# Patient Record
Sex: FEMALE | Race: WHITE | NOT HISPANIC OR LATINO | Employment: OTHER | URBAN - METROPOLITAN AREA
[De-identification: names, ages, dates, MRNs, and addresses within clinical notes are randomized per-mention and may not be internally consistent; named-entity substitution may affect disease eponyms.]

---

## 2017-01-31 ENCOUNTER — OFFICE VISIT (OUTPATIENT)
Dept: OBSTETRICS AND GYNECOLOGY | Facility: CLINIC | Age: 67
End: 2017-01-31
Payer: MEDICARE

## 2017-01-31 VITALS
BODY MASS INDEX: 30.11 KG/M2 | HEIGHT: 66 IN | SYSTOLIC BLOOD PRESSURE: 118 MMHG | WEIGHT: 187.38 LBS | DIASTOLIC BLOOD PRESSURE: 64 MMHG

## 2017-01-31 DIAGNOSIS — Z78.0 POSTMENOPAUSE: ICD-10-CM

## 2017-01-31 DIAGNOSIS — Z13.820 SCREENING FOR OSTEOPOROSIS: ICD-10-CM

## 2017-01-31 DIAGNOSIS — Z01.419 VISIT FOR GYNECOLOGIC EXAMINATION: Primary | ICD-10-CM

## 2017-01-31 PROCEDURE — 99213 OFFICE O/P EST LOW 20 MIN: CPT | Mod: PBBFAC | Performed by: NURSE PRACTITIONER

## 2017-01-31 PROCEDURE — G0101 CA SCREEN;PELVIC/BREAST EXAM: HCPCS | Mod: S$PBB,,, | Performed by: NURSE PRACTITIONER

## 2017-01-31 PROCEDURE — 99999 PR PBB SHADOW E&M-EST. PATIENT-LVL III: CPT | Mod: PBBFAC,,, | Performed by: NURSE PRACTITIONER

## 2017-01-31 NOTE — PROGRESS NOTES
HISTORY OF PRESENT ILLNESS:    Dana Cook is a 66 y.o. female , presents for a routine exam and has no gyn complaints.    -Main c/o are fatigue and eye problems.     Past Medical History   Diagnosis Date    Anxiety     Colon polyps     Depression     Dry eyes     Dry mouth     Dupuytren's contracture     Hypertension     left arm fracture      hairline    Osteopenia     Sarcoidosis        Past Surgical History   Procedure Laterality Date    Wrist surgery       left    Laparoscopy for possible endometriosis           MEDICATIONS AND ALLERGIES:      Current Outpatient Prescriptions:     ascorbic acid (VITAMIN C) 1000 MG tablet, , Disp: , Rfl:     buPROPion (WELLBUTRIN XL) 150 MG TB24 tablet, TAKE 1 TABLET BY MOUTH EVERY DAY, Disp: 90 tablet, Rfl: 0    chlorthalidone (HYGROTEN) 25 MG Tab, TAKE 1 TABLET BY MOUTH EVERY DAY, Disp: 90 tablet, Rfl: 3    cyanocobalamin, vitamin B-12, (VITAMIN B-12) 1,000 mcg Lozg, , Disp: , Rfl:     ferrous sulfate (IRON) 325 mg (65 mg iron) Tab, Take by mouth. 1 Tablet Oral Every day, Disp: , Rfl:     glucosamine-chondroitin 500-400 mg tablet, Take 1 tablet by mouth 3 (three) times daily., Disp: , Rfl:     multivitamin with minerals (MULTIPLE VITAMIN-MINERALS) tablet, Take by mouth. 1 Tablet Oral Every day, Disp: , Rfl:     omega-3 fatty acids-vitamin E (FISH OIL) 1,000 mg Cap, , Disp: , Rfl:     red yeast rice 600 mg Cap, Take by mouth., Disp: , Rfl:     SUBHASH'S WORT/S.GINSGR (SUBHASH'S WORT-SIBER.GINSENG) 450-90 mg Tab, , Disp: , Rfl:     vitamin E 1000 UNIT capsule, Take by mouth. 1 Capsule Oral Every day, Disp: , Rfl:     Review of patient's allergies indicates:   Allergen Reactions    Other Other (See Comments)     Allergy to tape.        Family History   Problem Relation Age of Onset    Cancer Father      ? type    Leukemia Paternal Grandmother     Breast cancer Paternal Aunt      maternal aunt    Cancer Paternal Aunt      cervical     Cervical cancer Paternal Aunt     Uterine cancer Sister     Basal cell carcinoma Sister      brother    Uterine cancer Maternal Grandmother     Breast cancer Maternal Aunt     Colon cancer Neg Hx        Social History     Social History    Marital status:      Spouse name: N/A    Number of children: 1    Years of education: N/A     Occupational History    retired      Part time security work     Social History Main Topics    Smoking status: Never Smoker    Smokeless tobacco: Never Used    Alcohol use 3.0 - 3.6 oz/week     5 - 6 Standard drinks or equivalent per week      Comment: wine every night    Drug use: No    Sexual activity: Not Currently     Partners: Male      Comment: Not x 10yrs     Other Topics Concern    Not on file     Social History Narrative       OB HISTORY: Number of vaginal deliveries:1     COMPREHENSIVE GYN HISTORY:  PAP History: Denies abnormal Paps. LAST PAP 1-27-15 NORMAL.   Infection History: Denies STDs. Denies PID.  Benign History: Denies uterine fibroids. Denies ovarian cysts. Reports endometriosis. Denies other conditions.  Cancer History: Denies cervical cancer. Denies uterine cancer or hyperplasia. Denies ovarian cancer. Denies vulvar cancer or pre-cancer. Denies vaginal cancer or pre-cancer. Denies breast cancer. Denies colon cancer.  Sexual Activity History: Denies currently being sexually active  Menstrual History: Denies menses. Pt is : 1996, not on HRT.     ROS:  GENERAL: No weight changes. No swelling. + FATIGUE. No fever.  CARDIOVASCULAR: No chest pain. No shortness of breath. No leg cramps.   NEUROLOGICAL: No headaches. No vision changes.  BREASTS: No pain. No lumps. No discharge.  ABDOMEN: No pain. No nausea. No vomiting. No diarrhea. No constipation.  REPRODUCTIVE: No abnormal bleeding.   VULVA: No pain. No lesions. No itching.  VAGINA: No relaxation. No itching. No odor. No discharge. No lesions.  URINARY: No incontinence. No nocturia. No frequency.  "No dysuria.    Visit Vitals    /64    Ht 5' 5.5" (1.664 m)    Wt 85 kg (187 lb 6.3 oz)    BMI 30.71 kg/m2       PE:  APPEARANCE: Well nourished, well developed, in no acute distress.  AFFECT: WNL, alert and oriented x 3.  SKIN: No hirsutism or acne.  NECK: Neck symmetric without masses or thyromegaly.  NODES: No inguinal, cervical, axillary or femoral lymph node enlargement.  CHEST: Good respiratory effort.   ABDOMEN: Soft. No tenderness or masses. No hepatosplenomegaly. No hernias.  BREASTS: Symmetrical, no skin changes or visible lesions. No palpable masses, nipple discharge bilaterally.  PELVIC: ATROPHIC EXTERNAL FEMALE GENITALIA without lesions. Normal hair distribution. Adequate perineal body, normal urethral meatus. VAGINA DRY / ATROPHIC without lesions or discharge. CERVIX STENOTIC without lesions, discharge or tenderness. No significant cystocele or rectocele. Bimanual exam shows uterus to be normal size, regular, mobile and nontender. Adnexa without masses or tenderness.  RECTAL: Rectovaginal exam confirms above with normal sphincter tone, no masses.  EXTREMITIES: No edema.    DIAGNOSIS:  1. Visit for gynecologic examination    2. Postmenopause        PLAN:    MEDICATIONS PRESCRIBED:  None    LABS AND TESTS ORDERED  BMD Test  Up to date on mammogram and colon screening ([facility])    COUNSELING:  The patient was counseled today on:  -osteoporosis prevention, calcium supplementation, regular weight bearing exercise;  -A.C.S. Pap and pelvic exam guidelines (pap every 3 years, no pap after age 65) and recommendations for yearly mammogram;  -to see her PCP for other health maintenance.    FOLLOW-UP with me annually.     "

## 2017-02-07 ENCOUNTER — LAB VISIT (OUTPATIENT)
Dept: LAB | Facility: HOSPITAL | Age: 67
End: 2017-02-07
Attending: INTERNAL MEDICINE
Payer: MEDICARE

## 2017-02-07 DIAGNOSIS — E78.00 ELEVATED CHOLESTEROL: ICD-10-CM

## 2017-02-07 DIAGNOSIS — I10 ESSENTIAL HYPERTENSION: ICD-10-CM

## 2017-02-07 DIAGNOSIS — Z11.59 NEED FOR HEPATITIS C SCREENING TEST: ICD-10-CM

## 2017-02-07 DIAGNOSIS — E78.5 HLD (HYPERLIPIDEMIA): ICD-10-CM

## 2017-02-07 DIAGNOSIS — F32.A DEPRESSION, UNSPECIFIED DEPRESSION TYPE: ICD-10-CM

## 2017-02-07 LAB
ALBUMIN SERPL BCP-MCNC: 3.8 G/DL
ALP SERPL-CCNC: 75 U/L
ALT SERPL W/O P-5'-P-CCNC: 27 U/L
ANION GAP SERPL CALC-SCNC: 11 MMOL/L
AST SERPL-CCNC: 27 U/L
BASOPHILS # BLD AUTO: 0.03 K/UL
BASOPHILS NFR BLD: 0.6 %
BILIRUB SERPL-MCNC: 0.5 MG/DL
BUN SERPL-MCNC: 12 MG/DL
CALCIUM SERPL-MCNC: 9.7 MG/DL
CHLORIDE SERPL-SCNC: 103 MMOL/L
CHOLEST/HDLC SERPL: 4.4 {RATIO}
CHOLEST/HDLC SERPL: 4.4 {RATIO}
CO2 SERPL-SCNC: 26 MMOL/L
CREAT SERPL-MCNC: 1 MG/DL
DIFFERENTIAL METHOD: NORMAL
EOSINOPHIL # BLD AUTO: 0.1 K/UL
EOSINOPHIL NFR BLD: 2.8 %
ERYTHROCYTE [DISTWIDTH] IN BLOOD BY AUTOMATED COUNT: 13.5 %
EST. GFR  (AFRICAN AMERICAN): >60 ML/MIN/1.73 M^2
EST. GFR  (NON AFRICAN AMERICAN): 58.8 ML/MIN/1.73 M^2
GLUCOSE SERPL-MCNC: 99 MG/DL
HCT VFR BLD AUTO: 40.7 %
HCV AB SERPL QL IA: NEGATIVE
HDL/CHOLESTEROL RATIO: 22.7 %
HDL/CHOLESTEROL RATIO: 22.7 %
HDLC SERPL-MCNC: 264 MG/DL
HDLC SERPL-MCNC: 264 MG/DL
HDLC SERPL-MCNC: 60 MG/DL
HDLC SERPL-MCNC: 60 MG/DL
HGB BLD-MCNC: 13.2 G/DL
LDLC SERPL CALC-MCNC: 168.6 MG/DL
LDLC SERPL CALC-MCNC: 168.6 MG/DL
LYMPHOCYTES # BLD AUTO: 2.1 K/UL
LYMPHOCYTES NFR BLD: 41.7 %
MCH RBC QN AUTO: 31 PG
MCHC RBC AUTO-ENTMCNC: 32.4 %
MCV RBC AUTO: 96 FL
MONOCYTES # BLD AUTO: 0.4 K/UL
MONOCYTES NFR BLD: 7.9 %
NEUTROPHILS # BLD AUTO: 2.3 K/UL
NEUTROPHILS NFR BLD: 46.8 %
NONHDLC SERPL-MCNC: 204 MG/DL
NONHDLC SERPL-MCNC: 204 MG/DL
PLATELET # BLD AUTO: 321 K/UL
PMV BLD AUTO: 9.7 FL
POTASSIUM SERPL-SCNC: 3.7 MMOL/L
PROT SERPL-MCNC: 7.6 G/DL
RBC # BLD AUTO: 4.26 M/UL
SODIUM SERPL-SCNC: 140 MMOL/L
TRIGL SERPL-MCNC: 177 MG/DL
TRIGL SERPL-MCNC: 177 MG/DL
TSH SERPL DL<=0.005 MIU/L-ACNC: 1.57 UIU/ML
WBC # BLD AUTO: 4.94 K/UL

## 2017-02-07 PROCEDURE — 80053 COMPREHEN METABOLIC PANEL: CPT

## 2017-02-07 PROCEDURE — 80061 LIPID PANEL: CPT

## 2017-02-07 PROCEDURE — 84443 ASSAY THYROID STIM HORMONE: CPT

## 2017-02-07 PROCEDURE — 36415 COLL VENOUS BLD VENIPUNCTURE: CPT | Mod: PO

## 2017-02-07 PROCEDURE — 86803 HEPATITIS C AB TEST: CPT

## 2017-02-07 PROCEDURE — 85025 COMPLETE CBC W/AUTO DIFF WBC: CPT

## 2017-02-09 ENCOUNTER — OFFICE VISIT (OUTPATIENT)
Dept: INTERNAL MEDICINE | Facility: CLINIC | Age: 67
End: 2017-02-09
Payer: MEDICARE

## 2017-02-09 VITALS
DIASTOLIC BLOOD PRESSURE: 88 MMHG | HEART RATE: 62 BPM | WEIGHT: 189.63 LBS | TEMPERATURE: 98 F | SYSTOLIC BLOOD PRESSURE: 136 MMHG | BODY MASS INDEX: 31.07 KG/M2 | RESPIRATION RATE: 18 BRPM

## 2017-02-09 DIAGNOSIS — D86.9 SARCOIDOSIS: ICD-10-CM

## 2017-02-09 DIAGNOSIS — M85.80 OSTEOPENIA: ICD-10-CM

## 2017-02-09 DIAGNOSIS — E78.2 MIXED HYPERLIPIDEMIA: ICD-10-CM

## 2017-02-09 DIAGNOSIS — Z00.00 ANNUAL PHYSICAL EXAM: Primary | ICD-10-CM

## 2017-02-09 DIAGNOSIS — I10 ESSENTIAL HYPERTENSION: ICD-10-CM

## 2017-02-09 DIAGNOSIS — F32.A DEPRESSION, UNSPECIFIED DEPRESSION TYPE: ICD-10-CM

## 2017-02-09 PROCEDURE — 99397 PER PM REEVAL EST PAT 65+ YR: CPT | Mod: S$PBB,,, | Performed by: INTERNAL MEDICINE

## 2017-02-09 PROCEDURE — 99999 PR PBB SHADOW E&M-EST. PATIENT-LVL III: CPT | Mod: PBBFAC,,, | Performed by: INTERNAL MEDICINE

## 2017-02-09 PROCEDURE — 90732 PPSV23 VACC 2 YRS+ SUBQ/IM: CPT | Mod: PBBFAC,PO | Performed by: INTERNAL MEDICINE

## 2017-02-09 PROCEDURE — 99213 OFFICE O/P EST LOW 20 MIN: CPT | Mod: PBBFAC,PO | Performed by: INTERNAL MEDICINE

## 2017-02-09 RX ORDER — BUPROPION HYDROCHLORIDE 300 MG/1
300 TABLET ORAL DAILY
Qty: 30 TABLET | Refills: 11 | Status: SHIPPED | OUTPATIENT
Start: 2017-02-09 | End: 2018-02-01 | Stop reason: SDUPTHER

## 2017-02-09 RX ORDER — BUPROPION HYDROCHLORIDE 300 MG/1
300 TABLET ORAL DAILY
Qty: 30 TABLET | Refills: 11 | Status: SHIPPED | OUTPATIENT
Start: 2017-02-09 | End: 2017-02-09 | Stop reason: SDUPTHER

## 2017-02-09 NOTE — PROGRESS NOTES
Subjective:       Patient ID: Dana Cook is a 66 y.o. female.    Chief Complaint: Annual Exam    Patient is a 66 y.o.female who presents today for annual.    Cholesterol: reviewed  Vaccines: Influenza (yearly); Tetanus (2016); Pneumovax (done); Zoster (done at age 60); Prevnar ( done)  Eye exam: up to date  Mammogram: nov 2016  Gyn exam: jan 2015  Colonoscopy: 2014; due in five years  Exercise: active  Diet: healthy      A bit more depressed recently. Feels like wellbutrin isn't working as well.   Review of Systems   Constitutional: Negative for appetite change, chills, diaphoresis, fatigue and fever.   HENT: Negative for congestion, dental problem, ear discharge, ear pain, hearing loss, postnasal drip, sinus pressure and sore throat.    Eyes: Negative for discharge, redness and itching.   Respiratory: Negative for cough, chest tightness, shortness of breath and wheezing.    Cardiovascular: Negative for chest pain, palpitations and leg swelling.   Gastrointestinal: Negative for abdominal pain, constipation, diarrhea, nausea and vomiting.   Endocrine: Negative for cold intolerance and heat intolerance.   Genitourinary: Negative for difficulty urinating, frequency, hematuria and urgency.   Musculoskeletal: Negative for arthralgias, back pain, gait problem, myalgias and neck pain.   Skin: Negative for color change and rash.   Neurological: Negative for dizziness, syncope and headaches.   Hematological: Negative for adenopathy.   Psychiatric/Behavioral: Positive for dysphoric mood. Negative for behavioral problems and sleep disturbance. The patient is not nervous/anxious.        Objective:      Physical Exam   Constitutional: She is oriented to person, place, and time. She appears well-developed and well-nourished. No distress.   HENT:   Head: Normocephalic and atraumatic.   Right Ear: External ear normal.   Left Ear: External ear normal.   Eyes: Conjunctivae and EOM are normal. Pupils are equal, round, and reactive  to light. Right eye exhibits no discharge. Left eye exhibits no discharge. No scleral icterus.   Neck: Normal range of motion. Neck supple. No JVD present. No thyromegaly present.   Cardiovascular: Normal rate, regular rhythm, normal heart sounds and intact distal pulses.  Exam reveals no gallop and no friction rub.    No murmur heard.  Pulmonary/Chest: Effort normal and breath sounds normal. No stridor. No respiratory distress. She has no wheezes. She has no rales. She exhibits no tenderness.   Abdominal: Soft. Bowel sounds are normal. She exhibits no distension. There is no tenderness. There is no rebound.   Musculoskeletal: Normal range of motion. She exhibits no edema or tenderness.   Lymphadenopathy:     She has no cervical adenopathy.   Neurological: She is alert and oriented to person, place, and time.   Skin: Skin is warm. No rash noted. She is not diaphoretic. No erythema.   Psychiatric: She has a normal mood and affect. Her behavior is normal.   Nursing note and vitals reviewed.      Assessment and Plan:       1. Annual physical exam  - labs reviewed  - mammo up to date  - gyn exam up to date  - dexa recently done  - colonoscopy up to date  - Pneumococcal Polysaccharide Vaccine (23 Valent) (SQ/IM)    2. Depression, unspecified depression type  - increase wellbutrin to 300 mg; notify clinic of mood in one month  - buPROPion (WELLBUTRIN XL) 300 MG 24 hr tablet; Take 1 tablet (300 mg total) by mouth once daily.  Dispense: 30 tablet; Refill: 11    3. Osteopenia  - recent dexa revealed osteopenia; recommendation is to start fosamax; pt will wait to speak to gyn as they ordered the screen    4. Sarcoidosis  - stable    5. HLD: improving; discussed low cholesterol diet and exercise; taking red yeast rice    6. HTN: stable on hygroten        Return in about 6 months (around 8/9/2017).

## 2017-02-09 NOTE — MR AVS SNAPSHOT
[facility]   [facility]  [place] [address]  Phone: [phone]  Fax: [phone]                  Dana Cook   2017 8:00 AM   Office Visit    Description:  Female : 1950   Provider:  Isabel Hernandez DO   Department:  [facility]           Reason for Visit     Annual Exam           Diagnoses this Visit        Comments    Annual physical exam    -  Primary     Depression, unspecified depression type         Osteopenia         Sarcoidosis         Mixed hyperlipidemia         Essential hypertension                To Do List           Goals (5 Years of Data)     None      Follow-Up and Disposition     Return in about 6 months (around 2017).       These Medications        Disp Refills Start End    buPROPion (WELLBUTRIN XL) 300 MG 24 hr tablet 30 tablet 11 2017     Take 1 tablet (300 mg total) by mouth once daily. - Oral    Pharmacy: [id number] [place]  [address] AT [facility] [facility] [place] Ph #: [phone]         [facility] On Call     [facility] On Call Nurse Care Line -  Assistance  Registered nurses in the [facility] On Call Center provide clinical advisement, health education, appointment booking, and other advisory services.  Call for this free service at [phone].             Medications           Message regarding Medications     Verify the changes and/or additions to your medication regime listed below are the same as discussed with your clinician today.  If any of these changes or additions are incorrect, please notify your healthcare provider.        START taking these NEW medications        Refills    buPROPion (WELLBUTRIN XL) 300 MG 24 hr tablet 11    Sig: Take 1 tablet (300 mg total) by mouth once daily.    Class: Normal    Route: Oral           Verify that the below list of medications is an accurate representation of the medications you are currently taking.  If none reported,  the list may be blank. If incorrect, please contact your healthcare provider. Carry this list with you in case of emergency.           Current Medications     ascorbic acid (VITAMIN C) 1000 MG tablet     chlorthalidone (HYGROTEN) 25 MG Tab TAKE 1 TABLET BY MOUTH EVERY DAY    cyanocobalamin, vitamin B-12, (VITAMIN B-12) 1,000 mcg Lozg     ferrous sulfate (IRON) 325 mg (65 mg iron) Tab Take by mouth. 1 Tablet Oral Every day    glucosamine-chondroitin 500-400 mg tablet Take 1 tablet by mouth 3 (three) times daily.    multivitamin with minerals (MULTIPLE VITAMIN-MINERALS) tablet Take by mouth. 1 Tablet Oral Every day    omega-3 fatty acids-vitamin E (FISH OIL) 1,000 mg Cap     red yeast rice 600 mg Cap Take by mouth.    SUBHASH'S WORT/S.GINSGR (SUBHASH'S WORT-SIBER.GINSENG) 450-90 mg Tab     vitamin E 1000 UNIT capsule Take by mouth. 1 Capsule Oral Every day    buPROPion (WELLBUTRIN XL) 300 MG 24 hr tablet Take 1 tablet (300 mg total) by mouth once daily.           Clinical Reference Information           Your Vitals Were     BP Pulse Temp Resp Weight BMI    136/88 (BP Location: Right arm, Patient Position: Sitting, BP Method: Manual) 62 98.3 °F (36.8 °C) (Oral) 18 86 kg (189 lb 9.5 oz) 31.07 kg/m2      Blood Pressure          Most Recent Value    BP  136/88      Allergies as of 2/9/2017     Other      Immunizations Administered on Date of Encounter - 2/9/2017     Name Date Dose VIS Date Route    Pneumococcal Polysaccharide - 23 Valent 2/9/2017 0.5 mL 4/24/2015 Intramuscular      Orders Placed During Today's Visit      Normal Orders This Visit    Pneumococcal Polysaccharide Vaccine (23 Valent) (SQ/IM)       [facility] Sign-Up     Activating your [facility] account is as easy as 1-2-3!     1) Visit [email or web address], select Sign Up Now, enter this activation code and your date of birth, then select Next.  [email or web address]  Expires: 3/26/2017  8:05 AM      2) Create a username and password to use when you visit [facility] in  the future and select a security question in case you lose your password and select Next.    3) Enter your e-mail address and click Sign Up!    Additional Information  If you have questions, please e-mail [email or web address] or call [phone] to talk to our [facility] staff. Remember, [facility] is NOT to be used for urgent needs. For medical emergencies, dial 911.         Language Assistance Services     ATTENTION: Language assistance services are available, free of charge. Please call [phone].      ATENCIÓN: Si habla español, tiene a arellano disposición servicios gratuitos de asistencia lingüística. Llame al [phone].     CHÚ Ý: N?u b?n nói Ti?ng Vi?t, có các d?ch v? h? tr? ngôn ng? mi?n phí dành cho b?n. G?i s? [phone].         [facility] complies with applicable Federal civil rights laws and does not discriminate on the basis of race, color, national origin, age, disability, or sex.

## 2017-02-16 ENCOUNTER — TELEPHONE (OUTPATIENT)
Dept: OBSTETRICS AND GYNECOLOGY | Facility: CLINIC | Age: 67
End: 2017-02-16

## 2017-02-16 NOTE — TELEPHONE ENCOUNTER
----- Message from Joe Masterson sent at 2/16/2017  8:46 AM CST -----  Contact: pt  x_ 1st Request   _ 2nd Request   _ 3rd Request     Who: JASON JACK [[id number]]    Why: pt is requesting a call back in reference to getting a prescription.  She will not be home today and is requesting a call back tomorrow.    What Number to Call Back: [phone]    When to Expect a call back: (Before the end of the day)   -- if call after 3:00 call back will be tomorrow.    PHONE CALL: LM that I will be out of the office tomorrow and will call on Monday. Maria G Burch NP    PHONE CALL: Discussed the results of her BMD test. Pt states her PCP did not prescribe medication. Advised to increase her weight bearing exercise, take the recommended Calcium and Vit D and will repeat the test in 2 years. If the results continue to decrease or she develops osteoporosis, would refer to endocrinology. Maria G Burch NP

## 2017-02-21 ENCOUNTER — TELEPHONE (OUTPATIENT)
Dept: INTERNAL MEDICINE | Facility: CLINIC | Age: 67
End: 2017-02-21

## 2017-02-21 DIAGNOSIS — M81.0 OSTEOPOROSIS: Primary | ICD-10-CM

## 2017-02-21 NOTE — TELEPHONE ENCOUNTER
Treatment recommendations are calcium 1200 mg daily, vitamin D 1000 units daily and we can start a medication called fosamax to take once a week to aid in bone turnover. Biggest side effect is reflux. She has to take it on a empty stomach with a full glass of water and sit up for thirty min after. If she is agreeable, i can send to her pharm

## 2017-02-21 NOTE — TELEPHONE ENCOUNTER
Spoke to pt who stated that she spoke with her GYN who stated that pt needs to talk to pcp about osteoporosis and treatment for it.     Pt also reported reaction to pneumovax, stated that she experienced arm swelling and fever and chills. Pt stated that she took ibuprofen and benadryl to help. Fever and chills went away after 2 days and arm swelling went down after 4 days.

## 2017-02-21 NOTE — TELEPHONE ENCOUNTER
----- Message from Thao Edmonds sent at 2/21/2017  8:16 AM CST -----  Contact: Home: [phone]   When ask the reference to the call. She replied, Call her back, she need to speak with you. I ask for ref again and this is all I got from her.

## 2017-02-23 NOTE — TELEPHONE ENCOUNTER
Spoke to pt and informed of Dr. Fagan's recommendations. Pt is not agreeable to fosamax. Please advise of another medication.

## 2017-02-24 ENCOUNTER — TELEPHONE (OUTPATIENT)
Dept: INTERNAL MEDICINE | Facility: CLINIC | Age: 67
End: 2017-02-24

## 2017-02-24 NOTE — TELEPHONE ENCOUNTER
If she doesn't want fosamax, the alternative is prolia or reclast which are injectable/ infusions. We can do an endocrine referral if she is interested in either of those. If she doesn't want that either, recommendation is at least calcium 1200 mg daily and vitamin D 2000 units daily

## 2017-02-24 NOTE — TELEPHONE ENCOUNTER
Spoke to pt. Pt advised of recommendations.   Pt ok with seeing endocrine for options of either prolia or reclast.

## 2017-02-24 NOTE — TELEPHONE ENCOUNTER
----- Message from Soledad Castro sent at 2/24/2017  1:06 PM CST -----  Contact: Pt at [phone]  Patient is returning a phone call.  Who left a message for the patient: Khadijah Martinez patient know what this is regarding:    Comments:

## 2017-03-10 ENCOUNTER — OFFICE VISIT (OUTPATIENT)
Dept: ENDOCRINOLOGY | Facility: CLINIC | Age: 67
End: 2017-03-10
Payer: MEDICARE

## 2017-03-10 VITALS
HEART RATE: 80 BPM | BODY MASS INDEX: 31.11 KG/M2 | WEIGHT: 186.75 LBS | HEIGHT: 65 IN | SYSTOLIC BLOOD PRESSURE: 138 MMHG | DIASTOLIC BLOOD PRESSURE: 80 MMHG

## 2017-03-10 DIAGNOSIS — M81.0 OSTEOPOROSIS: Primary | ICD-10-CM

## 2017-03-10 DIAGNOSIS — E78.2 MIXED HYPERLIPIDEMIA: ICD-10-CM

## 2017-03-10 DIAGNOSIS — E66.9 OBESITY (BMI 30.0-34.9): ICD-10-CM

## 2017-03-10 PROCEDURE — 99999 PR PBB SHADOW E&M-EST. PATIENT-LVL IV: CPT | Mod: PBBFAC,GC,, | Performed by: INTERNAL MEDICINE

## 2017-03-10 PROCEDURE — 99214 OFFICE O/P EST MOD 30 MIN: CPT | Mod: PBBFAC | Performed by: INTERNAL MEDICINE

## 2017-03-10 PROCEDURE — 99214 OFFICE O/P EST MOD 30 MIN: CPT | Mod: S$PBB,GC,, | Performed by: INTERNAL MEDICINE

## 2017-03-10 RX ORDER — ALENDRONATE SODIUM 70 MG/1
70 TABLET ORAL
Qty: 12 TABLET | Refills: 3 | Status: SHIPPED | OUTPATIENT
Start: 2017-03-10 | End: 2017-05-05 | Stop reason: SDUPTHER

## 2017-03-10 NOTE — PROGRESS NOTES
Subjective:       Patient ID: Dana Cook is a 66 y.o. female.    Chief Complaint: Osteoporosis    HPI Comments: Ms. Cook is presenting as a new patient for osteoporosis.    PMH includes depression, sarcoidosis.  Referred by PCP, Dr. Hernandez.  Recently had DXA in Feb 2017 which suggested osteopenia of the femoral neck.  FRAX score of osteoporitic fracture warranted treatment with 3% risk of hip fracture, 25% of osteoporotic fracture in 10 years.. Has never previously been on osteoporosis treatment.    Reports history of two fractures in wrist in 2003 and 2016.  First tripped over side-walk recent fracture in 2016 occurred at home.   Mom with history of hip fracture    Menopause at age 45. Was on HRT for maybe 1 year.    Drinks 2 alcoholic beverages per day.     Denies history of chronic steroids. No evidence of thyroid disease.    Does not take calcium and vit D.    Obesity BMI 31, Does no formal exercise now.    Dyslipidemia with elevated LDL and TG    Works previously as .    Review of Systems   Constitutional: Positive for fatigue. Negative for unexpected weight change.   Eyes: Negative for visual disturbance.   Respiratory: Negative for shortness of breath.    Cardiovascular: Negative for chest pain.   Gastrointestinal: Negative for abdominal pain.   Musculoskeletal: Negative for arthralgias.   Skin: Negative for wound.   Neurological: Negative for headaches.   Hematological: Does not bruise/bleed easily.   Psychiatric/Behavioral: Negative for sleep disturbance.       Objective:      Physical Exam   Constitutional: She appears well-developed and well-nourished.   HENT:   Head: Normocephalic.   Neck: Normal range of motion. Neck supple. No thyromegaly present.   Cardiovascular: Normal rate.    Pulmonary/Chest: Effort normal.   Abdominal: Soft.   Musculoskeletal: Normal range of motion. She exhibits no edema.   Neurological: She is alert.   Skin: Skin is warm and dry.    Psychiatric: She has a normal mood and affect.   Vitals reviewed.      Assessment:       1. Osteoporosis    2. Obesity (BMI 30.0-34.9)    3. Mixed hyperlipidemia        Plan:       1. Osteoporosis given history of wrist fragility fracture and elevated FRAX scores. Will begin fosamax 70mg weekly. Explained risk factors including esophagitis, jaw osteonecrosis, hypocalcemia. Plan to repeat DXA in 2 year.  2. Recommend exercise, weight loss.  3. Management per PCP. Lipid panel notable for elevated LDL and TG    Follow-up in 1 year  Discussed with Dr. Sierra Otoole MD

## 2017-03-10 NOTE — MR AVS SNAPSHOT
[facility]  [address]  [place] [address]  Phone: [phone]  Fax: [phone]                  Dana Cook   3/10/2017 8:00 AM   Office Visit    Description:  Female : 1950   Provider:  Brenton Otoole MD   Department:  [facility]           Reason for Visit     Osteoporosis           Diagnoses this Visit        Comments    Osteoporosis    -  Primary     Obesity (BMI 30.0-34.9)         Mixed hyperlipidemia                To Do List           Goals (5 Years of Data)     None      Follow-Up and Disposition     Return in about 1 year (around 3/10/2018).       These Medications        Disp Refills Start End    alendronate (FOSAMAX) 70 MG tablet 12 tablet 3 3/10/2017 3/10/2018    Take 1 tablet (70 mg total) by mouth every 7 days. - Oral    Pharmacy: [facility] [address] [place] [address] [place] [address] [address] AT [facility] [address] [place] Ph #: [phone]         [facility] On Call     [facility] On Call Nurse Care Line -  Assistance  Registered nurses in the [facility] provide clinical advisement, health education, appointment booking, and other advisory services.  Call for this free service at [phone].             Medications           Message regarding Medications     Verify the changes and/or additions to your medication regime listed below are the same as discussed with your clinician today.  If any of these changes or additions are incorrect, please notify your healthcare provider.        START taking these NEW medications        Refills    alendronate (FOSAMAX) 70 MG tablet 3    Sig: Take 1 tablet (70 mg total) by mouth every 7 days.    Class: Normal    Route: Oral           Verify that the below list of medications is an accurate representation of the medications you are currently taking.  If none reported, the list may be blank. If incorrect, please contact your healthcare provider. Carry this list with you in case of  "emergency.           Current Medications     ascorbic acid (VITAMIN C) 1000 MG tablet     buPROPion (WELLBUTRIN XL) 300 MG 24 hr tablet Take 1 tablet (300 mg total) by mouth once daily.    chlorthalidone (HYGROTEN) 25 MG Tab TAKE 1 TABLET BY MOUTH EVERY DAY    cyanocobalamin, vitamin B-12, (VITAMIN B-12) 1,000 mcg Lozg     ferrous sulfate (IRON) 325 mg (65 mg iron) Tab Take by mouth. 1 Tablet Oral Every day    glucosamine-chondroitin 500-400 mg tablet Take 1 tablet by mouth 3 (three) times daily.    multivitamin with minerals (MULTIPLE VITAMIN-MINERALS) tablet Take by mouth. 1 Tablet Oral Every day    omega-3 fatty acids-vitamin E (FISH OIL) 1,000 mg Cap     red yeast rice 600 mg Cap Take by mouth.    SUBHASH'S WORT/S.GINSGR (SUBHASH'S WORT-SIBER.GINSENG) 450-90 mg Tab     vitamin E 1000 UNIT capsule Take by mouth. 1 Capsule Oral Every day    alendronate (FOSAMAX) 70 MG tablet Take 1 tablet (70 mg total) by mouth every 7 days.           Clinical Reference Information           Your Vitals Were     BP Pulse Height Weight BMI    138/80 80 5' 5" (1.651 m) 84.7 kg (186 lb 11.7 oz) 31.07 kg/m2      Blood Pressure          Most Recent Value    BP  138/80      Allergies as of 3/10/2017     Other      Immunizations Administered on Date of Encounter - 3/10/2017     None      [facility] Sign-Up     Activating your [facility] account is as easy as 1-2-3!     1) Visit [email or web address], select Sign Up Now, enter this activation code and your date of birth, then select Next.  [email or web address]  Expires: 3/26/2017  8:05 AM      2) Create a username and password to use when you visit [facility] in the future and select a security question in case you lose your password and select Next.    3) Enter your e-mail address and click Sign Up!    Additional Information  If you have questions, please e-mail [email or web address] or call [phone] to talk to our [facility] staff. Remember, [facility] is NOT to be used for urgent needs. For " medical emergencies, dial 911.         Instructions    Take calcium 1200mg daily and Vit D 1000 IU daily       Language Assistance Services     ATTENTION: Language assistance services are available, free of charge. Please call [phone].      ATENCIÓN: Si tino dang, tiene a arellaon disposición servicios gratuitos de asistencia lingüística. Llame al [phone].     CHÚ Ý: N?u b?n nói Ti?ng Vi?t, có các d?ch v? h? tr? ngôn ng? mi?n phí dành cho b?n. G?i s? [phone].         [facility] complies with applicable Federal civil rights laws and does not discriminate on the basis of race, color, national origin, age, disability, or sex.

## 2017-03-10 NOTE — PROGRESS NOTES
I have reviewed and concur with the resident's history, physical, assessment, and plan.  I have personally interviewed the patient.     Will start weekly alendronate.

## 2017-03-25 RX ORDER — BUPROPION HYDROCHLORIDE 150 MG/1
TABLET ORAL
Qty: 90 TABLET | Refills: 0 | Status: SHIPPED | OUTPATIENT
Start: 2017-03-25 | End: 2017-06-22 | Stop reason: SDUPTHER

## 2017-05-04 ENCOUNTER — PATIENT MESSAGE (OUTPATIENT)
Dept: ENDOCRINOLOGY | Facility: CLINIC | Age: 67
End: 2017-05-04

## 2017-05-04 DIAGNOSIS — M81.0 OSTEOPOROSIS, UNSPECIFIED OSTEOPOROSIS TYPE, UNSPECIFIED PATHOLOGICAL FRACTURE PRESENCE: Primary | ICD-10-CM

## 2017-05-05 RX ORDER — ALENDRONATE SODIUM 70 MG/1
70 TABLET ORAL
Qty: 12 TABLET | Refills: 3 | Status: SHIPPED | OUTPATIENT
Start: 2017-05-05 | End: 2018-02-09 | Stop reason: SDUPTHER

## 2017-06-22 RX ORDER — BUPROPION HYDROCHLORIDE 150 MG/1
TABLET ORAL
Qty: 90 TABLET | Refills: 4 | Status: SHIPPED | OUTPATIENT
Start: 2017-06-22 | End: 2018-09-13 | Stop reason: SDUPTHER

## 2017-06-23 RX ORDER — CHLORTHALIDONE 25 MG/1
TABLET ORAL
Qty: 90 TABLET | Refills: 1 | Status: SHIPPED | OUTPATIENT
Start: 2017-06-23 | End: 2018-02-19 | Stop reason: SDUPTHER

## 2017-07-31 ENCOUNTER — TELEPHONE (OUTPATIENT)
Dept: INTERNAL MEDICINE | Facility: CLINIC | Age: 67
End: 2017-07-31

## 2017-07-31 NOTE — TELEPHONE ENCOUNTER
----- Message from Justina Castillo sent at 7/31/2017  8:56 AM CDT -----  Contact: patient- [phone]  Patient would like to know if she has ever been tested for Diabetes. Please call to advise.

## 2017-09-21 ENCOUNTER — TELEPHONE (OUTPATIENT)
Dept: OBSTETRICS AND GYNECOLOGY | Facility: CLINIC | Age: 67
End: 2017-09-21

## 2017-09-21 DIAGNOSIS — Z12.39 BREAST CANCER SCREENING: Primary | ICD-10-CM

## 2017-09-21 NOTE — TELEPHONE ENCOUNTER
----- Message from Cierra Blakely sent at 9/21/2017  7:55 AM CDT -----  Contact: Pt  Pt is calling to have mammo order placed in system and can be reached at [phone].    Thank you    Done GH

## 2017-10-30 ENCOUNTER — TELEPHONE (OUTPATIENT)
Dept: INTERNAL MEDICINE | Facility: CLINIC | Age: 67
End: 2017-10-30

## 2017-10-30 DIAGNOSIS — E78.5 HYPERLIPIDEMIA, UNSPECIFIED HYPERLIPIDEMIA TYPE: Primary | ICD-10-CM

## 2017-10-30 DIAGNOSIS — D86.9 SARCOID: ICD-10-CM

## 2017-10-30 NOTE — TELEPHONE ENCOUNTER
----- Message from Susie Young sent at 10/30/2017  2:42 PM CDT -----  Contact: fyi  Doctor appointment and lab have been scheduled.  Please link lab orders to the lab appointment.  Date of doctor appointment:  02/16/18  Physical or EP:  epp  Date of lab appointment:  02/09/18  Comments:

## 2017-11-20 ENCOUNTER — HOSPITAL ENCOUNTER (OUTPATIENT)
Dept: RADIOLOGY | Facility: HOSPITAL | Age: 67
Discharge: HOME | End: 2017-11-20
Attending: NURSE PRACTITIONER
Payer: MEDICARE

## 2017-11-20 VITALS — WEIGHT: 186 LBS | BODY MASS INDEX: 30.99 KG/M2 | HEIGHT: 65 IN

## 2017-11-20 DIAGNOSIS — Z12.39 BREAST CANCER SCREENING: ICD-10-CM

## 2017-11-20 PROCEDURE — 77067 SCR MAMMO BI INCL CAD: CPT | Mod: 26,GA,, | Performed by: RADIOLOGY

## 2017-11-20 PROCEDURE — 77063 BREAST TOMOSYNTHESIS BI: CPT | Mod: 26,GA,, | Performed by: RADIOLOGY

## 2017-11-20 PROCEDURE — 77067 SCR MAMMO BI INCL CAD: CPT | Mod: GA,TC

## 2018-02-01 DIAGNOSIS — F32.A DEPRESSION, UNSPECIFIED DEPRESSION TYPE: ICD-10-CM

## 2018-02-02 RX ORDER — BUPROPION HYDROCHLORIDE 300 MG/1
TABLET ORAL
Qty: 30 TABLET | Refills: 2 | Status: SHIPPED | OUTPATIENT
Start: 2018-02-02 | End: 2018-08-11 | Stop reason: SDUPTHER

## 2018-02-09 ENCOUNTER — LAB VISIT (OUTPATIENT)
Dept: LAB | Facility: HOSPITAL | Age: 68
End: 2018-02-09
Attending: INTERNAL MEDICINE
Payer: MEDICARE

## 2018-02-09 DIAGNOSIS — E78.5 HYPERLIPIDEMIA, UNSPECIFIED HYPERLIPIDEMIA TYPE: ICD-10-CM

## 2018-02-09 DIAGNOSIS — M81.0 OSTEOPOROSIS, UNSPECIFIED OSTEOPOROSIS TYPE, UNSPECIFIED PATHOLOGICAL FRACTURE PRESENCE: ICD-10-CM

## 2018-02-09 DIAGNOSIS — D86.9 SARCOID: ICD-10-CM

## 2018-02-09 LAB
ALBUMIN SERPL BCP-MCNC: 3.8 G/DL
ALP SERPL-CCNC: 63 U/L
ALT SERPL W/O P-5'-P-CCNC: 30 U/L
ANION GAP SERPL CALC-SCNC: 7 MMOL/L
AST SERPL-CCNC: 24 U/L
BASOPHILS # BLD AUTO: 0.05 K/UL
BASOPHILS NFR BLD: 1 %
BILIRUB SERPL-MCNC: 0.5 MG/DL
BUN SERPL-MCNC: 13 MG/DL
CALCIUM SERPL-MCNC: 9.7 MG/DL
CHLORIDE SERPL-SCNC: 100 MMOL/L
CHOLEST SERPL-MCNC: 297 MG/DL
CHOLEST/HDLC SERPL: 4 {RATIO}
CO2 SERPL-SCNC: 31 MMOL/L
CREAT SERPL-MCNC: 1 MG/DL
DIFFERENTIAL METHOD: ABNORMAL
EOSINOPHIL # BLD AUTO: 0.1 K/UL
EOSINOPHIL NFR BLD: 2.2 %
ERYTHROCYTE [DISTWIDTH] IN BLOOD BY AUTOMATED COUNT: 12.9 %
EST. GFR  (AFRICAN AMERICAN): >60 ML/MIN/1.73 M^2
EST. GFR  (NON AFRICAN AMERICAN): 58.4 ML/MIN/1.73 M^2
GLUCOSE SERPL-MCNC: 92 MG/DL
HCT VFR BLD AUTO: 39 %
HDLC SERPL-MCNC: 74 MG/DL
HDLC SERPL: 24.9 %
HGB BLD-MCNC: 13.2 G/DL
IMM GRANULOCYTES # BLD AUTO: 0.01 K/UL
IMM GRANULOCYTES NFR BLD AUTO: 0.2 %
LDLC SERPL CALC-MCNC: 186.2 MG/DL
LYMPHOCYTES # BLD AUTO: 2 K/UL
LYMPHOCYTES NFR BLD: 41.3 %
MCH RBC QN AUTO: 31.1 PG
MCHC RBC AUTO-ENTMCNC: 33.8 G/DL
MCV RBC AUTO: 92 FL
MONOCYTES # BLD AUTO: 0.5 K/UL
MONOCYTES NFR BLD: 10.2 %
NEUTROPHILS # BLD AUTO: 2.2 K/UL
NEUTROPHILS NFR BLD: 45.1 %
NONHDLC SERPL-MCNC: 223 MG/DL
NRBC BLD-RTO: 0 /100 WBC
PLATELET # BLD AUTO: 338 K/UL
PMV BLD AUTO: 9.4 FL
POTASSIUM SERPL-SCNC: 3.5 MMOL/L
PROT SERPL-MCNC: 7.9 G/DL
RBC # BLD AUTO: 4.24 M/UL
SODIUM SERPL-SCNC: 138 MMOL/L
TRIGL SERPL-MCNC: 184 MG/DL
TSH SERPL DL<=0.005 MIU/L-ACNC: 1.83 UIU/ML
WBC # BLD AUTO: 4.89 K/UL

## 2018-02-09 PROCEDURE — 80061 LIPID PANEL: CPT

## 2018-02-09 PROCEDURE — 84443 ASSAY THYROID STIM HORMONE: CPT

## 2018-02-09 PROCEDURE — 80053 COMPREHEN METABOLIC PANEL: CPT

## 2018-02-09 PROCEDURE — 85025 COMPLETE CBC W/AUTO DIFF WBC: CPT

## 2018-02-09 PROCEDURE — 36415 COLL VENOUS BLD VENIPUNCTURE: CPT | Mod: PO

## 2018-02-09 RX ORDER — ALENDRONATE SODIUM 70 MG/1
70 TABLET ORAL
Qty: 12 TABLET | Refills: 0 | Status: SHIPPED | OUTPATIENT
Start: 2018-02-09 | End: 2018-05-04 | Stop reason: SDUPTHER

## 2018-02-16 ENCOUNTER — HOSPITAL ENCOUNTER (OUTPATIENT)
Dept: RADIOLOGY | Facility: HOSPITAL | Age: 68
Discharge: HOME | End: 2018-02-16
Attending: INTERNAL MEDICINE
Payer: MEDICARE

## 2018-02-16 ENCOUNTER — PATIENT MESSAGE (OUTPATIENT)
Dept: INTERNAL MEDICINE | Facility: CLINIC | Age: 68
End: 2018-02-16

## 2018-02-16 ENCOUNTER — OFFICE VISIT (OUTPATIENT)
Dept: INTERNAL MEDICINE | Facility: CLINIC | Age: 68
End: 2018-02-16
Payer: MEDICARE

## 2018-02-16 VITALS
WEIGHT: 191.13 LBS | HEIGHT: 65 IN | SYSTOLIC BLOOD PRESSURE: 138 MMHG | RESPIRATION RATE: 16 BRPM | BODY MASS INDEX: 31.85 KG/M2 | TEMPERATURE: 98 F | HEART RATE: 67 BPM | DIASTOLIC BLOOD PRESSURE: 70 MMHG

## 2018-02-16 DIAGNOSIS — M25.511 ACUTE PAIN OF RIGHT SHOULDER: ICD-10-CM

## 2018-02-16 DIAGNOSIS — M54.40 LOW BACK PAIN WITH SCIATICA, SCIATICA LATERALITY UNSPECIFIED, UNSPECIFIED BACK PAIN LATERALITY, UNSPECIFIED CHRONICITY: ICD-10-CM

## 2018-02-16 DIAGNOSIS — Z00.00 ANNUAL PHYSICAL EXAM: Primary | ICD-10-CM

## 2018-02-16 DIAGNOSIS — M85.80 OSTEOPENIA, UNSPECIFIED LOCATION: ICD-10-CM

## 2018-02-16 DIAGNOSIS — E78.2 MIXED HYPERLIPIDEMIA: ICD-10-CM

## 2018-02-16 DIAGNOSIS — D86.9 SARCOIDOSIS: ICD-10-CM

## 2018-02-16 PROCEDURE — 73030 X-RAY EXAM OF SHOULDER: CPT | Mod: 26,RT,, | Performed by: RADIOLOGY

## 2018-02-16 PROCEDURE — 99214 OFFICE O/P EST MOD 30 MIN: CPT | Mod: PBBFAC,PO,25 | Performed by: INTERNAL MEDICINE

## 2018-02-16 PROCEDURE — 99999 PR PBB SHADOW E&M-EST. PATIENT-LVL IV: CPT | Mod: PBBFAC,,, | Performed by: INTERNAL MEDICINE

## 2018-02-16 PROCEDURE — 99397 PER PM REEVAL EST PAT 65+ YR: CPT | Mod: S$PBB,,, | Performed by: INTERNAL MEDICINE

## 2018-02-16 PROCEDURE — 72100 X-RAY EXAM L-S SPINE 2/3 VWS: CPT | Mod: 26,,, | Performed by: RADIOLOGY

## 2018-02-16 PROCEDURE — 72100 X-RAY EXAM L-S SPINE 2/3 VWS: CPT | Mod: TC,PO

## 2018-02-16 PROCEDURE — 73030 X-RAY EXAM OF SHOULDER: CPT | Mod: TC,PO,RT

## 2018-02-16 NOTE — PROGRESS NOTES
Subjective:       Patient ID: Dana Cook is a 67 y.o. female.    Chief Complaint: Annual Exam    Patient is a 67 y.o.female who presents today for annual.    Cholesterol: reviewed  Vaccines: Influenza (yearly); Tetanus (2016); Pneumovax (done); Zoster (done at age 60); Prevnar ( done)  Eye exam: up to date  Mammogram: nov 2017  Gyn exam: jan 2015  Colonoscopy: 2014; due in five years  Exercise: active  Diet: healthy; cholesterol is higher this year; pt states she had stopped red yeast rice      Right shoulder pain: intermittent; has had two steroid shots which helped but pt wants more of a work up.    Back pain: ongoing for a few months; worse with flexion; tried a heating pad with some relief. States it has bothered her since October. If she sits, pain is alleviated but once she starts walking again, it acts up. It does travel to the left buttock at times.   Review of Systems   Constitutional: Negative for appetite change, chills, diaphoresis, fatigue and fever.   HENT: Negative for congestion, dental problem, ear discharge, ear pain, hearing loss, postnasal drip, sinus pressure and sore throat.    Eyes: Negative for discharge, redness and itching.   Respiratory: Negative for cough, chest tightness, shortness of breath and wheezing.    Cardiovascular: Negative for chest pain, palpitations and leg swelling.   Gastrointestinal: Negative for abdominal pain, constipation, diarrhea, nausea and vomiting.   Endocrine: Negative for cold intolerance and heat intolerance.   Genitourinary: Negative for difficulty urinating, frequency, hematuria and urgency.   Musculoskeletal: Positive for arthralgias and back pain. Negative for gait problem, myalgias and neck pain.   Skin: Negative for color change and rash.   Neurological: Negative for dizziness, syncope and headaches.   Hematological: Negative for adenopathy.   Psychiatric/Behavioral: Negative for behavioral problems and sleep disturbance. The patient is not  nervous/anxious.        Objective:      Physical Exam   Constitutional: She is oriented to person, place, and time. She appears well-developed and well-nourished. No distress.   HENT:   Head: Normocephalic and atraumatic.   Right Ear: External ear normal.   Left Ear: External ear normal.   Nose: Nose normal.   Mouth/Throat: Oropharynx is clear and moist. No oropharyngeal exudate.   Eyes: Conjunctivae and EOM are normal. Pupils are equal, round, and reactive to light. Right eye exhibits no discharge. Left eye exhibits no discharge. No scleral icterus.   Neck: Normal range of motion. Neck supple. No JVD present. No thyromegaly present.   Cardiovascular: Normal rate, regular rhythm, normal heart sounds and intact distal pulses.  Exam reveals no gallop and no friction rub.    No murmur heard.  Pulmonary/Chest: Effort normal and breath sounds normal. No stridor. No respiratory distress. She has no wheezes. She has no rales. She exhibits no tenderness.   Abdominal: Soft. Bowel sounds are normal. She exhibits no distension. There is no tenderness. There is no rebound.   Musculoskeletal: She exhibits no edema.        Right shoulder: She exhibits decreased range of motion and tenderness.        Lumbar back: She exhibits decreased range of motion and tenderness.   Lymphadenopathy:     She has no cervical adenopathy.   Neurological: She is alert and oriented to person, place, and time. No cranial nerve deficit.   Skin: Skin is warm and dry. No rash noted. She is not diaphoretic. No erythema.   Psychiatric: She has a normal mood and affect. Her behavior is normal.   Nursing note and vitals reviewed.      Assessment and Plan:       1. Annual physical exam  - labs reviewed  - mammogram up to date    2. Mixed hyperlipidemia  - not controlled; pt stopped red yeast rice; declines statins; will restart red yeast rice    3. Sarcoidosis  - stable    4. Acute pain of right shoulder  - X-ray Shoulder 2 or More Views Right; Future  -  Ambulatory Referral to Orthopedics    5. Low back pain with sciatica, sciatica laterality unspecified, unspecified back pain laterality, unspecified chronicity  - X-Ray Lumbar Spine Ap And Lateral; Future  - Ambulatory Referral to Orthopedics    6. Osteopenia, unspecified location  - on fosamax; sees endo          No Follow-up on file.

## 2018-02-19 RX ORDER — CHLORTHALIDONE 25 MG/1
TABLET ORAL
Qty: 90 TABLET | Refills: 0 | Status: SHIPPED | OUTPATIENT
Start: 2018-02-19 | End: 2018-05-19 | Stop reason: SDUPTHER

## 2018-02-22 DIAGNOSIS — M54.50 LUMBAR SPINE PAIN: Primary | ICD-10-CM

## 2018-02-27 ENCOUNTER — OFFICE VISIT (OUTPATIENT)
Dept: OBSTETRICS AND GYNECOLOGY | Facility: CLINIC | Age: 68
End: 2018-02-27
Payer: MEDICARE

## 2018-02-27 VITALS
SYSTOLIC BLOOD PRESSURE: 118 MMHG | HEIGHT: 66 IN | BODY MASS INDEX: 30.44 KG/M2 | DIASTOLIC BLOOD PRESSURE: 68 MMHG | WEIGHT: 189.38 LBS

## 2018-02-27 DIAGNOSIS — Z78.0 POSTMENOPAUSE: ICD-10-CM

## 2018-02-27 DIAGNOSIS — Z01.419 WELL WOMAN EXAM WITH ROUTINE GYNECOLOGICAL EXAM: Primary | ICD-10-CM

## 2018-02-27 PROCEDURE — 88175 CYTOPATH C/V AUTO FLUID REDO: CPT

## 2018-02-27 PROCEDURE — G0101 CA SCREEN;PELVIC/BREAST EXAM: HCPCS | Mod: S$PBB,,, | Performed by: NURSE PRACTITIONER

## 2018-02-27 PROCEDURE — G0101 CA SCREEN;PELVIC/BREAST EXAM: HCPCS | Mod: PBBFAC | Performed by: NURSE PRACTITIONER

## 2018-02-27 PROCEDURE — 99999 PR PBB SHADOW E&M-EST. PATIENT-LVL III: CPT | Mod: PBBFAC,,, | Performed by: NURSE PRACTITIONER

## 2018-02-27 PROCEDURE — 99213 OFFICE O/P EST LOW 20 MIN: CPT | Mod: PBBFAC,25 | Performed by: NURSE PRACTITIONER

## 2018-02-27 RX ORDER — HYDROXYZINE HYDROCHLORIDE 10 MG/1
10 TABLET, FILM COATED ORAL
COMMUNITY
Start: 2018-02-21 | End: 2023-06-13

## 2018-02-27 NOTE — PROGRESS NOTES
"HISTORY OF PRESENT ILLNESS:    Dana Cook is a 67 y.o. female , presents for a routine exam and has no gyn complaints.    -Wants a pap "no matter what the recommendation is".  -c/o LBP, sciatica.    Past Medical History:   Diagnosis Date    Anxiety     Colon polyps     Depression     Dry eyes     Dry mouth     Dupuytren's contracture     Hypertension     left arm fracture     hairline    Osteopenia     Sarcoidosis        Past Surgical History:   Procedure Laterality Date    Laparoscopy for possible endometriosis   1985    WRIST SURGERY Left     Dupuytrens contracture        MEDICATIONS AND ALLERGIES:      Current Outpatient Prescriptions:     alendronate (FOSAMAX) 70 MG tablet, Take 1 tablet (70 mg total) by mouth every 7 days., Disp: 12 tablet, Rfl: 0    ascorbic acid (VITAMIN C) 1000 MG tablet, , Disp: , Rfl:     buPROPion (WELLBUTRIN XL) 150 MG TB24 tablet, TAKE 1 TABLET BY MOUTH EVERY DAY, Disp: 90 tablet, Rfl: 4    buPROPion (WELLBUTRIN XL) 300 MG 24 hr tablet, TAKE 1 TABLET BY MOUTH EVERY DAY, Disp: 30 tablet, Rfl: 2    chlorthalidone (HYGROTEN) 25 MG Tab, TAKE 1 TABLET BY MOUTH EVERY DAY, Disp: 90 tablet, Rfl: 0    cyanocobalamin, vitamin B-12, (VITAMIN B-12) 1,000 mcg Lozg, , Disp: , Rfl:     ferrous sulfate (IRON) 325 mg (65 mg iron) Tab, Take by mouth. 1 Tablet Oral Every day, Disp: , Rfl:     glucosamine-chondroitin 500-400 mg tablet, Take 1 tablet by mouth 3 (three) times daily., Disp: , Rfl:     hydrOXYzine HCl (ATARAX) 10 MG Tab, , Disp: , Rfl:     multivitamin with minerals (MULTIPLE VITAMIN-MINERALS) tablet, Take by mouth. 1 Tablet Oral Every day, Disp: , Rfl:     omega-3 fatty acids-vitamin E (FISH OIL) 1,000 mg Cap, , Disp: , Rfl:     red yeast rice 600 mg Cap, Take by mouth., Disp: , Rfl:     SUBHASH'S WORT/S.GINSGR (SUBHASH'S WORT-SIBER.GINSENG) 450-90 mg Tab, , Disp: , Rfl:     vitamin E 1000 UNIT capsule, Take by mouth. 1 Capsule Oral Every day, Disp: , " Rfl:     Review of patient's allergies indicates:   Allergen Reactions    Other Other (See Comments)     Allergy to tape.        Family History   Problem Relation Age of Onset    Cancer Father      ? type    Leukemia Paternal Grandmother     Breast cancer Paternal Aunt      maternal aunt    Cervical cancer Paternal Aunt     Uterine cancer Sister     Basal cell carcinoma Sister      brother    Uterine cancer Maternal Grandmother     Breast cancer Maternal Aunt     Colon cancer Neg Hx     Ovarian cancer Neg Hx        Social History     Social History    Marital status:      Spouse name: N/A    Number of children: 1    Years of education: N/A     Occupational History    retired      Part time security work     Social History Main Topics    Smoking status: Never Smoker    Smokeless tobacco: Never Used    Alcohol use 3.0 - 3.6 oz/week     5 - 6 Standard drinks or equivalent per week      Comment: wine every night    Drug use: No    Sexual activity: Not Currently     Partners: Male     Other Topics Concern    Not on file     Social History Narrative    No narrative on file       OB HISTORY: Number of vaginal deliveries:1    COMPREHENSIVE GYN HISTORY:  PAP History:  Denies abnormal Paps. LAST PAP 1-27-15 NORMAL.  Infection History: Denies STDs. Denies PID.  Benign History: Denies uterine fibroids. Denies ovarian cysts. Denies endometriosis. Denies other conditions.  Cancer History: Denies cervical cancer. Denies uterine cancer or hyperplasia. Denies ovarian cancer. Denies vulvar cancer or pre-cancer. Denies vaginal cancer or pre-cancer. Denies breast cancer. Denies colon cancer.  Sexual Activity History: Denies currently being sexually active  Menstrual History: Denies menses. Pt is  not on HRT.     ROS:  GENERAL: No weight changes. No swelling. No fatigue. No fever.  CARDIOVASCULAR: No chest pain. No shortness of breath. No leg cramps.   NEUROLOGICAL: No headaches. No vision  "changes.  BREASTS: No pain. No lumps. No discharge.  ABDOMEN: No pain. No nausea. No vomiting. No diarrhea. No constipation.  REPRODUCTIVE: No abnormal bleeding.   VULVA: No pain. No lesions. No itching.  VAGINA: No relaxation. No itching. No odor. No discharge. No lesions.  URINARY: No incontinence. No nocturia. No frequency. No dysuria.    /68   Ht 5' 5.5" (1.664 m)   Wt 85.9 kg (189 lb 6.4 oz)   BMI 31.04 kg/m²     PE:  APPEARANCE: Well nourished, well developed, in no acute distress.  AFFECT: WNL, alert and oriented x 3.  SKIN: No hirsutism or acne.  NECK: Neck symmetric without masses or thyromegaly.  NODES: No inguinal, cervical, axillary or femoral lymph node enlargement.  CHEST: Good respiratory effort.   ABDOMEN: Soft. No tenderness or masses.  BREASTS: Symmetrical, no skin changes or visible lesions.  PELVIC: ATROPHIC EXTERNAL FEMALE GENITALIA without lesions. Normal hair distribution. Adequate perineal body, normal urethral meatus. VAGINA DRY / ATROPHIC without lesions or discharge. CERVIX STENOTIC without lesions, discharge or tenderness. No significant cystocele or rectocele. Bimanual exam shows uterus to be normal size, regular, mobile and nontender. Adnexa without masses or tenderness.  RECTAL: Rectovaginal exam confirms above with normal sphincter tone, no masses.  EXTREMITIES: No edema.    DIAGNOSIS:  1. Well woman exam with routine gynecological exam    2. Postmenopause        PLAN:    LABS AND TESTS ORDERED:  Up to date on mammogram, BMD and colon screening  Pap done at pt's request    MEDICATIONS PRESCRIBED:  None    COUNSELING:  The patient was counseled today on:  -osteoporosis prevention, calcium supplementation, regular weight bearing exercise;  -A.C.S. Pap and pelvic exam guidelines (pap every 3 years, no pap after age 65) and recommendations for yearly mammogram;  -to see her PCP for other health maintenance.    FOLLOW-UP with me in two years.     "

## 2018-03-07 ENCOUNTER — OFFICE VISIT (OUTPATIENT)
Dept: ORTHOPEDICS | Facility: CLINIC | Age: 68
End: 2018-03-07
Payer: MEDICARE

## 2018-03-07 VITALS — WEIGHT: 188.69 LBS | BODY MASS INDEX: 30.93 KG/M2

## 2018-03-07 DIAGNOSIS — M25.511 CHRONIC RIGHT SHOULDER PAIN: Primary | ICD-10-CM

## 2018-03-07 DIAGNOSIS — G89.29 CHRONIC RIGHT SHOULDER PAIN: Primary | ICD-10-CM

## 2018-03-07 PROCEDURE — 99213 OFFICE O/P EST LOW 20 MIN: CPT | Mod: S$PBB,,, | Performed by: PHYSICIAN ASSISTANT

## 2018-03-07 PROCEDURE — 99999 PR PBB SHADOW E&M-EST. PATIENT-LVL III: CPT | Mod: PBBFAC,,, | Performed by: PHYSICIAN ASSISTANT

## 2018-03-07 PROCEDURE — 99213 OFFICE O/P EST LOW 20 MIN: CPT | Mod: PBBFAC | Performed by: PHYSICIAN ASSISTANT

## 2018-03-07 NOTE — PROGRESS NOTES
Subjective:      Patient ID: Dana Cook is a 67 y.o. female.    Chief Complaint: No chief complaint on file.    HPI  67 year old female presents with chief complaint of intermittent right shoulder pain x 4 years. She is RHD and denies trauma. She has received 2 cortisone injections in the past that have provided good relief. Last injection was in 2016. Pain returned last July. It occurs with a lot of use and flexion to 90 degrees. She takes aspirin prn which helps. She has done HEP in the past with little relief.   Review of Systems   Constitution: Negative for chills, fever and night sweats.   Cardiovascular: Negative for chest pain.   Respiratory: Negative for cough and shortness of breath.    Hematologic/Lymphatic: Does not bruise/bleed easily.   Skin: Negative for color change.   Gastrointestinal: Negative for heartburn.   Genitourinary: Negative for dysuria.   Neurological: Negative for numbness and paresthesias.   Psychiatric/Behavioral: Negative for altered mental status.   Allergic/Immunologic: Negative for persistent infections.         Objective:            General    Vitals reviewed.  Constitutional: She is oriented to person, place, and time. She appears well-developed and well-nourished.   Cardiovascular: Normal rate.    Neurological: She is alert and oriented to person, place, and time.         Right Shoulder Exam     Range of Motion   Active Abduction: normal   Forward Flexion: normal   External Rotation 0 degrees: normal   Internal Rotation 0 degrees: normal     Tests & Signs   Drop Arm: negative  Hawkin's test: negative  Impingement: negative  Rotator Cuff Painful Arc/Range: mild  Speed's Test: negative    Other   Sensation: normal    Comments:  Positive empty can test.     Muscle Strength   Right Upper Extremity   Shoulder Abduction: 5/5   Supraspinatus: 5/5/5   Biceps: 5/5/5     Vascular Exam     Right Pulses      Radial:                    2+            X-ray: reviewed by myself. Findings  suggest rotator cuff tear and OA is present.      Assessment:       Encounter Diagnosis   Name Primary?    Chronic right shoulder pain Yes          Plan:       Patient is interested in possible surgical intervention due to the chronicity of her pain. MRI was ordered. Will have her f/u with sports medicine to discuss surgical options.

## 2018-03-12 ENCOUNTER — HOSPITAL ENCOUNTER (OUTPATIENT)
Dept: RADIOLOGY | Facility: HOSPITAL | Age: 68
Discharge: HOME | End: 2018-03-12
Attending: PHYSICIAN ASSISTANT
Payer: MEDICARE

## 2018-03-12 DIAGNOSIS — M25.511 CHRONIC RIGHT SHOULDER PAIN: ICD-10-CM

## 2018-03-12 DIAGNOSIS — G89.29 CHRONIC RIGHT SHOULDER PAIN: ICD-10-CM

## 2018-03-12 PROCEDURE — 73221 MRI JOINT UPR EXTREM W/O DYE: CPT | Mod: 26,RT,, | Performed by: RADIOLOGY

## 2018-03-12 PROCEDURE — 73221 MRI JOINT UPR EXTREM W/O DYE: CPT | Mod: TC,RT

## 2018-03-14 ENCOUNTER — TELEPHONE (OUTPATIENT)
Dept: ORTHOPEDICS | Facility: CLINIC | Age: 68
End: 2018-03-14

## 2018-03-19 ENCOUNTER — OFFICE VISIT (OUTPATIENT)
Dept: SPORTS MEDICINE | Facility: CLINIC | Age: 68
End: 2018-03-19
Payer: MEDICARE

## 2018-03-19 ENCOUNTER — TELEPHONE (OUTPATIENT)
Dept: INTERNAL MEDICINE | Facility: CLINIC | Age: 68
End: 2018-03-19

## 2018-03-19 ENCOUNTER — HOSPITAL ENCOUNTER (OUTPATIENT)
Dept: RADIOLOGY | Facility: HOSPITAL | Age: 68
Discharge: HOME | End: 2018-03-19
Attending: ORTHOPAEDIC SURGERY
Payer: MEDICARE

## 2018-03-19 VITALS
BODY MASS INDEX: 29.89 KG/M2 | HEIGHT: 66 IN | SYSTOLIC BLOOD PRESSURE: 163 MMHG | DIASTOLIC BLOOD PRESSURE: 86 MMHG | HEART RATE: 59 BPM | WEIGHT: 186 LBS

## 2018-03-19 DIAGNOSIS — M19.011 OSTEOARTHRITIS OF RIGHT SHOULDER, UNSPECIFIED OSTEOARTHRITIS TYPE: ICD-10-CM

## 2018-03-19 DIAGNOSIS — G89.29 CHRONIC RIGHT SHOULDER PAIN: Primary | ICD-10-CM

## 2018-03-19 DIAGNOSIS — M25.511 CHRONIC RIGHT SHOULDER PAIN: Primary | ICD-10-CM

## 2018-03-19 DIAGNOSIS — M75.101 TEAR OF RIGHT ROTATOR CUFF, UNSPECIFIED TEAR EXTENT: ICD-10-CM

## 2018-03-19 DIAGNOSIS — M25.511 RIGHT SHOULDER PAIN, UNSPECIFIED CHRONICITY: ICD-10-CM

## 2018-03-19 PROCEDURE — 73020 X-RAY EXAM OF SHOULDER: CPT | Mod: 26,RT,, | Performed by: RADIOLOGY

## 2018-03-19 PROCEDURE — 99213 OFFICE O/P EST LOW 20 MIN: CPT | Mod: PBBFAC,25,PO | Performed by: ORTHOPAEDIC SURGERY

## 2018-03-19 PROCEDURE — 99203 OFFICE O/P NEW LOW 30 MIN: CPT | Mod: S$PBB,,, | Performed by: ORTHOPAEDIC SURGERY

## 2018-03-19 PROCEDURE — 99999 PR PBB SHADOW E&M-EST. PATIENT-LVL III: CPT | Mod: PBBFAC,,, | Performed by: ORTHOPAEDIC SURGERY

## 2018-03-19 PROCEDURE — 73020 X-RAY EXAM OF SHOULDER: CPT | Mod: TC,FY,PO,RT

## 2018-03-19 NOTE — TELEPHONE ENCOUNTER
----- Message from Shey Hunt sent at 3/19/2018 10:46 AM CDT -----  Contact: Patient [phone] or [phone] cell  Needs a clearance for her surgery that on 03/22/2018.    Please call and advise.     Thank You

## 2018-03-19 NOTE — LETTER
March 21, 2018      Meseret Jimenez PA-C  [address]  [facility] [address]           [facility]  [address]  [facility] [address]  Phone: [phone]          Patient: Dana Cook   MR Number: [id number]   YOB: 1950   Date of Visit: 3/19/2018       Dear Meseret Jimenez:    Thank you for referring Dana Cook to me for evaluation. Attached you will find relevant portions of my assessment and plan of care.    If you have questions, please do not hesitate to call me. I look forward to following Dana Cook along with you.    Sincerely,    Shama Denise MD    Enclosure  CC:  No Recipients    If you would like to receive this communication electronically, please contact [email or web address] or [phone] to request more information on [facility] Link access.    For providers and/or their staff who would like to refer a patient to [facility], please contact us through our one-stop-shop provider referral line, [facility], at [phone].    If you feel you have received this communication in error or would no longer like to receive these types of communications, please e-mail [email or web address]

## 2018-03-19 NOTE — TELEPHONE ENCOUNTER
----- Message from Mirta Alvarez sent at 3/19/2018  1:32 PM CDT -----  Contact: Pt [phone]  Pt would like a call back in regards to getting a medical clearance because she's having surgery on thursdays.

## 2018-03-19 NOTE — PROGRESS NOTES
CC: right shoulder pain, referred by Meseret Jimenez PA-C, patient is retired     67 y.o. Female RHD reports that the pain is severe and not responding to any conservative care.      Pain has been present for over 4 years now  She notes that her pain is worsening  She notes pain with use of her shoulder    She reports that the pain is worse with overhead activity/resisted activity. It also bothers her at night.  She describes her pain as anterior, superior and lateral  She plays bean bag baseball and mows her grass but notes that she had to discontinue playing bean bag baseball and avoids washing her car    She notes relief with cortisone injections in the past, she notes the last one being in 2016 and the relief lasted until last July  She also had a friend give her a home exercise program that she tried   She has taken aspirin as needed     Is affecting ADLs.     Review of Systems   Constitution: Negative. Negative for chills, fever and night sweats.   HENT: Negative for congestion and headaches.    Eyes: Negative for blurred vision, left vision loss and right vision loss.   Cardiovascular: Negative for chest pain and syncope.   Respiratory: Negative for cough and shortness of breath.    Endocrine: Negative for polydipsia, polyphagia and polyuria.   Hematologic/Lymphatic: Negative for bleeding problem. Does not bruise/bleed easily.   Skin: Negative for dry skin, itching and rash.   Musculoskeletal: Negative for falls and muscle weakness.   Gastrointestinal: Negative for abdominal pain and bowel incontinence.   Genitourinary: Negative for bladder incontinence and nocturia.   Neurological: Negative for disturbances in coordination, loss of balance and seizures.   Psychiatric/Behavioral: Negative for depression. The patient does not have insomnia.    Allergic/Immunologic: Negative for hives and persistent infections.     PAST MEDICAL HISTORY:   Past Medical History:   Diagnosis Date    Anxiety     Colon polyps      Depression     Dry eyes     Dry mouth     Dupuytren's contracture     Hypertension     left arm fracture     hairline    Osteopenia     Sarcoidosis      PAST SURGICAL HISTORY:   Past Surgical History:   Procedure Laterality Date    Laparoscopy for possible endometriosis   1985    WRIST SURGERY Left     Dupuytrens contracture     FAMILY HISTORY:   Family History   Problem Relation Age of Onset    Cancer Father      ? type    Leukemia Paternal Grandmother     Breast cancer Paternal Aunt      maternal aunt    Cervical cancer Paternal Aunt     Uterine cancer Sister     Basal cell carcinoma Sister      brother    Uterine cancer Maternal Grandmother     Breast cancer Maternal Aunt     Colon cancer Neg Hx     Ovarian cancer Neg Hx      SOCIAL HISTORY:   Social History     Social History    Marital status:      Spouse name: N/A    Number of children: 1    Years of education: N/A     Occupational History    retired      Part time security work     Social History Main Topics    Smoking status: Never Smoker    Smokeless tobacco: Never Used    Alcohol use 3.0 - 3.6 oz/week     5 - 6 Standard drinks or equivalent per week      Comment: wine every night    Drug use: No    Sexual activity: Not Currently     Partners: Male     Other Topics Concern    Not on file     Social History Narrative    No narrative on file       MEDICATIONS:   Current Outpatient Prescriptions:     alendronate (FOSAMAX) 70 MG tablet, Take 1 tablet (70 mg total) by mouth every 7 days., Disp: 12 tablet, Rfl: 0    ascorbic acid (VITAMIN C) 1000 MG tablet, , Disp: , Rfl:     buPROPion (WELLBUTRIN XL) 150 MG TB24 tablet, TAKE 1 TABLET BY MOUTH EVERY DAY, Disp: 90 tablet, Rfl: 4    buPROPion (WELLBUTRIN XL) 300 MG 24 hr tablet, TAKE 1 TABLET BY MOUTH EVERY DAY, Disp: 30 tablet, Rfl: 2    chlorthalidone (HYGROTEN) 25 MG Tab, TAKE 1 TABLET BY MOUTH EVERY DAY, Disp: 90 tablet, Rfl: 0    cyanocobalamin, vitamin B-12,  "(VITAMIN B-12) 1,000 mcg Josué, , Disp: , Rfl:     ferrous sulfate (IRON) 325 mg (65 mg iron) Tab, Take by mouth. 1 Tablet Oral Every day, Disp: , Rfl:     glucosamine-chondroitin 500-400 mg tablet, Take 1 tablet by mouth 3 (three) times daily., Disp: , Rfl:     hydrOXYzine HCl (ATARAX) 10 MG Tab, , Disp: , Rfl:     multivitamin with minerals (MULTIPLE VITAMIN-MINERALS) tablet, Take by mouth. 1 Tablet Oral Every day, Disp: , Rfl:     omega-3 fatty acids-vitamin E (FISH OIL) 1,000 mg Cap, , Disp: , Rfl:     red yeast rice 600 mg Cap, Take by mouth., Disp: , Rfl:     SUBHASH'S WORT/S.GINSGR (SUBHASH'S WORT-SIBER.GINSENG) 450-90 mg Tab, , Disp: , Rfl:     vitamin E 1000 UNIT capsule, Take by mouth. 1 Capsule Oral Every day, Disp: , Rfl:   ALLERGIES:   Review of patient's allergies indicates:   Allergen Reactions    Other Other (See Comments)     Allergy to tape.     Percocet [oxycodone-acetaminophen]        VITAL SIGNS: BP (!) 163/86   Pulse (!) 59   Ht 5' 5.5" (1.664 m)   Wt 84.4 kg (186 lb)   BMI 30.48 kg/m²      PHYSICAL EXAMINATION:  General:  The patient is alert and oriented x 3.  Mood is pleasant.  Observation of ears, eyes and nose reveal no gross abnormalities.  No labored breathing observed.  Gait is coordinated. Patient can toe walk and heel walk without difficulty.      right Shoulder / Upper Extremity Exam    OBSERVATION:     Swelling  none  Deformity  none   Discoloration  none   Scapular winging none   Scars   none  Atrophy  none    TENDERNESS / CREPITUS (T/C):          T/C      T/C   Clavicle   -/-  SUPRAspinatus    -/-     AC Jt.    -/-  INFRAspinatus  -/-    SC Jt.    -/-  Deltoid    -/-      G. Tuberosity  -/-  LH BICEP groove  +/-   Acromion:  -/-  Midline Neck   -/-     Scapular Spine -/-  Trapezium   -/-   SMA Scapula  -/-  GH jt. line - post  -/-     Scapulothoracic  -/-         ROM: (* = with pain)  Right shoulder   Left shoulder        AROM (PROM)   AROM (PROM)   FE    170° " (175°)     170° (175°)     ER at 0°    55°       60°  (65°)   ER at 90° ABD  (85°)     90°  (90°)   IR at 90°  ABD   NA  (30°)     NA  (40°)      IR (spine level)   T11     T10    STRENGTH: (* = with pain) RIGHT SHOULDER  LEFT SHOULDER   SCAPTION at 0  4+/5    5/5   SCAPTION at 30  5/5    5/5    IR    5/5    5/5   ER    5/5    5/5   BICEPS   5/5    5/5   Deltoid    5/5    5/5     SIGNS:  Painful side       NEER   +    OSONNYS  neg    IVY   +    SPEEDS  +     DROP ARM   neg   BELLY PRESS neg   Superior escape none    LIFT-OFF  neg   X-Body ADD    neg    MOVING VALGUS neg        STABILITY TESTING    RIGHT SHOULDER   LEFT SHOULDER     Translation     Anterior  up face     up face    Posterior  up face    up face    Sulcus   < 10mm    < 10 mm     Signs   Apprehension   neg      neg       Relocation   no change     no change      Jerk test  neg     neg    EXTREMITY NEURO-VASCULAR EXAM    Sensation grossly intact to light touch all dermatomal regions.    DTR 2+ Biceps, Triceps, BR and Negative Maddisons sign   Grossly intact motor function at Elbow, Wrist and Hand   Distal pulses radial and ulnar 2+, brisk cap refill, symmetric.      NECK:  Painless FROM and spinous processes non-tender. Negative Spurlings sign.      OTHER FINDINGS:  + scapular dyskinesia    XRAYS:  Shoulder trauma series right,  were obtained and reviewed  No convincing fracture or dislocation is noted. There is mild DJD.    MRI:  1. Moderate glenohumeral osteoarthritis with full-thickness cartilage fissuring over the glenoid and superomedial humeral head.  2. Severe supraspinatus tendinosis with high-grade interstitial tearing and a small high-grade partial-thickness articular surface tear of the anterior leading edge footprint fibers.  See description.  3. Infraspinatus tendinosis with intramuscular cyst.  Subscapularis tendinosis with fraying and tearing of the superior insertional fibers.  4. Global degenerative changes of the glenoid  labrum with suspected SLAP 2 and possible small posterior-superior paralabral cyst.  5. Biceps tendinosis with imaging findings suggesting biceps pulley injury.  No dislocation.  6. Joint effusion with synovitis and capsulitis.  No intra-articular loose bodies.  7. Moderate AC joint hypertrophy.  Subacromial/subdeltoid bursitis.      ASSESSMENT:   right:  1. Shoulder pain, rotator cuff tear, biceps tendonitis, SLAP   2.  Shoulder osteoarthritis  3.  Scapular dyskinesia    she would benefit from an arthroscopy, given the above.       PLAN:   Right Shoulder rotator cuff tear     All questions were answered, pt will contact us for questions or concerns in the interim.  Had thorough discussion of non-operative vs operative intervention, and risks and benefits of both.     We have discussed the surgery and recovery of arthroscopic shoulder surgery. she understands that there may be limited mobility up to several weeks after surgery depending on procedures that are performed at the time of surgery.    The spectrum of treatment options were discussed with the patient, including nonoperative and operative options.  After thorough discussion, the patient has elected to undergo surgical treatment to include:    right   a. Shoulder arthroscopic rotator cuff repair   b. Shoulder arthroscopic SAD   c. Shoulder arthroscopic extensive debridement   d. Shoulder arthroscopic biceps tenodesis   e. Shoulder arthroscopic possible microfracture   f. Shoulder arthroscopic possible lysis of adhesions   g.Shoulder arthroscopic-assisted Bio-D arthrocentesis   h. Shoulder arthroscopic chondroplasty      The details of the surgical procedure were explained, including the location of probable incisions and a description of likely hardware and/or grafts to be used.  The patient understands the likely convalescence after surgery.  Also, we have thoroughly discussed the risks, benefits and alternatives to surgery, including, but not limited to,  the risk of infection, joint stiffness, blood clot (including DVT and/or pulmonary embolus), neurologic and vascular injury.  It was explained that, if tissue has been repaired or reconstructed, there is a chance of failure, which may require further management.  Consent form for surgery is signed and in chart.    These risks include but are not limited to: bleeding, infection, scarring, re-tear of repair, irreparability of the tear, damage to neurovascular structures, damage to cartilage, stiffness, blood clots, pulmonary embolism, swelling, compartment syndrome, need for further surgery, and the risks of anesthesia. She verbalized her understanding of these risks and wished to proceed with surgery.   Time was spent face-to-face with the patient during this encounter on counseling about treatment options including surgery and coordination of her care for preoperative visits, surgery and post-operative rehab.       I can offer no guarantee whatsoever to the results of a this potential shoulder surgery due to the osteoarthritis.  I explained this in great detail today and patient acknowledged understanding and wishes to proceed.    She will need medical clearance

## 2018-03-20 ENCOUNTER — OFFICE VISIT (OUTPATIENT)
Dept: INTERNAL MEDICINE | Facility: CLINIC | Age: 68
End: 2018-03-20
Payer: MEDICARE

## 2018-03-20 ENCOUNTER — TELEPHONE (OUTPATIENT)
Dept: SPORTS MEDICINE | Facility: CLINIC | Age: 68
End: 2018-03-20

## 2018-03-20 VITALS
SYSTOLIC BLOOD PRESSURE: 136 MMHG | HEART RATE: 51 BPM | RESPIRATION RATE: 14 BRPM | BODY MASS INDEX: 30.54 KG/M2 | HEIGHT: 66 IN | TEMPERATURE: 98 F | DIASTOLIC BLOOD PRESSURE: 72 MMHG | WEIGHT: 190.06 LBS

## 2018-03-20 DIAGNOSIS — M25.319 INSTABILITY OF SHOULDER JOINT, UNSPECIFIED LATERALITY: ICD-10-CM

## 2018-03-20 DIAGNOSIS — Z01.818 PREOP EXAMINATION: Primary | ICD-10-CM

## 2018-03-20 DIAGNOSIS — M75.20 BICIPITAL TENOSYNOVITIS, UNSPECIFIED LATERALITY: Primary | ICD-10-CM

## 2018-03-20 DIAGNOSIS — M75.100 ROTATOR CUFF SYNDROME, UNSPECIFIED LATERALITY: ICD-10-CM

## 2018-03-20 PROCEDURE — 93005 ELECTROCARDIOGRAM TRACING: CPT | Mod: PBBFAC,PO | Performed by: INTERNAL MEDICINE

## 2018-03-20 PROCEDURE — 99214 OFFICE O/P EST MOD 30 MIN: CPT | Mod: S$PBB,,, | Performed by: INTERNAL MEDICINE

## 2018-03-20 PROCEDURE — 93010 ELECTROCARDIOGRAM REPORT: CPT | Mod: ,,, | Performed by: INTERNAL MEDICINE

## 2018-03-20 PROCEDURE — 99999 PR PBB SHADOW E&M-EST. PATIENT-LVL III: CPT | Mod: PBBFAC,,, | Performed by: INTERNAL MEDICINE

## 2018-03-20 PROCEDURE — 99213 OFFICE O/P EST LOW 20 MIN: CPT | Mod: PBBFAC,PO,25 | Performed by: INTERNAL MEDICINE

## 2018-03-20 NOTE — TELEPHONE ENCOUNTER
----- Message from Jerry Madrigal sent at 3/20/2018  9:04 AM CDT -----  Contact: self  Patient states she is medically cleared for surgery

## 2018-03-20 NOTE — PROGRESS NOTES
Subjective:       Patient ID: Dana Cook is a 67 y.o. female.    Chief Complaint: Pre-op Exam (shoulder)    HPI    67 y.o. female here for pre-op evaluation of Right rotator cuff repair by Dr. Denise planned for 03/22/2018.    History of prior anesthetic complications: no  Chronic Steroid usage: no  no tobacco, + EtOH (1 drink per day), no Illicit substances     Surgical Risk Assessment     Active cardiac issues:  Active decompensated heart failure? No   Unstable angina?  No   Significant uncontrolled arrhythmias? No   Severe valvular heart disease-Aortic or Mitral Stenosis? No   Recent MI or coronary revascularization < 30 days? No     Clinical risk factors predicting perioperative major adverse cardiac events per [facility]  High risk surgery (suprainguinal vascular, intraperitoneal, or intrathoracic surgery)? No   History of CAD/ischemic heart disease? No   History of cerebrovascular disease (CVA or TIA)? No   History of compensated heart failure? No   Type 2 diabetes requiring insulin? No   Serum Creatinine > 2? No   Total cardiac risk factors 0     0 predictors = 0.4%, 1 predictor = 0.9%, 2 predictors = 6.6%, ?3 predictors = >11%    According to the revised cardiac risk index, the risk of tulio-procedural major cardiac complications (cardiac death, nonfatal MI, nonfatal cardiac arrest, postoperative cardiogenic pulmonary edema, complete heart block) is: 0.4%     If patient has a low risk of MACE (<1%), proceed to surgery. If the patient is at elevated risk of MACE, then determine functional capacity (pt reported activity or DASI model).     If the patient has moderate, good, or excellent functional capacity (?4 METs), then proceed to surgery without further evaluation. If patient has poor or unknown functional capacity, will further testing impact decision making or perioperative care? If yes, then pharmacological stress testing is appropriate. In those patients with unknown functional capacity, exercise stress  "testing may be reasonable to perform.     Patient's functional mets: >4 METs    < 4 METs -unable to walk > 2 blocks on level ground without stopping due to symptoms  - eating, dressing, toileting, walking indoors, light housework. POOR   > 4 METs -climbing > 1 flight of stairs without stopping  -walking up hill > 1-2 blocks  -scrubbing floors  -moving furniture  - golf, bowling, dancing or tennis  -running short distance MODERATE to EXCELLENT     OR   [email or web address]    Review of Systems   Constitutional: Negative for activity change, fatigue and unexpected weight change.   Eyes: Negative for visual disturbance.   Respiratory: Negative for cough and shortness of breath.    Cardiovascular: Negative for chest pain, palpitations and leg swelling.   Gastrointestinal: Negative for abdominal pain and diarrhea.   Genitourinary: Negative for difficulty urinating, dysuria and frequency.   Musculoskeletal: Positive for arthralgias (shoulder pain) and back pain.   Skin: Negative for pallor, rash and wound.   Neurological: Negative for numbness and headaches.   Hematological: Negative for adenopathy.       Objective:        Vitals:    03/20/18 0829   BP: 136/72   BP Location: Right arm   Patient Position: Sitting   BP Method: Medium (Manual)   Pulse: (!) 51   Resp: 14   Temp: 97.7 °F (36.5 °C)   TempSrc: Oral   Weight: 86.2 kg (190 lb 0.6 oz)   Height: 5' 5.5" (1.664 m)       Body mass index is 31.14 kg/m².    Physical Exam   Constitutional: She is oriented to person, place, and time. She appears well-developed and well-nourished. No distress.   HENT:   Head: Normocephalic and atraumatic.   Right Ear: External ear normal.   Left Ear: External ear normal.   Nose: Nose normal.   Mouth/Throat: Oropharynx is clear and moist.   Eyes: Conjunctivae and EOM are normal. Right eye exhibits no discharge. Left eye exhibits no discharge.   Neck: Normal range of motion. Neck supple.   Cardiovascular: " Regular rhythm, normal heart sounds and intact distal pulses.  Bradycardia present.    No murmur heard.  Pulmonary/Chest: Effort normal and breath sounds normal. No respiratory distress. She has no wheezes.   Abdominal: Soft. Bowel sounds are normal.   Musculoskeletal: Normal range of motion. She exhibits no edema.   Lymphadenopathy:     She has no cervical adenopathy.   Neurological: She is alert and oriented to person, place, and time.   Skin: Skin is warm and dry. She is not diaphoretic. No erythema.   Psychiatric: She has a normal mood and affect. Her behavior is normal. Thought content normal.       Assessment:     1. Preop examination           Plan:         1. Preop examination  - EKG 12-lead; Future - no ischemic changes, sinus matthew  - No contraindications to anesthesia or surgery at this time. Proceed to planned surgery.

## 2018-03-21 ENCOUNTER — OFFICE VISIT (OUTPATIENT)
Dept: SPORTS MEDICINE | Facility: CLINIC | Age: 68
End: 2018-03-21
Payer: MEDICARE

## 2018-03-21 ENCOUNTER — HOSPITAL ENCOUNTER (OUTPATIENT)
Dept: PREADMISSION TESTING | Facility: OTHER | Age: 68
Discharge: HOME | End: 2018-03-21
Attending: ORTHOPAEDIC SURGERY
Payer: MEDICARE

## 2018-03-21 ENCOUNTER — ANESTHESIA EVENT (OUTPATIENT)
Dept: SURGERY | Facility: OTHER | Age: 68
End: 2018-03-21
Payer: MEDICARE

## 2018-03-21 VITALS
BODY MASS INDEX: 31.65 KG/M2 | WEIGHT: 190 LBS | DIASTOLIC BLOOD PRESSURE: 87 MMHG | SYSTOLIC BLOOD PRESSURE: 167 MMHG | HEART RATE: 63 BPM | HEIGHT: 65 IN

## 2018-03-21 VITALS
HEIGHT: 65 IN | BODY MASS INDEX: 31.65 KG/M2 | SYSTOLIC BLOOD PRESSURE: 157 MMHG | HEART RATE: 71 BPM | OXYGEN SATURATION: 96 % | DIASTOLIC BLOOD PRESSURE: 88 MMHG | WEIGHT: 190 LBS | TEMPERATURE: 98 F

## 2018-03-21 DIAGNOSIS — G89.29 CHRONIC RIGHT SHOULDER PAIN: Primary | ICD-10-CM

## 2018-03-21 DIAGNOSIS — M25.319 INSTABILITY OF SHOULDER JOINT, UNSPECIFIED LATERALITY: ICD-10-CM

## 2018-03-21 DIAGNOSIS — M75.101 ROTATOR CUFF SYNDROME OF RIGHT SHOULDER: ICD-10-CM

## 2018-03-21 DIAGNOSIS — G89.18 ACUTE POST-OPERATIVE PAIN: ICD-10-CM

## 2018-03-21 DIAGNOSIS — M75.21 RIGHT BICIPITAL TENOSYNOVITIS: ICD-10-CM

## 2018-03-21 DIAGNOSIS — M25.511 CHRONIC RIGHT SHOULDER PAIN: Primary | ICD-10-CM

## 2018-03-21 PROCEDURE — 99214 OFFICE O/P EST MOD 30 MIN: CPT | Mod: PBBFAC,PO | Performed by: PHYSICIAN ASSISTANT

## 2018-03-21 PROCEDURE — 99499 UNLISTED E&M SERVICE: CPT | Mod: S$PBB,,, | Performed by: PHYSICIAN ASSISTANT

## 2018-03-21 PROCEDURE — 99999 PR PBB SHADOW E&M-EST. PATIENT-LVL IV: CPT | Mod: PBBFAC,,, | Performed by: PHYSICIAN ASSISTANT

## 2018-03-21 RX ORDER — OXYCODONE AND ACETAMINOPHEN 10; 325 MG/1; MG/1
TABLET ORAL
Qty: 30 TABLET | Refills: 0 | Status: SHIPPED | OUTPATIENT
Start: 2018-03-21 | End: 2018-05-02 | Stop reason: SDUPTHER

## 2018-03-21 RX ORDER — FAMOTIDINE 20 MG/1
20 TABLET, FILM COATED ORAL
Status: CANCELLED | OUTPATIENT
Start: 2018-03-21 | End: 2018-03-21

## 2018-03-21 RX ORDER — SODIUM CHLORIDE, SODIUM LACTATE, POTASSIUM CHLORIDE, CALCIUM CHLORIDE 600; 310; 30; 20 MG/100ML; MG/100ML; MG/100ML; MG/100ML
INJECTION, SOLUTION INTRAVENOUS CONTINUOUS
Status: CANCELLED | OUTPATIENT
Start: 2018-03-21

## 2018-03-21 RX ORDER — PROMETHAZINE HYDROCHLORIDE 25 MG/1
25 TABLET ORAL EVERY 6 HOURS PRN
Qty: 16 TABLET | Refills: 0 | Status: SHIPPED | OUTPATIENT
Start: 2018-03-21 | End: 2019-01-23

## 2018-03-21 RX ORDER — ASPIRIN 325 MG
325 TABLET, DELAYED RELEASE (ENTERIC COATED) ORAL EVERY 12 HOURS
Qty: 42 TABLET | Refills: 0 | Status: SHIPPED | OUTPATIENT
Start: 2018-03-21 | End: 2019-01-23

## 2018-03-21 RX ORDER — TRAMADOL HYDROCHLORIDE 50 MG/1
50-100 TABLET ORAL EVERY 6 HOURS PRN
Qty: 30 TABLET | Refills: 0 | Status: SHIPPED | OUTPATIENT
Start: 2018-03-21 | End: 2019-01-23

## 2018-03-21 RX ORDER — SCOLOPAMINE TRANSDERMAL SYSTEM 1 MG/1
1 PATCH, EXTENDED RELEASE TRANSDERMAL ONCE
Status: CANCELLED | OUTPATIENT
Start: 2018-03-21 | End: 2018-03-21

## 2018-03-21 RX ORDER — LIDOCAINE HYDROCHLORIDE 10 MG/ML
0.5 INJECTION, SOLUTION EPIDURAL; INFILTRATION; INTRACAUDAL; PERINEURAL ONCE
Status: CANCELLED | OUTPATIENT
Start: 2018-03-21 | End: 2018-03-21

## 2018-03-21 RX ORDER — PREGABALIN 75 MG/1
150 CAPSULE ORAL ONCE
Status: CANCELLED | OUTPATIENT
Start: 2018-03-21 | End: 2018-03-21

## 2018-03-21 NOTE — H&P
Dana Cook  is here for a completion of her perioperative paperwork. she  Is scheduled to undergo     right              a. Shoulder arthroscopic rotator cuff repair              b. Shoulder arthroscopic SAD              c. Shoulder arthroscopic extensive debridement              d. Shoulder arthroscopic biceps tenodesis              e. Shoulder arthroscopic possible microfracture              f. Shoulder arthroscopic possible lysis of adhesions              g.Shoulder arthroscopic-assisted Bio-D arthrocentesis              h. Shoulder arthroscopic chondroplasty on 3/22/18.      She is a healthy individual but does need clearance for this procedure Dr. Croft at [facility].     PAST MEDICAL HISTORY:   Past Medical History:   Diagnosis Date    Anxiety     Colon polyps     Depression     Dry eyes     Dry mouth     Dupuytren's contracture     Hypertension     left arm fracture     hairline    Murmur, heart     Osteopenia     PONV (postoperative nausea and vomiting)     with wisdom teeth    Sarcoidosis      PAST SURGICAL HISTORY:   Past Surgical History:   Procedure Laterality Date    Laparoscopy for possible endometriosis   1985    WRIST SURGERY Left     Dupuytrens contracture     FAMILY HISTORY:   Family History   Problem Relation Age of Onset    Cancer Father      ? type    Leukemia Paternal Grandmother     Breast cancer Paternal Aunt      maternal aunt    Cervical cancer Paternal Aunt     Uterine cancer Sister     Basal cell carcinoma Sister      brother    Uterine cancer Maternal Grandmother     Breast cancer Maternal Aunt     Colon cancer Neg Hx     Ovarian cancer Neg Hx      SOCIAL HISTORY:   Social History     Social History    Marital status:      Spouse name: N/A    Number of children: 1    Years of education: N/A     Occupational History    retired      Part time security work     Social History Main Topics    Smoking status: Never Smoker    Smokeless tobacco: Never Used     Alcohol use 3.0 - 3.6 oz/week     5 - 6 Standard drinks or equivalent per week      Comment: wine every night    Drug use: No    Sexual activity: Not Currently     Partners: Male     Other Topics Concern    Not on file     Social History Narrative    No narrative on file       MEDICATIONS:   Current Outpatient Prescriptions:     alendronate (FOSAMAX) 70 MG tablet, Take 1 tablet (70 mg total) by mouth every 7 days., Disp: 12 tablet, Rfl: 0    ascorbic acid (VITAMIN C) 1000 MG tablet, , Disp: , Rfl:     buPROPion (WELLBUTRIN XL) 150 MG TB24 tablet, TAKE 1 TABLET BY MOUTH EVERY DAY, Disp: 90 tablet, Rfl: 4    buPROPion (WELLBUTRIN XL) 300 MG 24 hr tablet, TAKE 1 TABLET BY MOUTH EVERY DAY, Disp: 30 tablet, Rfl: 2    chlorthalidone (HYGROTEN) 25 MG Tab, TAKE 1 TABLET BY MOUTH EVERY DAY, Disp: 90 tablet, Rfl: 0    cyanocobalamin, vitamin B-12, (VITAMIN B-12) 1,000 mcg Lozg, , Disp: , Rfl:     ferrous sulfate (IRON) 325 mg (65 mg iron) Tab, Take by mouth. 1 Tablet Oral Every day, Disp: , Rfl:     glucosamine-chondroitin 500-400 mg tablet, Take 1 tablet by mouth daily as needed. , Disp: , Rfl:     hydrOXYzine HCl (ATARAX) 10 MG Tab, 10 mg as needed. , Disp: , Rfl:     multivitamin with minerals (MULTIPLE VITAMIN-MINERALS) tablet, Take by mouth. 1 Tablet Oral Every day, Disp: , Rfl:     omega-3 fatty acids-vitamin E (FISH OIL) 1,000 mg Cap, , Disp: , Rfl:     red yeast rice 600 mg Cap, Take by mouth., Disp: , Rfl:     SUBHASH'S WORT/S.GINSGR (SUBHASH'S WORT-SIBER.GINSENG) 450-90 mg Tab, , Disp: , Rfl:     vitamin E 1000 UNIT capsule, Take by mouth. 1 Capsule Oral Every day, Disp: , Rfl:     aspirin (ECOTRIN) 325 MG EC tablet, Take 1 tablet (325 mg total) by mouth every 12 (twelve) hours., Disp: 42 tablet, Rfl: 0    oxyCODONE-acetaminophen (PERCOCET)  mg per tablet, Take 1 tablet by mouth every 4-6 hours as needed for pain. Take stool softener with this medication., Disp: 30 tablet, Rfl: 0     "promethazine (PHENERGAN) 25 MG tablet, Take 1 tablet (25 mg total) by mouth every 6 (six) hours as needed for Nausea., Disp: 16 tablet, Rfl: 0    traMADol (ULTRAM) 50 mg tablet, Take 1-2 tablets ( mg total) by mouth every 6 (six) hours as needed., Disp: 30 tablet, Rfl: 0  ALLERGIES:   Review of patient's allergies indicates:   Allergen Reactions    Other Other (See Comments)     Allergy to tape.     Percocet [oxycodone-acetaminophen] Nausea Only       VITAL SIGNS: BP (!) 167/87   Pulse 63   Ht 5' 5" (1.651 m)   Wt 86.2 kg (190 lb)   BMI 31.62 kg/m²      Risks, indications and benefits of the surgical procedure were discussed with the patient. All questions with regard to surgery, rehab, expected return to functional activities, activities of daily living and recreational endeavors were answered to her satisfaction.    It was explained to the patient that there may be an increase in surgical risks if the patient has certain co-morbidities such as but not limited to: Obesity, Cardiovascular issues (CHF, CAD, Arrhythmias), chronic pulmonary issues, previous or current neurovascular/neurological issues, previous strokes, diabetes mellitus, previous wound healing issues, previous wound or skin infections, PVD, clotting disorders, if the patient uses chronic steroids, if the patient takes or has immune compromising medications or diseases, or has previously or currently used tobacco products.     The patient verbalized that he/she does not have any additional clotting, bleeding, or blood disorders, other than what is list in her chart on today's review.     Then a brief history and physical exam were performed.    Review of Systems   Constitution: Negative. Negative for chills, fever and night sweats.   HENT: Negative for congestion and headaches.    Eyes: Negative for blurred vision, left vision loss and right vision loss.   Cardiovascular: Negative for chest pain and syncope.   Respiratory: Negative for " cough and shortness of breath.    Endocrine: Negative for polydipsia, polyphagia and polyuria.   Hematologic/Lymphatic: Negative for bleeding problem. Does not bruise/bleed easily.   Skin: Negative for dry skin, itching and rash.   Musculoskeletal: Negative for falls and muscle weakness.   Gastrointestinal: Negative for abdominal pain and bowel incontinence.   Genitourinary: Negative for bladder incontinence and nocturia.   Neurological: Negative for disturbances in coordination, loss of balance and seizures.   Psychiatric/Behavioral: Negative for depression. The patient does not have insomnia.    Allergic/Immunologic: Negative for hives and persistent infections.     PHYSICAL EXAM:  GEN: A&Ox3, WD WN NAD  HEENT: WNL  CHEST: CTAB, no W/R/R  HEART: RRR, no M/R/G  ABD: Soft, NT ND, BS x4 QUADS  MS; See Epic  NEURO: CN II-XII intact       The surgical consent was then reviewed with the patient, who agreed with all the contents of the consent form and it was signed. she was then given the [facility] surgery packet to bring with her to [facility] for the anesthesia portion of her perioperative paperwork.   For all physicians except for Dr. Brannon, we will email and possibly fax the consent forms [facility]    The patient was given the opportunity to ask questions about the surgical plan and consent form, and once no other questions were asked, I proceeded with the pre-op appointment.    PHYSICAL THERAPY:  She was also instructed regarding physical therapy and will begin on  Likely 6 weeks after surgery. She was given a copy of the original prescription to schedule. Another copy of this prescription was also faxed to [facility].    POST OP CARE:instructions were reviewed including care of the wound and dressing after surgery and when she can shower.     PAIN MANAGEMENT: Dana Cook was also given her pain management regime, which includes the TENS unit given to her by chani  along with the education required for its use. She was also instructed regarding the Polar ice unit that will be in place after surgery and her postoperative pain medications.     PAIN MEDICATION:  Percocet 10/325mg 1 po q 4-6 hours prn pain  Ultram 50 mg Take 1-2 p.o. q.6 hours p.r.n. breakthrough pain,   Phenergan 25 mg one p.o. q.6 hours p.r.n. nausea and vomiting.    DVT prophylaxis was discussed with the patient today including risk factors for developing DVTs and history of DVTs. The patient was asked if any specific recommendations were given from the doctor/s that did pre-operative surgical clearance. The patient was then given an education sheet about DVTs and PE with warning signs and symptoms of both and steps to take if they suspect either of these.    This along with the Modified Caprini risk assessment model for VTE in general surgical patients was used to determine the patient's DVT risk.     From: Gold MAGDALENO, Fareed DA, Ashtyn SM, et al. Prevention of VTE in nonorthopedic surgical patients: antithrombotic therapy and prevention of thrombosis, 9th ed: American College of Chest Physicians evidence-based clinical practical guidelines. Chest 2012; 141:e227S. Copyright © 2012. Reproduced with permission from the American College of Chest Physicians.    The below listed DVT prophylaxis regimen along with bilateral IRIS compression stockings will be used post-op. Length of treatment has been determined to be 10-42 days post-op by the above noted Caprini assessment model.     Patient was instructed to buy and take:  Aspirin 325mg BID x 3 weeks for DVT prophylaxis starting on the evening after surgery.  Patient will also use bilateral TEDs on lower extremities, SCDs during surgery, and early ambulation post-op. If the patient was previously taking 81mg baby aspirin, they were told to not take it will using the above stated aspirin and to restart the 81mg aspirin after completion of the aspirin dose.      Patient  was also told to buy over the counter Prilosec medication and take it once daily for GI protection as long as they are taking NSAIDs or Aspirin.     Published data in Aakash GK, et al. J Arthroplasty. Oct; 31(10):2237-40, 2016; showed that aspirin use as prophylaxis during revision total joint arthroplasty was more effective than warfarin in preventing symptomatic venous thromboembolic events and was associated with lower complications.  Patients in the study were also treated with intermittent pneumatic compression devices. Compression stockings would be our method of mechanical prophylaxis, which has been shown to be similar to pneumatic compression in the systematic review, Tico TOBAR et al. [facility] Surg. Feb; 239(2): 162-171, 2004.     Results showed a significantly higher incidence of symptomatic venous thromboembolic events among patients in the warfarin group vs. the aspirin group (1.75% vs. 0.56%). Researchers also noted a bleeding event rate of 1.5% among patients who received warfarin compared with a rate of 0.4% among patients who received aspirin.    Patient denies history of seizures.       The patient was told that narcotic pain medications may make them drowsy and instructions were given to not sign legal documents, drive or operate heavy machinery, cars, or equipment while under the influence of narcotic medications. The patient was told and understands that narcotic pain medications should only be used as needed to control pain and that other options of pain control include TENs unit and ice packs/unit.     As there were no other questions to be asked, she was given my business card along with Shama Denise MD business card if she has any questions or concerns prior to surgery or in the postop period.

## 2018-03-21 NOTE — DISCHARGE INSTRUCTIONS
PRE-ADMIT TESTING -  [phone]    [address]  [place] [facility]          Your surgery has been scheduled at [facility]. We are pleased to have the opportunity to serve you. For Further Information please call [phone].    On the day of surgery please report to the Information Desk on the 1st floor.    · CONTACT YOUR PHYSICIAN'S OFFICE THE DAY PRIOR TO YOUR SURGERY TO OBTAIN YOUR ARRIVAL TIME.     · The evening before surgery do not eat anything after 9 p.m. ( this includes hard candy, chewing gum and mints).  You may only have GATORADE, POWERADE AND WATER  from 9 p.m. until you leave your home.   DO NOT DRINK ANY LIQUIDS ON THE WAY TO THE HOSPITAL.      SPECIAL MEDICATION INSTRUCTIONS: TAKE medications checked off by the Anesthesiologist on your Medication List.    Angiogram Patients: Take medications as instructed by your physician, including aspirin.     Surgery Patients:    If you take ASPIRIN - Your PHYSICIAN/SURGEON will need to inform you IF/OR when you need to stop taking aspirin prior to your surgery.     Do Not take any medications containing IBUPROFEN.  Do Not Wear any make-up or dark nail polish   (especially eye make-up) to surgery. If you come to surgery with makeup on you will be required to remove the makeup or nail polish.    Do not shave your surgical area at least 5 days prior to your surgery. The surgical prep will be performed at the hospital according to Infection Control regulations.    Leave all valuables at home.   Do Not wear any jewelry or watches, including any metal in body piercings.  Contact Lens must be removed before surgery. Either do not wear the contact lens or bring a case and solution for storage.  Please bring a container for eyeglasses or dentures as required.  Bring any paperwork your physician has provided, such as consent forms,  history and physicals, doctor's orders, etc.   Bring comfortable clothes that are loose fitting to wear upon  discharge. Take into consideration the type of surgery being performed.  Maintain your diet as advised per your physician the day prior to surgery.      Adequate rest the night before surgery is advised.   Park in the Parking lot behind the hospital or in the [facility] across the street from the parking lot. Parking is complimentary.  If you will be discharged the same day as your procedure, please arrange for a responsible adult to drive you home or to accompany you if traveling by taxi.   YOU WILL NOT BE PERMITTED TO DRIVE OR TO LEAVE THE HOSPITAL ALONE AFTER SURGERY.   It is strongly recommended that you arrange for someone to remain with you for the first 24 hrs following your surgery.       Thank you for your cooperation.  The Staff of [facility].        Bathing Instructions                                                                 Please shower the evening before and morning of your procedure with    ANTIBACTERIAL SOAP. ( DIAL, etc )  Concentrate on the surgical area   for at least 3 minutes and rinse completely. Dry off as usual.   Do not use any deodorant, powder, body lotions, perfume, after shave or    cologne.

## 2018-03-21 NOTE — PRE-PROCEDURE INSTRUCTIONS
Reviewed 's   shoulder arthroscopy discharge instructions and demonstrated postoperative equipment (polar ice and BREG arm sling with pillow), with verbalized understanding per patient.                                  .

## 2018-03-21 NOTE — ANESTHESIA PREPROCEDURE EVALUATION
03/21/2018  Dana Cook is a 67 y.o., female.    Anesthesia Evaluation    I have reviewed the Patient Summary Reports.    I have reviewed the Nursing Notes.   I have reviewed the Medications.     Review of Systems  Anesthesia Hx:  Denies Family Hx of Anesthesia complications.  Personal Hx of Anesthesia complications, Post-Operative Nausea/Vomiting, in the past, but not with recent anesthetics / prophylaxis   Social:  Non-Smoker    Hematology/Oncology:  Hematology Normal   Oncology Normal     EENT/Dental:EENT/Dental Normal   Cardiovascular:   Hypertension hyperlipidemia    Pulmonary:  Pulmonary Normal    Renal/:   Chronic Renal Disease (mild), CRI    Hepatic/GI:  Hepatic/GI Normal    Musculoskeletal:   Sarcoidosis manifested by joint pain, fatigue, and crawly skin (takes Atarax, work well)   Neurological:  Neurology Normal    Endocrine:  Endocrine Normal    Dermatological:  Skin Normal    Psych:   anxiety depression          Physical Exam  General:  Obesity    Airway/Jaw/Neck:  Airway Findings: Mouth Opening: Normal Mallampati: I  TM Distance: Normal, at least 6 cm  Jaw/Neck Findings:  Neck ROM: Normal ROM      Dental:  Dental Findings: In tact, molar caps        Mental Status:  Mental Status Findings:  Cooperative, Alert and Oriented         Anesthesia Plan  Type of Anesthesia, risks & benefits discussed:  Anesthesia Type:  general  Patient's Preference:   Intra-op Monitoring Plan: standard ASA monitors  Intra-op Monitoring Plan Comments:   Post Op Pain Control Plan: peripheral nerve block  Post Op Pain Control Plan Comments:   Induction:   IV  Beta Blocker:         Informed Consent: Patient understands risks and agrees with Anesthesia plan.  Questions answered. Anesthesia consent signed with patient.  ASA Score: 3     Day of Surgery Review of History & Physical:    H&P update referred to the surgeon.      Anesthesia Plan Notes: Recent labs/clearance in epic, OK    Note allergy to Percocet N/V        Ready For Surgery From Anesthesia Perspective.

## 2018-03-22 ENCOUNTER — TELEPHONE (OUTPATIENT)
Dept: SPORTS MEDICINE | Facility: CLINIC | Age: 68
End: 2018-03-22

## 2018-03-22 ENCOUNTER — HOSPITAL ENCOUNTER (OUTPATIENT)
Facility: OTHER | Age: 68
Discharge: HOME | End: 2018-03-22
Attending: ORTHOPAEDIC SURGERY | Admitting: ORTHOPAEDIC SURGERY
Payer: MEDICARE

## 2018-03-22 ENCOUNTER — ANESTHESIA (OUTPATIENT)
Dept: SURGERY | Facility: OTHER | Age: 68
End: 2018-03-22
Payer: MEDICARE

## 2018-03-22 VITALS
TEMPERATURE: 98 F | HEIGHT: 65 IN | OXYGEN SATURATION: 100 % | HEART RATE: 84 BPM | DIASTOLIC BLOOD PRESSURE: 67 MMHG | SYSTOLIC BLOOD PRESSURE: 140 MMHG | BODY MASS INDEX: 31.65 KG/M2 | RESPIRATION RATE: 16 BRPM | WEIGHT: 190 LBS

## 2018-03-22 DIAGNOSIS — M75.21 RIGHT BICIPITAL TENOSYNOVITIS: ICD-10-CM

## 2018-03-22 DIAGNOSIS — G89.29 CHRONIC RIGHT SHOULDER PAIN: ICD-10-CM

## 2018-03-22 DIAGNOSIS — M75.101 ROTATOR CUFF SYNDROME OF RIGHT SHOULDER: ICD-10-CM

## 2018-03-22 DIAGNOSIS — G89.29 CHRONIC RIGHT SHOULDER PAIN: Primary | ICD-10-CM

## 2018-03-22 DIAGNOSIS — M25.511 CHRONIC RIGHT SHOULDER PAIN: Primary | ICD-10-CM

## 2018-03-22 DIAGNOSIS — M25.511 CHRONIC RIGHT SHOULDER PAIN: ICD-10-CM

## 2018-03-22 PROCEDURE — 29827 SHO ARTHRS SRG RT8TR CUF RPR: CPT | Mod: RT,GC,, | Performed by: ORTHOPAEDIC SURGERY

## 2018-03-22 PROCEDURE — 29823 SHO ARTHRS SRG XTNSV DBRDMT: CPT | Mod: 51,RT,GC, | Performed by: ORTHOPAEDIC SURGERY

## 2018-03-22 PROCEDURE — 25000003 PHARM REV CODE 250: Performed by: ANESTHESIOLOGY

## 2018-03-22 PROCEDURE — 71000016 HC POSTOP RECOV ADDL HR: Performed by: ORTHOPAEDIC SURGERY

## 2018-03-22 PROCEDURE — 36000710: Performed by: ORTHOPAEDIC SURGERY

## 2018-03-22 PROCEDURE — C1713 ANCHOR/SCREW BN/BN,TIS/BN: HCPCS | Performed by: ORTHOPAEDIC SURGERY

## 2018-03-22 PROCEDURE — 63600175 PHARM REV CODE 636 W HCPCS: Performed by: NURSE ANESTHETIST, CERTIFIED REGISTERED

## 2018-03-22 PROCEDURE — 25000003 PHARM REV CODE 250: Performed by: NURSE ANESTHETIST, CERTIFIED REGISTERED

## 2018-03-22 PROCEDURE — 29999 UNLISTED PX ARTHROSCOPY: CPT | Mod: ,,, | Performed by: ORTHOPAEDIC SURGERY

## 2018-03-22 PROCEDURE — 29828 SHO ARTHRS SRG BICP TENODSIS: CPT | Mod: 52,51,RT,GC | Performed by: ORTHOPAEDIC SURGERY

## 2018-03-22 PROCEDURE — 71000033 HC RECOVERY, INTIAL HOUR: Performed by: ORTHOPAEDIC SURGERY

## 2018-03-22 PROCEDURE — 71000039 HC RECOVERY, EACH ADD'L HOUR: Performed by: ORTHOPAEDIC SURGERY

## 2018-03-22 PROCEDURE — 63600175 PHARM REV CODE 636 W HCPCS

## 2018-03-22 PROCEDURE — 71000015 HC POSTOP RECOV 1ST HR: Performed by: ORTHOPAEDIC SURGERY

## 2018-03-22 PROCEDURE — S0077 INJECTION, CLINDAMYCIN PHOSP: HCPCS | Performed by: PHYSICIAN ASSISTANT

## 2018-03-22 PROCEDURE — 63600175 PHARM REV CODE 636 W HCPCS: Performed by: ORTHOPAEDIC SURGERY

## 2018-03-22 PROCEDURE — 25000003 PHARM REV CODE 250: Performed by: PHYSICIAN ASSISTANT

## 2018-03-22 PROCEDURE — 29826 SHO ARTHRS SRG DECOMPRESSION: CPT | Mod: RT,GC,, | Performed by: ORTHOPAEDIC SURGERY

## 2018-03-22 PROCEDURE — 37000008 HC ANESTHESIA 1ST 15 MINUTES: Performed by: ORTHOPAEDIC SURGERY

## 2018-03-22 PROCEDURE — 63600175 PHARM REV CODE 636 W HCPCS: Performed by: ANESTHESIOLOGY

## 2018-03-22 PROCEDURE — V2790 AMNIOTIC MEMBRANE: HCPCS | Performed by: ORTHOPAEDIC SURGERY

## 2018-03-22 PROCEDURE — 27201423 OPTIME MED/SURG SUP & DEVICES STERILE SUPPLY: Performed by: ORTHOPAEDIC SURGERY

## 2018-03-22 PROCEDURE — 36000711: Performed by: ORTHOPAEDIC SURGERY

## 2018-03-22 PROCEDURE — 37000009 HC ANESTHESIA EA ADD 15 MINS: Performed by: ORTHOPAEDIC SURGERY

## 2018-03-22 PROCEDURE — 17999 UNLISTD PX SKN MUC MEMB SUBQ: CPT | Mod: ,,, | Performed by: ORTHOPAEDIC SURGERY

## 2018-03-22 DEVICE — ANCHOR SU BC CORKSCREW 5.5MM: Type: IMPLANTABLE DEVICE | Site: SHOULDER | Status: FUNCTIONAL

## 2018-03-22 DEVICE — ANCHOR SUT FT BIOCRKSCR 6.5MM: Type: IMPLANTABLE DEVICE | Site: SHOULDER | Status: FUNCTIONAL

## 2018-03-22 DEVICE — TISSUE MATRIX BIOD RESTORE LG: Type: IMPLANTABLE DEVICE | Site: SHOULDER | Status: FUNCTIONAL

## 2018-03-22 DEVICE — ANCHOR BIOCOMP SWVLLOK: Type: IMPLANTABLE DEVICE | Site: SHOULDER | Status: FUNCTIONAL

## 2018-03-22 DEVICE — ANCHOR DBL LOAD 4.75MM SWVLK: Type: IMPLANTABLE DEVICE | Site: SHOULDER | Status: FUNCTIONAL

## 2018-03-22 RX ORDER — PROPOFOL 10 MG/ML
VIAL (ML) INTRAVENOUS
Status: DISCONTINUED | OUTPATIENT
Start: 2018-03-22 | End: 2018-03-22

## 2018-03-22 RX ORDER — GLYCOPYRROLATE 0.2 MG/ML
INJECTION INTRAMUSCULAR; INTRAVENOUS
Status: DISCONTINUED | OUTPATIENT
Start: 2018-03-22 | End: 2018-03-22

## 2018-03-22 RX ORDER — SCOLOPAMINE TRANSDERMAL SYSTEM 1 MG/1
1 PATCH, EXTENDED RELEASE TRANSDERMAL ONCE
Status: COMPLETED | OUTPATIENT
Start: 2018-03-22 | End: 2018-03-22

## 2018-03-22 RX ORDER — HYDROMORPHONE HYDROCHLORIDE 2 MG/ML
0.4 INJECTION, SOLUTION INTRAMUSCULAR; INTRAVENOUS; SUBCUTANEOUS EVERY 5 MIN PRN
Status: DISCONTINUED | OUTPATIENT
Start: 2018-03-22 | End: 2018-03-22 | Stop reason: HOSPADM

## 2018-03-22 RX ORDER — FLUMAZENIL 0.1 MG/ML
INJECTION INTRAVENOUS
Status: DISCONTINUED | OUTPATIENT
Start: 2018-03-22 | End: 2018-03-22

## 2018-03-22 RX ORDER — ROCURONIUM BROMIDE 10 MG/ML
INJECTION, SOLUTION INTRAVENOUS
Status: DISCONTINUED | OUTPATIENT
Start: 2018-03-22 | End: 2018-03-22

## 2018-03-22 RX ORDER — CLINDAMYCIN PHOSPHATE 900 MG/50ML
900 INJECTION, SOLUTION INTRAVENOUS
Status: COMPLETED | OUTPATIENT
Start: 2018-03-22 | End: 2018-03-22

## 2018-03-22 RX ORDER — MEPERIDINE HYDROCHLORIDE 50 MG/ML
12.5 INJECTION INTRAMUSCULAR; INTRAVENOUS; SUBCUTANEOUS ONCE AS NEEDED
Status: DISCONTINUED | OUTPATIENT
Start: 2018-03-22 | End: 2018-03-22 | Stop reason: HOSPADM

## 2018-03-22 RX ORDER — PREGABALIN 75 MG/1
150 CAPSULE ORAL ONCE
Status: COMPLETED | OUTPATIENT
Start: 2018-03-22 | End: 2018-03-22

## 2018-03-22 RX ORDER — FENTANYL CITRATE 50 UG/ML
25 INJECTION, SOLUTION INTRAMUSCULAR; INTRAVENOUS EVERY 5 MIN PRN
Status: DISCONTINUED | OUTPATIENT
Start: 2018-03-22 | End: 2018-03-22 | Stop reason: HOSPADM

## 2018-03-22 RX ORDER — EPINEPHRINE 1 MG/ML
INJECTION, SOLUTION INTRACARDIAC; INTRAMUSCULAR; INTRAVENOUS; SUBCUTANEOUS
Status: DISCONTINUED | OUTPATIENT
Start: 2018-03-22 | End: 2018-03-22 | Stop reason: HOSPADM

## 2018-03-22 RX ORDER — MIDAZOLAM HYDROCHLORIDE 1 MG/ML
2 INJECTION INTRAMUSCULAR; INTRAVENOUS ONCE
Status: COMPLETED | OUTPATIENT
Start: 2018-03-22 | End: 2018-03-22

## 2018-03-22 RX ORDER — ROPIVACAINE HYDROCHLORIDE 5 MG/ML
INJECTION, SOLUTION EPIDURAL; INFILTRATION; PERINEURAL
Status: DISCONTINUED | OUTPATIENT
Start: 2018-03-22 | End: 2018-03-22

## 2018-03-22 RX ORDER — NEOSTIGMINE METHYLSULFATE 1 MG/ML
INJECTION, SOLUTION INTRAVENOUS
Status: DISCONTINUED | OUTPATIENT
Start: 2018-03-22 | End: 2018-03-22

## 2018-03-22 RX ORDER — FENTANYL CITRATE 50 UG/ML
100 INJECTION, SOLUTION INTRAMUSCULAR; INTRAVENOUS EVERY 5 MIN PRN
Status: COMPLETED | OUTPATIENT
Start: 2018-03-22 | End: 2018-03-22

## 2018-03-22 RX ORDER — DIPHENHYDRAMINE HYDROCHLORIDE 50 MG/ML
12.5 INJECTION INTRAMUSCULAR; INTRAVENOUS EVERY 30 MIN PRN
Status: DISCONTINUED | OUTPATIENT
Start: 2018-03-22 | End: 2018-03-22 | Stop reason: HOSPADM

## 2018-03-22 RX ORDER — LIDOCAINE HCL/PF 100 MG/5ML
SYRINGE (ML) INTRAVENOUS
Status: DISCONTINUED | OUTPATIENT
Start: 2018-03-22 | End: 2018-03-22

## 2018-03-22 RX ORDER — NALOXONE HCL 0.4 MG/ML
VIAL (ML) INJECTION
Status: COMPLETED
Start: 2018-03-22 | End: 2018-03-22

## 2018-03-22 RX ORDER — NALOXONE HYDROCHLORIDE 0.4 MG/ML
80 INJECTION, SOLUTION INTRAMUSCULAR; INTRAVENOUS; SUBCUTANEOUS
Status: DISCONTINUED | OUTPATIENT
Start: 2018-03-22 | End: 2018-03-22 | Stop reason: HOSPADM

## 2018-03-22 RX ORDER — SODIUM CHLORIDE 0.9 % (FLUSH) 0.9 %
3 SYRINGE (ML) INJECTION
Status: DISCONTINUED | OUTPATIENT
Start: 2018-03-22 | End: 2018-03-22 | Stop reason: HOSPADM

## 2018-03-22 RX ORDER — ONDANSETRON 2 MG/ML
INJECTION INTRAMUSCULAR; INTRAVENOUS
Status: DISCONTINUED | OUTPATIENT
Start: 2018-03-22 | End: 2018-03-22

## 2018-03-22 RX ORDER — HYDROCODONE BITARTRATE AND ACETAMINOPHEN 5; 325 MG/1; MG/1
1 TABLET ORAL
Status: DISCONTINUED | OUTPATIENT
Start: 2018-03-22 | End: 2018-03-22 | Stop reason: HOSPADM

## 2018-03-22 RX ORDER — FAMOTIDINE 20 MG/1
20 TABLET, FILM COATED ORAL
Status: COMPLETED | OUTPATIENT
Start: 2018-03-22 | End: 2018-03-22

## 2018-03-22 RX ORDER — SODIUM CHLORIDE, SODIUM LACTATE, POTASSIUM CHLORIDE, CALCIUM CHLORIDE 600; 310; 30; 20 MG/100ML; MG/100ML; MG/100ML; MG/100ML
INJECTION, SOLUTION INTRAVENOUS CONTINUOUS
Status: DISCONTINUED | OUTPATIENT
Start: 2018-03-22 | End: 2018-03-22 | Stop reason: HOSPADM

## 2018-03-22 RX ORDER — LIDOCAINE HYDROCHLORIDE 10 MG/ML
0.5 INJECTION, SOLUTION EPIDURAL; INFILTRATION; INTRACAUDAL; PERINEURAL ONCE
Status: DISCONTINUED | OUTPATIENT
Start: 2018-03-22 | End: 2018-03-22 | Stop reason: HOSPADM

## 2018-03-22 RX ORDER — ONDANSETRON 2 MG/ML
4 INJECTION INTRAMUSCULAR; INTRAVENOUS DAILY PRN
Status: DISCONTINUED | OUTPATIENT
Start: 2018-03-22 | End: 2018-03-22 | Stop reason: HOSPADM

## 2018-03-22 RX ADMIN — CLINDAMYCIN PHOSPHATE 900 MG: 18 INJECTION, SOLUTION INTRAVENOUS at 10:03

## 2018-03-22 RX ADMIN — ROCURONIUM BROMIDE 10 MG: 10 INJECTION, SOLUTION INTRAVENOUS at 12:03

## 2018-03-22 RX ADMIN — NALOXONE HYDROCHLORIDE 0.08 MG: 0.4 INJECTION, SOLUTION INTRAMUSCULAR; INTRAVENOUS; SUBCUTANEOUS at 02:03

## 2018-03-22 RX ADMIN — EPHEDRINE SULFATE 5 MG: 50 INJECTION INTRAMUSCULAR; INTRAVENOUS; SUBCUTANEOUS at 12:03

## 2018-03-22 RX ADMIN — GLYCOPYRROLATE 0.8 MG: 0.2 INJECTION, SOLUTION INTRAMUSCULAR; INTRAVENOUS at 01:03

## 2018-03-22 RX ADMIN — CARBOXYMETHYLCELLULOSE SODIUM 2 DROP: 2.5 SOLUTION/ DROPS OPHTHALMIC at 10:03

## 2018-03-22 RX ADMIN — FLUMAZENIL 2.5 MG: 0.1 INJECTION, SOLUTION INTRAVENOUS at 01:03

## 2018-03-22 RX ADMIN — LIDOCAINE HYDROCHLORIDE 75 MG: 20 INJECTION, SOLUTION INTRAVENOUS at 10:03

## 2018-03-22 RX ADMIN — FENTANYL CITRATE 50 MCG: 50 INJECTION, SOLUTION INTRAMUSCULAR; INTRAVENOUS at 10:03

## 2018-03-22 RX ADMIN — SODIUM CHLORIDE, SODIUM LACTATE, POTASSIUM CHLORIDE, AND CALCIUM CHLORIDE: 600; 310; 30; 20 INJECTION, SOLUTION INTRAVENOUS at 09:03

## 2018-03-22 RX ADMIN — PREGABALIN 150 MG: 75 CAPSULE ORAL at 08:03

## 2018-03-22 RX ADMIN — ROPIVACAINE HYDROCHLORIDE 20 ML: 5 INJECTION, SOLUTION EPIDURAL; INFILTRATION; PERINEURAL at 10:03

## 2018-03-22 RX ADMIN — SODIUM CHLORIDE, SODIUM LACTATE, POTASSIUM CHLORIDE, AND CALCIUM CHLORIDE: 600; 310; 30; 20 INJECTION, SOLUTION INTRAVENOUS at 12:03

## 2018-03-22 RX ADMIN — ROCURONIUM BROMIDE 40 MG: 10 INJECTION, SOLUTION INTRAVENOUS at 10:03

## 2018-03-22 RX ADMIN — ONDANSETRON 4 MG: 2 INJECTION INTRAMUSCULAR; INTRAVENOUS at 01:03

## 2018-03-22 RX ADMIN — EPHEDRINE SULFATE 10 MG: 50 INJECTION INTRAMUSCULAR; INTRAVENOUS; SUBCUTANEOUS at 11:03

## 2018-03-22 RX ADMIN — GLYCOPYRROLATE 0.2 MG: 0.2 INJECTION, SOLUTION INTRAMUSCULAR; INTRAVENOUS at 10:03

## 2018-03-22 RX ADMIN — FAMOTIDINE 20 MG: 20 TABLET, FILM COATED ORAL at 08:03

## 2018-03-22 RX ADMIN — PROPOFOL 180 MG: 10 INJECTION, EMULSION INTRAVENOUS at 10:03

## 2018-03-22 RX ADMIN — SCOLOPAMINE TRANSDERMAL SYSTEM 1 PATCH: 1 PATCH, EXTENDED RELEASE TRANSDERMAL at 08:03

## 2018-03-22 RX ADMIN — FENTANYL CITRATE 100 MCG: 50 INJECTION, SOLUTION INTRAMUSCULAR; INTRAVENOUS at 10:03

## 2018-03-22 RX ADMIN — EPHEDRINE SULFATE 10 MG: 50 INJECTION INTRAMUSCULAR; INTRAVENOUS; SUBCUTANEOUS at 01:03

## 2018-03-22 RX ADMIN — NALOXONE HYDROCHLORIDE 0.08 MG: 0.4 INJECTION, SOLUTION INTRAMUSCULAR; INTRAVENOUS; SUBCUTANEOUS at 03:03

## 2018-03-22 RX ADMIN — NEOSTIGMINE METHYLSULFATE 5 MG: 1 INJECTION INTRAVENOUS at 01:03

## 2018-03-22 RX ADMIN — EPHEDRINE SULFATE 10 MG: 50 INJECTION INTRAMUSCULAR; INTRAVENOUS; SUBCUTANEOUS at 10:03

## 2018-03-22 RX ADMIN — MIDAZOLAM HYDROCHLORIDE 2 MG: 1 INJECTION INTRAMUSCULAR; INTRAVENOUS at 10:03

## 2018-03-22 RX ADMIN — EPHEDRINE SULFATE 5 MG: 50 INJECTION INTRAMUSCULAR; INTRAVENOUS; SUBCUTANEOUS at 11:03

## 2018-03-22 NOTE — ANESTHESIA POSTPROCEDURE EVALUATION
"Anesthesia Post Evaluation    Patient: Dana Cook    Procedure(s) Performed: Procedure(s) (LRB):  REPAIR-ROTATOR CUFF-ARTHROSCOPIC (Right)  DEBRIDEMENT-SHOULDER-ARTHROSCOPIC W/ BURSECTOMY (Right)  [id number] (Right)  MICROFRACTURE-ARTHROSCOPIC (Right)  INJECTION-STEROID arthroscopic assisted BioD injection to the right shoudler (Right)  CHONDROPLASTY-SHOULDER (Right)  ARTHROSCOPY-SHOULDER WITH SUBACROMIAL DECOMPRESSION (Right)  BICEP TENODESIS ARTHROSCOPIC (TENDON FIXATION) (Right)    Final Anesthesia Type: general  Patient location during evaluation: PACU  Patient participation: Yes- Able to Participate  Level of consciousness: awake and alert and oriented  Post-procedure vital signs: reviewed and stable  Pain management: adequate  Airway patency: patent  PONV status at discharge: No PONV  Anesthetic complications: no      Cardiovascular status: blood pressure returned to baseline and hemodynamically stable  Respiratory status: unassisted, spontaneous ventilation and room air  Hydration status: euvolemic  Follow-up not needed.        Visit Vitals  /60   Pulse 70   Temp 36.6 °C (97.8 °F) (Oral)   Resp 16   Ht 5' 5" (1.651 m)   Wt 86.2 kg (190 lb)   SpO2 (!) 93%   Breastfeeding? No   BMI 31.62 kg/m²       Pain/Yuan Score: Pain Assessment Performed: Yes (3/22/2018  2:10 PM)  Presence of Pain: denies (3/22/2018  2:10 PM)  Yuan Score: 8 (3/22/2018  2:10 PM)      "

## 2018-03-22 NOTE — OR NURSING
Dr. Buckley at bedside to evaluate patient;  Respiration shallow and patient not arousable; new orders noted

## 2018-03-22 NOTE — BRIEF OP NOTE
Operative Note       Surgery Date: 3/22/2018     Surgeon(s) and Role:     * Shama Denise MD - Primary     * Ino Johnson MD - Fellow     * Verena ANDERSON    Pre-op Diagnosis:  Instability of shoulder joint, unspecified laterality [M25.319]  Bicipital tenosynovitis, unspecified laterality [M75.20]  Rotator cuff syndrome, unspecified laterality [M75.100]    Post-op Diagnosis:  Rotator cuff tear, biceps tenosynovitis, subacromial bursitis    Procedure(s) (LRB):  REPAIR-ROTATOR CUFF-ARTHROSCOPIC (Right)  DEBRIDEMENT-SHOULDER-ARTHROSCOPIC (Right)  REPAIR-TENDON-BICEP (Right)  [id number] (Right)  MICROFRACTURE-ARTHROSCOPIC (Right)  INJECTION-STEROID arthroscopic assisted BioD injection to the right shoudler (Right)    Anesthesia: General    Findings/Key Components:  As above    Core Measure Documentation:  Were antibiotics extended? No  Was the patient administered a VTE Prophylaxis? No. Short procedure; low risk  Estimated Blood Loss: minimal           Specimens     None        Implants:   Implant Name Type Inv. Item Serial No.  Lot No. LRB No. Used   [id number]  TISSUE [employer] BIOD RESTORE LG [id number] [employer] [id number] Right 1   ANCHOR BANERJEE BC CORKSCREW 5.5MM - [id number]  ANCHOR BANERJEE BC CORKSCREW 5.5MM  [employer] [id number] Right 2   ANCHOR SUT FT BIOCRKSCR 6.5MM - [id number]  ANCHOR SUT FT BIOCRKSCR 6.5MM  [employer] [id number] Right 1   ANCHOR BIOCOMP SWVLLOK - [id number]  ANCHOR BIOCOMP SWVLLOK  [employer] [id number] Right 1   ANCHOR DBL LOAD 4.75MM SWVLK - [id number]   ANCHOR DBL LOAD 4.75MM SWVLK   [employer] [id number] Right 1       Complications: none           Disposition: PACU - hemodynamically stable.           Condition: Stable    Attestation:  I was present for the entire procedure.

## 2018-03-22 NOTE — OP NOTE
DATE OF PROCEDURE: 03/22/2018    SURGEON: Shama Denise M.D.    ASSISTANT: SAMREEN Johnson MD PGY6  ASSISTANT: SMA Mario      PREOPERATIVE DIAGNOSES:   right  1. Shoulder rotator cuff tear   2. Shoulder biceps tendonitis  3. Shoulder synovitis  4. Shoulder SLAP  5. Shoulder impingement, bursitis    POSTOPERATIVE DIAGNOSES:   right  1. Shoulder rotator cuff tear   2. Shoulder biceps tendonitis  3. Shoulder synovitis  4. Shoulder SLAP  5. Shoulder impingement, bursitis  6. Shoulder AC arthritis    OPERATION:   right  1. Shoulder arthroscopic rotator cuff repair (20 x 15 mm, double row) (CPT 03686)   2. Shoulder arthroscopic biceps tenodesis (CPT 53665.52)  3. Shoulder arthroscopic subacromial decompression, bursectomy   4. Shoulder arthroscopic extensive debridement (anterior, posterior glenohumeral joint, subacromial space) (CPT 17591)  5. Shoulder arthroscopic-assisted arthrocentesis of viable structural human allograft tissue matrix (BioDRestore) (CPT 25366)   6. Shoulder arthroscopic microfracture (Crimson duvet technique) (CPT 97885)  7. Shoulder arthroscopic lysis of adhesions (CPT 93157)  8. Shoulder arthroscopic chondroplasty    ANESTHESIA:  General with interscalene block    ESTIMATED BLOOD LOSS:  Minimal.    TOURNIQUET TIME:  None.    DRAINS:  None.    COMPLICATIONS:  None.  The patient was moved to the recovery room in stable condition with compartments soft and cap refill less than a second in all digits.    INDICATIONS/MEDICAL NECESSITY: The patient is a 67 y.o. year-old female who has history and physical examination findings consistent with the above.  Nonoperative versus operative options were discussed.  The risks and benefits were discussed with the patient.  The patient acknowledged understanding and wished to proceed with operative intervention.  Informed consent was obtained prior to the procedure.  Reasonable expectations and potential complications were discussed and acknowledged, including  but not limited to infection, bleeding, blood clots, (DVT and/or PE), nerve injury, tear, instability, continued pain and stiffness.  They agreed and understood and wished to proceed.    EXAMINATION UNDER ANESTHESIA of the right SHOULDER: Forward elevation 175 degrees, External rotation at 0 60 degrees, External rotation at 90 90 degrees, Internal rotation at 90 60 degrees.  Translation testing: anterior grade 1, posterior grade 1, sulcus sign grade 1 corrects to 0 on external rotation.    EXAMINATION UNDER ANESTHESIA of the left SHOULDER: Forward elevation 175 degrees, External rotation at 0 60 degrees, External rotation at 90 90 degrees, Internal rotation at 90 60 degrees.  Translation testing: anterior grade 1, posterior grade 1, sulcus sign grade 1 corrects to 0 on external rotation.    PROCEDURE IN DETAIL:  After the correct operative site was marked by the operating surgeon, an interscalene block was administered by the anesthesia team.  The patient was then taken to the operating room and placed supine on the operating room table, where the patient  underwent general anesthesia by the anesthesia team.  The patient was then rolled into the lateral decubitus position with the operative side up.  A well-padded axillary roll, beanbag and pillows were placed.  All pressure points were carefully padded and checked.  The upper extremities and both lower extremities were placed in comfortable positions and were also well-padded.  The operative upper extremity was then prepped and draped in the usual sterile fashion.     The Spider arm positioner was implemented with balanced suspension and appropriate landmarks were noted on the skin.  A posterior followed by elsie-superior portals were created and systematic examination of the joint revealed the following:      There was no evidence of any significant chondral lesions to the glenoid or humeral head.     There were chondral lesions to:   Humeral head: 40 x 25 grade  3  Glenoid: 20 x 10 grade 3  Chondroplasty was performed with shaver.    Tenosynovitis and SLAP type II was noted to the biceps tendon on ramp sign.    There was some synovitis in the labrum that was debrided as needed.  Biceps release with auto-tenodesis was performed using thermal device.  Part of superior labrum was included to allow the biceps tendon to auto-tenodese.  Attention was then turned to the rotator cuff.  The rotator cuff undersurface was then visualized and debrided as needed using a shaver.      Attention was then turned to the subacromial space where a significant hypertrophic bursa was encountered.  The bursa itself was thickened and hypertrophic.  A lateral portal was created to assist with the bursectomy.  Subacromial decompression was completed using three-portal technique in the standard fashion with a 4.5 mm donna without difficulty.  The anterior osteophyte was flattened.  Confirmation of adequate resection was confirmed while viewing from the lateral portal.      The surface of the rotator cuff was then gently debrided and mobilized.  We did this using a shaver while viewing through the posterior portal and the shaver used through the lateral portal.  We were then able to mobilize the cuff tear which was located at the supraspinatus extending to the infraspinatus.  The cuff was able to be mobilized adequately laterally.  The cuff tear dimensions were: 20 mm AP and 15 mm medial to lateral.  The undersurface edge of the cuff was debrided as needed using the shaver.  A 7 mm cannula was placed in the lateral and elsie-superior portals.  The bony bed of the greater tuberosity was prepared with the donna.  This left a nice surface for the articular surface of the cuff to be repaired to and heal to.  Adequate debridement was performed, moving the arm as needed with internal/external rotation.     Shoulder:   Shoulder arthroscopic lysis of adhesions (CPT 11389):  With the arthroscope in the  subacromial space, an extensive lysis of adhesions needed to be performed with lysis of adhesions in the lateral gutter, posterior gutter, and anterior gutter where there was extensive scarring from the chronic nature of the rotator cuff tear.  The rotator cuff was retracted and there was a medium tear noted.  After the lysis of adhesions, the mobility was restored to the rotator cuff tissue and shoulder.    A 3 mm incision was made to place the 2 anchors through.  This was done in a central location using internal and external rotation to place the two 5.5mm Ar[employer]rex Bio-corkscrew anchors; one  anteriorly and one posteriorly.  The anterior anchor was placed first and the posterior anchor was placed second.  Scorpion suture-passing device was used to place two horizontal mattress sutures through the cuff starting anteriorly and proceeding posteriorly.  Next, the posterior anchor was placed and two more horizontal mattress sutures were passed.  After the two anchors were placed we had four horizontal mattress sutures proceeding from anterior to posterior that were running through the cuff.  The cuff tear was a crescentic shaped tear.  The sutures were then tied using modified Jose Elias knots proceeding from anterior to posterior.  All knots had good loop and knot security using modified Jose Elias knots.  After knots were applied from anterior to posterior, the cuff was reapproximated down to the greater tuberosity with good compression and knots on the superior side of the cuff.  The shoulder was cycled through full internal/external rotation.  No suture breakage was noted and the cuff was noted to remain nicely reapproximated throughout the entire rotation of the joint.  The scope was then placed in the joint on the articular side of the cuff.  The cuff was reapproximated nicely.     Prior to the lateral cuff fixation, 'crimson duvet' microfracture technique was performed using an awl to the greater tuberosity in the  standard fashion using awl punch device. There was confirmation of marrow elements extravasating out of the end of the microfracture holes upon decrease of pump pressure.     Double row cuff fixation was then performed using a 4.75 x 19.1 mm Swivelock bio-composite anchor x 2.  This gave good compression of the rotator cuff tissue lateral to the medial row down onto the greater tuberosity, which was previously repaired with a bur.      Viable structural human allograft tissue matrix (BioDRestore) was injected into cuff repair and subacromial space under direct arthroscopic visualization after irrigation, as described below, was completed.             The shoulder and subacromial space were then irrigated and fluid was extravasated using suction. All portals were reapproximated using inverted 4-0 Monocryl suture in the subcutaneous tissue of the portals.  Mastisol and Steri-strips were placed with xeroform, 4x4s, abd pad, and Medi-pore tape.  TENS unit pads were placed which were medically necessary for pain relief.  An iceman was secured in the shoulder stanton.  A sling with an abduction pillow was secured.  The patient was then moved to supine, extubated and taken to the recovery room where the patient arrived in stable condition with the compartments of the arm and forearm soft and cap refill less than a second in all digits.     POSTOPERATIVE PLAN: We will follow the arthroscopic rotator cuff repair guidelines for a medium size rotator cuff tear.  We discussed with the patient's family after surgery.  The patient will remain in a sling for 6 weeks.  PT to start at 4 weeks.    Quality of tissue: fair to good (soft bone anteriorly)     Quality of the repair: good    Size of tear: 20 x 15 mm

## 2018-03-22 NOTE — DISCHARGE INSTRUCTIONS
Per dr babb's instructions    Sling for 6 weeks NWB RUE    PT to begin at 4 weeks          Discharge Instructions: After Your Surgery  Youve just had surgery. During surgery, you were given medicine called anesthesia to keep you relaxed and free of pain. After surgery, you may have some pain or nausea. This is common. Here are some tips for feeling better and getting well after surgery.     Stay on schedule with your medicine.   Going home  Your healthcare provider will show you how to take care of yourself when you go home. He or she will also answer your questions. Have an adult family member or friend drive you home. For the first 24 hours after your surgery:  · Do not drive or use heavy equipment.  · Do not make important decisions or sign legal papers.  · Do not drink alcohol.  · Have someone stay with you, if needed. He or she can watch for problems and help keep you safe.  Be sure to go to all follow-up visits with your healthcare provider. And rest after your surgery for as long as your healthcare provider tells you to.  Coping with pain  If you have pain after surgery, pain medicine will help you feel better. Take it as told, before pain becomes severe. Also, ask your healthcare provider or pharmacist about other ways to control pain. This might be with heat, ice, or relaxation. And follow any other instructions your surgeon or nurse gives you.  Tips for taking pain medicine  To get the best relief possible, remember these points:  · Pain medicines can upset your stomach. Taking them with a little food may help.  · Most pain relievers taken by mouth need at least 20 to 30 minutes to start to work.  · Taking medicine on a schedule can help you remember to take it. Try to time your medicine so that you can take it before starting an activity. This might be before you get dressed, go for a walk, or sit down for dinner.  · Constipation is a common side effect of pain medicines. Call your healthcare provider  before taking any medicines such as laxatives or stool softeners to help ease constipation. Also ask if you should skip any foods. Drinking lots of fluids and eating foods such as fruits and vegetables that are high in fiber can also help. Remember, do not take laxatives unless your surgeon has prescribed them.  · Drinking alcohol and taking pain medicine can cause dizziness and slow your breathing. It can even be deadly. Do not drink alcohol while taking pain medicine.  · Pain medicine can make you react more slowly to things. Do not drive or run machinery while taking pain medicine.  Your healthcare provider may tell you to take acetaminophen to help ease your pain. Ask him or her how much you are supposed to take each day. Acetaminophen or other pain relievers may interact with your prescription medicines or other over-the-counter (OTC) medicines. Some prescription medicines have acetaminophen and other ingredients. Using both prescription and OTC acetaminophen for pain can cause you to overdose. Read the labels on your OTC medicines with care. This will help you to clearly know the list of ingredients, how much to take, and any warnings. It may also help you not take too much acetaminophen. If you have questions or do not understand the information, ask your pharmacist or healthcare provider to explain it to you before you take the OTC medicine.  Managing nausea  Some people have an upset stomach after surgery. This is often because of anesthesia, pain, or pain medicine, or the stress of surgery. These tips will help you handle nausea and eat healthy foods as you get better. If you were on a special food plan before surgery, ask your healthcare provider if you should follow it while you get better. These tips may help:  · Do not push yourself to eat. Your body will tell you when to eat and how much.  · Start off with clear liquids and soup. They are easier to digest.  · Next try semi-solid foods, such as mashed  potatoes, applesauce, and gelatin, as you feel ready.  · Slowly move to solid foods. Dont eat fatty, rich, or spicy foods at first.  · Do not force yourself to have 3 large meals a day. Instead eat smaller amounts more often.  · Take pain medicines with a small amount of solid food, such as crackers or toast, to avoid nausea.     Call your surgeon if  · You still have pain an hour after taking medicine. The medicine may not be strong enough.  · You feel too sleepy, dizzy, or groggy. The medicine may be too strong.  · You have side effects like nausea, vomiting, or skin changes, such as rash, itching, or hives.       If you have obstructive sleep apnea  You were given anesthesia medicine during surgery to keep you comfortable and free of pain. After surgery, you may have more apnea spells because of this medicine and other medicines you were given. The spells may last longer than usual.   At home:  · Keep using the continuous positive airway pressure (CPAP) device when you sleep. Unless your healthcare provider tells you not to, use it when you sleep, day or night. CPAP is a common device used to treat obstructive sleep apnea.  · Talk with your provider before taking any pain medicine, muscle relaxants, or sedatives. Your provider will tell you about the possible dangers of taking these medicines.  Date Last Reviewed: 12/1/2016  © 0128-8351 [employer]. [address] [place] [address], [place] [address]. All rights reserved. This information is not intended as a substitute for professional medical care. Always follow your healthcare professional's instructions.      Your feedback is important to us.  If you should receive a survey in the next few days, please share your experience with us.

## 2018-03-22 NOTE — DISCHARGE SUMMARY
.Discharge Note  Short Stay      SUMMARY     Admit Date: 3/22/2018    Attending Physician: Shama Denise MD     Discharge Physician: Shama Denise MD    Final Diagnosis: same    Disposition: Home or Self Care    Patient Instructions:   Current Discharge Medication List      CONTINUE these medications which have NOT CHANGED    Details   alendronate (FOSAMAX) 70 MG tablet Take 1 tablet (70 mg total) by mouth every 7 days.  Qty: 12 tablet, Refills: 0    Associated Diagnoses: Osteoporosis, unspecified osteoporosis type, unspecified pathological fracture presence      !! buPROPion (WELLBUTRIN XL) 150 MG TB24 tablet TAKE 1 TABLET BY MOUTH EVERY DAY  Qty: 90 tablet, Refills: 4      !! buPROPion (WELLBUTRIN XL) 300 MG 24 hr tablet TAKE 1 TABLET BY MOUTH EVERY DAY  Qty: 30 tablet, Refills: 2    Associated Diagnoses: Depression, unspecified depression type      chlorthalidone (HYGROTEN) 25 MG Tab TAKE 1 TABLET BY MOUTH EVERY DAY  Qty: 90 tablet, Refills: 0      cyanocobalamin, vitamin B-12, (VITAMIN B-12) 1,000 mcg Lozg       hydrOXYzine HCl (ATARAX) 10 MG Tab 10 mg as needed.       multivitamin with minerals (MULTIPLE VITAMIN-MINERALS) tablet Take by mouth. 1 Tablet Oral Every day      omega-3 fatty acids-vitamin E (FISH OIL) 1,000 mg Cap       red yeast rice 600 mg Cap Take by mouth.      ascorbic acid (VITAMIN C) 1000 MG tablet       aspirin (ECOTRIN) 325 MG EC tablet Take 1 tablet (325 mg total) by mouth every 12 (twelve) hours.  Qty: 42 tablet, Refills: 0    Associated Diagnoses: Chronic right shoulder pain; Rotator cuff syndrome of right shoulder; Right bicipital tenosynovitis; Acute post-operative pain      ferrous sulfate (IRON) 325 mg (65 mg iron) Tab Take by mouth. 1 Tablet Oral Every day      glucosamine-chondroitin 500-400 mg tablet Take 1 tablet by mouth daily as needed.       oxyCODONE-acetaminophen (PERCOCET)  mg per tablet Take 1 tablet by mouth every 4-6 hours as needed for pain. Take stool softener with  this medication.  Qty: 30 tablet, Refills: 0    Associated Diagnoses: Chronic right shoulder pain; Rotator cuff syndrome of right shoulder; Right bicipital tenosynovitis; Acute post-operative pain      promethazine (PHENERGAN) 25 MG tablet Take 1 tablet (25 mg total) by mouth every 6 (six) hours as needed for Nausea.  Qty: 16 tablet, Refills: 0    Associated Diagnoses: Chronic right shoulder pain; Rotator cuff syndrome of right shoulder; Right bicipital tenosynovitis; Acute post-operative pain      SUBHASH'S WORT/S.GINSGR (SUBHASH'S WORT-SIBER.GINSENG) 450-90 mg Tab       traMADol (ULTRAM) 50 mg tablet Take 1-2 tablets ( mg total) by mouth every 6 (six) hours as needed.  Qty: 30 tablet, Refills: 0    Associated Diagnoses: Chronic right shoulder pain; Rotator cuff syndrome of right shoulder; Right bicipital tenosynovitis; Acute post-operative pain      vitamin E 1000 UNIT capsule Take by mouth. 1 Capsule Oral Every day       !! - Potential duplicate medications found. Please discuss with provider.            Discharge Procedure Orders (must include Diet, Follow-up, Activity)  Activity as tolerated     Shower on day dressing removed (No bath)     Keep surgical extremity elevated     Ice to affected area     Weight bearing restrictions (specify)   Order Comments: NWMELANI RUE in sling all times except hygiene     Notify your health care provider if you experience any of the following:  temperature >100.4     Notify your health care provider if you experience any of the following:  persistent nausea and vomiting or diarrhea     Notify your health care provider if you experience any of the following:  severe uncontrolled pain     Notify your health care provider if you experience any of the following:  redness, tenderness, or signs of infection (pain, swelling, redness, odor or green/yellow discharge around incision site)     Notify your health care provider if you experience any of the following:  difficulty  breathing or increased cough     Remove dressing in 72 hours        F/U 2 weeks Dr Denise

## 2018-03-22 NOTE — H&P (VIEW-ONLY)
Dana Cook  is here for a completion of her perioperative paperwork. she  Is scheduled to undergo     right              a. Shoulder arthroscopic rotator cuff repair              b. Shoulder arthroscopic SAD              c. Shoulder arthroscopic extensive debridement              d. Shoulder arthroscopic biceps tenodesis              e. Shoulder arthroscopic possible microfracture              f. Shoulder arthroscopic possible lysis of adhesions              g.Shoulder arthroscopic-assisted Bio-D arthrocentesis              h. Shoulder arthroscopic chondroplasty on 3/22/18.      She is a healthy individual but does need clearance for this procedure Dr. Croft at [facility].     PAST MEDICAL HISTORY:   Past Medical History:   Diagnosis Date    Anxiety     Colon polyps     Depression     Dry eyes     Dry mouth     Dupuytren's contracture     Hypertension     left arm fracture     hairline    Murmur, heart     Osteopenia     PONV (postoperative nausea and vomiting)     with wisdom teeth    Sarcoidosis      PAST SURGICAL HISTORY:   Past Surgical History:   Procedure Laterality Date    Laparoscopy for possible endometriosis   1985    WRIST SURGERY Left     Dupuytrens contracture     FAMILY HISTORY:   Family History   Problem Relation Age of Onset    Cancer Father      ? type    Leukemia Paternal Grandmother     Breast cancer Paternal Aunt      maternal aunt    Cervical cancer Paternal Aunt     Uterine cancer Sister     Basal cell carcinoma Sister      brother    Uterine cancer Maternal Grandmother     Breast cancer Maternal Aunt     Colon cancer Neg Hx     Ovarian cancer Neg Hx      SOCIAL HISTORY:   Social History     Social History    Marital status:      Spouse name: N/A    Number of children: 1    Years of education: N/A     Occupational History    retired      Part time security work     Social History Main Topics    Smoking status: Never Smoker    Smokeless tobacco: Never Used     Alcohol use 3.0 - 3.6 oz/week     5 - 6 Standard drinks or equivalent per week      Comment: wine every night    Drug use: No    Sexual activity: Not Currently     Partners: Male     Other Topics Concern    Not on file     Social History Narrative    No narrative on file       MEDICATIONS:   Current Outpatient Prescriptions:     alendronate (FOSAMAX) 70 MG tablet, Take 1 tablet (70 mg total) by mouth every 7 days., Disp: 12 tablet, Rfl: 0    ascorbic acid (VITAMIN C) 1000 MG tablet, , Disp: , Rfl:     buPROPion (WELLBUTRIN XL) 150 MG TB24 tablet, TAKE 1 TABLET BY MOUTH EVERY DAY, Disp: 90 tablet, Rfl: 4    buPROPion (WELLBUTRIN XL) 300 MG 24 hr tablet, TAKE 1 TABLET BY MOUTH EVERY DAY, Disp: 30 tablet, Rfl: 2    chlorthalidone (HYGROTEN) 25 MG Tab, TAKE 1 TABLET BY MOUTH EVERY DAY, Disp: 90 tablet, Rfl: 0    cyanocobalamin, vitamin B-12, (VITAMIN B-12) 1,000 mcg Lozg, , Disp: , Rfl:     ferrous sulfate (IRON) 325 mg (65 mg iron) Tab, Take by mouth. 1 Tablet Oral Every day, Disp: , Rfl:     glucosamine-chondroitin 500-400 mg tablet, Take 1 tablet by mouth daily as needed. , Disp: , Rfl:     hydrOXYzine HCl (ATARAX) 10 MG Tab, 10 mg as needed. , Disp: , Rfl:     multivitamin with minerals (MULTIPLE VITAMIN-MINERALS) tablet, Take by mouth. 1 Tablet Oral Every day, Disp: , Rfl:     omega-3 fatty acids-vitamin E (FISH OIL) 1,000 mg Cap, , Disp: , Rfl:     red yeast rice 600 mg Cap, Take by mouth., Disp: , Rfl:     SUBHASH'S WORT/S.GINSGR (SUBHASH'S WORT-SIBER.GINSENG) 450-90 mg Tab, , Disp: , Rfl:     vitamin E 1000 UNIT capsule, Take by mouth. 1 Capsule Oral Every day, Disp: , Rfl:     aspirin (ECOTRIN) 325 MG EC tablet, Take 1 tablet (325 mg total) by mouth every 12 (twelve) hours., Disp: 42 tablet, Rfl: 0    oxyCODONE-acetaminophen (PERCOCET)  mg per tablet, Take 1 tablet by mouth every 4-6 hours as needed for pain. Take stool softener with this medication., Disp: 30 tablet, Rfl: 0     "promethazine (PHENERGAN) 25 MG tablet, Take 1 tablet (25 mg total) by mouth every 6 (six) hours as needed for Nausea., Disp: 16 tablet, Rfl: 0    traMADol (ULTRAM) 50 mg tablet, Take 1-2 tablets ( mg total) by mouth every 6 (six) hours as needed., Disp: 30 tablet, Rfl: 0  ALLERGIES:   Review of patient's allergies indicates:   Allergen Reactions    Other Other (See Comments)     Allergy to tape.     Percocet [oxycodone-acetaminophen] Nausea Only       VITAL SIGNS: BP (!) 167/87   Pulse 63   Ht 5' 5" (1.651 m)   Wt 86.2 kg (190 lb)   BMI 31.62 kg/m²      Risks, indications and benefits of the surgical procedure were discussed with the patient. All questions with regard to surgery, rehab, expected return to functional activities, activities of daily living and recreational endeavors were answered to her satisfaction.    It was explained to the patient that there may be an increase in surgical risks if the patient has certain co-morbidities such as but not limited to: Obesity, Cardiovascular issues (CHF, CAD, Arrhythmias), chronic pulmonary issues, previous or current neurovascular/neurological issues, previous strokes, diabetes mellitus, previous wound healing issues, previous wound or skin infections, PVD, clotting disorders, if the patient uses chronic steroids, if the patient takes or has immune compromising medications or diseases, or has previously or currently used tobacco products.     The patient verbalized that he/she does not have any additional clotting, bleeding, or blood disorders, other than what is list in her chart on today's review.     Then a brief history and physical exam were performed.    Review of Systems   Constitution: Negative. Negative for chills, fever and night sweats.   HENT: Negative for congestion and headaches.    Eyes: Negative for blurred vision, left vision loss and right vision loss.   Cardiovascular: Negative for chest pain and syncope.   Respiratory: Negative for " cough and shortness of breath.    Endocrine: Negative for polydipsia, polyphagia and polyuria.   Hematologic/Lymphatic: Negative for bleeding problem. Does not bruise/bleed easily.   Skin: Negative for dry skin, itching and rash.   Musculoskeletal: Negative for falls and muscle weakness.   Gastrointestinal: Negative for abdominal pain and bowel incontinence.   Genitourinary: Negative for bladder incontinence and nocturia.   Neurological: Negative for disturbances in coordination, loss of balance and seizures.   Psychiatric/Behavioral: Negative for depression. The patient does not have insomnia.    Allergic/Immunologic: Negative for hives and persistent infections.     PHYSICAL EXAM:  GEN: A&Ox3, WD WN NAD  HEENT: WNL  CHEST: CTAB, no W/R/R  HEART: RRR, no M/R/G  ABD: Soft, NT ND, BS x4 QUADS  MS; See Epic  NEURO: CN II-XII intact       The surgical consent was then reviewed with the patient, who agreed with all the contents of the consent form and it was signed. she was then given the [facility] surgery packet to bring with her to [facility] for the anesthesia portion of her perioperative paperwork.   For all physicians except for Dr. Brannon, we will email and possibly fax the consent forms and [facility]    The patient was given the opportunity to ask questions about the surgical plan and consent form, and once no other questions were asked, I proceeded with the pre-op appointment.    PHYSICAL THERAPY:  She was also instructed regarding physical therapy and will begin on  Likely 6 weeks after surgery. She was given a copy of the original prescription to schedule. Another copy of this prescription was also faxed to [facility].    POST OP CARE:instructions were reviewed including care of the wound and dressing after surgery and when she can shower.     PAIN MANAGEMENT: Dana Cook was also given her pain management regime, which includes the TENS unit given to her by chani  along with the education required for its use. She was also instructed regarding the Polar ice unit that will be in place after surgery and her postoperative pain medications.     PAIN MEDICATION:  Percocet 10/325mg 1 po q 4-6 hours prn pain  Ultram 50 mg Take 1-2 p.o. q.6 hours p.r.n. breakthrough pain,   Phenergan 25 mg one p.o. q.6 hours p.r.n. nausea and vomiting.    DVT prophylaxis was discussed with the patient today including risk factors for developing DVTs and history of DVTs. The patient was asked if any specific recommendations were given from the doctor/s that did pre-operative surgical clearance. The patient was then given an education sheet about DVTs and PE with warning signs and symptoms of both and steps to take if they suspect either of these.    This along with the Modified Caprini risk assessment model for VTE in general surgical patients was used to determine the patient's DVT risk.     From: Gold MAGDALENO, Fareed DA, Ashtyn SM, et al. Prevention of VTE in nonorthopedic surgical patients: antithrombotic therapy and prevention of thrombosis, 9th ed: American College of Chest Physicians evidence-based clinical practical guidelines. Chest 2012; 141:e227S. Copyright © 2012. Reproduced with permission from the American College of Chest Physicians.    The below listed DVT prophylaxis regimen along with bilateral IRIS compression stockings will be used post-op. Length of treatment has been determined to be 10-42 days post-op by the above noted Caprini assessment model.     Patient was instructed to buy and take:  Aspirin 325mg BID x 3 weeks for DVT prophylaxis starting on the evening after surgery.  Patient will also use bilateral TEDs on lower extremities, SCDs during surgery, and early ambulation post-op. If the patient was previously taking 81mg baby aspirin, they were told to not take it will using the above stated aspirin and to restart the 81mg aspirin after completion of the aspirin dose.      Patient  was also told to buy over the counter Prilosec medication and take it once daily for GI protection as long as they are taking NSAIDs or Aspirin.     Published data in Aakash GK, et al. J Arthroplasty. Oct; 31(10):2237-40, 2016; showed that aspirin use as prophylaxis during revision total joint arthroplasty was more effective than warfarin in preventing symptomatic venous thromboembolic events and was associated with lower complications.  Patients in the study were also treated with intermittent pneumatic compression devices. Compression stockings would be our method of mechanical prophylaxis, which has been shown to be similar to pneumatic compression in the systematic review, Tico TOBAR et al. [facility] Surg. Feb; 239(2): 162-171, 2004.     Results showed a significantly higher incidence of symptomatic venous thromboembolic events among patients in the warfarin group vs. the aspirin group (1.75% vs. 0.56%). Researchers also noted a bleeding event rate of 1.5% among patients who received warfarin compared with a rate of 0.4% among patients who received aspirin.    Patient denies history of seizures.       The patient was told that narcotic pain medications may make them drowsy and instructions were given to not sign legal documents, drive or operate heavy machinery, cars, or equipment while under the influence of narcotic medications. The patient was told and understands that narcotic pain medications should only be used as needed to control pain and that other options of pain control include TENs unit and ice packs/unit.     As there were no other questions to be asked, she was given my business card along with Shama Denise MD business card if she has any questions or concerns prior to surgery or in the postop period.

## 2018-03-22 NOTE — ANESTHESIA PROCEDURE NOTES
R ISB    Patient location during procedure: holding area   Block not for primary anesthetic.  Reason for block: at surgeon's request and post-op pain management   Post-op Pain Location: Right shoulder  Timeout: 3/22/2018 10:00 AM   End time: 3/22/2018 10:15 AM  Surgery related to: Arthroscopy  Staffing  Anesthesiologist: ZANDRA CRAIG  Performed: anesthesiologist   Preanesthetic Checklist  Completed: patient identified, site marked, surgical consent, pre-op evaluation, timeout performed, IV checked, risks and benefits discussed and monitors and equipment checked  Peripheral Block  Patient position: left lateral decubitus  Prep: ChloraPrep and site prepped and draped  Patient monitoring: heart rate, cardiac monitor, continuous pulse ox and frequent blood pressure checks  Block type: interscalene  Injection technique: single shot  Needle  Needle type: Echogenic   Needle gauge: 21 G  Needle length: 4 in  Needle localization: ultrasound guidance and anatomical landmarks   -ultrasound image captured on disc.  Assessment  Injection assessment: negative aspiration, negative parasthesia and local visualized surrounding nerve  Paresthesia pain: none  Heart rate change: no  Slow fractionated injection: yes  Medications:  Bolus administered: 20 mL of 0.5 ropivacaine  Epinephrine added: none

## 2018-03-22 NOTE — PLAN OF CARE
Dana Cook has met all discharge criteria from Phase II. Vital Signs are stable, ambulating  without difficulty. Discharge instructions given, patient verbalized understanding. Discharged from facility via wheelchair in stable condition.

## 2018-04-04 ENCOUNTER — OFFICE VISIT (OUTPATIENT)
Dept: SPORTS MEDICINE | Facility: CLINIC | Age: 68
End: 2018-04-04
Payer: MEDICARE

## 2018-04-04 VITALS
HEIGHT: 65 IN | BODY MASS INDEX: 31.65 KG/M2 | HEART RATE: 64 BPM | SYSTOLIC BLOOD PRESSURE: 148 MMHG | DIASTOLIC BLOOD PRESSURE: 80 MMHG | WEIGHT: 190 LBS

## 2018-04-04 DIAGNOSIS — M25.511 PAIN IN JOINT OF RIGHT SHOULDER: Primary | ICD-10-CM

## 2018-04-04 PROCEDURE — 99024 POSTOP FOLLOW-UP VISIT: CPT | Mod: POP,,, | Performed by: ORTHOPAEDIC SURGERY

## 2018-04-04 PROCEDURE — 99999 PR PBB SHADOW E&M-EST. PATIENT-LVL III: CPT | Mod: PBBFAC,,, | Performed by: ORTHOPAEDIC SURGERY

## 2018-04-04 PROCEDURE — 99213 OFFICE O/P EST LOW 20 MIN: CPT | Mod: PBBFAC,PO | Performed by: ORTHOPAEDIC SURGERY

## 2018-04-04 NOTE — PROGRESS NOTES
CC right shoulder pain    HISTORY OF PRESENT ILLNESS:   Pt is here today for first post-operative followup of her shoulder arthroscopy.  she is doing well.  We have reviewed her findings and discussed plan of care and future treatment options.                                                                    DATE OF PROCEDURE: 03/22/2018  OPERATION:   right  1. Shoulder arthroscopic rotator cuff repair (20 x 15 mm, double row) (CPT 25976)   2. Shoulder arthroscopic biceps tenodesis (CPT 50119.52)  3. Shoulder arthroscopic subacromial decompression, bursectomy   4. Shoulder arthroscopic extensive debridement (anterior, posterior glenohumeral joint, subacromial space) (CPT 17273)  5. Shoulder arthroscopic-assisted arthrocentesis of viable structural human allograft tissue matrix (BioDRestore) (CPT 64527)           6. Shoulder arthroscopic microfracture (Crimson duvet technique) (CPT 66532)  7. Shoulder arthroscopic lysis of adhesions (CPT 94277)  8. Shoulder arthroscopic chondroplasty                   There were chondral lesions to:   Humeral head: 40 x 25 grade 3  Glenoid: 20 x 10 grade 3    PHYSICAL EXAMINATION:     Incision sites healed well  No evidence of any erythema, infection or induration  elbow Range of motion full   Minimal effusion  2+ DP pulse  No swelling                                                                               ASSESSMENT:                                                                                                                                               1. Status post above, doing well.                                                                                                                               PLAN:                                                                                                                                                     1. PT  2. Emphasized scapular function.  3. I have discussed return to activity in detail.  4. she will see us  back in 4 weeks.                                      5. All questions were answered and she should contact us if she  has any questions or concerns in the interim.                                         POSTOPERATIVE PLAN: We will follow the arthroscopic rotator cuff repair guidelines for a medium size rotator cuff tear.  We discussed with the patient's family after surgery.  The patient will remain in a sling for 6 weeks.  PT to start at 4 weeks.

## 2018-04-10 ENCOUNTER — OFFICE VISIT (OUTPATIENT)
Dept: ORTHOPEDICS | Facility: CLINIC | Age: 68
End: 2018-04-10
Payer: MEDICARE

## 2018-04-10 ENCOUNTER — HOSPITAL ENCOUNTER (OUTPATIENT)
Dept: RADIOLOGY | Facility: HOSPITAL | Age: 68
Discharge: HOME | End: 2018-04-10
Attending: PHYSICIAN ASSISTANT
Payer: MEDICARE

## 2018-04-10 VITALS — BODY MASS INDEX: 30.76 KG/M2 | HEIGHT: 65 IN | WEIGHT: 184.63 LBS

## 2018-04-10 DIAGNOSIS — M43.16 SPONDYLOLISTHESIS OF LUMBAR REGION: Primary | ICD-10-CM

## 2018-04-10 DIAGNOSIS — M54.50 LUMBAR SPINE PAIN: ICD-10-CM

## 2018-04-10 PROCEDURE — 72100 X-RAY EXAM L-S SPINE 2/3 VWS: CPT | Mod: TC

## 2018-04-10 PROCEDURE — 72100 X-RAY EXAM L-S SPINE 2/3 VWS: CPT | Mod: 26,,, | Performed by: RADIOLOGY

## 2018-04-10 PROCEDURE — 99213 OFFICE O/P EST LOW 20 MIN: CPT | Mod: PBBFAC,25 | Performed by: PHYSICIAN ASSISTANT

## 2018-04-10 PROCEDURE — 99214 OFFICE O/P EST MOD 30 MIN: CPT | Mod: S$PBB,,, | Performed by: PHYSICIAN ASSISTANT

## 2018-04-10 PROCEDURE — 99999 PR PBB SHADOW E&M-EST. PATIENT-LVL III: CPT | Mod: PBBFAC,,, | Performed by: PHYSICIAN ASSISTANT

## 2018-04-10 RX ORDER — MELOXICAM 15 MG/1
TABLET ORAL
Qty: 90 TABLET | Refills: 0 | Status: SHIPPED | OUTPATIENT
Start: 2018-04-10 | End: 2018-04-10

## 2018-04-10 RX ORDER — MELOXICAM 15 MG/1
15 TABLET ORAL DAILY
Qty: 30 TABLET | Refills: 0 | Status: SHIPPED | OUTPATIENT
Start: 2018-04-10 | End: 2018-04-10 | Stop reason: SDUPTHER

## 2018-04-10 NOTE — LETTER
April 10, 2018      Meseret Jimenez PA-C  [address] [facility] [address]           [facility]  [address]  [place] [address]  Phone: [phone]          Patient: Dana Cook   MR Number: [id number]   YOB: 1950   Date of Visit: 4/10/2018       Dear Meseret Jimenez:    Thank you for referring Dana Cook to me for evaluation. Attached you will find relevant portions of my assessment and plan of care.    If you have questions, please do not hesitate to call me. I look forward to following Dana Cook along with you.    Sincerely,    Jennifer Shelton PA-C    Enclosure  CC:  No Recipients    If you would like to receive this communication electronically, please contact [email or web address] or [phone] to request more information on [facility] access.    For providers and/or their staff who would like to refer a patient to [facility], please contact us through our one-stop-shop provider referral line, [facility], at [phone].    If you feel you have received this communication in error or would no longer like to receive these types of communications, please e-mail [email or web address]

## 2018-04-10 NOTE — PROGRESS NOTES
DATE: 4/10/2018  PATIENT: Dana Cook    Supervising Physician: Zack Whitehead M.D.    CHIEF COMPLAINT: back pain    HISTORY:  Dana Cook is a 67 y.o. female s/p right rotator cuff repair by Dr. Denise 3/22/2018 here for initial evaluation of low back pain (Back - 5). The pain has been present since October. The patient describes the pain as aching.  The pain is worse with standing and walking and improved by sitting. There is no associated numbness and tingling. There is no subjective weakness. Prior treatments have included tylenol and home exercises, but no physical therapy, ESIs or surgery.  She says she went to a chiropractor about 5 years ago when she had sciatica.       The patient denies myelopathic symptoms such as handwriting changes or difficulty with buttons/coins/keys. Denies perineal paresthesias, bowel/bladder dysfunction.    PAST MEDICAL/SURGICAL HISTORY:  Past Medical History:   Diagnosis Date    Anxiety     Colon polyps     Depression     Dry eyes     Dry mouth     Dupuytren's contracture     Hypertension     left arm fracture     hairline    Murmur, heart     Osteopenia     PONV (postoperative nausea and vomiting)     with wisdom teeth    Sarcoidosis      Past Surgical History:   Procedure Laterality Date    Laparoscopy for possible endometriosis   1985    WRIST SURGERY Left     Dupuytrens contracture       Current Medications:   Current Outpatient Prescriptions:     alendronate (FOSAMAX) 70 MG tablet, Take 1 tablet (70 mg total) by mouth every 7 days., Disp: 12 tablet, Rfl: 0    ascorbic acid (VITAMIN C) 1000 MG tablet, , Disp: , Rfl:     aspirin (ECOTRIN) 325 MG EC tablet, Take 1 tablet (325 mg total) by mouth every 12 (twelve) hours., Disp: 42 tablet, Rfl: 0    buPROPion (WELLBUTRIN XL) 150 MG TB24 tablet, TAKE 1 TABLET BY MOUTH EVERY DAY, Disp: 90 tablet, Rfl: 4    buPROPion (WELLBUTRIN XL) 300 MG 24 hr tablet, TAKE 1 TABLET BY MOUTH EVERY DAY, Disp: 30 tablet, Rfl:  2    chlorthalidone (HYGROTEN) 25 MG Tab, TAKE 1 TABLET BY MOUTH EVERY DAY, Disp: 90 tablet, Rfl: 0    cyanocobalamin, vitamin B-12, (VITAMIN B-12) 1,000 mcg Lozg, , Disp: , Rfl:     ferrous sulfate (IRON) 325 mg (65 mg iron) Tab, Take by mouth. 1 Tablet Oral Every day, Disp: , Rfl:     glucosamine-chondroitin 500-400 mg tablet, Take 1 tablet by mouth daily as needed. , Disp: , Rfl:     hydrOXYzine HCl (ATARAX) 10 MG Tab, 10 mg as needed. , Disp: , Rfl:     multivitamin with minerals (MULTIPLE VITAMIN-MINERALS) tablet, Take by mouth. 1 Tablet Oral Every day, Disp: , Rfl:     omega-3 fatty acids-vitamin E (FISH OIL) 1,000 mg Cap, , Disp: , Rfl:     oxyCODONE-acetaminophen (PERCOCET)  mg per tablet, Take 1 tablet by mouth every 4-6 hours as needed for pain. Take stool softener with this medication., Disp: 30 tablet, Rfl: 0    promethazine (PHENERGAN) 25 MG tablet, Take 1 tablet (25 mg total) by mouth every 6 (six) hours as needed for Nausea., Disp: 16 tablet, Rfl: 0    red yeast rice 600 mg Cap, Take by mouth., Disp: , Rfl:     SUBHASH'S WORT/S.GINSGR (SUBHASH'S WORT-SIBER.GINSENG) 450-90 mg Tab, , Disp: , Rfl:     traMADol (ULTRAM) 50 mg tablet, Take 1-2 tablets ( mg total) by mouth every 6 (six) hours as needed., Disp: 30 tablet, Rfl: 0    vitamin E 1000 UNIT capsule, Take by mouth. 1 Capsule Oral Every day, Disp: , Rfl:     Social History:   Social History     Social History    Marital status:      Spouse name: N/A    Number of children: 1    Years of education: N/A     Occupational History    retired      Part time security work     Social History Main Topics    Smoking status: Never Smoker    Smokeless tobacco: Never Used    Alcohol use 3.0 - 3.6 oz/week     5 - 6 Standard drinks or equivalent per week      Comment: wine every night    Drug use: No    Sexual activity: Not Currently     Partners: Male     Other Topics Concern    Not on file     Social History Narrative  "   No narrative on file       REVIEW OF SYSTEMS:  Constitution: Negative. Negative for chills, fever and night sweats.   Cardiovascular: Negative for chest pain and syncope.   Respiratory: Negative for cough and shortness of breath.   Gastrointestinal: See HPI. Negative for nausea/vomiting. Negative for abdominal pain.  Genitourinary: See HPI. Negative for discoloration or dysuria.  Skin: Negative for dry skin, itching and rash.   Hematologic/Lymphatic: Negative for bleeding problem. Does not bruise/bleed easily.   Musculoskeletal: Negative for falls and muscle weakness.   Neurological: See HPI. No seizures.   Endocrine: Negative for polydipsia, polyphagia and polyuria.   Allergic/Immunologic: Negative for hives and persistent infections.    PHYSICAL EXAMINATION:    Ht 5' 5" (1.651 m)   Wt 83.7 kg (184 lb 10.2 oz)   LMP  (Exact Date)   BMI 30.72 kg/m²     General: The patient is a very pleasant 67 y.o. female in no apparent distress, the patient is oriented to person, place and time.   Psych: Normal mood and affect  HEENT: Vision grossly intact, hearing intact to the spoken word.  Lungs: Respirations unlabored.  Gait: Normal station and gait, no difficulty with toe or heel walk.   Skin: Dorsal lumbar skin negative for rashes, lesions, hairy patches and surgical scars.  Range of motion: Lumbar range of motion is acceptable. There is mild lumbar tenderness to palpation.  Spinal Balance: Global saggital and coronal spinal balance acceptable, no significant for scoliosis and kyphosis.  Musculoskeletal: No pain with the range of motion of the bilateral hips. No trochanteric tenderness to palpation.  Vascular: Bilateral lower extremities warm and well perfused, Dorsalis pedis pulses 2+ bilaterally.  Neurological: Normal strength and tone in all major motor groups in the bilateral lower extremities. Normal sensation to light touch in the L2-S1 dermatomes bilaterally.  Deep tendon reflexes symmetric 2+ in the bilateral " lower extremities.  Negative Babinski bilaterally.  Straight leg raise negative bilaterally.     IMAGING:   Today I personally reviewed AP, Lat and Flex/Ex upright L-spine films that demonstrate grade I anterolisthesis of L4/5. There is mild L5/S1 disc space narrowing.       ASSESSMENT/PLAN:    Diagnoses and all orders for this visit:    Spondylolisthesis of lumbar region  -     Ambulatory Referral to Physical/Occupational Therapy    Other orders  -     Discontinue: meloxicam (MOBIC) 15 MG tablet; Take 1 tablet (15 mg total) by mouth once daily.        Referral for PT placed today.  She will do this with the therapy for her shoulder.  Prescription for mobic sent to the pharmacy.  Follow up after therapy if symptoms persist.     We discussed the department policy regarding pain medications.  Patient understands and agrees.       Follow-up if symptoms worsen or fail to improve.

## 2018-05-01 ENCOUNTER — TELEPHONE (OUTPATIENT)
Dept: SPORTS MEDICINE | Facility: CLINIC | Age: 68
End: 2018-05-01

## 2018-05-01 DIAGNOSIS — M75.101 ROTATOR CUFF SYNDROME OF RIGHT SHOULDER: ICD-10-CM

## 2018-05-01 DIAGNOSIS — M25.511 CHRONIC RIGHT SHOULDER PAIN: ICD-10-CM

## 2018-05-01 DIAGNOSIS — M75.21 RIGHT BICIPITAL TENOSYNOVITIS: ICD-10-CM

## 2018-05-01 DIAGNOSIS — G89.18 ACUTE POST-OPERATIVE PAIN: ICD-10-CM

## 2018-05-01 DIAGNOSIS — G89.29 CHRONIC RIGHT SHOULDER PAIN: ICD-10-CM

## 2018-05-02 RX ORDER — OXYCODONE AND ACETAMINOPHEN 10; 325 MG/1; MG/1
TABLET ORAL
Qty: 15 TABLET | Refills: 0 | Status: SHIPPED | OUTPATIENT
Start: 2018-05-02 | End: 2019-01-23

## 2018-05-04 DIAGNOSIS — M81.0 OSTEOPOROSIS, UNSPECIFIED OSTEOPOROSIS TYPE, UNSPECIFIED PATHOLOGICAL FRACTURE PRESENCE: ICD-10-CM

## 2018-05-04 RX ORDER — ALENDRONATE SODIUM 70 MG/1
TABLET ORAL
Qty: 12 TABLET | Refills: 0 | Status: SHIPPED | OUTPATIENT
Start: 2018-05-04 | End: 2018-07-16 | Stop reason: SDUPTHER

## 2018-05-07 ENCOUNTER — OFFICE VISIT (OUTPATIENT)
Dept: SPORTS MEDICINE | Facility: CLINIC | Age: 68
End: 2018-05-07
Payer: MEDICARE

## 2018-05-07 ENCOUNTER — CLINICAL SUPPORT (OUTPATIENT)
Dept: REHABILITATION | Facility: HOSPITAL | Age: 68
End: 2018-05-07
Payer: MEDICARE

## 2018-05-07 VITALS
SYSTOLIC BLOOD PRESSURE: 148 MMHG | DIASTOLIC BLOOD PRESSURE: 84 MMHG | WEIGHT: 180 LBS | BODY MASS INDEX: 29.99 KG/M2 | HEIGHT: 65 IN | HEART RATE: 50 BPM

## 2018-05-07 DIAGNOSIS — G89.29 CHRONIC RIGHT SHOULDER PAIN: Primary | ICD-10-CM

## 2018-05-07 DIAGNOSIS — M25.511 CHRONIC RIGHT SHOULDER PAIN: Primary | ICD-10-CM

## 2018-05-07 DIAGNOSIS — M25.511 CHRONIC PAIN IN RIGHT SHOULDER: ICD-10-CM

## 2018-05-07 DIAGNOSIS — G89.29 CHRONIC PAIN IN RIGHT SHOULDER: ICD-10-CM

## 2018-05-07 DIAGNOSIS — G89.18 ACUTE POST-OPERATIVE PAIN: ICD-10-CM

## 2018-05-07 DIAGNOSIS — M54.5 CHRONIC LOW BACK PAIN, UNSPECIFIED BACK PAIN LATERALITY, WITH SCIATICA PRESENCE UNSPECIFIED: ICD-10-CM

## 2018-05-07 DIAGNOSIS — M19.011 OSTEOARTHRITIS OF RIGHT SHOULDER, UNSPECIFIED OSTEOARTHRITIS TYPE: ICD-10-CM

## 2018-05-07 DIAGNOSIS — G89.29 CHRONIC LOW BACK PAIN, UNSPECIFIED BACK PAIN LATERALITY, WITH SCIATICA PRESENCE UNSPECIFIED: ICD-10-CM

## 2018-05-07 DIAGNOSIS — M75.101 ROTATOR CUFF SYNDROME OF RIGHT SHOULDER: ICD-10-CM

## 2018-05-07 PROBLEM — M54.50 CHRONIC LOWER BACK PAIN: Status: ACTIVE | Noted: 2018-05-07

## 2018-05-07 PROCEDURE — 99024 POSTOP FOLLOW-UP VISIT: CPT | Mod: POP,,, | Performed by: PHYSICIAN ASSISTANT

## 2018-05-07 PROCEDURE — G8979 MOBILITY GOAL STATUS: HCPCS | Mod: CJ

## 2018-05-07 PROCEDURE — 97110 THERAPEUTIC EXERCISES: CPT

## 2018-05-07 PROCEDURE — 97140 MANUAL THERAPY 1/> REGIONS: CPT

## 2018-05-07 PROCEDURE — 99214 OFFICE O/P EST MOD 30 MIN: CPT | Mod: PBBFAC,PO | Performed by: PHYSICIAN ASSISTANT

## 2018-05-07 PROCEDURE — G8978 MOBILITY CURRENT STATUS: HCPCS | Mod: CL

## 2018-05-07 PROCEDURE — 99999 PR PBB SHADOW E&M-EST. PATIENT-LVL IV: CPT | Mod: PBBFAC,,, | Performed by: PHYSICIAN ASSISTANT

## 2018-05-07 RX ORDER — LIDOCAINE 50 MG/G
1 PATCH TOPICAL DAILY
Qty: 10 PATCH | Refills: 0 | Status: SHIPPED | OUTPATIENT
Start: 2018-05-07 | End: 2023-10-19

## 2018-05-07 NOTE — PLAN OF CARE
Physical Therapy Evaluation    Name: Dana Cook  [facility] Number: [id number]      Diagnosis:   Encounter Diagnoses   Name Primary?    Chronic pain in right shoulder     Chronic low back pain, unspecified back pain laterality, with sciatica presence unspecified      Physician: Villa Houston III, *  Treatment Orders: PT Eval and Treat    Past Medical History:   Diagnosis Date    Anxiety     Colon polyps     Depression     Dry eyes     Dry mouth     Dupuytren's contracture     Hypertension     left arm fracture     hairline    Murmur, heart     Osteopenia     PONV (postoperative nausea and vomiting)     with wisdom teeth    Sarcoidosis      Current Outpatient Prescriptions   Medication Sig    alendronate (FOSAMAX) 70 MG tablet TAKE 1 TABLET(70 MG) BY MOUTH EVERY 7 DAYS    ascorbic acid (VITAMIN C) 1000 MG tablet     aspirin (ECOTRIN) 325 MG EC tablet Take 1 tablet (325 mg total) by mouth every 12 (twelve) hours.    buPROPion (WELLBUTRIN XL) 150 MG TB24 tablet TAKE 1 TABLET BY MOUTH EVERY DAY    buPROPion (WELLBUTRIN XL) 300 MG 24 hr tablet TAKE 1 TABLET BY MOUTH EVERY DAY    chlorthalidone (HYGROTEN) 25 MG Tab TAKE 1 TABLET BY MOUTH EVERY DAY    cyanocobalamin, vitamin B-12, (VITAMIN B-12) 1,000 mcg Lozg     ferrous sulfate (IRON) 325 mg (65 mg iron) Tab Take by mouth. 1 Tablet Oral Every day    glucosamine-chondroitin 500-400 mg tablet Take 1 tablet by mouth daily as needed.     hydrOXYzine HCl (ATARAX) 10 MG Tab 10 mg as needed.     multivitamin with minerals (MULTIPLE VITAMIN-MINERALS) tablet Take by mouth. 1 Tablet Oral Every day    omega-3 fatty acids-vitamin E (FISH OIL) 1,000 mg Cap     oxyCODONE-acetaminophen (PERCOCET)  mg per tablet Take 1 tablet by mouth every 8 hours as needed for pain. Take stool softener with this medication.    promethazine (PHENERGAN) 25 MG tablet Take 1 tablet (25 mg total) by mouth every 6 (six) hours as needed for Nausea.    red yeast rice 600 mg  Cap Take by mouth.    SUBHASH'S WORT/S.GINSGR (SUBHASH'S WORT-SIBER.GINSENG) 450-90 mg Tab     traMADol (ULTRAM) 50 mg tablet Take 1-2 tablets ( mg total) by mouth every 6 (six) hours as needed.    vitamin E 1000 UNIT capsule Take by mouth. 1 Capsule Oral Every day     No current facility-administered medications for this visit.      Review of patient's allergies indicates:   Allergen Reactions    Other Other (See Comments)     Allergy to tape.     Percocet [oxycodone-acetaminophen] Nausea Only       Precautions: Follow medium rotator cuff repair    Evaluation Date: 5/7/18  Visit # [id number]: 1 of 10  Authorization period: 12/31/2018    Time In: 910a  Time Out:: 1005a      Subjective     Primary concern/ Chief complaints:    Dana is a 67 y.o. female that presents to [facility] with primary complaints of right shoulder pain after right shoulder rotator cuff repair on 3/22/2018 and lower back pain that began around October 2017.  The patient states that she is feeling right shoulder is getting better.  She states that she is not wearing her sling at home, but is still wearing it at all times when she goes out.  She states that she is able to sleep through the night.  She states that she is just starting pain meds to start PT.  She states that she feels back pain increases with standing in place, sit to stand, return from forward bending.  No cultural, environmental, or spiritual barriers identified to treatment or learning.    Aggravates: active motion of right shoulder, sleeping, standing  Relieves: rest, sitting    Patient Goals: To return to full ROM without pain    Objective     Scapular alignment:  Scapula abducted and elevated    Range of Motion:   Shoulder Right (AROM/PROM) Left (AROM/PROM)   Flexion 96/117 degrees 171 degrees   Abduction 66/88 degrees 174 degrees   ER at 90 21/34 degrees 88 degrees   IR at 90 21/29 degrees 62 degrees     Strength:  Shoulder Right Left    Flexion NT 5/5   Abduction NT 5/5   ER NT 4+/5   IR 5/5 5/5     TREATMENT:  Dana received therapeutic exercises to address deficits in right shoulder AROM and strength for 19 minutes including:   Wand flexion - 10x  Wand punches - 10x  SL ER - 2 x 10  Prone row - 2 x 10  Wall shoulder flexion - 2 x 10    Dana  received the following manual therapy techniques to address deficits in ROM and joint mobility x 11 min:    PROM affected shoulder all planes    Instructed pt. regarding: Proper technique with all exercises. Pt demonstrated good understanding of the education provided. Dana demonstrated good return demonstration of activities.      Assessment     This is a 67 y.o. female referred to outpatient physical therapy and presents with a medical diagnosis of s/p right shoulder rotator cuff repair on 3/22/18.  Patient presents with objective deficits in right shoulder AROM, tolerance for resisted motion, scapular and postural alignement that limits the patient's ability to perform reaching forward and overhead, reaching to the side, reaching behind back, reaching behind head, lifting/carrying.  Patient will benefit from skilled physical therapy services to address these deficits and progress to premorbid functional level.  These deficits and goals were discussed with the patient and patient is in agreement with them as to be addressed in the treatment plan. Patient was given a HEP consisting of exercises listed above. Patient verbally understood the instructions as they were given and demonstrated proper form and technique during therapy. Pt was advised to perform these exercises free of pain, and to stop performing them if pain occurs.     Medical necessity is demonstrated by the following IMPAIRMENTS/PROBLEM LIST:   1)Increase in pain level limiting function   2)Pain with reaching overhead   3)Pain with reaching behind head/back   4)Pain with carrying/lifting2   5)Lack of HEP    GOALS: Short Term Goals:  4  weeks  1.Patient will demonstrate 155 degrees affected shoulder flexion PROM  2.Patient will demonstrate 145 degrees affected shoulder abduction PROM   3.Patient will demonstrate 4/5 strength of affected shoulder flexion and abduction to increase ease with reaching for ADLs.    Long Term Goals: 16 weeks  1. .Patient will demonstrate 165 degrees affected shoulder flexion and abduction AROM  2.Increase strength to >/= 4+/5 in shoulder flexion, abduction, ER to increase tolerance for ADL and work activities.  3. Pt goal: To have no pain with reaching overhead    Plan     Pt will be treated by physical therapy 2-3 times a week for Pt Education, HEP, therapeutic exercises, neuromuscular re-education, manual therapy, joint mobilizations, modalities prn to achieve established goals. Dana may at times be seen by a PTA as part of the Rehab Team.     Cont PT for 20 weeks.     Fernando Li, PT, DPT        I certify the need for these services furnished under this plan of treatment and while under my care.______________________________ Physician/Referring Practitioner  Date of Signature

## 2018-05-07 NOTE — PROGRESS NOTES
Physical Therapy Evaluation    Name: Dana Cook  [facility] Number: [id number]      Diagnosis:   Encounter Diagnoses   Name Primary?    Chronic pain in right shoulder     Chronic low back pain, unspecified back pain laterality, with sciatica presence unspecified      Physician: Villa Houston III, *  Treatment Orders: PT Eval and Treat    Past Medical History:   Diagnosis Date    Anxiety     Colon polyps     Depression     Dry eyes     Dry mouth     Dupuytren's contracture     Hypertension     left arm fracture     hairline    Murmur, heart     Osteopenia     PONV (postoperative nausea and vomiting)     with wisdom teeth    Sarcoidosis      Current Outpatient Prescriptions   Medication Sig    alendronate (FOSAMAX) 70 MG tablet TAKE 1 TABLET(70 MG) BY MOUTH EVERY 7 DAYS    ascorbic acid (VITAMIN C) 1000 MG tablet     aspirin (ECOTRIN) 325 MG EC tablet Take 1 tablet (325 mg total) by mouth every 12 (twelve) hours.    buPROPion (WELLBUTRIN XL) 150 MG TB24 tablet TAKE 1 TABLET BY MOUTH EVERY DAY    buPROPion (WELLBUTRIN XL) 300 MG 24 hr tablet TAKE 1 TABLET BY MOUTH EVERY DAY    chlorthalidone (HYGROTEN) 25 MG Tab TAKE 1 TABLET BY MOUTH EVERY DAY    cyanocobalamin, vitamin B-12, (VITAMIN B-12) 1,000 mcg Lozg     ferrous sulfate (IRON) 325 mg (65 mg iron) Tab Take by mouth. 1 Tablet Oral Every day    glucosamine-chondroitin 500-400 mg tablet Take 1 tablet by mouth daily as needed.     hydrOXYzine HCl (ATARAX) 10 MG Tab 10 mg as needed.     multivitamin with minerals (MULTIPLE VITAMIN-MINERALS) tablet Take by mouth. 1 Tablet Oral Every day    omega-3 fatty acids-vitamin E (FISH OIL) 1,000 mg Cap     oxyCODONE-acetaminophen (PERCOCET)  mg per tablet Take 1 tablet by mouth every 8 hours as needed for pain. Take stool softener with this medication.    promethazine (PHENERGAN) 25 MG tablet Take 1 tablet (25 mg total) by mouth every 6 (six) hours as needed for Nausea.    red yeast rice 600 mg  Cap Take by mouth.    SUBHASH'S WORT/S.GINSGR (SUBHASH'S WORT-SIBER.GINSENG) 450-90 mg Tab     traMADol (ULTRAM) 50 mg tablet Take 1-2 tablets ( mg total) by mouth every 6 (six) hours as needed.    vitamin E 1000 UNIT capsule Take by mouth. 1 Capsule Oral Every day     No current facility-administered medications for this visit.      Review of patient's allergies indicates:   Allergen Reactions    Other Other (See Comments)     Allergy to tape.     Percocet [oxycodone-acetaminophen] Nausea Only       Precautions: Follow medium rotator cuff repair    Evaluation Date: 5/7/18  Visit # [id number]: 1 of 10  Authorization period: 12/31/2018    Time In: 910a  Time Out:: 1005a      Subjective     Primary concern/ Chief complaints:    Dana is a 67 y.o. female that presents to [facility] with primary complaints of right shoulder pain after right shoulder rotator cuff repair on 3/22/2018 and lower back pain that began around October 2017.  The patient states that she is feeling right shoulder is getting better.  She states that she is not wearing her sling at home, but is still wearing it at all times when she goes out.  She states that she is able to sleep through the night.  She states that she is just starting pain meds to start PT.  She states that she feels back pain increases with standing in place, sit to stand, return from forward bending.  No cultural, environmental, or spiritual barriers identified to treatment or learning.    Aggravates: active motion of right shoulder, sleeping, standing  Relieves: rest, sitting    Patient Goals: To return to full ROM without pain    Objective     Scapular alignment:  Scapula abducted and elevated    Range of Motion:   Shoulder Right (AROM/PROM) Left (AROM/PROM)   Flexion 96/117 degrees 171 degrees   Abduction 66/88 degrees 174 degrees   ER at 90 21/34 degrees 88 degrees   IR at 90 21/29 degrees 62 degrees     Strength:  Shoulder Right Left    Flexion NT 5/5   Abduction NT 5/5   ER NT 4+/5   IR 5/5 5/5     TREATMENT:  Dana received therapeutic exercises to address deficits in right shoulder AROM and strength for 19 minutes including:   Wand flexion - 10x  Wand punches - 10x  SL ER - 2 x 10  Prone row - 2 x 10  Wall shoulder flexion - 2 x 10    Dana  received the following manual therapy techniques to address deficits in ROM and joint mobility x 11 min:    PROM affected shoulder all planes    Instructed pt. regarding: Proper technique with all exercises. Pt demonstrated good understanding of the education provided. Dana demonstrated good return demonstration of activities.      Assessment     This is a 67 y.o. female referred to outpatient physical therapy and presents with a medical diagnosis of s/p right shoulder rotator cuff repair on 3/22/18.  Patient presents with objective deficits in right shoulder AROM, tolerance for resisted motion, scapular and postural alignement that limits the patient's ability to perform reaching forward and overhead, reaching to the side, reaching behind back, reaching behind head, lifting/carrying.  Patient will benefit from skilled physical therapy services to address these deficits and progress to premorbid functional level.  These deficits and goals were discussed with the patient and patient is in agreement with them as to be addressed in the treatment plan. Patient was given a HEP consisting of exercises listed above. Patient verbally understood the instructions as they were given and demonstrated proper form and technique during therapy. Pt was advised to perform these exercises free of pain, and to stop performing them if pain occurs.     Medical necessity is demonstrated by the following IMPAIRMENTS/PROBLEM LIST:   1)Increase in pain level limiting function   2)Pain with reaching overhead   3)Pain with reaching behind head/back   4)Pain with carrying/lifting2   5)Lack of HEP    GOALS: Short Term Goals:  4  weeks  1.Patient will demonstrate 155 degrees affected shoulder flexion PROM  2.Patient will demonstrate 145 degrees affected shoulder abduction PROM   3.Patient will demonstrate 4/5 strength of affected shoulder flexion and abduction to increase ease with reaching for ADLs.    Long Term Goals: 16 weeks  1. .Patient will demonstrate 165 degrees affected shoulder flexion and abduction AROM  2.Increase strength to >/= 4+/5 in shoulder flexion, abduction, ER to increase tolerance for ADL and work activities.  3. Pt goal: To have no pain with reaching overhead    Plan     Pt will be treated by physical therapy 2-3 times a week for Pt Education, HEP, therapeutic exercises, neuromuscular re-education, manual therapy, joint mobilizations, modalities prn to achieve established goals. Dana may at times be seen by a PTA as part of the Rehab Team.     Cont PT for 20 weeks.     Fernando Li, PT, DPT        I certify the need for these services furnished under this plan of treatment and while under my care.______________________________ Physician/Referring Practitioner  Date of Signature

## 2018-05-08 NOTE — PROGRESS NOTES
CC right shoulder pain    HISTORY OF PRESENT ILLNESS:   Pt is here today for post-operative followup of her shoulder arthroscopy.  she is doing well.  We have reviewed her findings and discussed plan of care and future treatment options.                                                                    DATE OF PROCEDURE: 03/22/2018  OPERATION:   right  1. Shoulder arthroscopic rotator cuff repair (20 x 15 mm, double row) (CPT 43324)   2. Shoulder arthroscopic biceps tenodesis (CPT 30273.52)  3. Shoulder arthroscopic subacromial decompression, bursectomy   4. Shoulder arthroscopic extensive debridement (anterior, posterior glenohumeral joint, subacromial space) (CPT 14053)  5. Shoulder arthroscopic-assisted arthrocentesis of viable structural human allograft tissue matrix ([employer]) (CPT 81197)           6. Shoulder arthroscopic microfracture (Crimson duvet technique) (CPT 67859)  7. Shoulder arthroscopic lysis of adhesions (CPT 65344)  8. Shoulder arthroscopic chondroplasty                   There were chondral lesions to:   Humeral head: 40 x 25 grade 3  Glenoid: 20 x 10 grade 3    PHYSICAL EXAMINATION:     Incision sites healed well  No evidence of any erythema, infection or induration  elbow Range of motion full   Minimal effusion  2+ DP pulse  No swelling      ER 30                                                                               ASSESSMENT:                                                                                                                                               1. Status post above, doing well.                                                                                                                               PLAN:                                                                                                                                                     1. Continue PT; DC sling as tolerated.   2. Emphasized scapular function.  3. I have discussed return  to activity in detail.  4. she will see us back in 6 weeks.                                      5. All questions were answered and she should contact us if she  has any questions or concerns in the interim.

## 2018-05-11 ENCOUNTER — CLINICAL SUPPORT (OUTPATIENT)
Dept: REHABILITATION | Facility: HOSPITAL | Age: 68
End: 2018-05-11
Payer: MEDICARE

## 2018-05-11 DIAGNOSIS — G89.29 CHRONIC LOW BACK PAIN, UNSPECIFIED BACK PAIN LATERALITY, WITH SCIATICA PRESENCE UNSPECIFIED: ICD-10-CM

## 2018-05-11 DIAGNOSIS — M54.5 CHRONIC LOW BACK PAIN, UNSPECIFIED BACK PAIN LATERALITY, WITH SCIATICA PRESENCE UNSPECIFIED: ICD-10-CM

## 2018-05-11 DIAGNOSIS — G89.29 CHRONIC PAIN IN RIGHT SHOULDER: ICD-10-CM

## 2018-05-11 DIAGNOSIS — M25.511 CHRONIC PAIN IN RIGHT SHOULDER: ICD-10-CM

## 2018-05-11 PROCEDURE — 97140 MANUAL THERAPY 1/> REGIONS: CPT

## 2018-05-11 PROCEDURE — 97110 THERAPEUTIC EXERCISES: CPT

## 2018-05-11 NOTE — PROGRESS NOTES
Physical Therapy SOAP Note    Name: Dana Cook  [facility] Number: [id number]      Diagnosis:   Encounter Diagnoses   Name Primary?    Chronic pain in right shoulder     Chronic low back pain, unspecified back pain laterality, with sciatica presence unspecified      Physician: Villa Houston III, *  Treatment Orders: PT Eval and Treat    Past Medical History:   Diagnosis Date    Anxiety     Colon polyps     Depression     Dry eyes     Dry mouth     Dupuytren's contracture     Hypertension     left arm fracture     hairline    Murmur, heart     Osteopenia     PONV (postoperative nausea and vomiting)     with wisdom teeth    Sarcoidosis      Current Outpatient Prescriptions   Medication Sig    alendronate (FOSAMAX) 70 MG tablet TAKE 1 TABLET(70 MG) BY MOUTH EVERY 7 DAYS    ascorbic acid (VITAMIN C) 1000 MG tablet     aspirin (ECOTRIN) 325 MG EC tablet Take 1 tablet (325 mg total) by mouth every 12 (twelve) hours.    buPROPion (WELLBUTRIN XL) 150 MG TB24 tablet TAKE 1 TABLET BY MOUTH EVERY DAY    buPROPion (WELLBUTRIN XL) 300 MG 24 hr tablet TAKE 1 TABLET BY MOUTH EVERY DAY    chlorthalidone (HYGROTEN) 25 MG Tab TAKE 1 TABLET BY MOUTH EVERY DAY    cyanocobalamin, vitamin B-12, (VITAMIN B-12) 1,000 mcg Lozg     ferrous sulfate (IRON) 325 mg (65 mg iron) Tab Take by mouth. 1 Tablet Oral Every day    glucosamine-chondroitin 500-400 mg tablet Take 1 tablet by mouth daily as needed.     hydrOXYzine HCl (ATARAX) 10 MG Tab 10 mg as needed.     lidocaine (LIDODERM) 5 % Place 1 patch onto the skin once daily. Remove & Discard patch within 12 hours. Do not use more than 1 patch in 24 hours.    multivitamin with minerals (MULTIPLE VITAMIN-MINERALS) tablet Take by mouth. 1 Tablet Oral Every day    omega-3 fatty acids-vitamin E (FISH OIL) 1,000 mg Cap     oxyCODONE-acetaminophen (PERCOCET)  mg per tablet Take 1 tablet by mouth every 8 hours as needed for pain. Take stool softener with this  "medication.    promethazine (PHENERGAN) 25 MG tablet Take 1 tablet (25 mg total) by mouth every 6 (six) hours as needed for Nausea.    red yeast rice 600 mg Cap Take by mouth.    SUBHASH'S WORT/S.GINSGR (SUBHASH'S WORT-SIBER.GINSENG) 450-90 mg Tab     traMADol (ULTRAM) 50 mg tablet Take 1-2 tablets ( mg total) by mouth every 6 (six) hours as needed.    vitamin E 1000 UNIT capsule Take by mouth. 1 Capsule Oral Every day     No current facility-administered medications for this visit.      Review of patient's allergies indicates:   Allergen Reactions    Other Other (See Comments)     Allergy to tape.     Percocet [oxycodone-acetaminophen] Nausea Only       Precautions: Follow medium rotator cuff repair    Evaluation Date: 5/7/18  Visit # [id number]: 2 of 10  Authorization period: 12/31/2018    Time In: 1100a  Time Out:: 1205p      Subjective     Patient states that she is feeling a bit more pain in neck of affected side since last visit    Objective     TREATMENT:  Dana received therapeutic exercises to address deficits in right shoulder AROM and strength for 39 minutes including:   Pulleys - 3' ea  Prone shoulder extension - 2 x 10  SL ER - 3 x 10  Rows - 3 x 10, blue  SHldr ER/IR walkout - 2 x 10, orange  Seated ball roll - 2 x 10, 3"  Bridge - 2 x 10  LTR - 10x, 3"  Standing hip abduction - 2 x 20    Dana  received the following manual therapy techniques to address deficits in ROM and joint mobility x 18 min:    PROM affected shoulder all planes    Instructed pt. regarding: Proper technique with all exercises. Pt demonstrated good understanding of the education provided. Dana demonstrated good return demonstration of activities.      Assessment     The patient demonstrates improvement in shoulder scaption PROM and improved tolerance for shoulder extension ROM    This is a 67 y.o. female referred to outpatient physical therapy and presents with a medical diagnosis of s/p right shoulder rotator " cuff repair on 3/22/18.      Medical necessity is demonstrated by the following IMPAIRMENTS/PROBLEM LIST:   1)Increase in pain level limiting function   2)Pain with reaching overhead   3)Pain with reaching behind head/back   4)Pain with carrying/lifting2   5)Lack of HEP    GOALS: Short Term Goals:  4 weeks  1.Patient will demonstrate 155 degrees affected shoulder flexion PROM  2.Patient will demonstrate 145 degrees affected shoulder abduction PROM   3.Patient will demonstrate 4/5 strength of affected shoulder flexion and abduction to increase ease with reaching for ADLs.    Long Term Goals: 16 weeks  1. .Patient will demonstrate 165 degrees affected shoulder flexion and abduction AROM  2.Increase strength to >/= 4+/5 in shoulder flexion, abduction, ER to increase tolerance for ADL and work activities.  3. Pt goal: To have no pain with reaching overhead    Plan     Progress rotator cuff strengthening and core strengthening as tolerated.     Fernando Li, PT, DPT

## 2018-05-16 ENCOUNTER — CLINICAL SUPPORT (OUTPATIENT)
Dept: REHABILITATION | Facility: HOSPITAL | Age: 68
End: 2018-05-16
Payer: MEDICARE

## 2018-05-16 DIAGNOSIS — M54.5 CHRONIC LOW BACK PAIN, UNSPECIFIED BACK PAIN LATERALITY, WITH SCIATICA PRESENCE UNSPECIFIED: ICD-10-CM

## 2018-05-16 DIAGNOSIS — M25.511 CHRONIC PAIN IN RIGHT SHOULDER: ICD-10-CM

## 2018-05-16 DIAGNOSIS — G89.29 CHRONIC PAIN IN RIGHT SHOULDER: ICD-10-CM

## 2018-05-16 DIAGNOSIS — G89.29 CHRONIC LOW BACK PAIN, UNSPECIFIED BACK PAIN LATERALITY, WITH SCIATICA PRESENCE UNSPECIFIED: ICD-10-CM

## 2018-05-16 PROCEDURE — 97140 MANUAL THERAPY 1/> REGIONS: CPT

## 2018-05-16 PROCEDURE — 97110 THERAPEUTIC EXERCISES: CPT

## 2018-05-16 NOTE — PROGRESS NOTES
Physical Therapy SOAP Note    Name: Dana Cook  [facility] Number: [id number]      Diagnosis:   Encounter Diagnoses   Name Primary?    Chronic pain in right shoulder     Chronic low back pain, unspecified back pain laterality, with sciatica presence unspecified      Physician: Villa Houston III, *  Treatment Orders: PT Eval and Treat    Past Medical History:   Diagnosis Date    Anxiety     Colon polyps     Depression     Dry eyes     Dry mouth     Dupuytren's contracture     Hypertension     left arm fracture     hairline    Murmur, heart     Osteopenia     PONV (postoperative nausea and vomiting)     with wisdom teeth    Sarcoidosis      Current Outpatient Prescriptions   Medication Sig    alendronate (FOSAMAX) 70 MG tablet TAKE 1 TABLET(70 MG) BY MOUTH EVERY 7 DAYS    ascorbic acid (VITAMIN C) 1000 MG tablet     aspirin (ECOTRIN) 325 MG EC tablet Take 1 tablet (325 mg total) by mouth every 12 (twelve) hours.    buPROPion (WELLBUTRIN XL) 150 MG TB24 tablet TAKE 1 TABLET BY MOUTH EVERY DAY    buPROPion (WELLBUTRIN XL) 300 MG 24 hr tablet TAKE 1 TABLET BY MOUTH EVERY DAY    chlorthalidone (HYGROTEN) 25 MG Tab TAKE 1 TABLET BY MOUTH EVERY DAY    cyanocobalamin, vitamin B-12, (VITAMIN B-12) 1,000 mcg Lozg     ferrous sulfate (IRON) 325 mg (65 mg iron) Tab Take by mouth. 1 Tablet Oral Every day    glucosamine-chondroitin 500-400 mg tablet Take 1 tablet by mouth daily as needed.     hydrOXYzine HCl (ATARAX) 10 MG Tab 10 mg as needed.     lidocaine (LIDODERM) 5 % Place 1 patch onto the skin once daily. Remove & Discard patch within 12 hours. Do not use more than 1 patch in 24 hours.    multivitamin with minerals (MULTIPLE VITAMIN-MINERALS) tablet Take by mouth. 1 Tablet Oral Every day    omega-3 fatty acids-vitamin E (FISH OIL) 1,000 mg Cap     oxyCODONE-acetaminophen (PERCOCET)  mg per tablet Take 1 tablet by mouth every 8 hours as needed for pain. Take stool softener with this  "medication.    promethazine (PHENERGAN) 25 MG tablet Take 1 tablet (25 mg total) by mouth every 6 (six) hours as needed for Nausea.    red yeast rice 600 mg Cap Take by mouth.    SUBHASH'S WORT/S.GINSGR (SUBHASH'S WORT-SIBER.GINSENG) 450-90 mg Tab     traMADol (ULTRAM) 50 mg tablet Take 1-2 tablets ( mg total) by mouth every 6 (six) hours as needed.    vitamin E 1000 UNIT capsule Take by mouth. 1 Capsule Oral Every day     No current facility-administered medications for this visit.      Review of patient's allergies indicates:   Allergen Reactions    Other Other (See Comments)     Allergy to tape.     Percocet [oxycodone-acetaminophen] Nausea Only       Precautions: Follow medium rotator cuff repair    Evaluation Date: 5/7/18  Visit # [id number]: 3 of 10  Authorization period: 12/31/2018    Time In: 909a  Time Out:: 1015a      Subjective     Patient states increased spasm in her right shoulder and increased pain in lower back after loading her  yesterday    Objective     TREATMENT:  Dana received therapeutic exercises to address deficits in right shoulder AROM and strength for 39 minutes including:   Pulleys - 3' ea  Prone shoulder extension - 2 x 10  SL ER - 3 x 10  Rows - 3 x 10, blue  SHldr ER/IR walkout - 2 x 10, orange  Seated ball roll - 2 x 10, 3"  Bridge with abduction - 2 x 10, green  LTR - 10x, 3"  Sidesteps - 2 laps, green  Monster - 1 lap, green    Dana  received the following manual therapy techniques to address deficits in ROM and joint mobility x 18 min:    PROM affected shoulder all planes    Instructed pt. regarding: Proper technique with all exercises. Pt demonstrated good understanding of the education provided. Dana demonstrated good return demonstration of activities.      Assessment     The patient demonstrates decreased discomfort with PROM and increased AAROM for flexion    This is a 67 y.o. female referred to outpatient physical therapy and presents with a " medical diagnosis of s/p right shoulder rotator cuff repair on 3/22/18.      Medical necessity is demonstrated by the following IMPAIRMENTS/PROBLEM LIST:   1)Increase in pain level limiting function   2)Pain with reaching overhead   3)Pain with reaching behind head/back   4)Pain with carrying/lifting2   5)Lack of HEP    GOALS: Short Term Goals:  4 weeks  1.Patient will demonstrate 155 degrees affected shoulder flexion PROM  2.Patient will demonstrate 145 degrees affected shoulder abduction PROM   3.Patient will demonstrate 4/5 strength of affected shoulder flexion and abduction to increase ease with reaching for ADLs.    Long Term Goals: 16 weeks  1. .Patient will demonstrate 165 degrees affected shoulder flexion and abduction AROM  2.Increase strength to >/= 4+/5 in shoulder flexion, abduction, ER to increase tolerance for ADL and work activities.  3. Pt goal: To have no pain with reaching overhead    Plan     Progress rotator cuff strengthening and core strengthening as tolerated.     Fernando Li, PT, DPT

## 2018-05-18 ENCOUNTER — CLINICAL SUPPORT (OUTPATIENT)
Dept: REHABILITATION | Facility: HOSPITAL | Age: 68
End: 2018-05-18
Payer: MEDICARE

## 2018-05-18 DIAGNOSIS — G89.29 CHRONIC PAIN IN RIGHT SHOULDER: ICD-10-CM

## 2018-05-18 DIAGNOSIS — M54.5 CHRONIC LOW BACK PAIN, UNSPECIFIED BACK PAIN LATERALITY, WITH SCIATICA PRESENCE UNSPECIFIED: ICD-10-CM

## 2018-05-18 DIAGNOSIS — M25.511 CHRONIC PAIN IN RIGHT SHOULDER: ICD-10-CM

## 2018-05-18 DIAGNOSIS — G89.29 CHRONIC LOW BACK PAIN, UNSPECIFIED BACK PAIN LATERALITY, WITH SCIATICA PRESENCE UNSPECIFIED: ICD-10-CM

## 2018-05-18 PROCEDURE — 97140 MANUAL THERAPY 1/> REGIONS: CPT

## 2018-05-18 PROCEDURE — 97110 THERAPEUTIC EXERCISES: CPT

## 2018-05-18 NOTE — PROGRESS NOTES
Physical Therapy SOAP Note    Name: Dana Cook  [facility] Number: [id number]      Diagnosis:   Encounter Diagnoses   Name Primary?    Chronic pain in right shoulder     Chronic low back pain, unspecified back pain laterality, with sciatica presence unspecified      Physician: Villa Houston III, *  Treatment Orders: PT Eval and Treat    Past Medical History:   Diagnosis Date    Anxiety     Colon polyps     Depression     Dry eyes     Dry mouth     Dupuytren's contracture     Hypertension     left arm fracture     hairline    Murmur, heart     Osteopenia     PONV (postoperative nausea and vomiting)     with wisdom teeth    Sarcoidosis      Current Outpatient Prescriptions   Medication Sig    alendronate (FOSAMAX) 70 MG tablet TAKE 1 TABLET(70 MG) BY MOUTH EVERY 7 DAYS    ascorbic acid (VITAMIN C) 1000 MG tablet     aspirin (ECOTRIN) 325 MG EC tablet Take 1 tablet (325 mg total) by mouth every 12 (twelve) hours.    buPROPion (WELLBUTRIN XL) 150 MG TB24 tablet TAKE 1 TABLET BY MOUTH EVERY DAY    buPROPion (WELLBUTRIN XL) 300 MG 24 hr tablet TAKE 1 TABLET BY MOUTH EVERY DAY    chlorthalidone (HYGROTEN) 25 MG Tab TAKE 1 TABLET BY MOUTH EVERY DAY    cyanocobalamin, vitamin B-12, (VITAMIN B-12) 1,000 mcg Lozg     ferrous sulfate (IRON) 325 mg (65 mg iron) Tab Take by mouth. 1 Tablet Oral Every day    glucosamine-chondroitin 500-400 mg tablet Take 1 tablet by mouth daily as needed.     hydrOXYzine HCl (ATARAX) 10 MG Tab 10 mg as needed.     lidocaine (LIDODERM) 5 % Place 1 patch onto the skin once daily. Remove & Discard patch within 12 hours. Do not use more than 1 patch in 24 hours.    multivitamin with minerals (MULTIPLE VITAMIN-MINERALS) tablet Take by mouth. 1 Tablet Oral Every day    omega-3 fatty acids-vitamin E (FISH OIL) 1,000 mg Cap     oxyCODONE-acetaminophen (PERCOCET)  mg per tablet Take 1 tablet by mouth every 8 hours as needed for pain. Take stool softener with this  "medication.    promethazine (PHENERGAN) 25 MG tablet Take 1 tablet (25 mg total) by mouth every 6 (six) hours as needed for Nausea.    red yeast rice 600 mg Cap Take by mouth.    SUBHASH'S WORT/S.GINSGR (SUBHASH'S WORT-SIBER.GINSENG) 450-90 mg Tab     traMADol (ULTRAM) 50 mg tablet Take 1-2 tablets ( mg total) by mouth every 6 (six) hours as needed.    vitamin E 1000 UNIT capsule Take by mouth. 1 Capsule Oral Every day     No current facility-administered medications for this visit.      Review of patient's allergies indicates:   Allergen Reactions    Other Other (See Comments)     Allergy to tape.     Percocet [oxycodone-acetaminophen] Nausea Only       Precautions: Follow medium rotator cuff repair    Evaluation Date: 5/7/18  Visit # [id number]: 4 of 10  Authorization period: 12/31/2018    Time In: 900a  Time Out:: 1015a      Subjective     Patient states that she is still having a lot of pain but feels like she is getting better    Objective     TREATMENT:  Dana received therapeutic exercises to address deficits in right shoulder AROM and strength for 44 minutes including:   Pulleys - 3' ea  Prone shoulder extension - 2 x 10  SL ER - 3 x 10  Rows - 3 x 10, blue  SHldr ER/IR walkout - 2 x 10, orange  Seated ball roll - 2 x 10, 3"  Bridge with abduction - 2 x 10, green  LTR - 10x, 3"  Sidesteps - 2 laps, green  Monster - 1 lap, green    Dana  received the following manual therapy techniques to address deficits in ROM and joint mobility x 15 min:    PROM affected shoulder all planes    Instructed pt. regarding: Proper technique with all exercises. Pt demonstrated good understanding of the education provided. Dana demonstrated good return demonstration of activities.      Assessment     The patient demonstrates improving tolerance for light resistance and decreasing shoulder ER stiffness    This is a 67 y.o. female referred to outpatient physical therapy and presents with a medical diagnosis of s/p " right shoulder rotator cuff repair on 3/22/18.      Medical necessity is demonstrated by the following IMPAIRMENTS/PROBLEM LIST:   1)Increase in pain level limiting function   2)Pain with reaching overhead   3)Pain with reaching behind head/back   4)Pain with carrying/lifting2   5)Lack of HEP    GOALS: Short Term Goals:  4 weeks  1.Patient will demonstrate 155 degrees affected shoulder flexion PROM  2.Patient will demonstrate 145 degrees affected shoulder abduction PROM   3.Patient will demonstrate 4/5 strength of affected shoulder flexion and abduction to increase ease with reaching for ADLs.    Long Term Goals: 16 weeks  1. .Patient will demonstrate 165 degrees affected shoulder flexion and abduction AROM  2.Increase strength to >/= 4+/5 in shoulder flexion, abduction, ER to increase tolerance for ADL and work activities.  3. Pt goal: To have no pain with reaching overhead    Plan     Progress rotator cuff strengthening and core strengthening as tolerated.     Fernando Li, PT, DPT

## 2018-05-19 RX ORDER — CHLORTHALIDONE 25 MG/1
TABLET ORAL
Qty: 90 TABLET | Refills: 0 | Status: SHIPPED | OUTPATIENT
Start: 2018-05-19 | End: 2018-08-18 | Stop reason: SDUPTHER

## 2018-05-21 ENCOUNTER — CLINICAL SUPPORT (OUTPATIENT)
Dept: REHABILITATION | Facility: HOSPITAL | Age: 68
End: 2018-05-21
Payer: MEDICARE

## 2018-05-21 DIAGNOSIS — G89.29 CHRONIC LOW BACK PAIN, UNSPECIFIED BACK PAIN LATERALITY, WITH SCIATICA PRESENCE UNSPECIFIED: ICD-10-CM

## 2018-05-21 DIAGNOSIS — G89.29 CHRONIC PAIN IN RIGHT SHOULDER: ICD-10-CM

## 2018-05-21 DIAGNOSIS — M25.511 CHRONIC PAIN IN RIGHT SHOULDER: ICD-10-CM

## 2018-05-21 DIAGNOSIS — M54.5 CHRONIC LOW BACK PAIN, UNSPECIFIED BACK PAIN LATERALITY, WITH SCIATICA PRESENCE UNSPECIFIED: ICD-10-CM

## 2018-05-21 PROCEDURE — 97110 THERAPEUTIC EXERCISES: CPT

## 2018-05-21 PROCEDURE — 97140 MANUAL THERAPY 1/> REGIONS: CPT

## 2018-05-21 NOTE — PROGRESS NOTES
Physical Therapy SOAP Note    Name: Dana Cook  [facility] Number: [id number]      Diagnosis:   Encounter Diagnoses   Name Primary?    Chronic pain in right shoulder     Chronic low back pain, unspecified back pain laterality, with sciatica presence unspecified      Physician: Villa Houston III, *  Treatment Orders: PT Eval and Treat    Past Medical History:   Diagnosis Date    Anxiety     Colon polyps     Depression     Dry eyes     Dry mouth     Dupuytren's contracture     Hypertension     left arm fracture     hairline    Murmur, heart     Osteopenia     PONV (postoperative nausea and vomiting)     with wisdom teeth    Sarcoidosis      Current Outpatient Prescriptions   Medication Sig    alendronate (FOSAMAX) 70 MG tablet TAKE 1 TABLET(70 MG) BY MOUTH EVERY 7 DAYS    ascorbic acid (VITAMIN C) 1000 MG tablet     aspirin (ECOTRIN) 325 MG EC tablet Take 1 tablet (325 mg total) by mouth every 12 (twelve) hours.    buPROPion (WELLBUTRIN XL) 150 MG TB24 tablet TAKE 1 TABLET BY MOUTH EVERY DAY    buPROPion (WELLBUTRIN XL) 300 MG 24 hr tablet TAKE 1 TABLET BY MOUTH EVERY DAY    chlorthalidone (HYGROTEN) 25 MG Tab TAKE 1 TABLET BY MOUTH EVERY DAY    cyanocobalamin, vitamin B-12, (VITAMIN B-12) 1,000 mcg Lozg     ferrous sulfate (IRON) 325 mg (65 mg iron) Tab Take by mouth. 1 Tablet Oral Every day    glucosamine-chondroitin 500-400 mg tablet Take 1 tablet by mouth daily as needed.     hydrOXYzine HCl (ATARAX) 10 MG Tab 10 mg as needed.     lidocaine (LIDODERM) 5 % Place 1 patch onto the skin once daily. Remove & Discard patch within 12 hours. Do not use more than 1 patch in 24 hours.    multivitamin with minerals (MULTIPLE VITAMIN-MINERALS) tablet Take by mouth. 1 Tablet Oral Every day    omega-3 fatty acids-vitamin E (FISH OIL) 1,000 mg Cap     oxyCODONE-acetaminophen (PERCOCET)  mg per tablet Take 1 tablet by mouth every 8 hours as needed for pain. Take stool softener with this  "medication.    promethazine (PHENERGAN) 25 MG tablet Take 1 tablet (25 mg total) by mouth every 6 (six) hours as needed for Nausea.    red yeast rice 600 mg Cap Take by mouth.    SUBHASH'S WORT/S.GINSGR (SUBHASH'S WORT-SIBER.GINSENG) 450-90 mg Tab     traMADol (ULTRAM) 50 mg tablet Take 1-2 tablets ( mg total) by mouth every 6 (six) hours as needed.    vitamin E 1000 UNIT capsule Take by mouth. 1 Capsule Oral Every day     No current facility-administered medications for this visit.      Review of patient's allergies indicates:   Allergen Reactions    Other Other (See Comments)     Allergy to tape.     Percocet [oxycodone-acetaminophen] Nausea Only       Precautions: Follow medium rotator cuff repair    Evaluation Date: 5/7/18  Visit # [id number]: 5 of 10  Authorization period: 12/31/2018    Time In: 900a  Time Out:: 1015a      Subjective     Patient states that she is having decreased discomfort with forward bending and standing in her lower back    Objective     TREATMENT:  Dana received therapeutic exercises to address deficits in right shoulder AROM and strength for 49 minutes including:   Pulleys - 3' ea  Prone shoulder extension - 2 x 10  SL ER - 3 x 10  Rows - 3 x 10, blue  SHldr ER/IR walkout - 2 x 10, orange  Seated ball roll - 2 x 10, 3"  Bridge with abduction - 2 x 10, green  LTR - 10x, 3"  Sidesteps - 2 laps, green  Monster - 1 lap, green    Dana  received the following manual therapy techniques to address deficits in ROM and joint mobility x 15 min:    PROM affected shoulder all planes    Instructed pt. regarding: Proper technique with all exercises. Pt demonstrated good understanding of the education provided. Dana demonstrated good return demonstration of activities.      Assessment     The patient demonstrates improving scaption and flexion AAROM, decreased discomfort with resisted exercises     This is a 67 y.o. female referred to outpatient physical therapy and presents with a " medical diagnosis of s/p right shoulder rotator cuff repair on 3/22/18.      Medical necessity is demonstrated by the following IMPAIRMENTS/PROBLEM LIST:   1)Increase in pain level limiting function   2)Pain with reaching overhead   3)Pain with reaching behind head/back   4)Pain with carrying/lifting2   5)Lack of HEP    GOALS: Short Term Goals:  4 weeks  1.Patient will demonstrate 155 degrees affected shoulder flexion PROM  2.Patient will demonstrate 145 degrees affected shoulder abduction PROM   3.Patient will demonstrate 4/5 strength of affected shoulder flexion and abduction to increase ease with reaching for ADLs.    Long Term Goals: 16 weeks  1. .Patient will demonstrate 165 degrees affected shoulder flexion and abduction AROM  2.Increase strength to >/= 4+/5 in shoulder flexion, abduction, ER to increase tolerance for ADL and work activities.  3. Pt goal: To have no pain with reaching overhead    Plan     Progress rotator cuff strengthening and core strengthening as tolerated.     Fernando Li, PT, DPT

## 2018-05-23 ENCOUNTER — CLINICAL SUPPORT (OUTPATIENT)
Dept: REHABILITATION | Facility: HOSPITAL | Age: 68
End: 2018-05-23
Payer: MEDICARE

## 2018-05-23 DIAGNOSIS — G89.29 CHRONIC LOW BACK PAIN, UNSPECIFIED BACK PAIN LATERALITY, WITH SCIATICA PRESENCE UNSPECIFIED: ICD-10-CM

## 2018-05-23 DIAGNOSIS — G89.29 CHRONIC PAIN IN RIGHT SHOULDER: ICD-10-CM

## 2018-05-23 DIAGNOSIS — M54.5 CHRONIC LOW BACK PAIN, UNSPECIFIED BACK PAIN LATERALITY, WITH SCIATICA PRESENCE UNSPECIFIED: ICD-10-CM

## 2018-05-23 DIAGNOSIS — M25.511 CHRONIC PAIN IN RIGHT SHOULDER: ICD-10-CM

## 2018-05-23 PROCEDURE — 97110 THERAPEUTIC EXERCISES: CPT

## 2018-05-23 PROCEDURE — 97140 MANUAL THERAPY 1/> REGIONS: CPT

## 2018-05-23 NOTE — PROGRESS NOTES
Physical Therapy SOAP Note    Name: Dana Cook  [facility] Number: [id number]      Diagnosis:   Encounter Diagnoses   Name Primary?    Chronic pain in right shoulder     Chronic low back pain, unspecified back pain laterality, with sciatica presence unspecified      Physician: Villa Houston III, *  Treatment Orders: PT Eval and Treat    Past Medical History:   Diagnosis Date    Anxiety     Colon polyps     Depression     Dry eyes     Dry mouth     Dupuytren's contracture     Hypertension     left arm fracture     hairline    Murmur, heart     Osteopenia     PONV (postoperative nausea and vomiting)     with wisdom teeth    Sarcoidosis      Current Outpatient Prescriptions   Medication Sig    alendronate (FOSAMAX) 70 MG tablet TAKE 1 TABLET(70 MG) BY MOUTH EVERY 7 DAYS    ascorbic acid (VITAMIN C) 1000 MG tablet     aspirin (ECOTRIN) 325 MG EC tablet Take 1 tablet (325 mg total) by mouth every 12 (twelve) hours.    buPROPion (WELLBUTRIN XL) 150 MG TB24 tablet TAKE 1 TABLET BY MOUTH EVERY DAY    buPROPion (WELLBUTRIN XL) 300 MG 24 hr tablet TAKE 1 TABLET BY MOUTH EVERY DAY    chlorthalidone (HYGROTEN) 25 MG Tab TAKE 1 TABLET BY MOUTH EVERY DAY    cyanocobalamin, vitamin B-12, (VITAMIN B-12) 1,000 mcg Lozg     ferrous sulfate (IRON) 325 mg (65 mg iron) Tab Take by mouth. 1 Tablet Oral Every day    glucosamine-chondroitin 500-400 mg tablet Take 1 tablet by mouth daily as needed.     hydrOXYzine HCl (ATARAX) 10 MG Tab 10 mg as needed.     lidocaine (LIDODERM) 5 % Place 1 patch onto the skin once daily. Remove & Discard patch within 12 hours. Do not use more than 1 patch in 24 hours.    multivitamin with minerals (MULTIPLE VITAMIN-MINERALS) tablet Take by mouth. 1 Tablet Oral Every day    omega-3 fatty acids-vitamin E (FISH OIL) 1,000 mg Cap     oxyCODONE-acetaminophen (PERCOCET)  mg per tablet Take 1 tablet by mouth every 8 hours as needed for pain. Take stool softener with this  "medication.    promethazine (PHENERGAN) 25 MG tablet Take 1 tablet (25 mg total) by mouth every 6 (six) hours as needed for Nausea.    red yeast rice 600 mg Cap Take by mouth.    SUBHASH'S WORT/S.GINSGR (SUBHASH'S WORT-SIBER.GINSENG) 450-90 mg Tab     traMADol (ULTRAM) 50 mg tablet Take 1-2 tablets ( mg total) by mouth every 6 (six) hours as needed.    vitamin E 1000 UNIT capsule Take by mouth. 1 Capsule Oral Every day     No current facility-administered medications for this visit.      Review of patient's allergies indicates:   Allergen Reactions    Other Other (See Comments)     Allergy to tape.     Percocet [oxycodone-acetaminophen] Nausea Only       Precautions: Follow medium rotator cuff repair    Evaluation Date: 5/7/18  Visit # [id number]: 6 of 10  Authorization period: 12/31/2018    Time In: 900a  Time Out:: 1035a      Subjective     Patient states that she is feeling decreased intensity of pain to 5/10    Objective     TREATMENT:  Dana received therapeutic exercises to address deficits in right shoulder AROM and strength for 56 minutes including:   Pulleys - 3' ea  Serratus punch - 2 x 10, 3#  Wand flexion - 2 x 10  SL ER - 3 x 10  Rows - 3 x 10, blue  Pull aparts - 2 x 10, orange  SHldr ER/IR walkout - 2 x 10, orange  Seated ball roll - 2 x 10, 3"  Bridge with abduction ring - 2 x 10, green  LTR - 10x, 3"  Sidesteps - 2 laps, green  Monster - 1 lap, green    Dana  received the following manual therapy techniques to address deficits in ROM and joint mobility x 15 min:    PROM affected shoulder all planes    Instructed pt. regarding: Proper technique with all exercises. Pt demonstrated good understanding of the education provided. Dana demonstrated good return demonstration of activities.      Assessment     The patient demonstrates decreased discomfort with PROM IR and scap stabilization with resistance    This is a 67 y.o. female referred to outpatient physical therapy and presents with " a medical diagnosis of s/p right shoulder rotator cuff repair on 3/22/18.      Medical necessity is demonstrated by the following IMPAIRMENTS/PROBLEM LIST:   1)Increase in pain level limiting function   2)Pain with reaching overhead   3)Pain with reaching behind head/back   4)Pain with carrying/lifting2   5)Lack of HEP    GOALS: Short Term Goals:  4 weeks  1.Patient will demonstrate 155 degrees affected shoulder flexion PROM  2.Patient will demonstrate 145 degrees affected shoulder abduction PROM   3.Patient will demonstrate 4/5 strength of affected shoulder flexion and abduction to increase ease with reaching for ADLs.    Long Term Goals: 16 weeks  1. .Patient will demonstrate 165 degrees affected shoulder flexion and abduction AROM  2.Increase strength to >/= 4+/5 in shoulder flexion, abduction, ER to increase tolerance for ADL and work activities.  3. Pt goal: To have no pain with reaching overhead    Plan     Progress rotator cuff strengthening and core strengthening as tolerated.     Fernando Li, PT, DPT

## 2018-05-28 ENCOUNTER — CLINICAL SUPPORT (OUTPATIENT)
Dept: REHABILITATION | Facility: HOSPITAL | Age: 68
End: 2018-05-28
Payer: MEDICARE

## 2018-05-28 DIAGNOSIS — M54.5 CHRONIC LOW BACK PAIN, UNSPECIFIED BACK PAIN LATERALITY, WITH SCIATICA PRESENCE UNSPECIFIED: ICD-10-CM

## 2018-05-28 DIAGNOSIS — G89.29 CHRONIC LOW BACK PAIN, UNSPECIFIED BACK PAIN LATERALITY, WITH SCIATICA PRESENCE UNSPECIFIED: ICD-10-CM

## 2018-05-28 DIAGNOSIS — M25.511 CHRONIC PAIN IN RIGHT SHOULDER: ICD-10-CM

## 2018-05-28 DIAGNOSIS — G89.29 CHRONIC PAIN IN RIGHT SHOULDER: ICD-10-CM

## 2018-05-28 PROCEDURE — 97140 MANUAL THERAPY 1/> REGIONS: CPT

## 2018-05-28 PROCEDURE — 97110 THERAPEUTIC EXERCISES: CPT

## 2018-05-28 NOTE — PROGRESS NOTES
Physical Therapy SOAP Note    Name: Dana Cook  [facility] Number: [id number]      Diagnosis:   Encounter Diagnoses   Name Primary?    Chronic pain in right shoulder     Chronic low back pain, unspecified back pain laterality, with sciatica presence unspecified      Physician: Villa Houston III, *  Treatment Orders: PT Eval and Treat    Past Medical History:   Diagnosis Date    Anxiety     Colon polyps     Depression     Dry eyes     Dry mouth     Dupuytren's contracture     Hypertension     left arm fracture     hairline    Murmur, heart     Osteopenia     PONV (postoperative nausea and vomiting)     with wisdom teeth    Sarcoidosis      Current Outpatient Prescriptions   Medication Sig    alendronate (FOSAMAX) 70 MG tablet TAKE 1 TABLET(70 MG) BY MOUTH EVERY 7 DAYS    ascorbic acid (VITAMIN C) 1000 MG tablet     aspirin (ECOTRIN) 325 MG EC tablet Take 1 tablet (325 mg total) by mouth every 12 (twelve) hours.    buPROPion (WELLBUTRIN XL) 150 MG TB24 tablet TAKE 1 TABLET BY MOUTH EVERY DAY    buPROPion (WELLBUTRIN XL) 300 MG 24 hr tablet TAKE 1 TABLET BY MOUTH EVERY DAY    chlorthalidone (HYGROTEN) 25 MG Tab TAKE 1 TABLET BY MOUTH EVERY DAY    cyanocobalamin, vitamin B-12, (VITAMIN B-12) 1,000 mcg Lozg     ferrous sulfate (IRON) 325 mg (65 mg iron) Tab Take by mouth. 1 Tablet Oral Every day    glucosamine-chondroitin 500-400 mg tablet Take 1 tablet by mouth daily as needed.     hydrOXYzine HCl (ATARAX) 10 MG Tab 10 mg as needed.     lidocaine (LIDODERM) 5 % Place 1 patch onto the skin once daily. Remove & Discard patch within 12 hours. Do not use more than 1 patch in 24 hours.    multivitamin with minerals (MULTIPLE VITAMIN-MINERALS) tablet Take by mouth. 1 Tablet Oral Every day    omega-3 fatty acids-vitamin E (FISH OIL) 1,000 mg Cap     oxyCODONE-acetaminophen (PERCOCET)  mg per tablet Take 1 tablet by mouth every 8 hours as needed for pain. Take stool softener with this  "medication.    promethazine (PHENERGAN) 25 MG tablet Take 1 tablet (25 mg total) by mouth every 6 (six) hours as needed for Nausea.    red yeast rice 600 mg Cap Take by mouth.    SUBHASH'S WORT/S.GINSGR (SUBHASH'S WORT-SIBER.GINSENG) 450-90 mg Tab     traMADol (ULTRAM) 50 mg tablet Take 1-2 tablets ( mg total) by mouth every 6 (six) hours as needed.    vitamin E 1000 UNIT capsule Take by mouth. 1 Capsule Oral Every day     No current facility-administered medications for this visit.      Review of patient's allergies indicates:   Allergen Reactions    Other Other (See Comments)     Allergy to tape.     Percocet [oxycodone-acetaminophen] Nausea Only       Precautions: Follow medium rotator cuff repair    Evaluation Date: 5/7/18  Visit # [id number]: 7 of 10  Authorization period: 12/31/2018    Time In: 851a  Time Out:: 1019a      Subjective     Patient states decreased use of pain medication and modalities at home    Objective     TREATMENT:  Dana received therapeutic exercises to address deficits in right shoulder AROM and strength for 49 minutes including:   Pulleys - 3' ea  Serratus punch - 2 x 10, 3#  Wand flexion - 2 x 10  SL ER - 3 x 10, 2#  Rows - 3 x 10, blue  Pull aparts - 2 x 10, orange  SHldr ER/IR walkout - 2 x 10, orange  Seated ball roll - 2 x 10, 3"  Bridge with abduction ring - 2 x 10, green  LTR - 10x, 3"  Sidesteps - 2 laps, green  Monster - 1 lap, green    Dana  received the following manual therapy techniques to address deficits in ROM and joint mobility x 15 min:    PROM affected shoulder all planes    Instructed pt. regarding: Proper technique with all exercises. Pt demonstrated good understanding of the education provided. Dana demonstrated good return demonstration of activities.      Assessment     The patient demonstrates improved symmetry of lumbar AROM and increased hip abduction strength    This is a 67 y.o. female referred to outpatient physical therapy and presents " with a medical diagnosis of s/p right shoulder rotator cuff repair on 3/22/18.      Medical necessity is demonstrated by the following IMPAIRMENTS/PROBLEM LIST:   1)Increase in pain level limiting function   2)Pain with reaching overhead   3)Pain with reaching behind head/back   4)Pain with carrying/lifting2   5)Lack of HEP    GOALS: Short Term Goals:  4 weeks  1.Patient will demonstrate 155 degrees affected shoulder flexion PROM  2.Patient will demonstrate 145 degrees affected shoulder abduction PROM   3.Patient will demonstrate 4/5 strength of affected shoulder flexion and abduction to increase ease with reaching for ADLs.    Long Term Goals: 16 weeks  1. .Patient will demonstrate 165 degrees affected shoulder flexion and abduction AROM  2.Increase strength to >/= 4+/5 in shoulder flexion, abduction, ER to increase tolerance for ADL and work activities.  3. Pt goal: To have no pain with reaching overhead    Plan     Progress rotator cuff strengthening and core strengthening as tolerated.     Fernando Li, PT, DPT

## 2018-05-30 ENCOUNTER — CLINICAL SUPPORT (OUTPATIENT)
Dept: REHABILITATION | Facility: HOSPITAL | Age: 68
End: 2018-05-30
Payer: MEDICARE

## 2018-05-30 DIAGNOSIS — G89.29 CHRONIC LOW BACK PAIN, UNSPECIFIED BACK PAIN LATERALITY, WITH SCIATICA PRESENCE UNSPECIFIED: ICD-10-CM

## 2018-05-30 DIAGNOSIS — M54.5 CHRONIC LOW BACK PAIN, UNSPECIFIED BACK PAIN LATERALITY, WITH SCIATICA PRESENCE UNSPECIFIED: ICD-10-CM

## 2018-05-30 DIAGNOSIS — M25.511 CHRONIC PAIN IN RIGHT SHOULDER: ICD-10-CM

## 2018-05-30 DIAGNOSIS — G89.29 CHRONIC PAIN IN RIGHT SHOULDER: ICD-10-CM

## 2018-05-30 PROCEDURE — 97110 THERAPEUTIC EXERCISES: CPT

## 2018-05-30 PROCEDURE — 97140 MANUAL THERAPY 1/> REGIONS: CPT

## 2018-05-30 NOTE — PROGRESS NOTES
Physical Therapy SOAP Note    Name: Dana Cook  [facility] Number: [id number]      Diagnosis:   Encounter Diagnoses   Name Primary?    Chronic pain in right shoulder     Chronic low back pain, unspecified back pain laterality, with sciatica presence unspecified      Physician: Villa Houston III, *  Treatment Orders: PT Eval and Treat    Past Medical History:   Diagnosis Date    Anxiety     Colon polyps     Depression     Dry eyes     Dry mouth     Dupuytren's contracture     Hypertension     left arm fracture     hairline    Murmur, heart     Osteopenia     PONV (postoperative nausea and vomiting)     with wisdom teeth    Sarcoidosis      Current Outpatient Prescriptions   Medication Sig    alendronate (FOSAMAX) 70 MG tablet TAKE 1 TABLET(70 MG) BY MOUTH EVERY 7 DAYS    ascorbic acid (VITAMIN C) 1000 MG tablet     aspirin (ECOTRIN) 325 MG EC tablet Take 1 tablet (325 mg total) by mouth every 12 (twelve) hours.    buPROPion (WELLBUTRIN XL) 150 MG TB24 tablet TAKE 1 TABLET BY MOUTH EVERY DAY    buPROPion (WELLBUTRIN XL) 300 MG 24 hr tablet TAKE 1 TABLET BY MOUTH EVERY DAY    chlorthalidone (HYGROTEN) 25 MG Tab TAKE 1 TABLET BY MOUTH EVERY DAY    cyanocobalamin, vitamin B-12, (VITAMIN B-12) 1,000 mcg Lozg     ferrous sulfate (IRON) 325 mg (65 mg iron) Tab Take by mouth. 1 Tablet Oral Every day    glucosamine-chondroitin 500-400 mg tablet Take 1 tablet by mouth daily as needed.     hydrOXYzine HCl (ATARAX) 10 MG Tab 10 mg as needed.     lidocaine (LIDODERM) 5 % Place 1 patch onto the skin once daily. Remove & Discard patch within 12 hours. Do not use more than 1 patch in 24 hours.    multivitamin with minerals (MULTIPLE VITAMIN-MINERALS) tablet Take by mouth. 1 Tablet Oral Every day    omega-3 fatty acids-vitamin E (FISH OIL) 1,000 mg Cap     oxyCODONE-acetaminophen (PERCOCET)  mg per tablet Take 1 tablet by mouth every 8 hours as needed for pain. Take stool softener with this  "medication.    promethazine (PHENERGAN) 25 MG tablet Take 1 tablet (25 mg total) by mouth every 6 (six) hours as needed for Nausea.    red yeast rice 600 mg Cap Take by mouth.    SUBHASH'S WORT/S.GINSGR (SUBHASH'S WORT-SIBER.GINSENG) 450-90 mg Tab     traMADol (ULTRAM) 50 mg tablet Take 1-2 tablets ( mg total) by mouth every 6 (six) hours as needed.    vitamin E 1000 UNIT capsule Take by mouth. 1 Capsule Oral Every day     No current facility-administered medications for this visit.      Review of patient's allergies indicates:   Allergen Reactions    Other Other (See Comments)     Allergy to tape.     Percocet [oxycodone-acetaminophen] Nausea Only       Precautions: Follow medium rotator cuff repair    Evaluation Date: 5/7/18  Visit # [id number]: 8 of 10  Authorization period: 12/31/2018    Time In: 857a  Time Out:: 1020a      Subjective     Patient states that she is feeling much better but still feeling weak when trying to lift objects    Objective     TREATMENT:  Dana received therapeutic exercises to address deficits in right shoulder AROM and strength for 49 minutes including:   Pulleys - 3' ea  Serratus punch - 2 x 10, 3#  Wand flexion - 2 x 10  SL ER - 3 x 10, 2#  Rows - 3 x 10, blue  Pull aparts - 2 x 10, orange  SHldr ER/IR walkout - 2 x 10, orange  Seated ball roll - 2 x 10, 3"  Bridge with abduction ring - 2 x 10, green  LTR - 10x, 3"  Sidesteps - 2 laps, green  Monster - 1 lap, green    Dana  received the following manual therapy techniques to address deficits in ROM and joint mobility x 15 min:    PROM affected shoulder all planes    Instructed pt. regarding: Proper technique with all exercises. Pt demonstrated good understanding of the education provided. Dana demonstrated good return demonstration of activities.      Assessment     The patient demonstrates decreased pain with PROM IR and resisted ER    This is a 67 y.o. female referred to outpatient physical therapy and presents " with a medical diagnosis of s/p right shoulder rotator cuff repair on 3/22/18.      Medical necessity is demonstrated by the following IMPAIRMENTS/PROBLEM LIST:   1)Increase in pain level limiting function   2)Pain with reaching overhead   3)Pain with reaching behind head/back   4)Pain with carrying/lifting2   5)Lack of HEP    GOALS: Short Term Goals:  4 weeks  1.Patient will demonstrate 155 degrees affected shoulder flexion PROM  2.Patient will demonstrate 145 degrees affected shoulder abduction PROM   3.Patient will demonstrate 4/5 strength of affected shoulder flexion and abduction to increase ease with reaching for ADLs.    Long Term Goals: 16 weeks  1. .Patient will demonstrate 165 degrees affected shoulder flexion and abduction AROM  2.Increase strength to >/= 4+/5 in shoulder flexion, abduction, ER to increase tolerance for ADL and work activities.  3. Pt goal: To have no pain with reaching overhead    Plan     Progress to trunk rotation for core strengthening    Fernando Li, PT, DPT

## 2018-06-04 ENCOUNTER — CLINICAL SUPPORT (OUTPATIENT)
Dept: REHABILITATION | Facility: HOSPITAL | Age: 68
End: 2018-06-04
Payer: MEDICARE

## 2018-06-04 DIAGNOSIS — M25.511 CHRONIC PAIN IN RIGHT SHOULDER: ICD-10-CM

## 2018-06-04 DIAGNOSIS — G89.29 CHRONIC LOW BACK PAIN, UNSPECIFIED BACK PAIN LATERALITY, WITH SCIATICA PRESENCE UNSPECIFIED: ICD-10-CM

## 2018-06-04 DIAGNOSIS — M54.5 CHRONIC LOW BACK PAIN, UNSPECIFIED BACK PAIN LATERALITY, WITH SCIATICA PRESENCE UNSPECIFIED: ICD-10-CM

## 2018-06-04 DIAGNOSIS — G89.29 CHRONIC PAIN IN RIGHT SHOULDER: ICD-10-CM

## 2018-06-04 PROCEDURE — 97140 MANUAL THERAPY 1/> REGIONS: CPT

## 2018-06-04 PROCEDURE — 97110 THERAPEUTIC EXERCISES: CPT

## 2018-06-04 NOTE — PROGRESS NOTES
Physical Therapy SOAP Note    Name: Dana Cook  [facility] Number: [id number]      Diagnosis:   Encounter Diagnoses   Name Primary?    Chronic pain in right shoulder     Chronic low back pain, unspecified back pain laterality, with sciatica presence unspecified      Physician: Villa Houston III, *  Treatment Orders: PT Eval and Treat    Past Medical History:   Diagnosis Date    Anxiety     Colon polyps     Depression     Dry eyes     Dry mouth     Dupuytren's contracture     Hypertension     left arm fracture     hairline    Murmur, heart     Osteopenia     PONV (postoperative nausea and vomiting)     with wisdom teeth    Sarcoidosis      Current Outpatient Prescriptions   Medication Sig    alendronate (FOSAMAX) 70 MG tablet TAKE 1 TABLET(70 MG) BY MOUTH EVERY 7 DAYS    ascorbic acid (VITAMIN C) 1000 MG tablet     aspirin (ECOTRIN) 325 MG EC tablet Take 1 tablet (325 mg total) by mouth every 12 (twelve) hours.    buPROPion (WELLBUTRIN XL) 150 MG TB24 tablet TAKE 1 TABLET BY MOUTH EVERY DAY    buPROPion (WELLBUTRIN XL) 300 MG 24 hr tablet TAKE 1 TABLET BY MOUTH EVERY DAY    chlorthalidone (HYGROTEN) 25 MG Tab TAKE 1 TABLET BY MOUTH EVERY DAY    cyanocobalamin, vitamin B-12, (VITAMIN B-12) 1,000 mcg Lozg     ferrous sulfate (IRON) 325 mg (65 mg iron) Tab Take by mouth. 1 Tablet Oral Every day    glucosamine-chondroitin 500-400 mg tablet Take 1 tablet by mouth daily as needed.     hydrOXYzine HCl (ATARAX) 10 MG Tab 10 mg as needed.     lidocaine (LIDODERM) 5 % Place 1 patch onto the skin once daily. Remove & Discard patch within 12 hours. Do not use more than 1 patch in 24 hours.    multivitamin with minerals (MULTIPLE VITAMIN-MINERALS) tablet Take by mouth. 1 Tablet Oral Every day    omega-3 fatty acids-vitamin E (FISH OIL) 1,000 mg Cap     oxyCODONE-acetaminophen (PERCOCET)  mg per tablet Take 1 tablet by mouth every 8 hours as needed for pain. Take stool softener with this  "medication.    promethazine (PHENERGAN) 25 MG tablet Take 1 tablet (25 mg total) by mouth every 6 (six) hours as needed for Nausea.    red yeast rice 600 mg Cap Take by mouth.    SUBHASH'S WORT/S.GINSGR (SUBHASH'S WORT-SIBER.GINSENG) 450-90 mg Tab     traMADol (ULTRAM) 50 mg tablet Take 1-2 tablets ( mg total) by mouth every 6 (six) hours as needed.    vitamin E 1000 UNIT capsule Take by mouth. 1 Capsule Oral Every day     No current facility-administered medications for this visit.      Review of patient's allergies indicates:   Allergen Reactions    Other Other (See Comments)     Allergy to tape.     Percocet [oxycodone-acetaminophen] Nausea Only       Precautions: Follow medium rotator cuff repair    Evaluation Date: 5/7/18  Visit # [id number]: 9 of 10  Authorization period: 12/31/2018    Time In: 857a  Time Out:: 1010a      Subjective     Patient states that she is feeling better, but feels like she should be further along    Objective     TREATMENT:  Dana received therapeutic exercises to address deficits in right shoulder AROM and strength for 36 minutes including:   Pulleys - 3' ea  Serratus punch - 2 x 10, 3#  Wand flexion - 2 x 10  SL ER - 3 x 10, 2#  Rows - 3 x 10, blue  Pull aparts - 2 x 10, orange  SHldr ER/IR walkout - 2 x 10, orange  Seated ball roll - 2 x 10, 3"  Bridge with abduction ring - 2 x 10, green  LTR - 10x, 3"  Sidesteps - 2 laps, green  Monster - 1 lap, green  Sidelying shoulder flexion - 2 x 10    Dana  received the following manual therapy techniques to address deficits in ROM and joint mobility x 19 min:    PROM affected shoulder all planes    Instructed pt. regarding: Proper technique with all exercises. Pt demonstrated good understanding of the education provided. Dana demonstrated good return demonstration of activities.      Assessment     The patient demonstrates improvement in PROM but still has difficulty elevating shoulder without overuse of upper traps    This " is a 67 y.o. female referred to outpatient physical therapy and presents with a medical diagnosis of s/p right shoulder rotator cuff repair on 3/22/18.      Medical necessity is demonstrated by the following IMPAIRMENTS/PROBLEM LIST:   1)Increase in pain level limiting function   2)Pain with reaching overhead   3)Pain with reaching behind head/back   4)Pain with carrying/lifting2   5)Lack of HEP    GOALS: Short Term Goals:  4 weeks  1.Patient will demonstrate 155 degrees affected shoulder flexion PROM  2.Patient will demonstrate 145 degrees affected shoulder abduction PROM   3.Patient will demonstrate 4/5 strength of affected shoulder flexion and abduction to increase ease with reaching for ADLs.    Long Term Goals: 16 weeks  1. .Patient will demonstrate 165 degrees affected shoulder flexion and abduction AROM  2.Increase strength to >/= 4+/5 in shoulder flexion, abduction, ER to increase tolerance for ADL and work activities.  3. Pt goal: To have no pain with reaching overhead    Plan     Progress to trunk rotation for core strengthening    Fernando Li, PT, DPT

## 2018-06-08 ENCOUNTER — CLINICAL SUPPORT (OUTPATIENT)
Dept: REHABILITATION | Facility: HOSPITAL | Age: 68
End: 2018-06-08
Payer: MEDICARE

## 2018-06-08 ENCOUNTER — PATIENT MESSAGE (OUTPATIENT)
Dept: SPORTS MEDICINE | Facility: CLINIC | Age: 68
End: 2018-06-08

## 2018-06-08 DIAGNOSIS — M25.511 CHRONIC PAIN IN RIGHT SHOULDER: ICD-10-CM

## 2018-06-08 DIAGNOSIS — G89.29 CHRONIC PAIN IN RIGHT SHOULDER: ICD-10-CM

## 2018-06-08 DIAGNOSIS — G89.29 CHRONIC LOW BACK PAIN, UNSPECIFIED BACK PAIN LATERALITY, WITH SCIATICA PRESENCE UNSPECIFIED: ICD-10-CM

## 2018-06-08 DIAGNOSIS — M54.5 CHRONIC LOW BACK PAIN, UNSPECIFIED BACK PAIN LATERALITY, WITH SCIATICA PRESENCE UNSPECIFIED: ICD-10-CM

## 2018-06-08 PROCEDURE — G8979 MOBILITY GOAL STATUS: HCPCS | Mod: CJ

## 2018-06-08 PROCEDURE — 97110 THERAPEUTIC EXERCISES: CPT

## 2018-06-08 PROCEDURE — 97140 MANUAL THERAPY 1/> REGIONS: CPT

## 2018-06-08 PROCEDURE — G8978 MOBILITY CURRENT STATUS: HCPCS | Mod: CK

## 2018-06-08 NOTE — PROGRESS NOTES
Physical Therapy Progress/Re-Assessment Note    Name: Dana Cook  [facility] Number: [id number]    Diagnosis:   Encounter Diagnoses   Name Primary?    Chronic pain in right shoulder     Chronic low back pain, unspecified back pain laterality, with sciatica presence unspecified      Physician: Villa Houston III, *  Treatment Orders: PT Eval and Treat    Past Medical History:   Diagnosis Date    Anxiety     Colon polyps     Depression     Dry eyes     Dry mouth     Dupuytren's contracture     Hypertension     left arm fracture     hairline    Murmur, heart     Osteopenia     PONV (postoperative nausea and vomiting)     with wisdom teeth    Sarcoidosis      Current Outpatient Prescriptions   Medication Sig    alendronate (FOSAMAX) 70 MG tablet TAKE 1 TABLET(70 MG) BY MOUTH EVERY 7 DAYS    ascorbic acid (VITAMIN C) 1000 MG tablet     aspirin (ECOTRIN) 325 MG EC tablet Take 1 tablet (325 mg total) by mouth every 12 (twelve) hours.    buPROPion (WELLBUTRIN XL) 150 MG TB24 tablet TAKE 1 TABLET BY MOUTH EVERY DAY    buPROPion (WELLBUTRIN XL) 300 MG 24 hr tablet TAKE 1 TABLET BY MOUTH EVERY DAY    chlorthalidone (HYGROTEN) 25 MG Tab TAKE 1 TABLET BY MOUTH EVERY DAY    cyanocobalamin, vitamin B-12, (VITAMIN B-12) 1,000 mcg Lozg     ferrous sulfate (IRON) 325 mg (65 mg iron) Tab Take by mouth. 1 Tablet Oral Every day    glucosamine-chondroitin 500-400 mg tablet Take 1 tablet by mouth daily as needed.     hydrOXYzine HCl (ATARAX) 10 MG Tab 10 mg as needed.     lidocaine (LIDODERM) 5 % Place 1 patch onto the skin once daily. Remove & Discard patch within 12 hours. Do not use more than 1 patch in 24 hours.    multivitamin with minerals (MULTIPLE VITAMIN-MINERALS) tablet Take by mouth. 1 Tablet Oral Every day    omega-3 fatty acids-vitamin E (FISH OIL) 1,000 mg Cap     oxyCODONE-acetaminophen (PERCOCET)  mg per tablet Take 1 tablet by mouth every 8 hours as needed for pain. Take stool softener  with this medication.    promethazine (PHENERGAN) 25 MG tablet Take 1 tablet (25 mg total) by mouth every 6 (six) hours as needed for Nausea.    red yeast rice 600 mg Cap Take by mouth.    SUBHASH'S WORT/S.GINSGR (SUBHASH'S WORT-SIBER.GINSENG) 450-90 mg Tab     traMADol (ULTRAM) 50 mg tablet Take 1-2 tablets ( mg total) by mouth every 6 (six) hours as needed.    vitamin E 1000 UNIT capsule Take by mouth. 1 Capsule Oral Every day     No current facility-administered medications for this visit.      Review of patient's allergies indicates:   Allergen Reactions    Other Other (See Comments)     Allergy to tape.     Percocet [oxycodone-acetaminophen] Nausea Only       Precautions: Follow medium rotator cuff repair    Evaluation Date: 5/7/18  Visit # [id number]: 10 of 10  Authorization period: 12/31/2018    Time In: 900a  Time Out:: 957a      Subjective     Primary concern/ Chief complaints:    Dana is a 67 y.o. female that presents to [facility] with primary complaints of right shoulder pain after right shoulder rotator cuff repair on 3/22/2018 and lower back pain that began around October 2017.  The patient states that since beginning PT, she feels decreased intensity and frequency of pain.  She states that she has improvement in her ability to use her arm for reaching forward and overhead actively.  She states that she is sleeping better and is no longer taking pain medications.  She states pain is intermittent and still limits her ability to lift and carry household items, reach overhead, reach behind back.    Patient Goals: To return to full ROM without pain    Objective     Range of Motion:   Shoulder Right (AROM/PROM) Left (AROM/PROM)   Flexion 143/157 degrees 171 degrees   Abduction 117/139 degrees 174 degrees   ER at 90 49/66 degrees 88 degrees   IR at 90 30/45 degrees 62 degrees     Strength:  Shoulder Right Left   Flexion 4/5 5/5   Abduction 3+/5 5/5   ER 4/5 4+/5   IR  "5/5 5/5     TREATMENT:  Dana received therapeutic exercises to address deficits in right shoulder AROM and strength for 39 minutes including:   Pulleys - 3' ea  Serratus punch - 2 x 10, 3#  Wand flexion - 2 x 10  SL ER - 3 x 10, 2#  Rows - 3 x 10, blue  Pull aparts - 2 x 10, orange  SHldr ER/IR walkout - 2 x 10, orange  Seated ball roll - 2 x 10, 3"  Bridge with abduction ring - 2 x 10, green  LTR - 10x, 3"  Sidesteps - 2 laps, green  Monster - 1 lap, green  Sidelying shoulder flexion - 2 x 10    Dana  received the following manual therapy techniques to address deficits in ROM and joint mobility x 13 min:    PROM affected shoulder all planes    Instructed pt. regarding: Proper technique with all exercises. Pt demonstrated good understanding of the education provided. Dana demonstrated good return demonstration of activities.      Assessment     This is a 67 y.o. female referred to outpatient physical therapy and presents with a medical diagnosis of s/p right shoulder rotator cuff repair on 3/22/18.  Patient presents with improvement in ability to reach overhead, reach forward, has decreased intensity of pain, and decreased frequency of pain.  However, the patient still demonstrates objective deficits in right shoulder AROM, tolerance for resisted motion, scapular and postural alignement that limits the patient's ability to perform reaching forward and overhead, reaching to the side, reaching behind back, reaching behind head, lifting/carrying.      Patient will benefit from skilled physical therapy services to address these deficits and progress to premorbid functional level.  These deficits and goals were discussed with the patient and patient is in agreement with them as to be addressed in the treatment plan. Patient was given a HEP consisting of exercises listed above. Patient verbally understood the instructions as they were given and demonstrated proper form and technique during therapy. Pt was advised to " perform these exercises free of pain, and to stop performing them if pain occurs.     Medical necessity is demonstrated by the following IMPAIRMENTS/PROBLEM LIST:   1)Increase in pain level limiting function   2)Pain with reaching overhead   3)Pain with reaching behind head/back   4)Pain with carrying/lifting   5)Lack of HEP    GOALS: Short Term Goals:  4 weeks  1.Patient will demonstrate 155 degrees affected shoulder flexion PROM - Achieved  2.Patient will demonstrate 145 degrees affected shoulder abduction PROM - Achieved  3.Patient will demonstrate 4/5 strength of affected shoulder flexion and abduction to increase ease with reaching for ADLs. - Achieved    Long Term Goals: 16 weeks  1. .Patient will demonstrate 165 degrees affected shoulder flexion and abduction AROM - Not Met  2.Increase strength to >/= 4+/5 in shoulder flexion, abduction, ER to increase tolerance for ADL and work activities. - Not Met  3. Pt goal: To have no pain with reaching overhead - Not Met    Plan     Pt will be treated by physical therapy 2-3 times a week for Pt Education, HEP, therapeutic exercises, neuromuscular re-education, manual therapy, joint mobilizations, modalities prn to achieve established goals. Dana may at times be seen by a PTA as part of the Rehab Team.     Cont PT for 12 weeks.     Fernando Li, PT, DPT        I certify the need for these services furnished under this plan of treatment and while under my care.______________________________ Physician/Referring Practitioner  Date of Signature

## 2018-06-11 ENCOUNTER — CLINICAL SUPPORT (OUTPATIENT)
Dept: REHABILITATION | Facility: HOSPITAL | Age: 68
End: 2018-06-11
Payer: MEDICARE

## 2018-06-11 DIAGNOSIS — M54.5 CHRONIC LOW BACK PAIN, UNSPECIFIED BACK PAIN LATERALITY, WITH SCIATICA PRESENCE UNSPECIFIED: ICD-10-CM

## 2018-06-11 DIAGNOSIS — G89.29 CHRONIC PAIN IN RIGHT SHOULDER: ICD-10-CM

## 2018-06-11 DIAGNOSIS — G89.29 CHRONIC LOW BACK PAIN, UNSPECIFIED BACK PAIN LATERALITY, WITH SCIATICA PRESENCE UNSPECIFIED: ICD-10-CM

## 2018-06-11 DIAGNOSIS — M25.511 CHRONIC PAIN IN RIGHT SHOULDER: ICD-10-CM

## 2018-06-11 PROCEDURE — 97140 MANUAL THERAPY 1/> REGIONS: CPT

## 2018-06-11 PROCEDURE — 97110 THERAPEUTIC EXERCISES: CPT

## 2018-06-11 NOTE — PROGRESS NOTES
Physical Therapy SOAP Note    Name: Dana Cook  [facility] Number: [id number]      Diagnosis:   Encounter Diagnoses   Name Primary?    Chronic pain in right shoulder     Chronic low back pain, unspecified back pain laterality, with sciatica presence unspecified      Physician: Villa Houston III, *  Treatment Orders: PT Eval and Treat    Past Medical History:   Diagnosis Date    Anxiety     Colon polyps     Depression     Dry eyes     Dry mouth     Dupuytren's contracture     Hypertension     left arm fracture     hairline    Murmur, heart     Osteopenia     PONV (postoperative nausea and vomiting)     with wisdom teeth    Sarcoidosis      Current Outpatient Prescriptions   Medication Sig    alendronate (FOSAMAX) 70 MG tablet TAKE 1 TABLET(70 MG) BY MOUTH EVERY 7 DAYS    ascorbic acid (VITAMIN C) 1000 MG tablet     aspirin (ECOTRIN) 325 MG EC tablet Take 1 tablet (325 mg total) by mouth every 12 (twelve) hours.    buPROPion (WELLBUTRIN XL) 150 MG TB24 tablet TAKE 1 TABLET BY MOUTH EVERY DAY    buPROPion (WELLBUTRIN XL) 300 MG 24 hr tablet TAKE 1 TABLET BY MOUTH EVERY DAY    chlorthalidone (HYGROTEN) 25 MG Tab TAKE 1 TABLET BY MOUTH EVERY DAY    cyanocobalamin, vitamin B-12, (VITAMIN B-12) 1,000 mcg Lozg     ferrous sulfate (IRON) 325 mg (65 mg iron) Tab Take by mouth. 1 Tablet Oral Every day    glucosamine-chondroitin 500-400 mg tablet Take 1 tablet by mouth daily as needed.     hydrOXYzine HCl (ATARAX) 10 MG Tab 10 mg as needed.     lidocaine (LIDODERM) 5 % Place 1 patch onto the skin once daily. Remove & Discard patch within 12 hours. Do not use more than 1 patch in 24 hours.    multivitamin with minerals (MULTIPLE VITAMIN-MINERALS) tablet Take by mouth. 1 Tablet Oral Every day    omega-3 fatty acids-vitamin E (FISH OIL) 1,000 mg Cap     oxyCODONE-acetaminophen (PERCOCET)  mg per tablet Take 1 tablet by mouth every 8 hours as needed for pain. Take stool softener with this  "medication.    promethazine (PHENERGAN) 25 MG tablet Take 1 tablet (25 mg total) by mouth every 6 (six) hours as needed for Nausea.    red yeast rice 600 mg Cap Take by mouth.    SUBHASH'S WORT/S.GINSGR (SUBHASH'S WORT-SIBER.GINSENG) 450-90 mg Tab     traMADol (ULTRAM) 50 mg tablet Take 1-2 tablets ( mg total) by mouth every 6 (six) hours as needed.    vitamin E 1000 UNIT capsule Take by mouth. 1 Capsule Oral Every day     No current facility-administered medications for this visit.      Review of patient's allergies indicates:   Allergen Reactions    Other Other (See Comments)     Allergy to tape.     Percocet [oxycodone-acetaminophen] Nausea Only       Precautions: Follow medium rotator cuff repair    Evaluation Date: 5/7/18  Visit # authorized: 10 of 10  Authorization period: 12/31/2018    Time In: 900a  Time Out:: 1010a      Subjective     Patient states improvement in ability to reach forward, decreased discomfort with reaching overhead    Objective     TREATMENT:  Dana received therapeutic exercises to address deficits in right shoulder AROM and strength for 38 minutes including:   Pulleys - 3' ea  Serratus punch - 2 x 10, 3#  Wand flexion - 2 x 10  SL ER - 3 x 10, 2#  Rows - 3 x 10, blue  Pull aparts - 2 x 10, orange  SHldr ER/IR walkout - 2 x 10, orange  Seated ball roll - 2 x 10, 3"  Bridge with abduction ring - 2 x 10, green  LTR - 10x, 3"  Sidesteps - 2 laps, green  Monster - 1 lap, green  Sidelying shoulder flexion - 2 x 10    Dana  received the following manual therapy techniques to address deficits in ROM and joint mobility x 19 min:    PROM affected shoulder all planes    Instructed pt. regarding: Proper technique with all exercises. Pt demonstrated good understanding of the education provided. Dana demonstrated good return demonstration of activities.      Assessment     The patient demonstrates improvement in shoulder abduction and decreased pain with shoulder ER/IR    This is a 67 " y.o. female referred to outpatient physical therapy and presents with a medical diagnosis of s/p right shoulder rotator cuff repair on 3/22/18.      Medical necessity is demonstrated by the following IMPAIRMENTS/PROBLEM LIST:   1)Increase in pain level limiting function   2)Pain with reaching overhead   3)Pain with reaching behind head/back   4)Pain with carrying/lifting2   5)Lack of HEP    GOALS: Short Term Goals:  4 weeks  1.Patient will demonstrate 155 degrees affected shoulder flexion PROM  2.Patient will demonstrate 145 degrees affected shoulder abduction PROM   3.Patient will demonstrate 4/5 strength of affected shoulder flexion and abduction to increase ease with reaching for ADLs.    Long Term Goals: 16 weeks  1. .Patient will demonstrate 165 degrees affected shoulder flexion and abduction AROM  2.Increase strength to >/= 4+/5 in shoulder flexion, abduction, ER to increase tolerance for ADL and work activities.  3. Pt goal: To have no pain with reaching overhead    Plan     Progress to resisted flexion below shoulder    Fernando Li, PT, DPT

## 2018-06-15 ENCOUNTER — CLINICAL SUPPORT (OUTPATIENT)
Dept: REHABILITATION | Facility: HOSPITAL | Age: 68
End: 2018-06-15
Payer: MEDICARE

## 2018-06-15 DIAGNOSIS — G89.29 CHRONIC LOW BACK PAIN, UNSPECIFIED BACK PAIN LATERALITY, WITH SCIATICA PRESENCE UNSPECIFIED: ICD-10-CM

## 2018-06-15 DIAGNOSIS — G89.29 CHRONIC PAIN IN RIGHT SHOULDER: ICD-10-CM

## 2018-06-15 DIAGNOSIS — M54.5 CHRONIC LOW BACK PAIN, UNSPECIFIED BACK PAIN LATERALITY, WITH SCIATICA PRESENCE UNSPECIFIED: ICD-10-CM

## 2018-06-15 DIAGNOSIS — M25.511 CHRONIC PAIN IN RIGHT SHOULDER: ICD-10-CM

## 2018-06-15 PROCEDURE — 97110 THERAPEUTIC EXERCISES: CPT

## 2018-06-15 PROCEDURE — 97140 MANUAL THERAPY 1/> REGIONS: CPT

## 2018-06-15 NOTE — PROGRESS NOTES
Physical Therapy SOAP Note    Name: Dana Cook  [facility] Number: [id number]      Diagnosis:   Encounter Diagnoses   Name Primary?    Chronic pain in right shoulder     Chronic low back pain, unspecified back pain laterality, with sciatica presence unspecified      Physician: Villa Houston III, *  Treatment Orders: PT Eval and Treat    Past Medical History:   Diagnosis Date    Anxiety     Colon polyps     Depression     Dry eyes     Dry mouth     Dupuytren's contracture     Hypertension     left arm fracture     hairline    Murmur, heart     Osteopenia     PONV (postoperative nausea and vomiting)     with wisdom teeth    Sarcoidosis      Current Outpatient Prescriptions   Medication Sig    alendronate (FOSAMAX) 70 MG tablet TAKE 1 TABLET(70 MG) BY MOUTH EVERY 7 DAYS    ascorbic acid (VITAMIN C) 1000 MG tablet     aspirin (ECOTRIN) 325 MG EC tablet Take 1 tablet (325 mg total) by mouth every 12 (twelve) hours.    buPROPion (WELLBUTRIN XL) 150 MG TB24 tablet TAKE 1 TABLET BY MOUTH EVERY DAY    buPROPion (WELLBUTRIN XL) 300 MG 24 hr tablet TAKE 1 TABLET BY MOUTH EVERY DAY    chlorthalidone (HYGROTEN) 25 MG Tab TAKE 1 TABLET BY MOUTH EVERY DAY    cyanocobalamin, vitamin B-12, (VITAMIN B-12) 1,000 mcg Lozg     ferrous sulfate (IRON) 325 mg (65 mg iron) Tab Take by mouth. 1 Tablet Oral Every day    glucosamine-chondroitin 500-400 mg tablet Take 1 tablet by mouth daily as needed.     hydrOXYzine HCl (ATARAX) 10 MG Tab 10 mg as needed.     lidocaine (LIDODERM) 5 % Place 1 patch onto the skin once daily. Remove & Discard patch within 12 hours. Do not use more than 1 patch in 24 hours.    multivitamin with minerals (MULTIPLE VITAMIN-MINERALS) tablet Take by mouth. 1 Tablet Oral Every day    omega-3 fatty acids-vitamin E (FISH OIL) 1,000 mg Cap     oxyCODONE-acetaminophen (PERCOCET)  mg per tablet Take 1 tablet by mouth every 8 hours as needed for pain. Take stool softener with this  "medication.    promethazine (PHENERGAN) 25 MG tablet Take 1 tablet (25 mg total) by mouth every 6 (six) hours as needed for Nausea.    red yeast rice 600 mg Cap Take by mouth.    SUBHASH'S WORT/S.GINSGR (SUBHASH'S WORT-SIBER.GINSENG) 450-90 mg Tab     traMADol (ULTRAM) 50 mg tablet Take 1-2 tablets ( mg total) by mouth every 6 (six) hours as needed.    vitamin E 1000 UNIT capsule Take by mouth. 1 Capsule Oral Every day     No current facility-administered medications for this visit.      Review of patient's allergies indicates:   Allergen Reactions    Other Other (See Comments)     Allergy to tape.     Percocet [oxycodone-acetaminophen] Nausea Only       Precautions: Follow medium rotator cuff repair    Evaluation Date: 5/7/18  Visit # authorized: 2 of 11  Authorization period: 12/31/2018    Time In: 900a  Time Out:: 1010a      Subjective     He patient states she is having increased pain since starting sidelying exercises at home    Objective     TREATMENT:  Dana received therapeutic exercises to address deficits in right shoulder AROM and strength for 40 minutes including:   Pulleys - 3' ea  Serratus punch - 2 x 10, 3#  Wand flexion - 2 x 10  SL ER - 3 x 10, 2#  Rows - 3 x 10, blue  Pull aparts - 2 x 10, orange  SHldr ER/IR walkout - 2 x 10, orange  Seated ball roll - 2 x 10, 3"  Bridge with abduction ring - 2 x 10, green  LTR - 10x, 3"  Sidesteps - 2 laps, green  Monster - 1 lap, green  Sidelying shoulder flexion - 2 x 10    Dana  received the following manual therapy techniques to address deficits in ROM and joint mobility x 19 min:    PROM affected shoulder all planes    Instructed pt. regarding: Proper technique with all exercises. Pt demonstrated good understanding of the education provided. Dana demonstrated good return demonstration of activities.      Assessment     The patient demonstrates need for manual assistance with scap retraction with AROM below shoulder level to avoid " substitution of upper trap    This is a 67 y.o. female referred to outpatient physical therapy and presents with a medical diagnosis of s/p right shoulder rotator cuff repair on 3/22/18.      Medical necessity is demonstrated by the following IMPAIRMENTS/PROBLEM LIST:   1)Increase in pain level limiting function   2)Pain with reaching overhead   3)Pain with reaching behind head/back   4)Pain with carrying/lifting2   5)Lack of HEP    GOALS: Short Term Goals:  4 weeks  1.Patient will demonstrate 155 degrees affected shoulder flexion PROM  2.Patient will demonstrate 145 degrees affected shoulder abduction PROM   3.Patient will demonstrate 4/5 strength of affected shoulder flexion and abduction to increase ease with reaching for ADLs.    Long Term Goals: 16 weeks  1. .Patient will demonstrate 165 degrees affected shoulder flexion and abduction AROM  2.Increase strength to >/= 4+/5 in shoulder flexion, abduction, ER to increase tolerance for ADL and work activities.  3. Pt goal: To have no pain with reaching overhead    Plan     Progress to resisted flexion below shoulder    Fernando Li, PT, DPT

## 2018-06-18 ENCOUNTER — OFFICE VISIT (OUTPATIENT)
Dept: SPORTS MEDICINE | Facility: CLINIC | Age: 68
End: 2018-06-18
Payer: MEDICARE

## 2018-06-18 VITALS
WEIGHT: 180 LBS | DIASTOLIC BLOOD PRESSURE: 81 MMHG | SYSTOLIC BLOOD PRESSURE: 139 MMHG | HEART RATE: 56 BPM | BODY MASS INDEX: 29.99 KG/M2 | HEIGHT: 65 IN

## 2018-06-18 DIAGNOSIS — M75.101 TEAR OF RIGHT ROTATOR CUFF, UNSPECIFIED TEAR EXTENT: Primary | ICD-10-CM

## 2018-06-18 PROCEDURE — 99213 OFFICE O/P EST LOW 20 MIN: CPT | Mod: PBBFAC,PO | Performed by: ORTHOPAEDIC SURGERY

## 2018-06-18 PROCEDURE — 99024 POSTOP FOLLOW-UP VISIT: CPT | Mod: POP,,, | Performed by: ORTHOPAEDIC SURGERY

## 2018-06-18 PROCEDURE — 99999 PR PBB SHADOW E&M-EST. PATIENT-LVL III: CPT | Mod: PBBFAC,,, | Performed by: ORTHOPAEDIC SURGERY

## 2018-06-18 NOTE — PROGRESS NOTES
CC right shoulder pain    HISTORY OF PRESENT ILLNESS:   Pt is here today for post-operative followup of her shoulder arthroscopy.  she is doing well.  We have reviewed her findings and discussed plan of care and future treatment options.  She is seeing Fernando for PT working on active ROM.    Pain today 3/10.  Takes Aleve with some relief.                                                                  DATE OF PROCEDURE: 03/22/2018  OPERATION:   right  1. Shoulder arthroscopic rotator cuff repair (20 x 15 mm, double row) (CPT 29712)   2. Shoulder arthroscopic biceps tenodesis (CPT 96882.52)  3. Shoulder arthroscopic subacromial decompression, bursectomy   4. Shoulder arthroscopic extensive debridement (anterior, posterior glenohumeral joint, subacromial space) (CPT 35368)  5. Shoulder arthroscopic-assisted arthrocentesis of viable structural human allograft tissue matrix ([employer]) (CPT 37128)           6. Shoulder arthroscopic microfracture (Crimson duvet technique) (CPT 75045)  7. Shoulder arthroscopic lysis of adhesions (CPT 05190)  8. Shoulder arthroscopic chondroplasty                   There were chondral lesions to:   Humeral head: 40 x 25 grade 3  Glenoid: 20 x 10 grade 3    PHYSICAL EXAMINATION:     Incision sites healed well  No evidence of any erythema, infection or induration  elbow Range of motion full   2+ DP pulse  No swelling      ER 50  IR T12  5/5 at 0 and 30                                                                               ASSESSMENT:                                                                                                                                               1. Status post above, doing well.                                                                                                                               PLAN:                                                                                                                                                      1. Continue PT  2. Emphasized scapular function.  3. I have discussed return to activity in detail.  4. she will see us back in 8 weeks                                      5. All questions were answered and she should contact us if she  has any questions or concerns in the interim.

## 2018-06-29 ENCOUNTER — CLINICAL SUPPORT (OUTPATIENT)
Dept: REHABILITATION | Facility: HOSPITAL | Age: 68
End: 2018-06-29
Payer: MEDICARE

## 2018-06-29 DIAGNOSIS — M25.511 CHRONIC PAIN IN RIGHT SHOULDER: ICD-10-CM

## 2018-06-29 DIAGNOSIS — G89.29 CHRONIC LOW BACK PAIN, UNSPECIFIED BACK PAIN LATERALITY, WITH SCIATICA PRESENCE UNSPECIFIED: ICD-10-CM

## 2018-06-29 DIAGNOSIS — M54.5 CHRONIC LOW BACK PAIN, UNSPECIFIED BACK PAIN LATERALITY, WITH SCIATICA PRESENCE UNSPECIFIED: ICD-10-CM

## 2018-06-29 DIAGNOSIS — G89.29 CHRONIC PAIN IN RIGHT SHOULDER: ICD-10-CM

## 2018-06-29 PROCEDURE — 97110 THERAPEUTIC EXERCISES: CPT

## 2018-06-29 PROCEDURE — 97140 MANUAL THERAPY 1/> REGIONS: CPT

## 2018-06-29 NOTE — PROGRESS NOTES
Physical Therapy SOAP Note    Name: Dana Cook  [facility] Number: [id number]      Diagnosis:   Encounter Diagnoses   Name Primary?    Chronic pain in right shoulder     Chronic low back pain, unspecified back pain laterality, with sciatica presence unspecified      Physician: Villa Houston III, *  Treatment Orders: PT Eval and Treat    Past Medical History:   Diagnosis Date    Anxiety     Colon polyps     Depression     Dry eyes     Dry mouth     Dupuytren's contracture     Hypertension     left arm fracture     hairline    Murmur, heart     Osteopenia     PONV (postoperative nausea and vomiting)     with wisdom teeth    Sarcoidosis      Current Outpatient Prescriptions   Medication Sig    alendronate (FOSAMAX) 70 MG tablet TAKE 1 TABLET(70 MG) BY MOUTH EVERY 7 DAYS    ascorbic acid (VITAMIN C) 1000 MG tablet     aspirin (ECOTRIN) 325 MG EC tablet Take 1 tablet (325 mg total) by mouth every 12 (twelve) hours.    buPROPion (WELLBUTRIN XL) 150 MG TB24 tablet TAKE 1 TABLET BY MOUTH EVERY DAY    buPROPion (WELLBUTRIN XL) 300 MG 24 hr tablet TAKE 1 TABLET BY MOUTH EVERY DAY    chlorthalidone (HYGROTEN) 25 MG Tab TAKE 1 TABLET BY MOUTH EVERY DAY    cyanocobalamin, vitamin B-12, (VITAMIN B-12) 1,000 mcg Lozg     ferrous sulfate (IRON) 325 mg (65 mg iron) Tab Take by mouth. 1 Tablet Oral Every day    glucosamine-chondroitin 500-400 mg tablet Take 1 tablet by mouth daily as needed.     hydrOXYzine HCl (ATARAX) 10 MG Tab 10 mg as needed.     lidocaine (LIDODERM) 5 % Place 1 patch onto the skin once daily. Remove & Discard patch within 12 hours. Do not use more than 1 patch in 24 hours.    multivitamin with minerals (MULTIPLE VITAMIN-MINERALS) tablet Take by mouth. 1 Tablet Oral Every day    omega-3 fatty acids-vitamin E (FISH OIL) 1,000 mg Cap     oxyCODONE-acetaminophen (PERCOCET)  mg per tablet Take 1 tablet by mouth every 8 hours as needed for pain. Take stool softener with this  "medication.    promethazine (PHENERGAN) 25 MG tablet Take 1 tablet (25 mg total) by mouth every 6 (six) hours as needed for Nausea.    red yeast rice 600 mg Cap Take by mouth.    SUBHASH'S WORT/S.GINSGR (SUBHASH'S WORT-SIBER.GINSENG) 450-90 mg Tab     traMADol (ULTRAM) 50 mg tablet Take 1-2 tablets ( mg total) by mouth every 6 (six) hours as needed.    vitamin E 1000 UNIT capsule Take by mouth. 1 Capsule Oral Every day     No current facility-administered medications for this visit.      Review of patient's allergies indicates:   Allergen Reactions    Other Other (See Comments)     Allergy to tape.     Percocet [oxycodone-acetaminophen] Nausea Only       Precautions: Follow medium rotator cuff repair    Evaluation Date: 5/7/18  Visit # authorized: 3 of 11  Authorization period: 12/31/2018    Time In:100p  Time Out:: 200p      Subjective     She states that she feels the same amount of pain that she did before the surgery    Objective     TREATMENT:  Dana received therapeutic exercises to address deficits in right shoulder AROM and strength for 40 minutes including:   Pulleys - 3' ea  Serratus punch - 2 x 10, 3#  Wand flexion - 2 x 10  SL ER - 3 x 10, 2#  Rows - 3 x 10, blue  Pull aparts - 2 x 10, orange  SHldr ER/IR walkout - 2 x 10, orange  Seated ball roll - 2 x 10, 3"  Bridge with abduction ring - 2 x 10, green  LTR - 10x, 3"  Sidesteps - 2 laps, green  Monster - 1 lap, green  Sidelying shoulder flexion - 2 x 10    aDna  received the following manual therapy techniques to address deficits in ROM and joint mobility x 15 min:    PROM affected shoulder all planes    Instructed pt. regarding: Proper technique with all exercises. Pt demonstrated good understanding of the education provided. Dana demonstrated good return demonstration of activities.      Assessment     The patient demonstrates improved control of shoulder hiking with shoulder flexion, still uncontrolled with abduction    This is a 67 " y.o. female referred to outpatient physical therapy and presents with a medical diagnosis of s/p right shoulder rotator cuff repair on 3/22/18.      Medical necessity is demonstrated by the following IMPAIRMENTS/PROBLEM LIST:   1)Increase in pain level limiting function   2)Pain with reaching overhead   3)Pain with reaching behind head/back   4)Pain with carrying/lifting2   5)Lack of HEP    GOALS: Short Term Goals:  4 weeks  1.Patient will demonstrate 155 degrees affected shoulder flexion PROM  2.Patient will demonstrate 145 degrees affected shoulder abduction PROM   3.Patient will demonstrate 4/5 strength of affected shoulder flexion and abduction to increase ease with reaching for ADLs.    Long Term Goals: 16 weeks  1. .Patient will demonstrate 165 degrees affected shoulder flexion and abduction AROM  2.Increase strength to >/= 4+/5 in shoulder flexion, abduction, ER to increase tolerance for ADL and work activities.  3. Pt goal: To have no pain with reaching overhead    Plan     Progress to resisted flexion below shoulder    Fernando Li, PT, DPT

## 2018-07-02 ENCOUNTER — CLINICAL SUPPORT (OUTPATIENT)
Dept: REHABILITATION | Facility: HOSPITAL | Age: 68
End: 2018-07-02
Payer: MEDICARE

## 2018-07-02 DIAGNOSIS — M54.5 CHRONIC LOW BACK PAIN, UNSPECIFIED BACK PAIN LATERALITY, WITH SCIATICA PRESENCE UNSPECIFIED: ICD-10-CM

## 2018-07-02 DIAGNOSIS — G89.29 CHRONIC PAIN IN RIGHT SHOULDER: ICD-10-CM

## 2018-07-02 DIAGNOSIS — M25.511 CHRONIC PAIN IN RIGHT SHOULDER: ICD-10-CM

## 2018-07-02 DIAGNOSIS — G89.29 CHRONIC LOW BACK PAIN, UNSPECIFIED BACK PAIN LATERALITY, WITH SCIATICA PRESENCE UNSPECIFIED: ICD-10-CM

## 2018-07-02 PROCEDURE — 97110 THERAPEUTIC EXERCISES: CPT

## 2018-07-02 PROCEDURE — 97140 MANUAL THERAPY 1/> REGIONS: CPT

## 2018-07-02 NOTE — PROGRESS NOTES
Physical Therapy SOAP Note    Name: Dana Cook  [facility] Number: [id number]      Diagnosis:   Encounter Diagnoses   Name Primary?    Chronic pain in right shoulder     Chronic low back pain, unspecified back pain laterality, with sciatica presence unspecified      Physician: Villa Houston III, *  Treatment Orders: PT Eval and Treat    Past Medical History:   Diagnosis Date    Anxiety     Colon polyps     Depression     Dry eyes     Dry mouth     Dupuytren's contracture     Hypertension     left arm fracture     hairline    Murmur, heart     Osteopenia     PONV (postoperative nausea and vomiting)     with wisdom teeth    Sarcoidosis      Current Outpatient Prescriptions   Medication Sig    alendronate (FOSAMAX) 70 MG tablet TAKE 1 TABLET(70 MG) BY MOUTH EVERY 7 DAYS    ascorbic acid (VITAMIN C) 1000 MG tablet     aspirin (ECOTRIN) 325 MG EC tablet Take 1 tablet (325 mg total) by mouth every 12 (twelve) hours.    buPROPion (WELLBUTRIN XL) 150 MG TB24 tablet TAKE 1 TABLET BY MOUTH EVERY DAY    buPROPion (WELLBUTRIN XL) 300 MG 24 hr tablet TAKE 1 TABLET BY MOUTH EVERY DAY    chlorthalidone (HYGROTEN) 25 MG Tab TAKE 1 TABLET BY MOUTH EVERY DAY    cyanocobalamin, vitamin B-12, (VITAMIN B-12) 1,000 mcg Lozg     ferrous sulfate (IRON) 325 mg (65 mg iron) Tab Take by mouth. 1 Tablet Oral Every day    glucosamine-chondroitin 500-400 mg tablet Take 1 tablet by mouth daily as needed.     hydrOXYzine HCl (ATARAX) 10 MG Tab 10 mg as needed.     lidocaine (LIDODERM) 5 % Place 1 patch onto the skin once daily. Remove & Discard patch within 12 hours. Do not use more than 1 patch in 24 hours.    multivitamin with minerals (MULTIPLE VITAMIN-MINERALS) tablet Take by mouth. 1 Tablet Oral Every day    omega-3 fatty acids-vitamin E (FISH OIL) 1,000 mg Cap     oxyCODONE-acetaminophen (PERCOCET)  mg per tablet Take 1 tablet by mouth every 8 hours as needed for pain. Take stool softener with this  "medication.    promethazine (PHENERGAN) 25 MG tablet Take 1 tablet (25 mg total) by mouth every 6 (six) hours as needed for Nausea.    red yeast rice 600 mg Cap Take by mouth.    SUBHASH'S WORT/S.GINSGR (SUBHASH'S WORT-SIBER.GINSENG) 450-90 mg Tab     traMADol (ULTRAM) 50 mg tablet Take 1-2 tablets ( mg total) by mouth every 6 (six) hours as needed.    vitamin E 1000 UNIT capsule Take by mouth. 1 Capsule Oral Every day     No current facility-administered medications for this visit.      Review of patient's allergies indicates:   Allergen Reactions    Other Other (See Comments)     Allergy to tape.     Percocet [oxycodone-acetaminophen] Nausea Only       Precautions: Follow medium rotator cuff repair    Evaluation Date: 5/7/18  Visit # authorized: 4 of 11  Authorization period: 12/31/2018    Time In: 855a  Time Out:: 955a      Subjective     She states that pain is better today and she has been able to move better since last week     Objective     TREATMENT:  Dana received therapeutic exercises to address deficits in right shoulder AROM and strength for 40 minutes including:   Pulleys - 3' ea  Serratus punch - 2 x 10, 3#  Wand flexion - 2 x 10  SL ER - 3 x 10, 2#  Rows - 3 x 10, blue  Pull aparts - 2 x 10, orange  SHldr ER/IR walkout - 2 x 10, orange  Seated ball roll - 2 x 10, 3"  Bridge with abduction ring - 2 x 10, green  LTR - 10x, 3"  Sidesteps - 2 laps, green  Monster - 1 lap, green  Sidelying shoulder flexion - 2 x 10    Dana  received the following manual therapy techniques to address deficits in ROM and joint mobility x 15 min:    PROM affected shoulder all planes    Instructed pt. regarding: Proper technique with all exercises. Pt demonstrated good understanding of the education provided. Dana demonstrated good return demonstration of activities.      Assessment     The patient demonstrates less pain with AROM in scaption plane today and improved awareness of substitution with external " rotation    This is a 67 y.o. female referred to outpatient physical therapy and presents with a medical diagnosis of s/p right shoulder rotator cuff repair on 3/22/18.      Medical necessity is demonstrated by the following IMPAIRMENTS/PROBLEM LIST:   1)Increase in pain level limiting function   2)Pain with reaching overhead   3)Pain with reaching behind head/back   4)Pain with carrying/lifting2   5)Lack of HEP    GOALS: Short Term Goals:  4 weeks  1.Patient will demonstrate 155 degrees affected shoulder flexion PROM  2.Patient will demonstrate 145 degrees affected shoulder abduction PROM   3.Patient will demonstrate 4/5 strength of affected shoulder flexion and abduction to increase ease with reaching for ADLs.    Long Term Goals: 16 weeks  1. .Patient will demonstrate 165 degrees affected shoulder flexion and abduction AROM  2.Increase strength to >/= 4+/5 in shoulder flexion, abduction, ER to increase tolerance for ADL and work activities.  3. Pt goal: To have no pain with reaching overhead    Plan     Progress to resisted flexion below shoulder    Fernando Li, PT, DPT

## 2018-07-06 ENCOUNTER — CLINICAL SUPPORT (OUTPATIENT)
Dept: REHABILITATION | Facility: HOSPITAL | Age: 68
End: 2018-07-06
Payer: MEDICARE

## 2018-07-06 DIAGNOSIS — G89.29 CHRONIC PAIN IN RIGHT SHOULDER: ICD-10-CM

## 2018-07-06 DIAGNOSIS — M54.5 CHRONIC LOW BACK PAIN, UNSPECIFIED BACK PAIN LATERALITY, WITH SCIATICA PRESENCE UNSPECIFIED: ICD-10-CM

## 2018-07-06 DIAGNOSIS — M25.511 CHRONIC PAIN IN RIGHT SHOULDER: ICD-10-CM

## 2018-07-06 DIAGNOSIS — G89.29 CHRONIC LOW BACK PAIN, UNSPECIFIED BACK PAIN LATERALITY, WITH SCIATICA PRESENCE UNSPECIFIED: ICD-10-CM

## 2018-07-06 PROCEDURE — 97110 THERAPEUTIC EXERCISES: CPT

## 2018-07-06 PROCEDURE — 97140 MANUAL THERAPY 1/> REGIONS: CPT

## 2018-07-06 NOTE — PROGRESS NOTES
Physical Therapy SOAP Note    Name: Dana Cook  [facility] Number: [id number]      Diagnosis:   Encounter Diagnoses   Name Primary?    Chronic pain in right shoulder     Chronic low back pain, unspecified back pain laterality, with sciatica presence unspecified      Physician: Villa Houston III, *  Treatment Orders: PT Eval and Treat    Past Medical History:   Diagnosis Date    Anxiety     Colon polyps     Depression     Dry eyes     Dry mouth     Dupuytren's contracture     Hypertension     left arm fracture     hairline    Murmur, heart     Osteopenia     PONV (postoperative nausea and vomiting)     with wisdom teeth    Sarcoidosis      Current Outpatient Prescriptions   Medication Sig    alendronate (FOSAMAX) 70 MG tablet TAKE 1 TABLET(70 MG) BY MOUTH EVERY 7 DAYS    ascorbic acid (VITAMIN C) 1000 MG tablet     aspirin (ECOTRIN) 325 MG EC tablet Take 1 tablet (325 mg total) by mouth every 12 (twelve) hours.    buPROPion (WELLBUTRIN XL) 150 MG TB24 tablet TAKE 1 TABLET BY MOUTH EVERY DAY    buPROPion (WELLBUTRIN XL) 300 MG 24 hr tablet TAKE 1 TABLET BY MOUTH EVERY DAY    chlorthalidone (HYGROTEN) 25 MG Tab TAKE 1 TABLET BY MOUTH EVERY DAY    cyanocobalamin, vitamin B-12, (VITAMIN B-12) 1,000 mcg Lozg     ferrous sulfate (IRON) 325 mg (65 mg iron) Tab Take by mouth. 1 Tablet Oral Every day    glucosamine-chondroitin 500-400 mg tablet Take 1 tablet by mouth daily as needed.     hydrOXYzine HCl (ATARAX) 10 MG Tab 10 mg as needed.     lidocaine (LIDODERM) 5 % Place 1 patch onto the skin once daily. Remove & Discard patch within 12 hours. Do not use more than 1 patch in 24 hours.    multivitamin with minerals (MULTIPLE VITAMIN-MINERALS) tablet Take by mouth. 1 Tablet Oral Every day    omega-3 fatty acids-vitamin E (FISH OIL) 1,000 mg Cap     oxyCODONE-acetaminophen (PERCOCET)  mg per tablet Take 1 tablet by mouth every 8 hours as needed for pain. Take stool softener with this  "medication.    promethazine (PHENERGAN) 25 MG tablet Take 1 tablet (25 mg total) by mouth every 6 (six) hours as needed for Nausea.    red yeast rice 600 mg Cap Take by mouth.    SUBHASH'S WORT/S.GINSGR (SUBHASH'S WORT-SIBER.GINSENG) 450-90 mg Tab     traMADol (ULTRAM) 50 mg tablet Take 1-2 tablets ( mg total) by mouth every 6 (six) hours as needed.    vitamin E 1000 UNIT capsule Take by mouth. 1 Capsule Oral Every day     No current facility-administered medications for this visit.      Review of patient's allergies indicates:   Allergen Reactions    Other Other (See Comments)     Allergy to tape.     Percocet [oxycodone-acetaminophen] Nausea Only       Precautions: Follow medium rotator cuff repair    Evaluation Date: 5/7/18  Visit # authorized: 5 of 11  Authorization period: 12/31/2018    Time In: 900a  Time Out:: 955a      Subjective     Patient states that her average pain has been 1-2/10     Objective     TREATMENT:  Dana received therapeutic exercises to address deficits in right shoulder AROM and strength for 40 minutes including:   Pulleys - 3' ea  Serratus punch - 2 x 10, 3#  Wand flexion - 2 x 10  SL ER - 3 x 10, 2#  Rows - 3 x 10, blue  Pull aparts - 2 x 10, orange  SHldr ER/IR walkout - 2 x 10, orange  Seated ball roll - 2 x 10, 3"  Bridge with abduction ring - 2 x 10, green  LTR - 10x, 3"  Sidesteps - 2 laps, green  Monster - 1 lap, green  Sidelying shoulder flexion - 2 x 10    Dana  received the following manual therapy techniques to address deficits in ROM and joint mobility x 15 min:    PROM affected shoulder all planes    Instructed pt. regarding: Proper technique with all exercises. Pt demonstrated good understanding of the education provided. Dana demonstrated good return demonstration of activities.      Assessment     The patient has decreased upper trap activation with AROM, given manual cues with rows for proper posture and updated HEP with W pulls    This is a 67 y.o. " female referred to outpatient physical therapy and presents with a medical diagnosis of s/p right shoulder rotator cuff repair on 3/22/18.      Medical necessity is demonstrated by the following IMPAIRMENTS/PROBLEM LIST:   1)Increase in pain level limiting function   2)Pain with reaching overhead   3)Pain with reaching behind head/back   4)Pain with carrying/lifting2   5)Lack of HEP    GOALS: Short Term Goals:  4 weeks  1.Patient will demonstrate 155 degrees affected shoulder flexion PROM  2.Patient will demonstrate 145 degrees affected shoulder abduction PROM   3.Patient will demonstrate 4/5 strength of affected shoulder flexion and abduction to increase ease with reaching for ADLs.    Long Term Goals: 16 weeks  1. .Patient will demonstrate 165 degrees affected shoulder flexion and abduction AROM  2.Increase strength to >/= 4+/5 in shoulder flexion, abduction, ER to increase tolerance for ADL and work activities.  3. Pt goal: To have no pain with reaching overhead    Plan     Progress to resisted flexion below shoulder    Feranndo Li, PT, DPT

## 2018-07-09 ENCOUNTER — CLINICAL SUPPORT (OUTPATIENT)
Dept: REHABILITATION | Facility: HOSPITAL | Age: 68
End: 2018-07-09
Payer: MEDICARE

## 2018-07-09 DIAGNOSIS — M25.511 CHRONIC PAIN IN RIGHT SHOULDER: ICD-10-CM

## 2018-07-09 DIAGNOSIS — M54.5 CHRONIC LOW BACK PAIN, UNSPECIFIED BACK PAIN LATERALITY, WITH SCIATICA PRESENCE UNSPECIFIED: ICD-10-CM

## 2018-07-09 DIAGNOSIS — G89.29 CHRONIC PAIN IN RIGHT SHOULDER: ICD-10-CM

## 2018-07-09 DIAGNOSIS — G89.29 CHRONIC LOW BACK PAIN, UNSPECIFIED BACK PAIN LATERALITY, WITH SCIATICA PRESENCE UNSPECIFIED: ICD-10-CM

## 2018-07-09 PROCEDURE — 97140 MANUAL THERAPY 1/> REGIONS: CPT

## 2018-07-09 PROCEDURE — 97110 THERAPEUTIC EXERCISES: CPT

## 2018-07-09 RX ORDER — MELOXICAM 15 MG/1
TABLET ORAL
Qty: 90 TABLET | Refills: 0 | Status: SHIPPED | OUTPATIENT
Start: 2018-07-09 | End: 2018-10-10 | Stop reason: SDUPTHER

## 2018-07-09 NOTE — PROGRESS NOTES
Physical Therapy SOAP Note    Name: Dana Cook  [facility] Number: [id number]      Diagnosis:   Encounter Diagnoses   Name Primary?    Chronic pain in right shoulder     Chronic low back pain, unspecified back pain laterality, with sciatica presence unspecified      Physician: Villa Houston III, *  Treatment Orders: PT Eval and Treat    Past Medical History:   Diagnosis Date    Anxiety     Colon polyps     Depression     Dry eyes     Dry mouth     Dupuytren's contracture     Hypertension     left arm fracture     hairline    Murmur, heart     Osteopenia     PONV (postoperative nausea and vomiting)     with wisdom teeth    Sarcoidosis      Current Outpatient Prescriptions   Medication Sig    alendronate (FOSAMAX) 70 MG tablet TAKE 1 TABLET(70 MG) BY MOUTH EVERY 7 DAYS    ascorbic acid (VITAMIN C) 1000 MG tablet     aspirin (ECOTRIN) 325 MG EC tablet Take 1 tablet (325 mg total) by mouth every 12 (twelve) hours.    buPROPion (WELLBUTRIN XL) 150 MG TB24 tablet TAKE 1 TABLET BY MOUTH EVERY DAY    buPROPion (WELLBUTRIN XL) 300 MG 24 hr tablet TAKE 1 TABLET BY MOUTH EVERY DAY    chlorthalidone (HYGROTEN) 25 MG Tab TAKE 1 TABLET BY MOUTH EVERY DAY    cyanocobalamin, vitamin B-12, (VITAMIN B-12) 1,000 mcg Lozg     ferrous sulfate (IRON) 325 mg (65 mg iron) Tab Take by mouth. 1 Tablet Oral Every day    glucosamine-chondroitin 500-400 mg tablet Take 1 tablet by mouth daily as needed.     hydrOXYzine HCl (ATARAX) 10 MG Tab 10 mg as needed.     lidocaine (LIDODERM) 5 % Place 1 patch onto the skin once daily. Remove & Discard patch within 12 hours. Do not use more than 1 patch in 24 hours.    meloxicam (MOBIC) 15 MG tablet TAKE 1 TABLET(15 MG) BY MOUTH EVERY DAY    multivitamin with minerals (MULTIPLE VITAMIN-MINERALS) tablet Take by mouth. 1 Tablet Oral Every day    omega-3 fatty acids-vitamin E (FISH OIL) 1,000 mg Cap     oxyCODONE-acetaminophen (PERCOCET)  mg per tablet Take 1 tablet by  "mouth every 8 hours as needed for pain. Take stool softener with this medication.    promethazine (PHENERGAN) 25 MG tablet Take 1 tablet (25 mg total) by mouth every 6 (six) hours as needed for Nausea.    red yeast rice 600 mg Cap Take by mouth.    SUBHASH'S WORT/S.GINSGR (SUBHASH'S WORT-SIBER.GINSENG) 450-90 mg Tab     traMADol (ULTRAM) 50 mg tablet Take 1-2 tablets ( mg total) by mouth every 6 (six) hours as needed.    vitamin E 1000 UNIT capsule Take by mouth. 1 Capsule Oral Every day     No current facility-administered medications for this visit.      Review of patient's allergies indicates:   Allergen Reactions    Other Other (See Comments)     Allergy to tape.     Percocet [oxycodone-acetaminophen] Nausea Only       Precautions: Follow medium rotator cuff repair    Evaluation Date: 5/7/18  Visit # authorized: 6 of 11  Authorization period: 12/31/2018    Time In: 900a  Time Out:: 955a      Subjective     Patient states that she has a lot less pain after starting to use a different bra    Objective     TREATMENT:  Dana received therapeutic exercises to address deficits in right shoulder AROM and strength for 40 minutes including:   Pulleys - 3' ea  Serratus punch - 2 x 10, 3#  Wand flexion - 2 x 10  SL ER - 3 x 10, 2#  Rows - 3 x 10, blue  Pull aparts - 2 x 10, orange  SHldr ER/IR walkout - 2 x 10, orange  Seated ball roll - 2 x 10, 3"  Bridge with abduction ring - 2 x 10, green  LTR - 10x, 3"  Sidesteps - 2 laps, green  Monster - 1 lap, green  Sidelying shoulder flexion - 2 x 10    Dana  received the following manual therapy techniques to address deficits in ROM and joint mobility x 15 min:    PROM affected shoulder all planes    Instructed pt. regarding: Proper technique with all exercises. Pt demonstrated good understanding of the education provided. Dana demonstrated good return demonstration of activities.      Assessment     The patient has improved serratus control with attempted closed " chain activity, improved abduction AROM    This is a 67 y.o. female referred to outpatient physical therapy and presents with a medical diagnosis of s/p right shoulder rotator cuff repair on 3/22/18.      Medical necessity is demonstrated by the following IMPAIRMENTS/PROBLEM LIST:   1)Increase in pain level limiting function   2)Pain with reaching overhead   3)Pain with reaching behind head/back   4)Pain with carrying/lifting2   5)Lack of HEP    GOALS: Short Term Goals:  4 weeks  1.Patient will demonstrate 155 degrees affected shoulder flexion PROM  2.Patient will demonstrate 145 degrees affected shoulder abduction PROM   3.Patient will demonstrate 4/5 strength of affected shoulder flexion and abduction to increase ease with reaching for ADLs.    Long Term Goals: 16 weeks  1. .Patient will demonstrate 165 degrees affected shoulder flexion and abduction AROM  2.Increase strength to >/= 4+/5 in shoulder flexion, abduction, ER to increase tolerance for ADL and work activities.  3. Pt goal: To have no pain with reaching overhead    Plan     Continue to address UE strengthening    Fernando Li, PT, DPT

## 2018-07-13 ENCOUNTER — CLINICAL SUPPORT (OUTPATIENT)
Dept: REHABILITATION | Facility: HOSPITAL | Age: 68
End: 2018-07-13
Payer: MEDICARE

## 2018-07-13 DIAGNOSIS — M25.511 CHRONIC PAIN IN RIGHT SHOULDER: ICD-10-CM

## 2018-07-13 DIAGNOSIS — G89.29 CHRONIC PAIN IN RIGHT SHOULDER: ICD-10-CM

## 2018-07-13 DIAGNOSIS — G89.29 CHRONIC LOW BACK PAIN, UNSPECIFIED BACK PAIN LATERALITY, WITH SCIATICA PRESENCE UNSPECIFIED: ICD-10-CM

## 2018-07-13 DIAGNOSIS — M54.5 CHRONIC LOW BACK PAIN, UNSPECIFIED BACK PAIN LATERALITY, WITH SCIATICA PRESENCE UNSPECIFIED: ICD-10-CM

## 2018-07-13 PROCEDURE — 97110 THERAPEUTIC EXERCISES: CPT

## 2018-07-13 PROCEDURE — G8980 MOBILITY D/C STATUS: HCPCS | Mod: CJ

## 2018-07-13 PROCEDURE — G8979 MOBILITY GOAL STATUS: HCPCS | Mod: CJ

## 2018-07-13 PROCEDURE — G8978 MOBILITY CURRENT STATUS: HCPCS | Mod: CJ

## 2018-07-13 PROCEDURE — 97140 MANUAL THERAPY 1/> REGIONS: CPT

## 2018-07-13 NOTE — PROGRESS NOTES
Physical Therapy Discharge Note    Name: Dana Cook  [facility] Number: [id number]      Diagnosis:   Encounter Diagnoses   Name Primary?    Chronic pain in right shoulder     Chronic low back pain, unspecified back pain laterality, with sciatica presence unspecified      Physician: Villa Houston III, *  Treatment Orders: PT Eval and Treat    Past Medical History:   Diagnosis Date    Anxiety     Colon polyps     Depression     Dry eyes     Dry mouth     Dupuytren's contracture     Hypertension     left arm fracture     hairline    Murmur, heart     Osteopenia     PONV (postoperative nausea and vomiting)     with wisdom teeth    Sarcoidosis      Current Outpatient Prescriptions   Medication Sig    alendronate (FOSAMAX) 70 MG tablet TAKE 1 TABLET(70 MG) BY MOUTH EVERY 7 DAYS    ascorbic acid (VITAMIN C) 1000 MG tablet     aspirin (ECOTRIN) 325 MG EC tablet Take 1 tablet (325 mg total) by mouth every 12 (twelve) hours.    buPROPion (WELLBUTRIN XL) 150 MG TB24 tablet TAKE 1 TABLET BY MOUTH EVERY DAY    buPROPion (WELLBUTRIN XL) 300 MG 24 hr tablet TAKE 1 TABLET BY MOUTH EVERY DAY    chlorthalidone (HYGROTEN) 25 MG Tab TAKE 1 TABLET BY MOUTH EVERY DAY    cyanocobalamin, vitamin B-12, (VITAMIN B-12) 1,000 mcg Lozg     ferrous sulfate (IRON) 325 mg (65 mg iron) Tab Take by mouth. 1 Tablet Oral Every day    glucosamine-chondroitin 500-400 mg tablet Take 1 tablet by mouth daily as needed.     hydrOXYzine HCl (ATARAX) 10 MG Tab 10 mg as needed.     lidocaine (LIDODERM) 5 % Place 1 patch onto the skin once daily. Remove & Discard patch within 12 hours. Do not use more than 1 patch in 24 hours.    meloxicam (MOBIC) 15 MG tablet TAKE 1 TABLET(15 MG) BY MOUTH EVERY DAY    multivitamin with minerals (MULTIPLE VITAMIN-MINERALS) tablet Take by mouth. 1 Tablet Oral Every day    omega-3 fatty acids-vitamin E (FISH OIL) 1,000 mg Cap     oxyCODONE-acetaminophen (PERCOCET)  mg per tablet Take 1 tablet by  "mouth every 8 hours as needed for pain. Take stool softener with this medication.    promethazine (PHENERGAN) 25 MG tablet Take 1 tablet (25 mg total) by mouth every 6 (six) hours as needed for Nausea.    red yeast rice 600 mg Cap Take by mouth.    SUBHASH'S WORT/S.GINSGR (SUBHASH'S WORT-SIBER.GINSENG) 450-90 mg Tab     traMADol (ULTRAM) 50 mg tablet Take 1-2 tablets ( mg total) by mouth every 6 (six) hours as needed.    vitamin E 1000 UNIT capsule Take by mouth. 1 Capsule Oral Every day     No current facility-administered medications for this visit.      Review of patient's allergies indicates:   Allergen Reactions    Other Other (See Comments)     Allergy to tape.     Percocet [oxycodone-acetaminophen] Nausea Only       Precautions: Follow medium rotator cuff repair    Evaluation Date: 5/7/18  Visit # authorized: 7 of 11  Authorization period: 12/31/2018    Time In: 900a  Time Out:: 955a      Subjective     Patient states that she feels about 85% better and is comfortable with her home exercises.    Objective     Range of Motion:   Shoulder Right (AROM/PROM) Left (AROM/PROM)   Flexion 156/169 degrees 171 degrees   Abduction 158/160 degrees 174 degrees   ER at 90 65/76 degrees 88 degrees   IR at 90 40/55 degrees 62 degrees     Strength:  Shoulder Right Left   Flexion 4+/5 5/5   Abduction 4+/5 5/5   ER 4/5 4+/5   IR 5/5 5/5     TREATMENT:  Dana received therapeutic exercises to address deficits in right shoulder AROM and strength for 40 minutes including:   Pulleys - 3' ea  Serratus punch - 2 x 10, 3#  Wand flexion - 2 x 10  SL ER - 3 x 10, 2#  Rows - 3 x 10, blue  Pull aparts - 2 x 10, orange  SHldr ER/IR walkout - 2 x 10, orange  Seated ball roll - 2 x 10, 3"  Bridge with abduction ring - 2 x 10, green  LTR - 10x, 3"  Sidesteps - 2 laps, green  Monster - 1 lap, green  Sidelying shoulder flexion - 2 x 10    Dana  received the following manual therapy techniques to address deficits in ROM and joint " mobility x 15 min:    PROM affected shoulder all planes    Instructed pt. regarding: Proper technique with all exercises. Pt demonstrated good understanding of the education provided. Dana demonstrated good return demonstration of activities.      Assessment     The patient demonstrates decreased discomfort in all planes of motion and is able to perform functional reaching without pain.  She still has limitation in overhead strength and reaching behind back.  She states understanding of HEP and will continue independently at this time.    This is a 67 y.o. female referred to outpatient physical therapy and presents with a medical diagnosis of s/p right shoulder rotator cuff repair on 3/22/18.      Medical necessity is demonstrated by the following IMPAIRMENTS/PROBLEM LIST:   1)Increase in pain level limiting function   2)Pain with reaching overhead   3)Pain with reaching behind head/back   4)Pain with carrying/lifting2   5)Lack of HEP    GOALS: Short Term Goals:  4 weeks  1.Patient will demonstrate 155 degrees affected shoulder flexion PROM - Achieved  2.Patient will demonstrate 145 degrees affected shoulder abduction PROM - Achieved  3.Patient will demonstrate 4/5 strength of affected shoulder flexion and abduction to increase ease with reaching for ADLs. - Achieved    Long Term Goals: 16 weeks  1. .Patient will demonstrate 165 degrees affected shoulder flexion and abduction AROM - Partailly Met  2.Increase strength to >/= 4+/5 in shoulder flexion, abduction, ER to increase tolerance for ADL and work activities. - Achieved  3. Pt goal: To have no pain with reaching overhead - Partially Met    Plan     Discharge to home with HEP.    Fernando Li, PT, DPT

## 2018-07-16 DIAGNOSIS — M81.0 OSTEOPOROSIS, UNSPECIFIED OSTEOPOROSIS TYPE, UNSPECIFIED PATHOLOGICAL FRACTURE PRESENCE: ICD-10-CM

## 2018-07-17 RX ORDER — ALENDRONATE SODIUM 70 MG/1
TABLET ORAL
Qty: 12 TABLET | Refills: 0 | Status: SHIPPED | OUTPATIENT
Start: 2018-07-17 | End: 2018-10-25 | Stop reason: SDUPTHER

## 2018-08-11 DIAGNOSIS — F32.A DEPRESSION, UNSPECIFIED DEPRESSION TYPE: ICD-10-CM

## 2018-08-11 RX ORDER — BUPROPION HYDROCHLORIDE 300 MG/1
TABLET ORAL
Qty: 30 TABLET | Refills: 5 | Status: SHIPPED | OUTPATIENT
Start: 2018-08-11 | End: 2019-09-16 | Stop reason: SDUPTHER

## 2018-08-13 ENCOUNTER — OFFICE VISIT (OUTPATIENT)
Dept: SPORTS MEDICINE | Facility: CLINIC | Age: 68
End: 2018-08-13
Payer: MEDICARE

## 2018-08-13 VITALS
DIASTOLIC BLOOD PRESSURE: 73 MMHG | HEART RATE: 57 BPM | SYSTOLIC BLOOD PRESSURE: 133 MMHG | BODY MASS INDEX: 30.99 KG/M2 | WEIGHT: 186 LBS | HEIGHT: 65 IN

## 2018-08-13 DIAGNOSIS — Z98.890 STATUS POST ROTATOR CUFF REPAIR: Primary | ICD-10-CM

## 2018-08-13 PROCEDURE — 99999 PR PBB SHADOW E&M-EST. PATIENT-LVL IV: CPT | Mod: PBBFAC,,, | Performed by: ORTHOPAEDIC SURGERY

## 2018-08-13 PROCEDURE — 99212 OFFICE O/P EST SF 10 MIN: CPT | Mod: S$PBB,,, | Performed by: ORTHOPAEDIC SURGERY

## 2018-08-13 PROCEDURE — 99214 OFFICE O/P EST MOD 30 MIN: CPT | Mod: PBBFAC,PO | Performed by: ORTHOPAEDIC SURGERY

## 2018-08-13 NOTE — PROGRESS NOTES
CC right shoulder pain    HISTORY OF PRESENT ILLNESS:   Pt is here today for followup of her shoulder arthroscopy.  she is doing well.  We have reviewed her findings and discussed plan of care and future treatment options.      Doing well, was just taking anti-inflammatories as needed  2 weeks ago she tripped over something on the floor and her shoulder hit the door frame  She notes that some exercises hurt since this incident  She had to go back to using heat and cold     She feels as though she is improving since her shoulder hit the door frame                                                   She did note that she was taking an anti-inflammatory and Aleve, we discussed that these should not take multiple anti-inflammatories at a time and that she can take one of the anti-inflammatories and she can take acetaminophen in addition for additional pain relief     DATE OF PROCEDURE: 03/22/2018  OPERATION:   right  1. Shoulder arthroscopic rotator cuff repair (20 x 15 mm, double row) (CPT 32998)   2. Shoulder arthroscopic biceps tenodesis (CPT 54327.52)  3. Shoulder arthroscopic subacromial decompression, bursectomy   4. Shoulder arthroscopic extensive debridement (anterior, posterior glenohumeral joint, subacromial space) (CPT 56041)  5. Shoulder arthroscopic-assisted arthrocentesis of viable structural human allograft tissue matrix ([employer]) (CPT 62227)           6. Shoulder arthroscopic microfracture (Crimson duvet technique) (CPT 96665)  7. Shoulder arthroscopic lysis of adhesions (CPT 72238)  8. Shoulder arthroscopic chondroplasty                   There were chondral lesions to:   Humeral head: 40 x 25 grade 3  Glenoid: 20 x 10 grade 3    Review of Systems   Constitution: Negative. Negative for chills, fever and night sweats.   HENT: Negative for congestion and headaches.    Eyes: Negative for blurred vision, left vision loss and right vision loss.   Cardiovascular: Negative for chest pain and syncope.    Respiratory: Negative for cough and shortness of breath.    Endocrine: Negative for polydipsia, polyphagia and polyuria.   Hematologic/Lymphatic: Negative for bleeding problem. Does not bruise/bleed easily.   Skin: Negative for dry skin, itching and rash.   Musculoskeletal: Negative for falls and muscle weakness.   Gastrointestinal: Negative for abdominal pain and bowel incontinence.   Genitourinary: Negative for bladder incontinence and nocturia.   Neurological: Negative for disturbances in coordination, loss of balance and seizures.   Psychiatric/Behavioral: Negative for depression. The patient does not have insomnia.    Allergic/Immunologic: Negative for hives and persistent infections.       PAST MEDICAL HISTORY:   Past Medical History:   Diagnosis Date    Anxiety     Colon polyps     Depression     Dry eyes     Dry mouth     Dupuytren's contracture     Hypertension     left arm fracture     hairline    Murmur, heart     Osteopenia     PONV (postoperative nausea and vomiting)     with wisdom teeth    Sarcoidosis      PAST SURGICAL HISTORY:   Past Surgical History:   Procedure Laterality Date    Laparoscopy for possible endometriosis   1985    WRIST SURGERY Left     Dupuytrens contracture     FAMILY HISTORY:   Family History   Problem Relation Age of Onset    Cancer Father         ? type    Leukemia Paternal Grandmother     Breast cancer Paternal Aunt         maternal aunt    Cervical cancer Paternal Aunt     Uterine cancer Sister     Basal cell carcinoma Sister         brother    Uterine cancer Maternal Grandmother     Breast cancer Maternal Aunt     Colon cancer Neg Hx     Ovarian cancer Neg Hx      SOCIAL HISTORY:   Social History     Socioeconomic History    Marital status:      Spouse name: Not on file    Number of children: 1    Years of education: Not on file    Highest education level: Not on file   Social Needs    Financial resource strain: Not on file    Food  insecurity - worry: Not on file    Food insecurity - inability: Not on file    Transportation needs - medical: Not on file    Transportation needs - non-medical: Not on file   Occupational History    Occupation: retired     Comment: Part time security work   Tobacco Use    Smoking status: Never Smoker    Smokeless tobacco: Never Used   Substance and Sexual Activity    Alcohol use: Yes     Alcohol/week: 3.0 - 3.6 oz     Types: 5 - 6 Standard drinks or equivalent per week     Comment: wine every night    Drug use: No    Sexual activity: Not Currently     Partners: Male   Other Topics Concern    Not on file   Social History Narrative    Not on file       MEDICATIONS:   Current Outpatient Medications:     alendronate (FOSAMAX) 70 MG tablet, TAKE 1 TABLET(70 MG) BY MOUTH EVERY 7 DAYS, Disp: 12 tablet, Rfl: 0    ascorbic acid (VITAMIN C) 1000 MG tablet, , Disp: , Rfl:     buPROPion (WELLBUTRIN XL) 150 MG TB24 tablet, TAKE 1 TABLET BY MOUTH EVERY DAY, Disp: 90 tablet, Rfl: 4    buPROPion (WELLBUTRIN XL) 300 MG 24 hr tablet, TAKE 1 TABLET BY MOUTH EVERY DAY, Disp: 30 tablet, Rfl: 5    chlorthalidone (HYGROTEN) 25 MG Tab, TAKE 1 TABLET BY MOUTH EVERY DAY, Disp: 90 tablet, Rfl: 0    cyanocobalamin, vitamin B-12, (VITAMIN B-12) 1,000 mcg Lozg, , Disp: , Rfl:     ferrous sulfate (IRON) 325 mg (65 mg iron) Tab, Take by mouth. 1 Tablet Oral Every day, Disp: , Rfl:     glucosamine-chondroitin 500-400 mg tablet, Take 1 tablet by mouth daily as needed. , Disp: , Rfl:     hydrOXYzine HCl (ATARAX) 10 MG Tab, 10 mg as needed. , Disp: , Rfl:     lidocaine (LIDODERM) 5 %, Place 1 patch onto the skin once daily. Remove & Discard patch within 12 hours. Do not use more than 1 patch in 24 hours., Disp: 10 patch, Rfl: 0    meloxicam (MOBIC) 15 MG tablet, TAKE 1 TABLET(15 MG) BY MOUTH EVERY DAY, Disp: 90 tablet, Rfl: 0    multivitamin with minerals (MULTIPLE VITAMIN-MINERALS) tablet, Take by mouth. 1 Tablet Oral Every  "day, Disp: , Rfl:     omega-3 fatty acids-vitamin E (FISH OIL) 1,000 mg Cap, , Disp: , Rfl:     oxyCODONE-acetaminophen (PERCOCET)  mg per tablet, Take 1 tablet by mouth every 8 hours as needed for pain. Take stool softener with this medication., Disp: 15 tablet, Rfl: 0    promethazine (PHENERGAN) 25 MG tablet, Take 1 tablet (25 mg total) by mouth every 6 (six) hours as needed for Nausea., Disp: 16 tablet, Rfl: 0    red yeast rice 600 mg Cap, Take by mouth., Disp: , Rfl:     SUBHASH'S WORT/S.GINSGR (SUBHASH'S WORT-SIBER.GINSENG) 450-90 mg Tab, , Disp: , Rfl:     traMADol (ULTRAM) 50 mg tablet, Take 1-2 tablets ( mg total) by mouth every 6 (six) hours as needed., Disp: 30 tablet, Rfl: 0    vitamin E 1000 UNIT capsule, Take by mouth. 1 Capsule Oral Every day, Disp: , Rfl:     aspirin (ECOTRIN) 325 MG EC tablet, Take 1 tablet (325 mg total) by mouth every 12 (twelve) hours., Disp: 42 tablet, Rfl: 0  ALLERGIES:   Review of patient's allergies indicates:   Allergen Reactions    Other Other (See Comments)     Allergy to tape.     Percocet [oxycodone-acetaminophen] Nausea Only       VITAL SIGNS: /73   Pulse (!) 57   Ht 5' 5" (1.651 m)   Wt 84.4 kg (186 lb)   BMI 30.95 kg/m²        PHYSICAL EXAMINATION:     Incision sites healed well  No evidence of any erythema, infection or induration  elbow Range of motion full   2+ DP pulse  No swelling      ER 60  IR T12    Strength:  Scaption at 0, 5-/5  Scaption at 30, 5/5           Tenderness: + pec minor                        Continued shoulder hiking but improved                                                      ASSESSMENT:                                                                                                                                               1. Status post above, doing well.                                                                                                                               PLAN:             "                                                                                                                                         1. Continue HEP  2. Emphasized scapular function.  3. I have discussed return to activity in detail.  4. she will see us back in 8 weeks with [place] form                                      5. All questions were answered and she should contact us if she  has any questions or concerns in the interim.

## 2018-08-20 RX ORDER — CHLORTHALIDONE 25 MG/1
TABLET ORAL
Qty: 90 TABLET | Refills: 1 | Status: SHIPPED | OUTPATIENT
Start: 2018-08-20 | End: 2019-06-03 | Stop reason: SDUPTHER

## 2018-09-13 RX ORDER — BUPROPION HYDROCHLORIDE 150 MG/1
TABLET ORAL
Qty: 90 TABLET | Refills: 0 | Status: SHIPPED | OUTPATIENT
Start: 2018-09-13 | End: 2019-01-23

## 2018-10-10 RX ORDER — MELOXICAM 15 MG/1
TABLET ORAL
Qty: 90 TABLET | Refills: 0 | Status: SHIPPED | OUTPATIENT
Start: 2018-10-10 | End: 2019-08-19

## 2018-10-22 ENCOUNTER — PATIENT MESSAGE (OUTPATIENT)
Dept: ENDOCRINOLOGY | Facility: CLINIC | Age: 68
End: 2018-10-22

## 2018-10-25 DIAGNOSIS — M81.0 OSTEOPOROSIS, UNSPECIFIED OSTEOPOROSIS TYPE, UNSPECIFIED PATHOLOGICAL FRACTURE PRESENCE: ICD-10-CM

## 2018-10-25 RX ORDER — ALENDRONATE SODIUM 70 MG/1
TABLET ORAL
Qty: 12 TABLET | Refills: 0 | Status: SHIPPED | OUTPATIENT
Start: 2018-10-25 | End: 2019-02-11 | Stop reason: SDUPTHER

## 2018-10-28 ENCOUNTER — PATIENT MESSAGE (OUTPATIENT)
Dept: ADMINISTRATIVE | Facility: OTHER | Age: 68
End: 2018-10-28

## 2018-10-29 ENCOUNTER — OFFICE VISIT (OUTPATIENT)
Dept: SPORTS MEDICINE | Facility: CLINIC | Age: 68
End: 2018-10-29
Payer: MEDICARE

## 2018-10-29 VITALS
HEART RATE: 67 BPM | DIASTOLIC BLOOD PRESSURE: 85 MMHG | SYSTOLIC BLOOD PRESSURE: 142 MMHG | HEIGHT: 65 IN | BODY MASS INDEX: 30.99 KG/M2 | WEIGHT: 186 LBS

## 2018-10-29 DIAGNOSIS — M25.511 ACUTE PAIN OF RIGHT SHOULDER: Primary | ICD-10-CM

## 2018-10-29 PROCEDURE — 99215 OFFICE O/P EST HI 40 MIN: CPT | Mod: PBBFAC,PO | Performed by: ORTHOPAEDIC SURGERY

## 2018-10-29 PROCEDURE — 99999 PR PBB SHADOW E&M-EST. PATIENT-LVL V: CPT | Mod: PBBFAC,,, | Performed by: ORTHOPAEDIC SURGERY

## 2018-10-29 PROCEDURE — 99214 OFFICE O/P EST MOD 30 MIN: CPT | Mod: S$PBB,,, | Performed by: ORTHOPAEDIC SURGERY

## 2018-10-29 NOTE — PROGRESS NOTES
CC right shoulder pain    HISTORY OF PRESENT ILLNESS:   Pt is here today for followup of her shoulder arthroscopy.  she is doing well.  We have reviewed her findings and discussed plan of care and future treatment options.      Doing well, was just taking anti-inflammatories as needed  2 weeks ago she tripped over something on the floor and her shoulder hit the door frame  She notes that some exercises hurt since this incident  She had to go back to using heat and cold     She feels as though she is improving since her shoulder hit the door frame                                                   She did note that she was taking an anti-inflammatory and Aleve, we discussed that these should not take multiple anti-inflammatories at a time and that she can take one of the anti-inflammatories and she can take acetaminophen in addition for additional pain relief     preop 9/10    2/10 now    80% better Vs preop    Did well on cruise recently    Stiff after sitting at movies      DATE OF PROCEDURE: 03/22/2018  OPERATION:   right  1. Shoulder arthroscopic rotator cuff repair (20 x 15 mm, double row) (CPT 33216)   2. Shoulder arthroscopic biceps tenodesis (CPT 21757.52)  3. Shoulder arthroscopic subacromial decompression, bursectomy   4. Shoulder arthroscopic extensive debridement (anterior, posterior glenohumeral joint, subacromial space) (CPT 66245)  5. Shoulder arthroscopic-assisted arthrocentesis of viable structural human allograft tissue matrix (BioDRestore) (CPT 97357)           6. Shoulder arthroscopic microfracture (Crimson duvet technique) (CPT 66591)  7. Shoulder arthroscopic lysis of adhesions (CPT 79255)  8. Shoulder arthroscopic chondroplasty                   There were chondral lesions to:   Humeral head: 40 x 25 grade 3  Glenoid: 20 x 10 grade 3    Review of Systems   Constitution: Negative. Negative for chills, fever and night sweats.   HENT: Negative for congestion and headaches.    Eyes: Negative for  blurred vision, left vision loss and right vision loss.   Cardiovascular: Negative for chest pain and syncope.   Respiratory: Negative for cough and shortness of breath.    Endocrine: Negative for polydipsia, polyphagia and polyuria.   Hematologic/Lymphatic: Negative for bleeding problem. Does not bruise/bleed easily.   Skin: Negative for dry skin, itching and rash.   Musculoskeletal: Negative for falls and muscle weakness.   Gastrointestinal: Negative for abdominal pain and bowel incontinence.   Genitourinary: Negative for bladder incontinence and nocturia.   Neurological: Negative for disturbances in coordination, loss of balance and seizures.   Psychiatric/Behavioral: Negative for depression. The patient does not have insomnia.    Allergic/Immunologic: Negative for hives and persistent infections.       PAST MEDICAL HISTORY:   Past Medical History:   Diagnosis Date    Anxiety     Colon polyps     Depression     Dry eyes     Dry mouth     Dupuytren's contracture     Hypertension     left arm fracture     hairline    Murmur, heart     Osteopenia     PONV (postoperative nausea and vomiting)     with wisdom teeth    Sarcoidosis      PAST SURGICAL HISTORY:   Past Surgical History:   Procedure Laterality Date    ARTHROSCOPY-SHOULDER WITH SUBACROMIAL DECOMPRESSION Right 3/22/2018    [facility]    BICEP TENODESIS ARTHROSCOPIC (TENDON FIXATION) Right 3/22/2018    [facility]    CHONDROPLASTY-SHOULDER Right 3/22/2018    [facility]    DEBRIDEMENT-SHOULDER-ARTHROSCOPIC W/ BURSECTOMY Right 3/22/2018    [facility]    INJECTION-STEROID arthroscopic assisted BioD injection to the right shoudler Right 3/22/2018    [facility]    Laparoscopy for possible endometriosis   1985    [id number] Right 3/22/2018    [facility]     MICROFRACTURE-ARTHROSCOPIC Right 3/22/2018    [facility]    REPAIR-ROTATOR CUFF-ARTHROSCOPIC Right 3/22/2018    [facility]    WRIST SURGERY Left     Dupuytrens contracture     FAMILY HISTORY:   Family History   Problem Relation Age of Onset    Cancer Father         ? type    Leukemia Paternal Grandmother     Breast cancer Paternal Aunt         maternal aunt    Cervical cancer Paternal Aunt     Uterine cancer Sister     Basal cell carcinoma Sister         brother    Uterine cancer Maternal Grandmother     Breast cancer Maternal Aunt     Colon cancer Neg Hx     Ovarian cancer Neg Hx      SOCIAL HISTORY:   Social History     Socioeconomic History    Marital status:      Spouse name: Not on file    Number of children: 1    Years of education: Not on file    Highest education level: Not on file   Social Needs    Financial resource strain: Not on file    Food insecurity - worry: Not on file    Food insecurity - inability: Not on file    Transportation needs - medical: Not on file    Transportation needs - non-medical: Not on file   Occupational History    Occupation: retired     Comment: Part time security work   Tobacco Use    Smoking status: Never Smoker    Smokeless tobacco: Never Used   Substance and Sexual Activity    Alcohol use: Yes     Alcohol/week: 3.0 - 3.6 oz     Types: 5 - 6 Standard drinks or equivalent per week     Comment: wine every night    Drug use: No    Sexual activity: Not Currently     Partners: Male   Other Topics Concern    Not on file   Social History Narrative    Not on file       MEDICATIONS:   Current Outpatient Medications:     alendronate (FOSAMAX) 70 MG tablet, TAKE 1 TABLET(70 MG) BY MOUTH EVERY 7 DAYS, Disp: 12 tablet, Rfl: 0    ascorbic acid (VITAMIN C) 1000 MG tablet, , Disp: , Rfl:     buPROPion (WELLBUTRIN XL) 150 MG TB24 tablet, TAKE 1 TABLET BY MOUTH EVERY DAY, Disp: 90 tablet, Rfl: 0     buPROPion (WELLBUTRIN XL) 300 MG 24 hr tablet, TAKE 1 TABLET BY MOUTH EVERY DAY, Disp: 30 tablet, Rfl: 5    chlorthalidone (HYGROTEN) 25 MG Tab, TAKE 1 TABLET BY MOUTH EVERY DAY, Disp: 90 tablet, Rfl: 1    cyanocobalamin, vitamin B-12, (VITAMIN B-12) 1,000 mcg Lozg, , Disp: , Rfl:     ferrous sulfate (IRON) 325 mg (65 mg iron) Tab, Take by mouth. 1 Tablet Oral Every day, Disp: , Rfl:     glucosamine-chondroitin 500-400 mg tablet, Take 1 tablet by mouth daily as needed. , Disp: , Rfl:     hydrOXYzine HCl (ATARAX) 10 MG Tab, 10 mg as needed. , Disp: , Rfl:     lidocaine (LIDODERM) 5 %, Place 1 patch onto the skin once daily. Remove & Discard patch within 12 hours. Do not use more than 1 patch in 24 hours., Disp: 10 patch, Rfl: 0    meloxicam (MOBIC) 15 MG tablet, TAKE 1 TABLET(15 MG) BY MOUTH EVERY DAY, Disp: 90 tablet, Rfl: 0    multivitamin with minerals (MULTIPLE VITAMIN-MINERALS) tablet, Take by mouth. 1 Tablet Oral Every day, Disp: , Rfl:     omega-3 fatty acids-vitamin E (FISH OIL) 1,000 mg Cap, , Disp: , Rfl:     oxyCODONE-acetaminophen (PERCOCET)  mg per tablet, Take 1 tablet by mouth every 8 hours as needed for pain. Take stool softener with this medication., Disp: 15 tablet, Rfl: 0    promethazine (PHENERGAN) 25 MG tablet, Take 1 tablet (25 mg total) by mouth every 6 (six) hours as needed for Nausea., Disp: 16 tablet, Rfl: 0    red yeast rice 600 mg Cap, Take by mouth., Disp: , Rfl:     SUBHASH'S WORT/S.GINSGR (SUBHASH'S WORT-SIBER.GINSENG) 450-90 mg Tab, , Disp: , Rfl:     traMADol (ULTRAM) 50 mg tablet, Take 1-2 tablets ( mg total) by mouth every 6 (six) hours as needed., Disp: 30 tablet, Rfl: 0    vitamin E 1000 UNIT capsule, Take by mouth. 1 Capsule Oral Every day, Disp: , Rfl:     aspirin (ECOTRIN) 325 MG EC tablet, Take 1 tablet (325 mg total) by mouth every 12 (twelve) hours., Disp: 42 tablet, Rfl: 0  ALLERGIES:   Review of patient's allergies indicates:   Allergen  "Reactions    Other Other (See Comments)     Allergy to tape.     Percocet [oxycodone-acetaminophen] Nausea Only       VITAL SIGNS: BP (!) 142/85   Pulse 67   Ht 5' 5" (1.651 m)   Wt 84.4 kg (186 lb)   BMI 30.95 kg/m²        PHYSICAL EXAMINATION:     Incision sites healed well  No evidence of any erythema, infection or induration  elbow Range of motion full   2+ DP pulse  No swelling      ER 60  IR T12    Strength:  Scaption at 0, 5/5  Scaption at 30, 5/5           Tenderness: + pec minor                        Continued shoulder hiking but improved                                                      ASSESSMENT:                                                                                                                                               1. Status post above, doing well.                                                                                                                               PLAN:                                                                                                                                                     1. Continue HEP, PT scap with Fernando  2. Emphasized scapular function.  3. I have discussed return to activity in detail.  4. she will see us back in 12 weeks with [place] form                                      5. All questions were answered and she should contact us if she  has any questions or concerns in the interim.                                                            "

## 2018-11-07 ENCOUNTER — CLINICAL SUPPORT (OUTPATIENT)
Dept: REHABILITATION | Facility: HOSPITAL | Age: 68
End: 2018-11-07
Attending: ORTHOPAEDIC SURGERY
Payer: MEDICARE

## 2018-11-07 DIAGNOSIS — M25.511 CHRONIC PAIN IN RIGHT SHOULDER: ICD-10-CM

## 2018-11-07 DIAGNOSIS — G89.29 CHRONIC LOW BACK PAIN, UNSPECIFIED BACK PAIN LATERALITY, WITH SCIATICA PRESENCE UNSPECIFIED: ICD-10-CM

## 2018-11-07 DIAGNOSIS — M54.5 CHRONIC LOW BACK PAIN, UNSPECIFIED BACK PAIN LATERALITY, WITH SCIATICA PRESENCE UNSPECIFIED: ICD-10-CM

## 2018-11-07 DIAGNOSIS — G89.29 CHRONIC PAIN IN RIGHT SHOULDER: ICD-10-CM

## 2018-11-07 PROCEDURE — G8979 MOBILITY GOAL STATUS: HCPCS | Mod: CJ

## 2018-11-07 PROCEDURE — 97110 THERAPEUTIC EXERCISES: CPT

## 2018-11-07 PROCEDURE — 97161 PT EVAL LOW COMPLEX 20 MIN: CPT

## 2018-11-07 PROCEDURE — G8978 MOBILITY CURRENT STATUS: HCPCS | Mod: CK

## 2018-11-08 NOTE — PLAN OF CARE
[facility]  Physical Therapy Initial Evaluation    Name: Dana Cook  Clinic Number: [id number]    Therapy Diagnosis:   Encounter Diagnoses   Name Primary?    Chronic pain in right shoulder     Chronic low back pain, unspecified back pain laterality, with sciatica presence unspecified      Physician: Shama Denise MD    Physician Orders: PT Eval and Treat  Medical Diagnosis: Acute right shoulder pain  Evaluation Date: 11/7/2018  Authorization Period Expiration: 12/31/2018  Plan of Care Certification Period: 12/31/2018  Visit # / Visits authorized: 1/3  Date of Surgery: March 2018  Precautions: Standard    Time In: 1100a  Time Out: 1150a  Total Billable Time: 50 minutes    Subjective     Date of onset: Insidious  History of current condition - Dana reports: that she feels about 90% better since her surgery but still gets some pain and discomfort with reaching out to the side, taking a shirt off, and reaching behind her back.  She states that she feels some weakness with carrying heavy items.     Past Medical History:   Diagnosis Date    Anxiety     Colon polyps     Depression     Dry eyes     Dry mouth     Dupuytren's contracture     Hypertension     left arm fracture     hairline    Murmur, heart     Osteopenia     PONV (postoperative nausea and vomiting)     with wisdom teeth    Sarcoidosis      Dana Cook  has a past surgical history that includes Wrist surgery (Left); Laparoscopy for possible endometriosis  (1985); REPAIR-ROTATOR CUFF-ARTHROSCOPIC (Right, 3/22/2018); DEBRIDEMENT-SHOULDER-ARTHROSCOPIC W/ BURSECTOMY (Right, 3/22/2018); [id number] (Right, 3/22/2018); MICROFRACTURE-ARTHROSCOPIC (Right, 3/22/2018); INJECTION-STEROID arthroscopic assisted BioD injection to the right shoudler (Right, 3/22/2018); CHONDROPLASTY-SHOULDER (Right, 3/22/2018); ARTHROSCOPY-SHOULDER WITH SUBACROMIAL DECOMPRESSION (Right, 3/22/2018); and BICEP TENODESIS ARTHROSCOPIC  (TENDON FIXATION) (Right, 3/22/2018).    Dana has a current medication list which includes the following prescription(s): alendronate, ascorbic acid (vitamin c), aspirin, bupropion, bupropion, chlorthalidone, cyanocobalamin (vitamin b-12), ferrous sulfate, glucosamine-chondroitin, hydroxyzine hcl, lidocaine, meloxicam, multivitamin with minerals, omega-3 fatty acids-vitamin e, oxycodone-acetaminophen, promethazine, red yeast rice, rachele's wort-siber.ginseng, tramadol, and vitamin e.    Review of patient's allergies indicates:   Allergen Reactions    Other Other (See Comments)     Allergy to tape.     Percocet [oxycodone-acetaminophen] Nausea Only        Prior Therapy: Yes, for same diagnosis  Social History: Lives at home independently  Occupation: Retired  Prior Level of Function: Functional ROM in all planes  Current Level of Function: Limited with abduction and lifting    Pain:  Current 2/10, worst 9/10, best 0/10   Location: right shoulder   Description: Aching and Deep    Pts goals: To reach behind her back    Objective     Scapular alignment:  Right scapula abducted    Range of Motion:   Shoulder Right (AROM/PROM) Left (AROM/PROM)   Flexion 157 degrees 161 degrees   Abduction 158 degrees 160 degrees   ER at 90 82 degrees 88 degrees   IR at 90 61 degrees 67 degrees     Strength:  Shoulder Right Left   Flexion 4+/5 5/5   Abduction 5/5 5/5   ER 5/5 5/5   IR 5/5 5/5       Special Tests:  AC Joint Right Left   AC Joint Compression Test - -   Empty Can Test - -   Drop Arm test - -   Subscaputlaris Lift Off - -   Clunk test - -   Hawkin's Kenndy - -   Neer's Test - -   Speed's test - -   Anterior Apprehension test - -       CMS Impairment/Limitation/Restriction for FOTO Shoulder Survey    Therapist reviewed FOTO scores for Dana Cook on 11/7/2018.   FOTO documents entered into [employer] - see Media section.    Limitation Score: 41%  Category: Mobility    Current : CK = at least 40% but < 60% impaired, limited  or restricted  Goal: CJ = at least 20% but < 40% impaired, limited or restricted  Discharge:          TREATMENT     Treatment Time In: 1115a  Treatment Time Out: 1145a  Total Treatment time separate from Evaluation time:30    Dana received therapeutic exercises to develop strength, endurance, ROM and posture for 30 minutes including:  Prone mid traps -2  X 10  Sidelying flexion - 2 x 10  Supine serratus - 2 x 10  ER step out - 2 x 10, green    Dnaa received the following manual therapy techniques for 0 minutes including:    Home Exercises and Patient Education Provided    Education provided re: therapeutic diagnosis and plan of care    Written Home Exercises Provided: Yes  Exercises were reviewed and Dana was able to demonstrate them prior to the end of the session.   Pt received a written copy of exercises to perform at home. Dana demonstrated good  understanding of the education provided.     See EMR under patient instructions for exercises given.     Assessment     Dana is a 67 y.o. female referred to outpatient Physical Therapy with a medical diagnosis of acute right shoulder pain after rotator cuff repair in March 2018. Pt presents with objective deficits in right shoulder AROM and strength that limits functional ability to reach behind back and lift overhead.    Pt prognosis is Excellent.   Pt will benefit from skilled outpatient Physical Therapy to address the deficits stated above and in the chart below, provide pt/family education, and to maximize pt's level of independence.     Plan of care discussed with patient: Yes  Pt's spiritual, cultural and educational needs considered and patient is agreeable to the plan of care and goals as stated below:     Anticipated Barriers for therapy: None    Medical Necessity is demonstrated by the following  History  Co-morbidities and personal factors that may impact the plan of care Co-morbidities:   None    Personal Factors:   no deficits     low    Examination  Body Structures and Functions, activity limitations and participation restrictions that may impact the plan of care Body Regions:   upper extremities    Body Systems:    ROM  strength  gross coordinated movement    Participation Restrictions:   None    Activity limitations:   Learning and applying knowledge  no deficits    General Tasks and Commands  no deficits    Communication  no deficits    Mobility  lifting and carrying objects    Self care  no deficits    Domestic Life  no deficits    Interactions/Relationships  no deficits    Life Areas  no deficits    Community and Social Life  no deficits         low   Clinical Presentation stable and uncomplicated low   Decision Making/ Complexity Score: low       Goals:  Short Term Goals:  4 weeks  1.Patient will demonstrate 165 degrees affected shoulder flexion AROM to demonstrate progression to functional AROM.  2.Patient will demonstrate 165 degrees affected shoulder abduction AROM to demonstrate progression to functional AROM.   3.Patient will demonstrate 5/5 strength of affected shoulder flexion to increase ease with lifting overhead.    Long Term Goals: 12 weeks  1. .Patient will demonstrate 65 degrees affected shoulder flexion and abduction AROM to demonstrate progression to functional AROM  2.Increase strength to >/= 4+/5 in all planes of shoulder motion to increase tolerance for ADL and work activities.  3. Pt goal: To reach behind back    Plan   Certification Period/Plan of care expiration: 11/7/2018 to 3/18/2019.    Outpatient Physical Therapy 2 times weekly for 16 weeks to include the following interventions: manual therapy as needed, therapeutic exercises to address functional deficits, modalities prn, wound care prn, dry needling prn, treatment by a skilled PTA at times.    Fernando Li, PT

## 2018-11-13 ENCOUNTER — TELEPHONE (OUTPATIENT)
Dept: OBSTETRICS AND GYNECOLOGY | Facility: CLINIC | Age: 68
End: 2018-11-13

## 2018-11-13 DIAGNOSIS — Z12.39 BREAST CANCER SCREENING: Primary | ICD-10-CM

## 2018-11-13 NOTE — TELEPHONE ENCOUNTER
----- Message from Lizet Rahman sent at 11/13/2018  8:40 AM CST -----  Name of Who is Calling: JASON JACK [[id number]]      What is the request in detail: Pt needs an order for a mammogram      Can the clinic reply by [facility]:    No       [facility] [phone]      Done .

## 2018-11-21 ENCOUNTER — CLINICAL SUPPORT (OUTPATIENT)
Dept: REHABILITATION | Facility: HOSPITAL | Age: 68
End: 2018-11-21
Attending: ORTHOPAEDIC SURGERY
Payer: MEDICARE

## 2018-11-21 DIAGNOSIS — M25.511 CHRONIC PAIN IN RIGHT SHOULDER: ICD-10-CM

## 2018-11-21 DIAGNOSIS — M54.5 CHRONIC LOW BACK PAIN, UNSPECIFIED BACK PAIN LATERALITY, WITH SCIATICA PRESENCE UNSPECIFIED: ICD-10-CM

## 2018-11-21 DIAGNOSIS — G89.29 CHRONIC PAIN IN RIGHT SHOULDER: ICD-10-CM

## 2018-11-21 DIAGNOSIS — G89.29 CHRONIC LOW BACK PAIN, UNSPECIFIED BACK PAIN LATERALITY, WITH SCIATICA PRESENCE UNSPECIFIED: ICD-10-CM

## 2018-11-21 PROCEDURE — 97140 MANUAL THERAPY 1/> REGIONS: CPT

## 2018-11-21 NOTE — PROGRESS NOTES
Physical Therapy Daily Treatment Note     Name: Dana POLO [facility] Number: [id number]    Therapy Diagnosis:   Encounter Diagnoses   Name Primary?    Chronic pain in right shoulder     Chronic low back pain, unspecified back pain laterality, with sciatica presence unspecified      Physician: Shama Denise MD  Physician Orders: PT Eval and Treat  Medical Diagnosis: Acute right shoulder pain  Evaluation Date: 11/7/2018  Authorization Period Expiration: 12/31/2018  Plan of Care Certification Period: 12/31/2018  Date of Surgery: March 2018  Precautions: Standard    Visit Date: 11/21/2018  Visit # / Visits authorized: 2/3    Time In: 900a  Time Out: 1000a  Total Billable Time: 60 minutes    Subjective      Pt reports: she was compliant with home exercise program given last session.   Response to previous treatment: Patient states that she is feeling continued pain with reaching and her exercises    Pain: 3/10  Location: right shoulder      Objective     Dana received therapeutic exercises to develop strength, endurance, ROM and posture for 0 minutes including:    Dana received the following manual therapy techniques for 25 minutes, including:  PROM right shoulder in all planes    Home Exercises Provided and Patient Education Provided     Education provided:   Continue current HEP    Written Home Exercises Provided: Continue with current HEP  Exercises were reviewed and Dana was able to demonstrate them prior to the end of the session.      Pt received a written copy of exercises to perform at home.   See EMR under patient instructions for exercises given.     Dana demonstrated good  understanding of the education provided.     Assessment     The patient was given new inferior joint mobilization with prolonged propping and told to discontinue exercises for 1 week prior to follow up    Dana is progressing well towards her goals.   Pt prognosis is Excellent.     Pt will continue to benefit  from skilled outpatient physical therapy to address the deficits listed in the problem list box on initial evaluation, provide pt/family education and to maximize pt's level of independence in the home and community environment.     Pt's spiritual, cultural and educational needs considered and pt agreeable to plan of care and goals.    Anticipated barriers to physical therapy: None    Goals:   Short Term Goals:  4 weeks  1.Patient will demonstrate 165 degrees affected shoulder flexion AROM to demonstrate progression to functional AROM.  2.Patient will demonstrate 165 degrees affected shoulder abduction AROM to demonstrate progression to functional AROM.   3.Patient will demonstrate 5/5 strength of affected shoulder flexion to increase ease with lifting overhead.     Long Term Goals: 12 weeks  1. .Patient will demonstrate 65 degrees affected shoulder flexion and abduction AROM to demonstrate progression to functional AROM  2.Increase strength to >/= 4+/5 in all planes of shoulder motion to increase tolerance for ADL and work activities.  3. Pt goal: To reach behind back    Plan     Progress shoulder ROM and add prolonged abduction stretch    Fernando Li, PT

## 2018-11-30 ENCOUNTER — CLINICAL SUPPORT (OUTPATIENT)
Dept: REHABILITATION | Facility: HOSPITAL | Age: 68
End: 2018-11-30
Attending: ORTHOPAEDIC SURGERY
Payer: MEDICARE

## 2018-11-30 DIAGNOSIS — G89.29 CHRONIC PAIN IN RIGHT SHOULDER: ICD-10-CM

## 2018-11-30 DIAGNOSIS — M25.511 CHRONIC PAIN IN RIGHT SHOULDER: ICD-10-CM

## 2018-11-30 DIAGNOSIS — M54.5 CHRONIC LOW BACK PAIN, UNSPECIFIED BACK PAIN LATERALITY, WITH SCIATICA PRESENCE UNSPECIFIED: ICD-10-CM

## 2018-11-30 DIAGNOSIS — G89.29 CHRONIC LOW BACK PAIN, UNSPECIFIED BACK PAIN LATERALITY, WITH SCIATICA PRESENCE UNSPECIFIED: ICD-10-CM

## 2018-11-30 PROCEDURE — 97140 MANUAL THERAPY 1/> REGIONS: CPT

## 2018-11-30 NOTE — PROGRESS NOTES
Physical Therapy Daily Treatment Note     Name: Dana POLO [facility] Number: [id number]    Therapy Diagnosis:   Encounter Diagnoses   Name Primary?    Chronic pain in right shoulder     Chronic low back pain, unspecified back pain laterality, with sciatica presence unspecified      Physician: Shama Denise MD  Physician Orders: PT Eval and Treat  Medical Diagnosis: Acute right shoulder pain  Evaluation Date: 11/7/2018  Authorization Period Expiration: 12/31/2018  Plan of Care Certification Period: 12/31/2018  Date of Surgery: March 2018  Precautions: Standard    Visit Date: 11/30/2018  Visit # / Visits authorized: 2/3    Time In: 700a  Time Out: 730a  Total Billable Time: 60 minutes    Subjective      Pt reports: she was compliant with home exercise program given last session.   Response to previous treatment: Patient states that she is feeling a lot better and no pain some days    Pain: 3/10  Location: right shoulder      Objective     Dana received therapeutic exercises to develop strength, endurance, ROM and posture for 0 minutes including:    Dana received the following manual therapy techniques for 25 minutes, including:  PROM right shoulder in all planes    Home Exercises Provided and Patient Education Provided     Education provided:   Continue current HEP    Written Home Exercises Provided: Continue with current HEP  Exercises were reviewed and Dana was able to demonstrate them prior to the end of the session.      Pt received a written copy of exercises to perform at home.   See EMR under patient instructions for exercises given.     Dana demonstrated good  understanding of the education provided.     Assessment     The patient is improving IR AROM and is able to perform AROM overhead with less pain    Dana is progressing well towards her goals.   Pt prognosis is Excellent.     Pt will continue to benefit from skilled outpatient physical therapy to address the deficits listed  in the problem list box on initial evaluation, provide pt/family education and to maximize pt's level of independence in the home and community environment.     Pt's spiritual, cultural and educational needs considered and pt agreeable to plan of care and goals.    Anticipated barriers to physical therapy: None    Goals:   Short Term Goals:  4 weeks  1.Patient will demonstrate 165 degrees affected shoulder flexion AROM to demonstrate progression to functional AROM.  2.Patient will demonstrate 165 degrees affected shoulder abduction AROM to demonstrate progression to functional AROM.   3.Patient will demonstrate 5/5 strength of affected shoulder flexion to increase ease with lifting overhead.     Long Term Goals: 12 weeks  1. .Patient will demonstrate 65 degrees affected shoulder flexion and abduction AROM to demonstrate progression to functional AROM  2.Increase strength to >/= 4+/5 in all planes of shoulder motion to increase tolerance for ADL and work activities.  3. Pt goal: To reach behind back    Plan     Progress shoulder ROM and add prolonged abduction stretch    Fernando Li, PT

## 2018-12-10 ENCOUNTER — HOSPITAL ENCOUNTER (OUTPATIENT)
Dept: RADIOLOGY | Facility: HOSPITAL | Age: 68
Discharge: HOME | End: 2018-12-10
Attending: NURSE PRACTITIONER
Payer: MEDICARE

## 2018-12-10 VITALS — BODY MASS INDEX: 30.99 KG/M2 | WEIGHT: 186 LBS | HEIGHT: 65 IN

## 2018-12-10 DIAGNOSIS — Z12.39 BREAST CANCER SCREENING: ICD-10-CM

## 2018-12-10 PROCEDURE — 77063 BREAST TOMOSYNTHESIS BI: CPT | Mod: TC

## 2018-12-10 PROCEDURE — 77067 SCR MAMMO BI INCL CAD: CPT | Mod: 26,,, | Performed by: RADIOLOGY

## 2018-12-10 PROCEDURE — 77067 SCR MAMMO BI INCL CAD: CPT | Mod: TC

## 2018-12-10 PROCEDURE — 77063 BREAST TOMOSYNTHESIS BI: CPT | Mod: 26,,, | Performed by: RADIOLOGY

## 2018-12-14 ENCOUNTER — CLINICAL SUPPORT (OUTPATIENT)
Dept: REHABILITATION | Facility: HOSPITAL | Age: 68
End: 2018-12-14
Attending: ORTHOPAEDIC SURGERY
Payer: MEDICARE

## 2018-12-14 DIAGNOSIS — G89.29 CHRONIC PAIN IN RIGHT SHOULDER: ICD-10-CM

## 2018-12-14 DIAGNOSIS — M25.511 CHRONIC PAIN IN RIGHT SHOULDER: ICD-10-CM

## 2018-12-14 DIAGNOSIS — M54.5 CHRONIC LOW BACK PAIN, UNSPECIFIED BACK PAIN LATERALITY, WITH SCIATICA PRESENCE UNSPECIFIED: ICD-10-CM

## 2018-12-14 DIAGNOSIS — G89.29 CHRONIC LOW BACK PAIN, UNSPECIFIED BACK PAIN LATERALITY, WITH SCIATICA PRESENCE UNSPECIFIED: ICD-10-CM

## 2018-12-14 PROCEDURE — 97140 MANUAL THERAPY 1/> REGIONS: CPT

## 2018-12-14 PROCEDURE — 97110 THERAPEUTIC EXERCISES: CPT

## 2018-12-14 NOTE — PROGRESS NOTES
"                            Physical Therapy Daily Treatment Note     Name: Dana POLO [facility] Number: [id number]    Therapy Diagnosis:   Encounter Diagnoses   Name Primary?    Chronic pain in right shoulder     Chronic low back pain, unspecified back pain laterality, with sciatica presence unspecified      Physician: Villa Houston III, *  Physician Orders: PT Eval and Treat  Medical Diagnosis: Acute right shoulder pain  Evaluation Date: 11/7/2018  Authorization Period Expiration: 12/31/2018  Plan of Care Certification Period: 12/31/2018  Date of Surgery: March 2018  Precautions: Standard    Visit Date: 12/14/2018  Visit # / Visits authorized: 3/3    Time In: 700a  Time Out: 750a  Total Billable Time: 50 minutes    Subjective      Pt reports: she was compliant with home exercise program given last session.   Response to previous treatment: Patient states that she has been feeling a lot better, still feeling weakness with lifting and reaching behind back    Pain: 3/10  Location: right shoulder      Objective     Dana received therapeutic exercises to develop strength, endurance, ROM and posture for 10 minutes including:  IR stretch - supine 3'  Sidelying flexion - 3 x 10, 2#  Scap retractions - 3', 5" ea    Dana received the following manual therapy techniques for 25 minutes, including:  PROM right shoulder in all planes    Home Exercises Provided and Patient Education Provided     Education provided:   Continue current HEP    Written Home Exercises Provided: Continue with current HEP  Exercises were reviewed and Dana was able to demonstrate them prior to the end of the session.      Pt received a written copy of exercises to perform at home.   See EMR under patient instructions for exercises given.     Dana demonstrated good  understanding of the education provided.     Assessment     The patient is able to reach behind back with very little pain and no restrictions in AROM flexion and scaption    Dana is " progressing well towards her goals.   Pt prognosis is Excellent.     Pt will continue to benefit from skilled outpatient physical therapy to address the deficits listed in the problem list box on initial evaluation, provide pt/family education and to maximize pt's level of independence in the home and community environment.     Pt's spiritual, cultural and educational needs considered and pt agreeable to plan of care and goals.    Anticipated barriers to physical therapy: None    Goals:   Short Term Goals:  4 weeks  1.Patient will demonstrate 165 degrees affected shoulder flexion AROM to demonstrate progression to functional AROM.  2.Patient will demonstrate 165 degrees affected shoulder abduction AROM to demonstrate progression to functional AROM.   3.Patient will demonstrate 5/5 strength of affected shoulder flexion to increase ease with lifting overhead.     Long Term Goals: 12 weeks  1. .Patient will demonstrate 65 degrees affected shoulder flexion and abduction AROM to demonstrate progression to functional AROM  2.Increase strength to >/= 4+/5 in all planes of shoulder motion to increase tolerance for ADL and work activities.  3. Pt goal: To reach behind back    Plan     Resume HEP with propping of shoulder and f/u in 2 weeks    Fernando Li, PT

## 2019-01-02 ENCOUNTER — CLINICAL SUPPORT (OUTPATIENT)
Dept: REHABILITATION | Facility: HOSPITAL | Age: 69
End: 2019-01-02
Attending: ORTHOPAEDIC SURGERY
Payer: MEDICARE

## 2019-01-02 DIAGNOSIS — M54.5 CHRONIC LOW BACK PAIN, UNSPECIFIED BACK PAIN LATERALITY, WITH SCIATICA PRESENCE UNSPECIFIED: ICD-10-CM

## 2019-01-02 DIAGNOSIS — G89.29 CHRONIC PAIN IN RIGHT SHOULDER: ICD-10-CM

## 2019-01-02 DIAGNOSIS — M25.511 CHRONIC PAIN IN RIGHT SHOULDER: ICD-10-CM

## 2019-01-02 DIAGNOSIS — G89.29 CHRONIC LOW BACK PAIN, UNSPECIFIED BACK PAIN LATERALITY, WITH SCIATICA PRESENCE UNSPECIFIED: ICD-10-CM

## 2019-01-02 PROCEDURE — 97110 THERAPEUTIC EXERCISES: CPT

## 2019-01-02 NOTE — PROGRESS NOTES
"                            Physical Therapy Daily Treatment Note     Name: Dana POLO [facility] Number: [id number]    Therapy Diagnosis:   Encounter Diagnoses   Name Primary?    Chronic pain in right shoulder     Chronic low back pain, unspecified back pain laterality, with sciatica presence unspecified      Physician: Villa Houston III, *  Physician Orders: PT Eval and Treat  Medical Diagnosis: Acute right shoulder pain  Evaluation Date: 11/7/2018  Authorization Period Expiration: 12/31/2018  Plan of Care Certification Period: 12/31/2018  Date of Surgery: March 2018  Precautions: Standard    Visit Date: 1/2/2019  Visit # / Visits authorized: Pending    Time In: 700a  Time Out: 750a  Total Billable Time: 50 minutes    Subjective      Pt reports: she was compliant with home exercise program given last session.   Response to previous treatment: Patient states that she is feeling average 2/10 pain, less constant pain and improved strength    Pain: 3/10  Location: right shoulder      Objective     Dana received therapeutic exercises to develop strength, endurance, ROM and posture for 10 minutes including:  IR stretch - supine 3'  Sidelying flexion - 3 x 10, 2#  Scap retractions - 3', 5" ea with arms straight  Anterior delt press - 2 x 10  Sidelying ER - 2 x 10    Dana received the following manual therapy techniques for 25 minutes, including:  PROM right shoulder in all planes    Home Exercises Provided and Patient Education Provided     Education provided:   Continue current HEP    Written Home Exercises Provided: Continue with current HEP  Exercises were reviewed and Dana was able to demonstrate them prior to the end of the session.      Pt received a written copy of exercises to perform at home.   See EMR under patient instructions for exercises given.     Dana demonstrated good  understanding of the education provided.     Assessment     The patient demonstrates improved shoulder IR and flexion AROM with " decreased pain, improved hiking of shoulder with motion above shoulder    Dana is progressing well towards her goals.   Pt prognosis is Excellent.     Pt will continue to benefit from skilled outpatient physical therapy to address the deficits listed in the problem list box on initial evaluation, provide pt/family education and to maximize pt's level of independence in the home and community environment.     Pt's spiritual, cultural and educational needs considered and pt agreeable to plan of care and goals.    Anticipated barriers to physical therapy: None    Goals:   Short Term Goals:  4 weeks  1.Patient will demonstrate 165 degrees affected shoulder flexion AROM to demonstrate progression to functional AROM.  2.Patient will demonstrate 165 degrees affected shoulder abduction AROM to demonstrate progression to functional AROM.   3.Patient will demonstrate 5/5 strength of affected shoulder flexion to increase ease with lifting overhead.     Long Term Goals: 12 weeks  1. .Patient will demonstrate 65 degrees affected shoulder flexion and abduction AROM to demonstrate progression to functional AROM  2.Increase strength to >/= 4+/5 in all planes of shoulder motion to increase tolerance for ADL and work activities.  3. Pt goal: To reach behind back    Plan     Perform HEP 3x/week and f/u in 2 weeks    Fernando Li, PT

## 2019-01-21 ENCOUNTER — CLINICAL SUPPORT (OUTPATIENT)
Dept: REHABILITATION | Facility: HOSPITAL | Age: 69
End: 2019-01-21
Attending: ORTHOPAEDIC SURGERY
Payer: MEDICARE

## 2019-01-21 DIAGNOSIS — G89.29 CHRONIC PAIN IN RIGHT SHOULDER: ICD-10-CM

## 2019-01-21 DIAGNOSIS — M25.511 CHRONIC PAIN IN RIGHT SHOULDER: ICD-10-CM

## 2019-01-21 DIAGNOSIS — G89.29 CHRONIC LOW BACK PAIN, UNSPECIFIED BACK PAIN LATERALITY, WITH SCIATICA PRESENCE UNSPECIFIED: ICD-10-CM

## 2019-01-21 DIAGNOSIS — M54.5 CHRONIC LOW BACK PAIN, UNSPECIFIED BACK PAIN LATERALITY, WITH SCIATICA PRESENCE UNSPECIFIED: ICD-10-CM

## 2019-01-21 PROCEDURE — 97140 MANUAL THERAPY 1/> REGIONS: CPT

## 2019-01-21 PROCEDURE — 97110 THERAPEUTIC EXERCISES: CPT

## 2019-01-21 NOTE — PROGRESS NOTES
Physical Therapy Daily Treatment Note     Name: Dana POLO [facility] Number: [id number]    Therapy Diagnosis:   Encounter Diagnoses   Name Primary?    Chronic pain in right shoulder     Chronic low back pain, unspecified back pain laterality, with sciatica presence unspecified      Physician: Villa Houston III, *  Physician Orders: PT Eval and Treat  Medical Diagnosis: Acute right shoulder pain  Evaluation Date: 11/7/2018  Authorization Period Expiration: 12/31/2018  Plan of Care Certification Period: 12/31/2018  Date of Surgery: March 2018  Precautions: Standard    Visit Date: 1/21/2019  Visit # / Visits authorized: Pending    Time In: 900a  Time Out: 930a  Total Billable Time: 30 minutes    Subjective      Pt reports: she was compliant with home exercise program given last session.   Response to previous treatment: Patient states that she is getting better but still intermittent pain    Pain: 3/10  Location: right shoulder      Objective     Dana received therapeutic exercises to develop strength, endurance, ROM and posture for 10 minutes including:    Sidelying Gator 3 x 10 2#   Sidelying flexion - 3 x 10, 2#  IR/ER Walkout x 20 GTB    Dana received the following manual therapy techniques for 25 minutes, including:  PROM right shoulder in all planes    Home Exercises Provided and Patient Education Provided     Education provided:   Continue current HEP    Written Home Exercises Provided: Continue with current HEP  Exercises were reviewed and Dana was able to demonstrate them prior to the end of the session.      Pt received a written copy of exercises to perform at home.   See EMR under patient instructions for exercises given.     Dana demonstrated good  understanding of the education provided.     Assessment     The patient demonstrates symmetry of IR and decreased pain with IR resisted motion    Dana is progressing well towards her goals.   Pt prognosis is Excellent.     Pt  will continue to benefit from skilled outpatient physical therapy to address the deficits listed in the problem list box on initial evaluation, provide pt/family education and to maximize pt's level of independence in the home and community environment.     Pt's spiritual, cultural and educational needs considered and pt agreeable to plan of care and goals.    Anticipated barriers to physical therapy: None    Goals:   Short Term Goals:  4 weeks  1.Patient will demonstrate 165 degrees affected shoulder flexion AROM to demonstrate progression to functional AROM.  2.Patient will demonstrate 165 degrees affected shoulder abduction AROM to demonstrate progression to functional AROM.   3.Patient will demonstrate 5/5 strength of affected shoulder flexion to increase ease with lifting overhead.     Long Term Goals: 12 weeks  1. .Patient will demonstrate 65 degrees affected shoulder flexion and abduction AROM to demonstrate progression to functional AROM  2.Increase strength to >/= 4+/5 in all planes of shoulder motion to increase tolerance for ADL and work activities.  3. Pt goal: To reach behind back    Plan     Perform HEP 3x/week and f/u in 2 weeks    Fernando Li, PT

## 2019-01-23 ENCOUNTER — OFFICE VISIT (OUTPATIENT)
Dept: ENDOCRINOLOGY | Facility: CLINIC | Age: 69
End: 2019-01-23
Payer: MEDICARE

## 2019-01-23 VITALS
BODY MASS INDEX: 31.44 KG/M2 | HEART RATE: 60 BPM | HEIGHT: 65 IN | SYSTOLIC BLOOD PRESSURE: 146 MMHG | WEIGHT: 188.69 LBS | DIASTOLIC BLOOD PRESSURE: 84 MMHG

## 2019-01-23 DIAGNOSIS — M81.0 OSTEOPOROSIS, UNSPECIFIED OSTEOPOROSIS TYPE, UNSPECIFIED PATHOLOGICAL FRACTURE PRESENCE: Primary | ICD-10-CM

## 2019-01-23 DIAGNOSIS — E78.2 MIXED HYPERLIPIDEMIA: ICD-10-CM

## 2019-01-23 PROCEDURE — 99214 OFFICE O/P EST MOD 30 MIN: CPT | Mod: S$PBB,,, | Performed by: INTERNAL MEDICINE

## 2019-01-23 PROCEDURE — 99214 PR OFFICE/OUTPT VISIT, EST, LEVL IV, 30-39 MIN: ICD-10-PCS | Mod: S$PBB,,, | Performed by: INTERNAL MEDICINE

## 2019-01-23 PROCEDURE — 99999 PR PBB SHADOW E&M-EST. PATIENT-LVL III: CPT | Mod: PBBFAC,,, | Performed by: INTERNAL MEDICINE

## 2019-01-23 PROCEDURE — 99213 OFFICE O/P EST LOW 20 MIN: CPT | Mod: PBBFAC | Performed by: INTERNAL MEDICINE

## 2019-01-23 PROCEDURE — 99999 PR PBB SHADOW E&M-EST. PATIENT-LVL III: ICD-10-PCS | Mod: PBBFAC,,, | Performed by: INTERNAL MEDICINE

## 2019-01-23 RX ORDER — FLUOCINONIDE TOPICAL SOLUTION USP, 0.05% 0.5 MG/ML
SOLUTION TOPICAL
COMMUNITY
Start: 2017-06-26 | End: 2023-10-19

## 2019-01-23 RX ORDER — KETOCONAZOLE 20 MG/ML
SHAMPOO, SUSPENSION TOPICAL
COMMUNITY
Start: 2017-05-03 | End: 2021-11-16 | Stop reason: CLARIF

## 2019-01-23 NOTE — PROGRESS NOTES
Subjective:       Patient ID: Dana Cook is a 68 y.o. female.    Chief Complaint: Follow-up; Osteoporosis; Obesity; and Hyperlipidemia    Ms. Cook is presenting as a new patient for osteoporosis.    PMH includes depression, sarcoidosis.  Referred by PCP, Dr. Hernandez.  Recently had DXA in Feb 2017 which suggested osteopenia of the femoral neck.  FRAX score of osteoporitic fracture warranted treatment with 3% risk of hip fracture, 25% of osteoporotic fracture in 10 years.. Has never previously been on osteoporosis treatment.    Reports history of two fractures in wrist in 2003 and 2016.  First tripped over side-walk recent fracture in 2016 occurred at home.   Mom with history of hip fracture    Menopause at age 45. Was on HRT for maybe 1 year.    Drinks 2 alcoholic beverages per day.     Denies history of chronic steroids. No evidence of thyroid disease.    Does not take calcium and vit D.    Obesity BMI 31, Does no formal exercise now.    Dyslipidemia with elevated LDL and TG    Works previously as .      Hyperlipidemia   Pertinent negatives include no chest pain or shortness of breath.     Review of Systems   Constitutional: Positive for fatigue. Negative for unexpected weight change.   Eyes: Negative for visual disturbance.   Respiratory: Negative for shortness of breath.    Cardiovascular: Negative for chest pain.   Gastrointestinal: Negative for abdominal pain.   Musculoskeletal: Negative for arthralgias.   Skin: Negative for wound.   Neurological: Negative for headaches.   Hematological: Does not bruise/bleed easily.   Psychiatric/Behavioral: Negative for sleep disturbance.       Objective:      Physical Exam   Constitutional: She appears well-developed and well-nourished.   HENT:   Head: Normocephalic.   Neck: Normal range of motion. Neck supple. No thyromegaly present.   Cardiovascular: Normal rate.   Pulmonary/Chest: Effort normal.   Abdominal: Soft.   Musculoskeletal:  Normal range of motion. She exhibits no edema.   Neurological: She is alert.   Skin: Skin is warm and dry.   Psychiatric: She has a normal mood and affect.   Vitals reviewed.      Assessment:       No diagnosis found.    Plan:       1. Osteoporosis given history of wrist fragility fracture and elevated FRAX scores. Will begin fosamax 70mg weekly. Explained risk factors including esophagitis, jaw osteonecrosis, hypocalcemia. Plan to repeat DXA in 2 year.  2. Recommend exercise, weight loss.  3. Management per PCP. Lipid panel notable for elevated LDL and TG    Check vit D and BMD

## 2019-01-28 ENCOUNTER — OFFICE VISIT (OUTPATIENT)
Dept: SPORTS MEDICINE | Facility: CLINIC | Age: 69
End: 2019-01-28
Payer: MEDICARE

## 2019-01-28 VITALS
SYSTOLIC BLOOD PRESSURE: 128 MMHG | DIASTOLIC BLOOD PRESSURE: 83 MMHG | HEART RATE: 59 BPM | HEIGHT: 65 IN | BODY MASS INDEX: 31.32 KG/M2 | WEIGHT: 188 LBS

## 2019-01-28 DIAGNOSIS — Z98.890 STATUS POST ROTATOR CUFF REPAIR: Primary | ICD-10-CM

## 2019-01-28 PROCEDURE — 99999 PR PBB SHADOW E&M-EST. PATIENT-LVL IV: CPT | Mod: PBBFAC,,, | Performed by: ORTHOPAEDIC SURGERY

## 2019-01-28 PROCEDURE — 99999 PR PBB SHADOW E&M-EST. PATIENT-LVL IV: ICD-10-PCS | Mod: PBBFAC,,, | Performed by: ORTHOPAEDIC SURGERY

## 2019-01-28 PROCEDURE — 99214 OFFICE O/P EST MOD 30 MIN: CPT | Mod: PBBFAC,PO | Performed by: ORTHOPAEDIC SURGERY

## 2019-01-28 PROCEDURE — 99214 OFFICE O/P EST MOD 30 MIN: CPT | Mod: S$PBB,,, | Performed by: ORTHOPAEDIC SURGERY

## 2019-01-28 PROCEDURE — 99214 PR OFFICE/OUTPT VISIT, EST, LEVL IV, 30-39 MIN: ICD-10-PCS | Mod: S$PBB,,, | Performed by: ORTHOPAEDIC SURGERY

## 2019-01-28 NOTE — PROGRESS NOTES
CC right shoulder pain    HISTORY OF PRESENT ILLNESS:   Pt is here today for followup of her shoulder arthroscopy.  she is doing well.  We have reviewed her findings and discussed plan of care and future treatment options.  She is still doing PT one weekly. She states she still has pain in her shoulder (3/10 today) and that it is better than before surgery. She also expresses frustration that she still has pain. Pain is mostly activity related and improves with rest and NSAID's. Shoulder is 70% better c/w pre-op.    She also has a new complaint of left shoulder pain. This has been present for several months. She attributes this to overuse of the left shoulder to compensate for the right. No trauma.        DATE OF PROCEDURE: 03/22/2018  OPERATION:   right  1. Shoulder arthroscopic rotator cuff repair (20 x 15 mm, double row) (CPT 02549)   2. Shoulder arthroscopic biceps tenodesis (CPT 72543.52)  3. Shoulder arthroscopic subacromial decompression, bursectomy   4. Shoulder arthroscopic extensive debridement (anterior, posterior glenohumeral joint, subacromial space) (CPT 42630)  5. Shoulder arthroscopic-assisted arthrocentesis of viable structural human allograft tissue matrix (BioDRestore) (CPT 68128)           6. Shoulder arthroscopic microfracture (Crimson duvet technique) (CPT 58405)  7. Shoulder arthroscopic lysis of adhesions (CPT 18550)  8. Shoulder arthroscopic chondroplasty                   There were chondral lesions to:   Humeral head: 40 x 25 grade 3  Glenoid: 20 x 10 grade 3    Review of Systems   Constitution: Negative. Negative for chills, fever and night sweats.   HENT: Negative for congestion and headaches.    Eyes: Negative for blurred vision, left vision loss and right vision loss.   Cardiovascular: Negative for chest pain and syncope.   Respiratory: Negative for cough and shortness of breath.    Endocrine: Negative for polydipsia, polyphagia and polyuria.   Hematologic/Lymphatic: Negative for  bleeding problem. Does not bruise/bleed easily.   Skin: Negative for dry skin, itching and rash.   Musculoskeletal: Negative for falls and muscle weakness.   Gastrointestinal: Negative for abdominal pain and bowel incontinence.   Genitourinary: Negative for bladder incontinence and nocturia.   Neurological: Negative for disturbances in coordination, loss of balance and seizures.   Psychiatric/Behavioral: Negative for depression. The patient does not have insomnia.    Allergic/Immunologic: Negative for hives and persistent infections.       PAST MEDICAL HISTORY:   Past Medical History:   Diagnosis Date    Anxiety     Colon polyps     Depression     Dry eyes     Dry mouth     Dupuytren's contracture     Hypertension     left arm fracture     hairline    Murmur, heart     Osteopenia     PONV (postoperative nausea and vomiting)     with wisdom teeth    Sarcoidosis      PAST SURGICAL HISTORY:   Past Surgical History:   Procedure Laterality Date    ARTHROSCOPY-SHOULDER WITH SUBACROMIAL DECOMPRESSION Right 3/22/2018    [facility]    BICEP TENODESIS ARTHROSCOPIC (TENDON FIXATION) Right 3/22/2018    [facility]    CHONDROPLASTY-SHOULDER Right 3/22/2018    [facility]    DEBRIDEMENT-SHOULDER-ARTHROSCOPIC W/ BURSECTOMY Right 3/22/2018    [facility]    INJECTION-STEROID arthroscopic assisted BioD injection to the right shoudler Right 3/22/2018    [facility]    Laparoscopy for possible endometriosis   1985    XCGWQ-ISATJSEX-MNXCLESEKUPN Right 3/22/2018    [facility]    MICROFRACTURE-ARTHROSCOPIC Right 3/22/2018    [facility]    REPAIR-ROTATOR CUFF-ARTHROSCOPIC Right 3/22/2018    [facility]    WRIST SURGERY Left     Dupuytrens contracture     FAMILY HISTORY:   Family History   Problem  Relation Age of Onset    Cancer Father         ? type    Leukemia Paternal Grandmother     Breast cancer Paternal Aunt         maternal aunt    Cervical cancer Paternal Aunt     Uterine cancer Sister     Basal cell carcinoma Sister         brother    Uterine cancer Maternal Grandmother     Breast cancer Maternal Aunt     Colon cancer Neg Hx     Ovarian cancer Neg Hx      SOCIAL HISTORY:   Social History     Socioeconomic History    Marital status:      Spouse name: Not on file    Number of children: 1    Years of education: Not on file    Highest education level: Not on file   Social Needs    Financial resource strain: Not on file    Food insecurity - worry: Not on file    Food insecurity - inability: Not on file    Transportation needs - medical: Not on file    Transportation needs - non-medical: Not on file   Occupational History    Occupation: retired     Comment: Part time security work   Tobacco Use    Smoking status: Never Smoker    Smokeless tobacco: Never Used   Substance and Sexual Activity    Alcohol use: Yes     Alcohol/week: 3.0 - 3.6 oz     Types: 5 - 6 Standard drinks or equivalent per week     Comment: wine every night    Drug use: No    Sexual activity: Not Currently     Partners: Male   Other Topics Concern    Not on file   Social History Narrative    Not on file       MEDICATIONS:   Current Outpatient Medications:     alendronate (FOSAMAX) 70 MG tablet, TAKE 1 TABLET(70 MG) BY MOUTH EVERY 7 DAYS, Disp: 12 tablet, Rfl: 0    ascorbic acid (VITAMIN C) 1000 MG tablet, , Disp: , Rfl:     buPROPion (WELLBUTRIN XL) 300 MG 24 hr tablet, TAKE 1 TABLET BY MOUTH EVERY DAY, Disp: 30 tablet, Rfl: 5    chlorthalidone (HYGROTEN) 25 MG Tab, TAKE 1 TABLET BY MOUTH EVERY DAY, Disp: 90 tablet, Rfl: 1    cyanocobalamin, vitamin B-12, (VITAMIN B-12) 1,000 mcg Lozg, , Disp: , Rfl:     ferrous sulfate (IRON) 325 mg (65 mg iron) Tab, Take by mouth. 1 Tablet Oral Every day, Disp: ,  "Rfl:     fluocinonide (LIDEX) 0.05 % external solution, USE ONCE TO BID ON SCALP FOR RASH OR IRRITATION, Disp: , Rfl:     glucosamine-chondroitin 500-400 mg tablet, Take 1 tablet by mouth daily as needed. , Disp: , Rfl:     hydrOXYzine HCl (ATARAX) 10 MG Tab, 10 mg as needed. , Disp: , Rfl:     ketoconazole (NIZORAL) 2 % shampoo, USE ON SCALP THREE TIMES A WEEK, Disp: , Rfl:     lidocaine (LIDODERM) 5 %, Place 1 patch onto the skin once daily. Remove & Discard patch within 12 hours. Do not use more than 1 patch in 24 hours., Disp: 10 patch, Rfl: 0    meloxicam (MOBIC) 15 MG tablet, TAKE 1 TABLET(15 MG) BY MOUTH EVERY DAY (Patient taking differently: TAKE 1 TABLET(15 MG) BY MOUTH EVERY DAY AS NEEDED), Disp: 90 tablet, Rfl: 0    multivitamin with minerals (MULTIPLE VITAMIN-MINERALS) tablet, Take by mouth. 1 Tablet Oral Every day, Disp: , Rfl:     omega-3 fatty acids-vitamin E (FISH OIL) 1,000 mg Cap, , Disp: , Rfl:     red yeast rice 600 mg Cap, Take by mouth., Disp: , Rfl:     SUBHASH'S WORT/S.GINSGR (SUBHASH'S WORT-SIBER.GINSENG) 450-90 mg Tab, , Disp: , Rfl:   ALLERGIES:   Review of patient's allergies indicates:   Allergen Reactions    Other Other (See Comments)     Allergy to tape.     Percocet [oxycodone-acetaminophen] Nausea Only       VITAL SIGNS: /83   Pulse (!) 59   Ht 5' 5" (1.651 m)   Wt 85.3 kg (188 lb)   BMI 31.28 kg/m²        bilateral Shoulder / Upper Extremity Exam    OBSERVATION:     Swelling  none  Deformity  none   Discoloration  none   Scapular winging none   Scars   + (right) Atrophy  none    TENDERNESS / CREPITUS (T/C):          T/C      T/C   Clavicle   -/-  SUPRAspinatus    -/-     AC Jt.    -/-  INFRAspinatus  -/-    SC Jt.    -/-  Deltoid    -/-      G. Tuberosity  -/-  LH BICEP groove  -/-   Acromion:  -/-  Midline Neck   -/-     Scapular Spine -/-  Trapezium   -/-   SMA Scapula  -/-  GH jt. line - post  -/-     Scapulothoracic  -/-         ROM: (* = with " pain)  Right shoulder   Left shoulder        AROM (PROM)   AROM (PROM)   FE    170° (175°)     170° (175°)     ER at 0°    60°  (65°)*    60°  (65°)*   ER at 90° ABD  90°  (90°)*    90°  (90°)*   IR at 90°  ABD   NA  (40°)     NA  (40°)      IR (spine level)   T10     T10    STRENGTH: (* = with pain) RIGHT SHOULDER  LEFT SHOULDER   SCAPTION at 0  5/5*    5/5*   SCAPTION at 30  5/5    5/5    IR    5/5    5/5   ER    5/5*    5/5*   BICEPS   5/5    5/5   Deltoid    5/5    5/5     SIGNS:  Painful side       NEER   +    OSONNYS  neg    IVY   -    SPEEDS  neg     DROP ARM   neg   BELLY PRESS neg   Superior escape none    LIFT-OFF  neg   X-Body ADD    neg    MOVING VALGUS neg          EXTREMITY NEURO-VASCULAR EXAM    Sensation grossly intact to light touch all dermatomal regions.    DTR 2+ Biceps, Triceps, BR and Negative Maddisons sign   Grossly intact motor function at Elbow, Wrist and Hand   Distal pulses radial and ulnar 2+, brisk cap refill, symmetric.      NECK:  Painless FROM and spinous processes non-tender. Negative Spurlings sign.                                                         ASSESSMENT:                                                                                                                                               1. Status post above (right shoulder), doing well  2. Left shoulder pain due to overuse                                                                                                                             PLAN:                                                                                                                                                     1. Continue HEP/PTwith Fernando  2. Emphasized scapular function.  3. I have discussed return to activity in detail.  4. she will see us back PRN                                   All questions were answered and she should contact us if she has any questions or concerns in the interim.

## 2019-02-08 ENCOUNTER — HOSPITAL ENCOUNTER (OUTPATIENT)
Dept: RADIOLOGY | Facility: CLINIC | Age: 69
Discharge: HOME | End: 2019-02-08
Attending: INTERNAL MEDICINE
Payer: MEDICARE

## 2019-02-08 DIAGNOSIS — E78.2 MIXED HYPERLIPIDEMIA: ICD-10-CM

## 2019-02-08 DIAGNOSIS — M81.0 OSTEOPOROSIS, UNSPECIFIED OSTEOPOROSIS TYPE, UNSPECIFIED PATHOLOGICAL FRACTURE PRESENCE: ICD-10-CM

## 2019-02-08 PROCEDURE — 77080 DXA BONE DENSITY AXIAL: CPT | Mod: 26,,, | Performed by: INTERNAL MEDICINE

## 2019-02-08 PROCEDURE — 77080 DXA BONE DENSITY AXIAL: CPT | Mod: TC

## 2019-02-08 PROCEDURE — 77080 DEXA BONE DENSITY SPINE HIP: ICD-10-PCS | Mod: 26,,, | Performed by: INTERNAL MEDICINE

## 2019-02-11 DIAGNOSIS — M81.0 OSTEOPOROSIS, UNSPECIFIED OSTEOPOROSIS TYPE, UNSPECIFIED PATHOLOGICAL FRACTURE PRESENCE: ICD-10-CM

## 2019-02-12 RX ORDER — ALENDRONATE SODIUM 70 MG/1
TABLET ORAL
Qty: 12 TABLET | Refills: 0 | Status: SHIPPED | OUTPATIENT
Start: 2019-02-12 | End: 2019-05-06 | Stop reason: SDUPTHER

## 2019-02-25 ENCOUNTER — LAB VISIT (OUTPATIENT)
Dept: LAB | Facility: HOSPITAL | Age: 69
End: 2019-02-25
Attending: HOSPITALIST
Payer: MEDICARE

## 2019-02-25 ENCOUNTER — OFFICE VISIT (OUTPATIENT)
Dept: INTERNAL MEDICINE | Facility: CLINIC | Age: 69
End: 2019-02-25
Payer: MEDICARE

## 2019-02-25 VITALS
BODY MASS INDEX: 31.44 KG/M2 | DIASTOLIC BLOOD PRESSURE: 72 MMHG | RESPIRATION RATE: 16 BRPM | TEMPERATURE: 98 F | HEIGHT: 65 IN | WEIGHT: 188.69 LBS | SYSTOLIC BLOOD PRESSURE: 146 MMHG | HEART RATE: 50 BPM

## 2019-02-25 DIAGNOSIS — I70.90 ATHEROSCLEROSIS: ICD-10-CM

## 2019-02-25 DIAGNOSIS — F32.A DEPRESSION, UNSPECIFIED DEPRESSION TYPE: ICD-10-CM

## 2019-02-25 DIAGNOSIS — I10 ESSENTIAL HYPERTENSION: ICD-10-CM

## 2019-02-25 DIAGNOSIS — Z00.00 ENCOUNTER FOR PREVENTIVE HEALTH EXAMINATION: ICD-10-CM

## 2019-02-25 DIAGNOSIS — Z00.00 ENCOUNTER FOR PREVENTIVE HEALTH EXAMINATION: Primary | ICD-10-CM

## 2019-02-25 DIAGNOSIS — I77.89 OTHER SPECIFIED DISORDERS OF ARTERIES AND ARTERIOLES: ICD-10-CM

## 2019-02-25 DIAGNOSIS — F41.9 ANXIETY DISORDER: ICD-10-CM

## 2019-02-25 LAB
ALBUMIN SERPL BCP-MCNC: 4 G/DL
ALP SERPL-CCNC: 63 U/L
ALT SERPL W/O P-5'-P-CCNC: 34 U/L
ANION GAP SERPL CALC-SCNC: 8 MMOL/L
AST SERPL-CCNC: 28 U/L
BASOPHILS # BLD AUTO: 0.04 K/UL
BASOPHILS NFR BLD: 1.1 %
BILIRUB SERPL-MCNC: 0.5 MG/DL
BUN SERPL-MCNC: 18 MG/DL
CALCIUM SERPL-MCNC: 10 MG/DL
CHLORIDE SERPL-SCNC: 101 MMOL/L
CHOLEST SERPL-MCNC: 257 MG/DL
CHOLEST/HDLC SERPL: 3.8 {RATIO}
CO2 SERPL-SCNC: 27 MMOL/L
CREAT SERPL-MCNC: 1 MG/DL
DIFFERENTIAL METHOD: ABNORMAL
EOSINOPHIL # BLD AUTO: 0.1 K/UL
EOSINOPHIL NFR BLD: 2.4 %
ERYTHROCYTE [DISTWIDTH] IN BLOOD BY AUTOMATED COUNT: 12.8 %
EST. GFR  (AFRICAN AMERICAN): >60 ML/MIN/1.73 M^2
EST. GFR  (NON AFRICAN AMERICAN): 58 ML/MIN/1.73 M^2
GLUCOSE SERPL-MCNC: 97 MG/DL
HCT VFR BLD AUTO: 38.6 %
HDLC SERPL-MCNC: 68 MG/DL
HDLC SERPL: 26.5 %
HGB BLD-MCNC: 13 G/DL
IMM GRANULOCYTES # BLD AUTO: 0.01 K/UL
IMM GRANULOCYTES NFR BLD AUTO: 0.3 %
LDLC SERPL CALC-MCNC: 166.6 MG/DL
LYMPHOCYTES # BLD AUTO: 1.4 K/UL
LYMPHOCYTES NFR BLD: 38.6 %
MCH RBC QN AUTO: 31 PG
MCHC RBC AUTO-ENTMCNC: 33.7 G/DL
MCV RBC AUTO: 92 FL
MONOCYTES # BLD AUTO: 0.5 K/UL
MONOCYTES NFR BLD: 12.3 %
NEUTROPHILS # BLD AUTO: 1.7 K/UL
NEUTROPHILS NFR BLD: 45.3 %
NONHDLC SERPL-MCNC: 189 MG/DL
NRBC BLD-RTO: 0 /100 WBC
PLATELET # BLD AUTO: 313 K/UL
PMV BLD AUTO: 9.3 FL
POTASSIUM SERPL-SCNC: 3.8 MMOL/L
PROT SERPL-MCNC: 7.7 G/DL
RBC # BLD AUTO: 4.2 M/UL
SODIUM SERPL-SCNC: 136 MMOL/L
TRIGL SERPL-MCNC: 112 MG/DL
TSH SERPL DL<=0.005 MIU/L-ACNC: 1.27 UIU/ML
WBC # BLD AUTO: 3.73 K/UL

## 2019-02-25 PROCEDURE — 80061 LIPID PANEL: CPT

## 2019-02-25 PROCEDURE — 99214 OFFICE O/P EST MOD 30 MIN: CPT | Mod: PBBFAC,PO | Performed by: HOSPITALIST

## 2019-02-25 PROCEDURE — 84443 ASSAY THYROID STIM HORMONE: CPT

## 2019-02-25 PROCEDURE — 80053 COMPREHEN METABOLIC PANEL: CPT

## 2019-02-25 PROCEDURE — 99999 PR PBB SHADOW E&M-EST. PATIENT-LVL IV: ICD-10-PCS | Mod: PBBFAC,,, | Performed by: HOSPITALIST

## 2019-02-25 PROCEDURE — 36415 COLL VENOUS BLD VENIPUNCTURE: CPT | Mod: PO

## 2019-02-25 PROCEDURE — 99999 PR PBB SHADOW E&M-EST. PATIENT-LVL IV: CPT | Mod: PBBFAC,,, | Performed by: HOSPITALIST

## 2019-02-25 PROCEDURE — 99397 PR PREVENTIVE VISIT,EST,65 & OVER: ICD-10-PCS | Mod: S$PBB,,, | Performed by: HOSPITALIST

## 2019-02-25 PROCEDURE — 99397 PER PM REEVAL EST PAT 65+ YR: CPT | Mod: S$PBB,,, | Performed by: HOSPITALIST

## 2019-02-25 PROCEDURE — 85025 COMPLETE CBC W/AUTO DIFF WBC: CPT

## 2019-02-25 RX ORDER — PNV NO.95/FERROUS FUM/FOLIC AC 28MG-0.8MG
1000 TABLET ORAL DAILY
COMMUNITY
End: 2021-04-27 | Stop reason: CLARIF

## 2019-02-25 NOTE — PROGRESS NOTES
Subjective:     @Patient ID: Dana Cook is a 68 y.o. female.    Chief Complaint: Annual Exam    HPI  69 yo F presents for annual exam. Pt is new to me. She reports that she is doing well. Mildly anxious when she comes to the doctor's office. Did not take BP med this AM as fasting in anticipation of labwork today. Otherwise has no complaints. Requests to have carotid u/s done as has paper work from screening exam in 2016 that showed mild atherosclerosis of the     Lipid disorders/ASCVD risk (ages >/= 45 or >/= 20 if increased risk ): ordered  DM (>45y yearly or if obese, HTN): A1c n/a  Hepatitis C (one time if born between 7589-9431): done  Eye exam: Done 2019  Breast Cancer (40-50y discretion of pt, 50-74y every 1-2 years): Mammogram Done 12/2018. Not due until 12/2019  Cervical Cancer (Pap Smear ages 21-65 every 3 years or Pap + HPV q5 years after 30 years of age):  n/a  Colorectal Cancer (normal risk 50-75yr): Colonoscopy Due. Pt will make her appointment      Lung Cancer (low dose CT for ages 55-80 if 30 pack year history and currently smoking/ quit within 15 years): n/a  DEXA (F>66 yo, M >71yo, M&F 50-68 yo with risk factors (smoking, previous fx, wt <70kg; etoh abuse, chronic steroids, RA)):Done       Vaccines:   Influenza (yearly) 11/2018  Tetanus (every 10 yrs - 1st tdap)  Done  PPSV23(>64yo or <65 w/ lung dz, smoking, DM) Done  PCV13 (> 65 or <65 w/ immunocompromised) Done  Zoster (>59yo) Done 2015    Exercise: walking  Diet: Regular       Past Medical History:   Diagnosis Date    Anxiety     Colon polyps     Depression     Dry eyes     Dry mouth     Dupuytren's contracture     Hypertension     left arm fracture     hairline    Murmur, heart     Osteopenia     PONV (postoperative nausea and vomiting)     with wisdom teeth    Sarcoidosis      Past Surgical History:   Procedure Laterality Date    ARTHROSCOPY-SHOULDER WITH SUBACROMIAL DECOMPRESSION Right 3/22/2018    Performed by Shama Denise MD  at [facility], [place], operated by [facility] OR    BICEP TENODESIS ARTHROSCOPIC (TENDON FIXATION) Right 3/22/2018    Performed by Shama Denise MD at [facility] [place], operated by [facility]    CHONDROPLASTY-SHOULDER Right 3/22/2018    Performed by Shama Denise MD at [facility] [place] [place]    DEBRIDEMENT-SHOULDER-ARTHROSCOPIC W/ BURSECTOMY Right 3/22/2018    Performed by Shama Denise MD at [facility] [place], operated by [facility] [place]    INJECTION-STEROID arthroscopic assisted BioD injection to the right shoudler Right 3/22/2018    Performed by Shama Denise MD at [facility] [place], [facility]    Laparoscopy for possible endometriosis   1985    [id number] Right 3/22/2018    Performed by Shama Denise MD at [facility] [place] [place]    MICROFRACTURE-ARTHROSCOPIC Right 3/22/2018    Performed by Shama [facility]ise MD at [facility] [place] [place]    REPAIR-ROTATOR CUFF-ARTHROSCOPIC Right 3/22/2018    Performed by Shama Denise MD at [facility] [place] [place]    SHOULDER SURGERY  03/22/2018    WRIST SURGERY Left     Dupuytrens contracture     Family History   Problem Relation Age of Onset    Cancer Father         ? type    Leukemia Paternal Grandmother     Breast cancer Paternal Aunt         maternal aunt    Cervical cancer Paternal Aunt     Cancer Paternal Aunt         breast cancer    Uterine cancer Sister     Basal cell carcinoma Sister         brother    Uterine cancer Maternal Grandmother     Breast cancer Maternal Aunt     Cancer Maternal Aunt         breast cancer    Cancer Brother         nasal cancer     Colon cancer Neg Hx     Ovarian cancer Neg Hx      Social History     Socioeconomic History    Marital status:      Spouse name: Not on file    Number of children: 1    Years of education: Not on file    Highest education level: Not on file   Social Needs    Financial resource strain: Not on file    Food insecurity - worry: Not on file    Food insecurity - inability: Not on file    Transportation needs - medical: Not on file    Transportation needs - non-medical: Not on file   Occupational History    Occupation: retired     Comment: Part time security work   Tobacco  Use    Smoking status: Never Smoker    Smokeless tobacco: Never Used   Substance and Sexual Activity    Alcohol use: Yes     Alcohol/week: 3.0 - 3.6 oz     Types: 5 - 6 Standard drinks or equivalent per week     Comment: wine every night    Drug use: No    Sexual activity: Not Currently     Partners: Male   Other Topics Concern    Not on file   Social History Narrative    Not on file     Review of patient's allergies indicates:   Allergen Reactions    Other Other (See Comments)     Allergy to tape.     Percocet [oxycodone-acetaminophen] Nausea Only       Current Outpatient Medications:     alendronate (FOSAMAX) 70 MG tablet, TAKE 1 TABLET BY MOUTH EVERY 7 DAYS, Disp: 12 tablet, Rfl: 0    ascorbic acid (VITAMIN C) 1000 MG tablet, , Disp: , Rfl:     buPROPion (WELLBUTRIN XL) 300 MG 24 hr tablet, TAKE 1 TABLET BY MOUTH EVERY DAY, Disp: 30 tablet, Rfl: 5    chlorthalidone (HYGROTEN) 25 MG Tab, TAKE 1 TABLET BY MOUTH EVERY DAY, Disp: 90 tablet, Rfl: 1    cyanocobalamin, vitamin B-12, (VITAMIN B-12) 1,000 mcg Lozg, , Disp: , Rfl:     glucosamine-chondroitin 500-400 mg tablet, Take 1 tablet by mouth daily as needed. , Disp: , Rfl:     hydrOXYzine HCl (ATARAX) 10 MG Tab, 10 mg as needed. , Disp: , Rfl:     meloxicam (MOBIC) 15 MG tablet, TAKE 1 TABLET(15 MG) BY MOUTH EVERY DAY (Patient taking differently: TAKE 1 TABLET(15 MG) BY MOUTH EVERY DAY AS NEEDED), Disp: 90 tablet, Rfl: 0    multivitamin with minerals (MULTIPLE VITAMIN-MINERALS) tablet, Take by mouth. 1 Tablet Oral Every day, Disp: , Rfl:     omega-3 fatty acids-vitamin E (FISH OIL) 1,000 mg Cap, , Disp: , Rfl:     SUBHASH'S WORT/S.GINSGR (SUBHASH'S WORT-SIBER.GINSENG) 450-90 mg Tab, , Disp: , Rfl:     VALERIAN ROOT ORAL, Take by mouth 2 (two) times daily as needed., Disp: , Rfl:     vitamin E 1000 UNIT capsule, Take 1,000 Units by mouth once daily., Disp: , Rfl:     fluocinonide (LIDEX) 0.05 % external solution, USE ONCE TO BID ON SCALP FOR  "RASH OR IRRITATION, Disp: , Rfl:     ketoconazole (NIZORAL) 2 % shampoo, USE ON SCALP THREE TIMES A WEEK, Disp: , Rfl:     lidocaine (LIDODERM) 5 %, Place 1 patch onto the skin once daily. Remove & Discard patch within 12 hours. Do not use more than 1 patch in 24 hours., Disp: 10 patch, Rfl: 0    red yeast rice 600 mg Cap, Take by mouth., Disp: , Rfl:           Review of Systems   Constitutional: Negative for activity change and unexpected weight change.   HENT: Negative for hearing loss, rhinorrhea and trouble swallowing.    Eyes: Negative for discharge and visual disturbance.   Respiratory: Negative for chest tightness and wheezing.    Cardiovascular: Negative for chest pain and palpitations.   Gastrointestinal: Negative for blood in stool, constipation, diarrhea and vomiting.   Endocrine: Negative for polydipsia and polyuria.   Genitourinary: Negative for difficulty urinating, dysuria, hematuria and menstrual problem.   Musculoskeletal: Positive for arthralgias and neck pain. Negative for joint swelling.   Skin: Negative for color change and wound.   Neurological: Negative for weakness and headaches.   Psychiatric/Behavioral: Positive for dysphoric mood. Negative for confusion.     Past medical history, surgical history, and family medical history reviewed and updated as appropriate.    Medications and allergies reviewed.     Objective:     Vitals:    02/25/19 0748   BP: (!) 152/80   BP Location: Right arm   Patient Position: Sitting   BP Method: Medium (Manual)   Pulse: (!) 50   Resp: 16   Temp: 98.1 °F (36.7 °C)   TempSrc: Oral   Weight: 85.6 kg (188 lb 11.4 oz)   Height: 5' 5" (1.651 m)     Body mass index is 31.4 kg/m².  Physical Exam   Constitutional: She is oriented to person, place, and time. She appears well-developed and well-nourished. No distress.   HENT:   Head: Normocephalic and atraumatic.   Mouth/Throat: Oropharynx is clear and moist. No oropharyngeal exudate.   Eyes: Conjunctivae are normal. " Pupils are equal, round, and reactive to light. Right eye exhibits no discharge. Left eye exhibits no discharge.   Neck: Normal range of motion. Neck supple.   Cardiovascular: Normal rate, regular rhythm and intact distal pulses. Exam reveals no friction rub.   No murmur heard.  Pulmonary/Chest: Effort normal and breath sounds normal.   Abdominal: Soft. Bowel sounds are normal. She exhibits no distension. There is no tenderness. There is no guarding.   Musculoskeletal: Normal range of motion. She exhibits no edema.   Lymphadenopathy:     She has no cervical adenopathy.   Neurological: She is alert and oriented to person, place, and time.   Skin: Skin is warm and dry.   Psychiatric: She has a normal mood and affect. Her behavior is normal.   Vitals reviewed.      Lab Results   Component Value Date    WBC 4.89 02/09/2018    HGB 13.2 02/09/2018    HCT 39.0 02/09/2018     02/09/2018    CHOL 297 (H) 02/09/2018    TRIG 184 (H) 02/09/2018    HDL 74 02/09/2018    ALT 30 02/09/2018    AST 24 02/09/2018     02/09/2018    K 3.5 02/09/2018     02/09/2018    CREATININE 1.0 02/09/2018    BUN 13 02/09/2018    CO2 31 (H) 02/09/2018    TSH 1.830 02/09/2018    HGBA1C 5.8 11/18/2015       Assessment:     1. Encounter for preventive health examination    2. Other specified disorders of arteries and arterioles     3. Atherosclerosis     4. Essential hypertension    5. Anxiety disorder     6. Depression, unspecified depression type      Plan:   Dana was seen today for annual exam.    Diagnoses and all orders for this visit:    Encounter for preventive health examination  -     Comprehensive metabolic panel; Future  -     CBC auto differential; Future  -     TSH; Future  -     Urinalysis; Future    Other specified disorders of arteries and arterioles   -     Ultrasound doppler carotid (Cupid Only); Future  -     Lipid panel; Future    Atherosclerosis   -     Lipid panel; Future    Essential hypertension        - BP  elevated. However pt is anxious and did not take her chlorthalidone this AM. Reports BP is normally better controlled at home.     Anxiety disorder   -     TSH; Future    Depression, unspecified depression type        - Pt continues with wellbutrin       Follow-up in about 1 year (around 2/25/2020), or if symptoms worsen or fail to improve.    Mariam Johnson MD  Internal Medicine    2/25/2019

## 2019-02-26 ENCOUNTER — CLINICAL SUPPORT (OUTPATIENT)
Dept: CARDIOLOGY | Facility: CLINIC | Age: 69
End: 2019-02-26
Attending: HOSPITALIST
Payer: MEDICARE

## 2019-02-26 DIAGNOSIS — I77.89 OTHER SPECIFIED DISORDERS OF ARTERIES AND ARTERIOLES: ICD-10-CM

## 2019-02-26 LAB
LEFT ARM DIASTOLIC BLOOD PRESSURE: 80 MMHG
LEFT ARM SYSTOLIC BLOOD PRESSURE: 130 MMHG
LEFT CBA DIAS: 23 CM/S
LEFT CBA SYS: 86 CM/S
LEFT CCA DIST DIAS: 16 CM/S
LEFT CCA DIST SYS: 61 CM/S
LEFT CCA MID DIAS: 23 CM/S
LEFT CCA MID SYS: 78 CM/S
LEFT CCA PROX DIAS: 17 CM/S
LEFT CCA PROX SYS: 77 CM/S
LEFT ECA DIAS: 12 CM/S
LEFT ECA SYS: 92 CM/S
LEFT ICA DIST DIAS: 28 CM/S
LEFT ICA DIST SYS: 96 CM/S
LEFT ICA MID DIAS: 32 CM/S
LEFT ICA MID SYS: 91 CM/S
LEFT ICA PROX DIAS: 21 CM/S
LEFT ICA PROX SYS: 65 CM/S
LEFT VERTEBRAL DIAS: 16 CM/S
LEFT VERTEBRAL SYS: 48 CM/S
OHS CV CAROTID RIGHT ICA EDV HIGHEST: 25
OHS CV CAROTID ULTRASOUND LEFT ICA/CCA RATIO: 1.23
OHS CV CAROTID ULTRASOUND RIGHT ICA/CCA RATIO: 1.15
OHS CV PV CAROTID LEFT HIGHEST CCA: 78
OHS CV PV CAROTID LEFT HIGHEST ICA: 96
OHS CV PV CAROTID RIGHT HIGHEST CCA: 91
OHS CV PV CAROTID RIGHT HIGHEST ICA: 105
OHS CV US CAROTID LEFT HIGHEST EDV: 32
RIGHT ARM DIASTOLIC BLOOD PRESSURE: 80 MMHG
RIGHT ARM SYSTOLIC BLOOD PRESSURE: 125 MMHG
RIGHT CBA DIAS: 10 CM/S
RIGHT CBA SYS: 65 CM/S
RIGHT CCA DIST DIAS: 15 CM/S
RIGHT CCA DIST SYS: 71 CM/S
RIGHT CCA MID DIAS: 11 CM/S
RIGHT CCA MID SYS: 70 CM/S
RIGHT CCA PROX DIAS: 13 CM/S
RIGHT CCA PROX SYS: 91 CM/S
RIGHT ECA DIAS: 19 CM/S
RIGHT ECA SYS: 110 CM/S
RIGHT ICA DIST DIAS: 25 CM/S
RIGHT ICA DIST SYS: 105 CM/S
RIGHT ICA MID DIAS: 16 CM/S
RIGHT ICA MID SYS: 64 CM/S
RIGHT ICA PROX DIAS: 13 CM/S
RIGHT ICA PROX SYS: 70 CM/S
RIGHT VERTEBRAL DIAS: 11 CM/S
RIGHT VERTEBRAL SYS: 68 CM/S

## 2019-02-26 PROCEDURE — 93880 CV US DOPPLER CAROTID (CUPID ONLY): ICD-10-PCS | Mod: 26,S$PBB,, | Performed by: INTERNAL MEDICINE

## 2019-02-26 PROCEDURE — 93880 EXTRACRANIAL BILAT STUDY: CPT | Mod: PBBFAC,PO | Performed by: INTERNAL MEDICINE

## 2019-02-27 ENCOUNTER — PATIENT MESSAGE (OUTPATIENT)
Dept: INTERNAL MEDICINE | Facility: CLINIC | Age: 69
End: 2019-02-27

## 2019-03-12 ENCOUNTER — OFFICE VISIT (OUTPATIENT)
Dept: OBSTETRICS AND GYNECOLOGY | Facility: CLINIC | Age: 69
End: 2019-03-12
Payer: MEDICARE

## 2019-03-12 ENCOUNTER — TELEPHONE (OUTPATIENT)
Dept: OBSTETRICS AND GYNECOLOGY | Facility: CLINIC | Age: 69
End: 2019-03-12

## 2019-03-12 VITALS
DIASTOLIC BLOOD PRESSURE: 80 MMHG | HEIGHT: 65 IN | BODY MASS INDEX: 30.82 KG/M2 | WEIGHT: 185 LBS | SYSTOLIC BLOOD PRESSURE: 138 MMHG

## 2019-03-12 DIAGNOSIS — Z78.0 POSTMENOPAUSE: ICD-10-CM

## 2019-03-12 DIAGNOSIS — Z01.419 WELL WOMAN EXAM WITH ROUTINE GYNECOLOGICAL EXAM: Primary | ICD-10-CM

## 2019-03-12 PROCEDURE — 99999 PR PBB SHADOW E&M-EST. PATIENT-LVL III: CPT | Mod: PBBFAC,,, | Performed by: NURSE PRACTITIONER

## 2019-03-12 PROCEDURE — G0101 PR CA SCREEN;PELVIC/BREAST EXAM: ICD-10-PCS | Mod: S$PBB,,, | Performed by: NURSE PRACTITIONER

## 2019-03-12 PROCEDURE — G0101 CA SCREEN;PELVIC/BREAST EXAM: HCPCS | Mod: PBBFAC | Performed by: NURSE PRACTITIONER

## 2019-03-12 PROCEDURE — 99999 PR PBB SHADOW E&M-EST. PATIENT-LVL III: ICD-10-PCS | Mod: PBBFAC,,, | Performed by: NURSE PRACTITIONER

## 2019-03-12 PROCEDURE — 99213 OFFICE O/P EST LOW 20 MIN: CPT | Mod: PBBFAC | Performed by: NURSE PRACTITIONER

## 2019-03-12 PROCEDURE — G0101 CA SCREEN;PELVIC/BREAST EXAM: HCPCS | Mod: S$PBB,,, | Performed by: NURSE PRACTITIONER

## 2019-03-12 NOTE — PROGRESS NOTES
HISTORY OF PRESENT ILLNESS:    Dana Cook is a 68 y.o. female , presents for a routine exam and has no gyn complaints.    -Wants a pap every two years.  -Had right shoulder surgery, still has pain, but very active - mows grass with one hand, hammers, uses chain saw.     Past Medical History:   Diagnosis Date    Anxiety     Colon polyps     Depression     Dry eyes     Dry mouth     Dupuytren's contracture     Hypertension     left arm fracture     hairline    Murmur, heart     Osteopenia     PONV (postoperative nausea and vomiting)     with wisdom teeth    Sarcoidosis        Past Surgical History:   Procedure Laterality Date    ARTHROSCOPY-SHOULDER WITH SUBACROMIAL DECOMPRESSION Right 3/22/2018    [facility]formed by [facility]    BICEP TENODESIS ARTHROSCOPIC (TENDON FIXATION) Right 3/22/2018    [facility] at [facility]    CHONDROPLASTY-SHOULDER Right 3/22/2018    [facility]    DEBRIDEMENT-SHOULDER-ARTHROSCOPIC W/ BURSECTOMY Right 3/22/2018    [facility] at [facility]    INJECTION-STEROID arthroscopic assisted BioD injection to the right shoudler Right 3/22/2018    [facility] at [facility]    Laparoscopy for possible endometriosis   1985    [id number] Right 3/22/2018    [facility]    MICROFRACTURE-ARTHROSCOPIC Right 3/22/2018    [facility]    REPAIR-ROTATOR CUFF-ARTHROSCOPIC Right 3/22/2018    [facility] at [facility]    SHOULDER SURGERY  2018    WRIST SURGERY Left     Dupuytrens contracture        MEDICATIONS AND ALLERGIES:      Current Outpatient Medications:     alendronate (FOSAMAX) 70 MG tablet, TAKE 1 TABLET BY MOUTH EVERY 7 DAYS, Disp: 12 tablet, Rfl: 0    ascorbic acid (VITAMIN C) 1000 MG tablet, , Disp: , Rfl:     buPROPion (WELLBUTRIN XL) 300 MG 24 hr tablet, TAKE 1 TABLET BY MOUTH EVERY DAY, Disp: 30  tablet, Rfl: 5    chlorthalidone (HYGROTEN) 25 MG Tab, TAKE 1 TABLET BY MOUTH EVERY DAY, Disp: 90 tablet, Rfl: 1    cyanocobalamin, vitamin B-12, (VITAMIN B-12) 1,000 mcg Lozg, , Disp: , Rfl:     fluocinonide (LIDEX) 0.05 % external solution, USE ONCE TO BID ON SCALP FOR RASH OR IRRITATION, Disp: , Rfl:     glucosamine-chondroitin 500-400 mg tablet, Take 1 tablet by mouth daily as needed. , Disp: , Rfl:     hydrOXYzine HCl (ATARAX) 10 MG Tab, 10 mg as needed. , Disp: , Rfl:     ketoconazole (NIZORAL) 2 % shampoo, USE ON SCALP THREE TIMES A WEEK, Disp: , Rfl:     lidocaine (LIDODERM) 5 %, Place 1 patch onto the skin once daily. Remove & Discard patch within 12 hours. Do not use more than 1 patch in 24 hours., Disp: 10 patch, Rfl: 0    meloxicam (MOBIC) 15 MG tablet, TAKE 1 TABLET(15 MG) BY MOUTH EVERY DAY (Patient taking differently: TAKE 1 TABLET(15 MG) BY MOUTH EVERY DAY AS NEEDED), Disp: 90 tablet, Rfl: 0    multivitamin with minerals (MULTIPLE VITAMIN-MINERALS) tablet, Take by mouth. 1 Tablet Oral Every day, Disp: , Rfl:     omega-3 fatty acids-vitamin E (FISH OIL) 1,000 mg Cap, , Disp: , Rfl:     red yeast rice 600 mg Cap, Take by mouth., Disp: , Rfl:     SUBHASH'S WORT/S.GINSGR (SUBHASH'S WORT-SIBER.GINSENG) 450-90 mg Tab, , Disp: , Rfl:     VALERIAN ROOT ORAL, Take by mouth 2 (two) times daily as needed., Disp: , Rfl:     vitamin E 1000 UNIT capsule, Take 1,000 Units by mouth once daily., Disp: , Rfl:     Review of patient's allergies indicates:   Allergen Reactions    Other Other (See Comments)     Allergy to tape.     Percocet [oxycodone-acetaminophen] Nausea Only       Family History   Problem Relation Age of Onset    Cancer Father         ? type    Leukemia Paternal Grandmother     Breast cancer Paternal Aunt         maternal aunt    Cervical cancer Paternal Aunt     Cancer Paternal Aunt         breast cancer    Uterine cancer Sister     Basal cell carcinoma Sister         brother     Uterine cancer Maternal Grandmother     Breast cancer Maternal Aunt     Cancer Maternal Aunt         breast cancer    Cancer Brother         nasal cancer     Colon cancer Neg Hx     Ovarian cancer Neg Hx        Social History     Socioeconomic History    Marital status:      Spouse name: Not on file    Number of children: 1    Years of education: Not on file    Highest education level: Not on file   Social Needs    Financial resource strain: Not on file    Food insecurity - worry: Not on file    Food insecurity - inability: Not on file    Transportation needs - medical: Not on file    Transportation needs - non-medical: Not on file   Occupational History    Occupation: retired     Comment: Part time security work   Tobacco Use    Smoking status: Never Smoker    Smokeless tobacco: Never Used   Substance and Sexual Activity    Alcohol use: Yes     Alcohol/week: 3.0 - 3.6 oz     Types: 5 - 6 Standard drinks or equivalent per week     Comment: wine every night    Drug use: No    Sexual activity: Not Currently     Partners: Male   Other Topics Concern    Not on file   Social History Narrative    Not on file       OB HISTORY: Number of vaginal deliveries:1     COMPREHENSIVE GYN HISTORY:  PAP History:  Denies abnormal Paps. LAST PAP 2-27-18 NORMAL.  Infection History: Denies STDs. Denies PID.  Benign History: Denies uterine fibroids. Denies ovarian cysts. Denies endometriosis. Denies other conditions.  Cancer History: Denies cervical cancer. Denies uterine cancer or hyperplasia. Denies ovarian cancer. Denies vulvar cancer or pre-cancer. Denies vaginal cancer or pre-cancer. Denies breast cancer. Denies colon cancer.  Sexual Activity History: Denies currently being sexually active  Menstrual History: Denies menses. Pt is  not on HRT.      ROS:  GENERAL: No weight changes. No swelling. No fatigue. No fever.  CARDIOVASCULAR: No chest pain. No shortness of breath. No leg cramps.  "  NEUROLOGICAL: No headaches. No vision changes.  MSK: + CHRONIC LBP and R SHOULDER PAIN.  BREASTS: No pain. No lumps. No discharge.  ABDOMEN: No pain. No nausea. No vomiting. No diarrhea. No constipation.  REPRODUCTIVE: No abnormal bleeding.   VULVA: No pain. No lesions. No itching.  VAGINA: No relaxation. No itching. No odor. No discharge. + FREQUENCY. No frequency. No dysuria.    /80   Ht 5' 5" (1.651 m)   Wt 83.9 kg (185 lb)   BMI 30.79 kg/m²     PE:  APPEARANCE: Well nourished, well developed, in no acute distress.  AFFECT: WNL, alert and oriented x 3.  SKIN: No hirsutism or acne.  NECK: Neck symmetric without masses or thyromegaly.  NODES: No inguinal, cervical, axillary or femoral lymph node enlargement.  CHEST: Good respiratory effort.   ABDOMEN: Soft. No tenderness or masses.   BREASTS: Symmetrical, no skin changes or visible lesions. No palpable masses, nipple discharge bilaterally.  PELVIC: ATROPHIC EXTERNAL FEMALE GENITALIA without lesions. Normal hair distribution. Adequate perineal body, normal urethral meatus. VAGINA DRY / ATROPHIC without lesions or discharge. CERVIX STENOTIC without lesions, discharge or tenderness. No significant cystocele or rectocele. Bimanual exam shows uterus to be normal size, regular, mobile and nontender. Adnexa without masses or tenderness.  RECTAL: Rectovaginal exam confirms above with normal sphincter tone, no masses.  EXTREMITIES: No edema.    DIAGNOSIS:  1. Well woman exam with routine gynecological exam    2. Postmenopause        PLAN:    ORDERS:  Up to date on mammogram and colonoscopy due in April.     COUNSELING:  The patient was counseled today on:  -osteoporosis prevention and regular weight bearing exercise;  -A.C.S. Pap and pelvic exam guidelines (pap every 3 years, no pap after age 65) and recommendations for yearly mammogram;  -to see her PCP for other health maintenance.    FOLLOW-UP with Dr. Huynh in two years.    "

## 2019-05-06 DIAGNOSIS — M81.0 OSTEOPOROSIS, UNSPECIFIED OSTEOPOROSIS TYPE, UNSPECIFIED PATHOLOGICAL FRACTURE PRESENCE: ICD-10-CM

## 2019-05-07 RX ORDER — ALENDRONATE SODIUM 70 MG/1
TABLET ORAL
Qty: 12 TABLET | Refills: 0 | Status: SHIPPED | OUTPATIENT
Start: 2019-05-07 | End: 2019-08-10 | Stop reason: SDUPTHER

## 2019-06-04 RX ORDER — CHLORTHALIDONE 25 MG/1
TABLET ORAL
Qty: 90 TABLET | Refills: 0 | Status: SHIPPED | OUTPATIENT
Start: 2019-06-04 | End: 2019-09-16 | Stop reason: SDUPTHER

## 2019-08-10 DIAGNOSIS — M81.0 OSTEOPOROSIS, UNSPECIFIED OSTEOPOROSIS TYPE, UNSPECIFIED PATHOLOGICAL FRACTURE PRESENCE: ICD-10-CM

## 2019-08-12 RX ORDER — ALENDRONATE SODIUM 70 MG/1
TABLET ORAL
Qty: 12 TABLET | Refills: 0 | Status: SHIPPED | OUTPATIENT
Start: 2019-08-12 | End: 2019-10-28 | Stop reason: SDUPTHER

## 2019-08-19 ENCOUNTER — HOSPITAL ENCOUNTER (OUTPATIENT)
Dept: RADIOLOGY | Facility: HOSPITAL | Age: 69
Discharge: HOME | End: 2019-08-19
Attending: PHYSICIAN ASSISTANT
Payer: MEDICARE

## 2019-08-19 ENCOUNTER — OFFICE VISIT (OUTPATIENT)
Dept: ORTHOPEDICS | Facility: CLINIC | Age: 69
End: 2019-08-19
Payer: MEDICARE

## 2019-08-19 VITALS
HEART RATE: 58 BPM | HEIGHT: 65 IN | DIASTOLIC BLOOD PRESSURE: 80 MMHG | WEIGHT: 185 LBS | SYSTOLIC BLOOD PRESSURE: 154 MMHG | BODY MASS INDEX: 30.82 KG/M2

## 2019-08-19 DIAGNOSIS — M25.511 BILATERAL SHOULDER PAIN, UNSPECIFIED CHRONICITY: Primary | ICD-10-CM

## 2019-08-19 DIAGNOSIS — M25.511 BILATERAL SHOULDER PAIN, UNSPECIFIED CHRONICITY: ICD-10-CM

## 2019-08-19 DIAGNOSIS — M75.80 ROTATOR CUFF TENDINITIS, UNSPECIFIED LATERALITY: ICD-10-CM

## 2019-08-19 DIAGNOSIS — M25.512 BILATERAL SHOULDER PAIN, UNSPECIFIED CHRONICITY: Primary | ICD-10-CM

## 2019-08-19 DIAGNOSIS — M25.512 BILATERAL SHOULDER PAIN, UNSPECIFIED CHRONICITY: ICD-10-CM

## 2019-08-19 PROCEDURE — 99213 PR OFFICE/OUTPT VISIT, EST, LEVL III, 20-29 MIN: ICD-10-PCS | Mod: S$PBB,,, | Performed by: PHYSICIAN ASSISTANT

## 2019-08-19 PROCEDURE — 99999 PR PBB SHADOW E&M-EST. PATIENT-LVL IV: CPT | Mod: PBBFAC,,, | Performed by: PHYSICIAN ASSISTANT

## 2019-08-19 PROCEDURE — 73030 XR SHOULDER TRAUMA 3 VIEW BILATERAL: ICD-10-PCS | Mod: 26,50,, | Performed by: RADIOLOGY

## 2019-08-19 PROCEDURE — 99999 PR PBB SHADOW E&M-EST. PATIENT-LVL IV: ICD-10-PCS | Mod: PBBFAC,,, | Performed by: PHYSICIAN ASSISTANT

## 2019-08-19 PROCEDURE — 99213 OFFICE O/P EST LOW 20 MIN: CPT | Mod: S$PBB,,, | Performed by: PHYSICIAN ASSISTANT

## 2019-08-19 PROCEDURE — 73030 X-RAY EXAM OF SHOULDER: CPT | Mod: 26,50,, | Performed by: RADIOLOGY

## 2019-08-19 PROCEDURE — 73030 X-RAY EXAM OF SHOULDER: CPT | Mod: TC,50

## 2019-08-19 PROCEDURE — 99214 OFFICE O/P EST MOD 30 MIN: CPT | Mod: PBBFAC,25 | Performed by: PHYSICIAN ASSISTANT

## 2019-08-19 RX ORDER — MELOXICAM 15 MG/1
15 TABLET ORAL DAILY
Qty: 30 TABLET | Refills: 0 | Status: SHIPPED | OUTPATIENT
Start: 2019-08-19 | End: 2019-08-19 | Stop reason: SDUPTHER

## 2019-08-19 RX ORDER — MELOXICAM 15 MG/1
15 TABLET ORAL DAILY
Qty: 90 TABLET | Refills: 0 | Status: SHIPPED | OUTPATIENT
Start: 2019-08-19 | End: 2019-09-18

## 2019-08-19 NOTE — PROGRESS NOTES
Subjective:      Patient ID: Dana Cook is a 68 y.o. female.    Chief Complaint: Pain and Injury of the Left Shoulder and Pain of the Right Shoulder    HPI  68 year old female presents with chief complaint of bilateral shoulder pain. She is RHD. She reports left shoulder pain since she fell in March. Initially she had pain with turning the  wheel but she started doing some HEP and that has made her able to turn the wheel without pain. She had right RCR by Dr. Denise in March 2018. She reports having some right shoulder pain since the surgery. She does not continue HEP. Pain is worse with use. She does aleve, otc creams, and patches with some relief. She reports clicking.   Review of Systems   Constitution: Negative for chills, fever and night sweats.   Cardiovascular: Negative for chest pain.   Respiratory: Negative for cough and shortness of breath.    Hematologic/Lymphatic: Does not bruise/bleed easily.   Skin: Negative for color change.   Gastrointestinal: Negative for heartburn.   Genitourinary: Negative for dysuria.   Neurological: Negative for numbness and paresthesias.   Psychiatric/Behavioral: Negative for altered mental status.   Allergic/Immunologic: Negative for persistent infections.         Objective:            General    Vitals reviewed.  Constitutional: She is oriented to person, place, and time. She appears well-developed and well-nourished.   Cardiovascular: Normal rate.    Neurological: She is alert and oriented to person, place, and time.         Right Shoulder Exam     Range of Motion   Active abduction: normal   Forward Flexion: normal   External Rotation 0 degrees: normal   Internal rotation 0 degrees: normal     Tests & Signs   Landeros test: positive (mild pain)  Impingement: positive (mild pain)  Speed's Test: negative    Other   Sensation: normal    Left Shoulder Exam     Range of Motion   Active abduction: normal   Forward Flexion: normal   External Rotation 0 degrees: normal    Internal rotation 0 degrees: normal     Tests & Signs   Landeros test: negative  Impingement: negative  Speed's Test: negative    Other   Sensation: normal     Comments:  Positive empty can test.       Muscle Strength   Right Upper Extremity   Shoulder Abduction: 5/5   Supraspinatus: 5/5/5   Biceps: 5/5/5   Left Upper Extremity  Shoulder Abduction: 5/5   Supraspinatus: 5/5/5   Biceps: 5/5/5     Vascular Exam     Right Pulses      Radial:                    2+      Left Pulses      Radial:                    2+          X-ray: ordered and reviewed by myself. Right: There is baseline DJD.  No fracture dislocation bone destruction seen.  There is a high-riding shoulder.  Left: There is baseline DJD and calcific rotator cuff tendinitis.  No fracture dislocation bone destruction seen        Assessment:       Encounter Diagnoses   Name Primary?    Bilateral shoulder pain, unspecified chronicity Yes    Rotator cuff tendinitis, unspecified laterality           Plan:       Discussed treatment options with patient. She is trying to avoid surgery. Mobic sent to pharmacy. Order placed for PT. RTC if symptoms worsen or do not improve.

## 2019-08-22 ENCOUNTER — PATIENT MESSAGE (OUTPATIENT)
Dept: ORTHOPEDICS | Facility: CLINIC | Age: 69
End: 2019-08-22

## 2019-08-28 ENCOUNTER — CLINICAL SUPPORT (OUTPATIENT)
Dept: REHABILITATION | Facility: HOSPITAL | Age: 69
End: 2019-08-28
Payer: MEDICARE

## 2019-08-28 DIAGNOSIS — R52 PAIN: ICD-10-CM

## 2019-08-28 PROCEDURE — G8978 MOBILITY CURRENT STATUS: HCPCS | Mod: CJ | Performed by: PHYSICAL THERAPIST

## 2019-08-28 PROCEDURE — 97161 PT EVAL LOW COMPLEX 20 MIN: CPT | Performed by: PHYSICAL THERAPIST

## 2019-08-28 PROCEDURE — G8979 MOBILITY GOAL STATUS: HCPCS | Mod: CJ | Performed by: PHYSICAL THERAPIST

## 2019-08-28 PROCEDURE — 97110 THERAPEUTIC EXERCISES: CPT | Performed by: PHYSICAL THERAPIST

## 2019-08-28 NOTE — PLAN OF CARE
[facility]  Physical Therapy Initial Evaluation    Name: Dana Cook  Clinic Number: [id number]    Therapy Diagnosis:   Encounter Diagnosis   Name Primary?    Pain      Physician: Meseret Jimenez PA-C    Physician Orders: PT Eval and Treat   Medical Diagnosis: M25.511,M25.512 (ICD-10-CM) - Bilateral shoulder pain, unspecified chronicity M75.80 (ICD-10-CM) - Rotator cuff tendinitis, unspecified laterality   Evaluation Date: 8/28/2019  Authorization Period Expiration: 12-31-19  Plan of Care Certification Period: 10-23-19  Visit # / Visits authorized: 1/ 1    Time In: 2:40 pm  Time Out: 3:20 pm  Total Billable Time: 35 minutes    Precautions: Standard    Subjective     Date of onset: March 2019  History of current condition - Dana reports: injuring L shld in a fall in March in which she landed on her L arm.  States sx have decreased after doing some exercises on her own but still c/o much anterior pain with driving.  Presently, states L shld aches.  States having had R RCR 18 mo ago with satisfactory results.        Past Medical History:   Diagnosis Date    Anxiety     Colon polyps     Depression     Dry eyes     Dry mouth     Dupuytren's contracture     Hypertension     left arm fracture     hairline    Murmur, heart     Osteopenia     PONV (postoperative nausea and vomiting)     with wisdom teeth    Sarcoidosis      Dana Cook  has a past surgical history that includes Wrist surgery (Left); Laparoscopy for possible endometriosis  (1985); and Shoulder surgery (03/22/2018).    Dana has a current medication list which includes the following prescription(s): alendronate, ascorbic acid (vitamin c), bupropion, chlorthalidone, cyanocobalamin (vitamin b-12), fluocinonide, glucosamine-chondroitin, hydroxyzine hcl, ketoconazole, lidocaine, meloxicam, multivitamin with minerals, omega-3 fatty acids-vitamin e, red yeast rice, rachele's wort-siber.ginseng, valerian root, and  vitamin e.    Review of patient's allergies indicates:   Allergen Reactions    Other Other (See Comments)     Allergy to tape.     Percocet [oxycodone-acetaminophen] Nausea Only        Imaging: x-ray:  Right: There is baseline DJD.  No fracture dislocation bone destruction seen.  There is a high-riding shoulder.    Left: There is baseline DJD and calcific rotator cuff tendinitis.  No fracture dislocation bone destruction seen.       Prior Therapy: na on L  Social History:  lives alone  Occupation: retired  Prior Level of Function: heavy yard work  Current Level of Function: ADL limited by pain    Pain:  Current 0/10, worst 8/10, best 0/10   Location: left shoulder   Description: Aching  Aggravating Factors: Lifting  Easing Factors: rest    Pts goals: pain-free ADL; prevent any surgery    Objective     Postural examination:  Protracted shld     Functional assessment:   - walking:   independent             AROM:  WFL with scap compensation on R; L WFL-WNL with end range pain     MMT:   L ER 4/5, others 4+/5 to 5/5 grossly    Tone:  Decreased scap muscles    Flexibility testing:   End range tightness    Special tests:   Pain/weakness with empty can; + [place]'s and [place]    Palpation:   TTP biceps tendon in groove    Joint mobility: fair    Swelling:  none    Other:  Sensation intact to light touch    CMS Impairment/Limitation/Restriction for FOTO UE Survey    Therapist reviewed FOTO scores for Dana Cook on 8/28/2019.   FOTO documents entered into [employer] - see Media section.    Limitation Score: 38%  Category: Mobility    Current : CJ = at least 20% but < 40% impaired, limited or restricted  Goal: CJ = at least 20% but < 40% impaired, limited or restricted         TREATMENT     Treatment Time In: 1:40 pm  Treatment Time Out: 2:20 pm  Total Treatment time separate from Evaluation time:  15 min  Home Exercises and Patient Education Provided    Treatment:  HEP and posture education    Education provided:   - ice  for pain    Written Home Exercises Provided: yes.  Exercises were reviewed and Dana was able to demonstrate them prior to the end of the session.  Dana demonstrated good  understanding of the education provided.     See EMR under Media for exercises provided 8/28/2019.      Assessment     Dana is a 68 y.o. female referred to outpatient Physical Therapy with a medical diagnosis of M25.511,M25.512 (ICD-10-CM) - Bilateral shoulder pain, unspecified chronicity M75.80 (ICD-10-CM) - Rotator cuff tendinitis, unspecified laterality   .  Pt presents with L shld pain with weakness.  Possible labral injury?    Pt prognosis is Fair.   Pt will benefit from skilled outpatient Physical Therapy to address the deficits stated above and in the chart below, provide pt/family education, and to maximize pt's level of independence.     Plan of care discussed with patient: Yes  Pt's spiritual, cultural and educational needs considered and patient is agreeable to the plan of care and goals as stated below:     Anticipated Barriers for therapy: none    Medical Necessity is demonstrated by the following:  History  Co-morbidities and personal factors that may impact the plan of care Co-morbidities:   depression and high BMI    Personal Factors:   no deficits     low   Examination  Body Structures and Functions, activity limitations and participation restrictions that may impact the plan of care Body Regions:   upper extremities    Body Systems:    ROM  strength    Participation Restrictions:   none    Activity limitations:   Learning and applying knowledge  no deficits    General Tasks and Commands  no deficits    Communication  no deficits    Mobility  lifting and carrying objects    Self care  no deficits    Domestic Life  doing house work (cleaning house, washing dishes, laundry)    Interactions/Relationships  no deficits    Life Areas  no deficits    Community and Social Life  no deficits         low   Clinical Presentation stable and  uncomplicated low   Decision Making/ Complexity Score: low     Goals:  Short Term Goals: 2 weeks         1.   Independent with HEP        2.  Pt will report decreased pain level of < 50% from above measure with ADL    Long Term Goals:   GOALS:    8_   weeks. Pt agrees with goals set.  1. Independent with HEP.  2. Report decreased    L shld    pain  <   / =  3/10 with ADL such as housework  3. Increased MMT  for  L shld to 4+/5 to 5/5  with ADL such as lifting  4. Increased arom  for  L shld to WNL with functional activities such walking or self-care      Plan     Certification Period/Plan of care expiration: 8/28/2019 to 10-23-19.    Outpatient Physical Therapy 2 times weekly for 8 weeks to include the following interventions: Manual Therapy, Moist Heat/ Ice, Patient Education and Therapeutic Exercise.     Christopher Otero, PT

## 2019-08-28 NOTE — PATIENT INSTRUCTIONS
ROM: Pendulum (Circular)    Let R/L arm hang and shift body weight to move arm in a [place] clockwise, then counterclockwise.  Do 5 minutes per session. Do 2 sessions per day.        Scapular Retraction (Standing)    With arms at sides, squeeze shoulder blades together. Do not shrug and do not hold your breath. Hold 5 seconds.  Repeat 25 times per session. Do 2 sessions per day.       ROM: Flexion - Wand     In supine, hold wand with both hands and in front of you. Raise arms above your head as tolerated. Repeat 10 times  Do 1 set per session. Do 2 sessions per day.    Scapular: Protraction - 90° of Flexion - Serratus Punches     Holding  weights, push arms up toward ceiling, keeping elbows straight and back against floor.  Repeat 25 times. Do 1 set per session. Do 2 sessions per day.      Progressive Resisted: External Rotation (Side-Lying)    Lie on non affected side with  shoulder blade squeezed back and down. Raise forearm toward ceiling. Keep elbow bent and at side.  Repeat 25 times. Do 1 set per session. Do 2 sessions per day.        Abduction (Side-Lying)    Lie on non affected side. With thumb up, raise top arm above head.  Repeat 25 times . Do 1 set per session. Do 2 sessions per day.     [email or web address]://orth.[email or web address].[email or web address]         Prone Row    Perform 25 times, 1 set, 2x per day (HEP to go)      Shoulder External rotation    Perform 25 times, 1 set, 2x per day (HEP to go)      Shoulder Internal Rotation    Perform 25 times, 1 set, 2x per day (HEP to go)      Shoulder Extension    Perform 25 times, 1 set, 2x per day (HEP to go)

## 2019-09-03 ENCOUNTER — CLINICAL SUPPORT (OUTPATIENT)
Dept: REHABILITATION | Facility: HOSPITAL | Age: 69
End: 2019-09-03
Payer: MEDICARE

## 2019-09-03 DIAGNOSIS — R52 PAIN: ICD-10-CM

## 2019-09-03 PROCEDURE — 97110 THERAPEUTIC EXERCISES: CPT

## 2019-09-06 ENCOUNTER — CLINICAL SUPPORT (OUTPATIENT)
Dept: REHABILITATION | Facility: HOSPITAL | Age: 69
End: 2019-09-06
Payer: MEDICARE

## 2019-09-06 DIAGNOSIS — R52 PAIN: ICD-10-CM

## 2019-09-06 PROCEDURE — 97110 THERAPEUTIC EXERCISES: CPT

## 2019-09-06 NOTE — PROGRESS NOTES
"                            Physical Therapy Daily Treatment Note     Name: Dana POLO [facility] Number: [id number]    Therapy Diagnosis:   Encounter Diagnosis   Name Primary?    Pain      Physician: Meseret Jimenez PA-C    Visit Date: 9/6/2019  Physician Orders: PT Eval and Treat   Medical Diagnosis: M25.511,M25.512 (ICD-10-CM) - Bilateral shoulder pain, unspecified chronicity M75.80 (ICD-10-CM) - Rotator cuff tendinitis, unspecified laterality   Evaluation Date: 8/28/2019  Authorization Period Expiration: 12-31-19  Plan of Care Certification Period: 10-23-19  Visit # / Visits authorized: 3/ Pending    Time In: 800a  Time Out: 850a  Total Billable Time: 50 minutes    Subjective      Pt reports: she was compliant with home exercise program given last session.   Response to previous treatment: Patient states that her shoulder feels better, improvement in reaching forward    Pain: 2/10  Location: left shoulder      Objective     Dana received therapeutic exercises to develop strength, endurance, ROM and posture for 40 minutes including:  Pulleys - 2' ea  Prone mid traps - 3 x 10  Serratus punch - 2 x 10, 5", 3#  Sidelying ER - 2x burn, 2#  Prone row - 2x burn, 5#  Row - 10 x 10", purple  Pull apart - 2 x 15, orange    Dana received the following manual therapy techniques for 0 minutes, including:      Home Exercises Provided and Patient Education Provided     Education provided:   Continue current HEP    Written Home Exercises Provided: Continue with current HEP  Exercises were reviewed and Dana was able to demonstrate them prior to the end of the session.      Pt received a written copy of exercises to perform at home.   See EMR under patient instructions for exercises given.     Dana demonstrated good  understanding of the education provided.     Assessment     Patient demonstrates decreased shoulder pain with extension and ER AROM    Dana is progressing well towards her goals.   Pt prognosis is Excellent. "     Pt will continue to benefit from skilled outpatient physical therapy to address the deficits listed in the problem list box on initial evaluation, provide pt/family education and to maximize pt's level of independence in the home and community environment.     Pt's spiritual, cultural and educational needs considered and pt agreeable to plan of care and goals.    Anticipated barriers to physical therapy: None    Goals:   Short Term Goals: 2 weeks         1.   Independent with HEP        2.  Pt will report decreased pain level of < 50% from above measure with ADL     Long Term Goals:   GOALS:    8_   weeks. Pt agrees with goals set.  1. Independent with HEP.  2. Report decreased    L shld    pain  <   / =  3/10 with ADL such as housework  3. Increased MMT  for  L shld to 4+/5 to 5/5  with ADL such as lifting  4. Increased arom  for  L shld to WNL with functional activities such walking or self-care    Plan     Progress shoulder cuff strengthening for improved scapular timing for rotation    Fernando Li, PT

## 2019-09-16 DIAGNOSIS — F32.A DEPRESSION, UNSPECIFIED DEPRESSION TYPE: ICD-10-CM

## 2019-09-16 RX ORDER — BUPROPION HYDROCHLORIDE 300 MG/1
TABLET ORAL
Qty: 30 TABLET | Refills: 0 | Status: SHIPPED | OUTPATIENT
Start: 2019-09-16 | End: 2019-10-26 | Stop reason: SDUPTHER

## 2019-09-16 RX ORDER — CHLORTHALIDONE 25 MG/1
TABLET ORAL
Qty: 90 TABLET | Refills: 0 | Status: SHIPPED | OUTPATIENT
Start: 2019-09-16 | End: 2019-12-14 | Stop reason: SDUPTHER

## 2019-10-26 DIAGNOSIS — F32.A DEPRESSION, UNSPECIFIED DEPRESSION TYPE: ICD-10-CM

## 2019-10-26 RX ORDER — BUPROPION HYDROCHLORIDE 300 MG/1
TABLET ORAL
Qty: 30 TABLET | Refills: 0 | Status: SHIPPED | OUTPATIENT
Start: 2019-10-26 | End: 2019-12-06 | Stop reason: SDUPTHER

## 2019-10-28 DIAGNOSIS — M81.0 OSTEOPOROSIS, UNSPECIFIED OSTEOPOROSIS TYPE, UNSPECIFIED PATHOLOGICAL FRACTURE PRESENCE: ICD-10-CM

## 2019-10-29 RX ORDER — ALENDRONATE SODIUM 70 MG/1
TABLET ORAL
Qty: 12 TABLET | Refills: 3 | Status: SHIPPED | OUTPATIENT
Start: 2019-10-29 | End: 2020-09-17

## 2019-12-06 DIAGNOSIS — F32.A DEPRESSION, UNSPECIFIED DEPRESSION TYPE: ICD-10-CM

## 2019-12-06 DIAGNOSIS — I70.90 ATHEROSCLEROSIS: ICD-10-CM

## 2019-12-06 DIAGNOSIS — E78.2 MIXED HYPERLIPIDEMIA: ICD-10-CM

## 2019-12-06 DIAGNOSIS — E66.9 OBESITY (BMI 30.0-34.9): ICD-10-CM

## 2019-12-06 DIAGNOSIS — F41.9 ANXIETY DISORDER, UNSPECIFIED TYPE: ICD-10-CM

## 2019-12-06 DIAGNOSIS — I10 ESSENTIAL HYPERTENSION: Primary | ICD-10-CM

## 2019-12-06 RX ORDER — BUPROPION HYDROCHLORIDE 300 MG/1
TABLET ORAL
Qty: 30 TABLET | Refills: 0 | Status: SHIPPED | OUTPATIENT
Start: 2019-12-06 | End: 2020-01-05

## 2019-12-06 NOTE — TELEPHONE ENCOUNTER
----- Message from Thao Edmonds sent at 12/6/2019 12:12 PM CST -----  Contact: Self   Doctor appointment and lab have been scheduled.  Please link lab orders to the lab appointment.  Date of doctor appointment:  02/26  Date of lab appointment:  2/26  Physical or EP: Epp  Comments:

## 2019-12-06 NOTE — TELEPHONE ENCOUNTER
Please order labs.  Date of doctor appointment:  02/26   Date of lab appointment:  2/26   Physical or EP: Epp

## 2019-12-14 RX ORDER — CHLORTHALIDONE 25 MG/1
TABLET ORAL
Qty: 90 TABLET | Refills: 0 | Status: SHIPPED | OUTPATIENT
Start: 2019-12-14 | End: 2020-03-14

## 2020-01-05 DIAGNOSIS — F32.A DEPRESSION, UNSPECIFIED DEPRESSION TYPE: ICD-10-CM

## 2020-01-05 RX ORDER — BUPROPION HYDROCHLORIDE 300 MG/1
TABLET ORAL
Qty: 30 TABLET | Refills: 6 | Status: SHIPPED | OUTPATIENT
Start: 2020-01-05 | End: 2020-08-24

## 2020-01-13 ENCOUNTER — TELEPHONE (OUTPATIENT)
Dept: INTERNAL MEDICINE | Facility: CLINIC | Age: 70
End: 2020-01-13

## 2020-01-13 DIAGNOSIS — Z12.31 ENCOUNTER FOR SCREENING MAMMOGRAM FOR BREAST CANCER: Primary | ICD-10-CM

## 2020-01-13 NOTE — TELEPHONE ENCOUNTER
----- Message from Manoj Farmer sent at 1/13/2020 12:50 PM CST -----  Contact: Pt      The Pt would like for you to send her Mammo order over and call her back after so that she will know to call us back to schedule it.    Phone # [phone]

## 2020-01-17 ENCOUNTER — HOSPITAL ENCOUNTER (OUTPATIENT)
Dept: RADIOLOGY | Facility: HOSPITAL | Age: 70
Discharge: HOME | End: 2020-01-17
Attending: HOSPITALIST
Payer: MEDICARE

## 2020-01-17 DIAGNOSIS — Z12.31 ENCOUNTER FOR SCREENING MAMMOGRAM FOR BREAST CANCER: ICD-10-CM

## 2020-01-17 PROCEDURE — 77067 SCR MAMMO BI INCL CAD: CPT | Mod: 26,,, | Performed by: RADIOLOGY

## 2020-01-17 PROCEDURE — 77063 BREAST TOMOSYNTHESIS BI: CPT | Mod: 26,,, | Performed by: RADIOLOGY

## 2020-01-17 PROCEDURE — 77067 SCR MAMMO BI INCL CAD: CPT | Mod: TC,PO

## 2020-01-17 PROCEDURE — 77067 MAMMO DIGITAL SCREENING BILAT WITH TOMOSYNTHESIS_CAD: ICD-10-PCS | Mod: 26,,, | Performed by: RADIOLOGY

## 2020-01-17 PROCEDURE — 77063 MAMMO DIGITAL SCREENING BILAT WITH TOMOSYNTHESIS_CAD: ICD-10-PCS | Mod: 26,,, | Performed by: RADIOLOGY

## 2020-02-26 ENCOUNTER — PATIENT OUTREACH (OUTPATIENT)
Dept: ADMINISTRATIVE | Facility: HOSPITAL | Age: 70
End: 2020-02-26

## 2020-02-26 ENCOUNTER — OFFICE VISIT (OUTPATIENT)
Dept: INTERNAL MEDICINE | Facility: CLINIC | Age: 70
End: 2020-02-26
Payer: MEDICARE

## 2020-02-26 ENCOUNTER — TELEPHONE (OUTPATIENT)
Dept: INTERNAL MEDICINE | Facility: CLINIC | Age: 70
End: 2020-02-26

## 2020-02-26 ENCOUNTER — LAB VISIT (OUTPATIENT)
Dept: LAB | Facility: HOSPITAL | Age: 70
End: 2020-02-26
Attending: HOSPITALIST
Payer: MEDICARE

## 2020-02-26 VITALS
HEIGHT: 65 IN | HEART RATE: 60 BPM | SYSTOLIC BLOOD PRESSURE: 130 MMHG | BODY MASS INDEX: 30.71 KG/M2 | RESPIRATION RATE: 16 BRPM | DIASTOLIC BLOOD PRESSURE: 62 MMHG | WEIGHT: 184.31 LBS | TEMPERATURE: 98 F

## 2020-02-26 DIAGNOSIS — M67.819 TENDINOSIS OF ROTATOR CUFF: ICD-10-CM

## 2020-02-26 DIAGNOSIS — E66.9 OBESITY (BMI 30.0-34.9): ICD-10-CM

## 2020-02-26 DIAGNOSIS — M85.80 OSTEOPENIA, UNSPECIFIED LOCATION: ICD-10-CM

## 2020-02-26 DIAGNOSIS — E78.2 MIXED HYPERLIPIDEMIA: ICD-10-CM

## 2020-02-26 DIAGNOSIS — Z76.89 ENCOUNTER TO ESTABLISH CARE WITH NEW DOCTOR: ICD-10-CM

## 2020-02-26 DIAGNOSIS — Z00.00 ENCOUNTER FOR PREVENTIVE HEALTH EXAMINATION: Primary | ICD-10-CM

## 2020-02-26 DIAGNOSIS — D86.9 SARCOIDOSIS: ICD-10-CM

## 2020-02-26 DIAGNOSIS — I10 ESSENTIAL HYPERTENSION: ICD-10-CM

## 2020-02-26 LAB
25(OH)D3+25(OH)D2 SERPL-MCNC: 34 NG/ML (ref 30–96)
ALBUMIN SERPL BCP-MCNC: 4.3 G/DL (ref 3.5–5.2)
ALP SERPL-CCNC: 67 U/L (ref 55–135)
ALT SERPL W/O P-5'-P-CCNC: 33 U/L (ref 10–44)
ANION GAP SERPL CALC-SCNC: 12 MMOL/L (ref 8–16)
AST SERPL-CCNC: 29 U/L (ref 10–40)
BASOPHILS # BLD AUTO: 0.04 K/UL (ref 0–0.2)
BASOPHILS NFR BLD: 0.7 % (ref 0–1.9)
BILIRUB SERPL-MCNC: 0.5 MG/DL (ref 0.1–1)
BUN SERPL-MCNC: 16 MG/DL (ref 8–23)
CALCIUM SERPL-MCNC: 9.6 MG/DL (ref 8.7–10.5)
CHLORIDE SERPL-SCNC: 99 MMOL/L (ref 95–110)
CHOLEST SERPL-MCNC: 332 MG/DL (ref 120–199)
CHOLEST/HDLC SERPL: 4.5 {RATIO} (ref 2–5)
CO2 SERPL-SCNC: 29 MMOL/L (ref 23–29)
CREAT SERPL-MCNC: 1.1 MG/DL (ref 0.5–1.4)
DIFFERENTIAL METHOD: ABNORMAL
EOSINOPHIL # BLD AUTO: 0.2 K/UL (ref 0–0.5)
EOSINOPHIL NFR BLD: 2.8 % (ref 0–8)
ERYTHROCYTE [DISTWIDTH] IN BLOOD BY AUTOMATED COUNT: 13.1 % (ref 11.5–14.5)
EST. GFR  (AFRICAN AMERICAN): 59.2 ML/MIN/1.73 M^2
EST. GFR  (NON AFRICAN AMERICAN): 51.3 ML/MIN/1.73 M^2
GLUCOSE SERPL-MCNC: 97 MG/DL (ref 70–110)
HCT VFR BLD AUTO: 44 % (ref 37–48.5)
HDLC SERPL-MCNC: 73 MG/DL (ref 40–75)
HDLC SERPL: 22 % (ref 20–50)
HGB BLD-MCNC: 13.8 G/DL (ref 12–16)
IMM GRANULOCYTES # BLD AUTO: 0.02 K/UL (ref 0–0.04)
IMM GRANULOCYTES NFR BLD AUTO: 0.4 % (ref 0–0.5)
LDLC SERPL CALC-MCNC: 208.4 MG/DL (ref 63–159)
LYMPHOCYTES # BLD AUTO: 1.8 K/UL (ref 1–4.8)
LYMPHOCYTES NFR BLD: 33.3 % (ref 18–48)
MCH RBC QN AUTO: 30.9 PG (ref 27–31)
MCHC RBC AUTO-ENTMCNC: 31.4 G/DL (ref 32–36)
MCV RBC AUTO: 98 FL (ref 82–98)
MONOCYTES # BLD AUTO: 0.7 K/UL (ref 0.3–1)
MONOCYTES NFR BLD: 12 % (ref 4–15)
NEUTROPHILS # BLD AUTO: 2.8 K/UL (ref 1.8–7.7)
NEUTROPHILS NFR BLD: 50.8 % (ref 38–73)
NONHDLC SERPL-MCNC: 259 MG/DL
NRBC BLD-RTO: 0 /100 WBC
PLATELET # BLD AUTO: 342 K/UL (ref 150–350)
PMV BLD AUTO: 9.5 FL (ref 9.2–12.9)
POTASSIUM SERPL-SCNC: 3.4 MMOL/L (ref 3.5–5.1)
PROT SERPL-MCNC: 8.3 G/DL (ref 6–8.4)
RBC # BLD AUTO: 4.47 M/UL (ref 4–5.4)
SODIUM SERPL-SCNC: 140 MMOL/L (ref 136–145)
TRIGL SERPL-MCNC: 253 MG/DL (ref 30–150)
TSH SERPL DL<=0.005 MIU/L-ACNC: 1.68 UIU/ML (ref 0.4–4)
WBC # BLD AUTO: 5.41 K/UL (ref 3.9–12.7)

## 2020-02-26 PROCEDURE — 99999 PR PBB SHADOW E&M-EST. PATIENT-LVL III: ICD-10-PCS | Mod: PBBFAC,,, | Performed by: HOSPITALIST

## 2020-02-26 PROCEDURE — 84443 ASSAY THYROID STIM HORMONE: CPT

## 2020-02-26 PROCEDURE — 99213 OFFICE O/P EST LOW 20 MIN: CPT | Mod: PBBFAC,PO | Performed by: HOSPITALIST

## 2020-02-26 PROCEDURE — 36415 COLL VENOUS BLD VENIPUNCTURE: CPT | Mod: PO

## 2020-02-26 PROCEDURE — 80053 COMPREHEN METABOLIC PANEL: CPT

## 2020-02-26 PROCEDURE — 99999 PR PBB SHADOW E&M-EST. PATIENT-LVL III: CPT | Mod: PBBFAC,,, | Performed by: HOSPITALIST

## 2020-02-26 PROCEDURE — 82306 VITAMIN D 25 HYDROXY: CPT

## 2020-02-26 PROCEDURE — 85025 COMPLETE CBC W/AUTO DIFF WBC: CPT

## 2020-02-26 PROCEDURE — 99214 OFFICE O/P EST MOD 30 MIN: CPT | Mod: S$PBB,,, | Performed by: HOSPITALIST

## 2020-02-26 PROCEDURE — 99214 PR OFFICE/OUTPT VISIT, EST, LEVL IV, 30-39 MIN: ICD-10-PCS | Mod: S$PBB,,, | Performed by: HOSPITALIST

## 2020-02-26 PROCEDURE — 80061 LIPID PANEL: CPT

## 2020-02-26 NOTE — LETTER
AUTHORIZATION FOR RELEASE OF   CONFIDENTIAL INFORMATION    Dear Dr. Sultana,    We are seeing Dana Cook, date of birth 1950, in the clinic at [facility]. Mariam Johnson MD is the patient's PCP. Dana Cook has an outstanding lab/procedure at the time we reviewed her chart. In order to help keep her health information updated, she has authorized us to request the following medical record(s):        (  )  MAMMOGRAM                                      ( x )  COLONOSCOPY      (  )  PAP SMEAR                                          (  )  OUTSIDE LAB RESULTS     (  )  DEXA SCAN                                          (  )  EYE EXAM            (  )  FOOT EXAM                                          (  )  ENTIRE RECORD     (  )  OUTSIDE IMMUNIZATIONS                 (  )  _______________         Please fax records to [facility], Miriam C Azuoru, MD, [phone]     If you have any questions, please contact CRIS Amin at [phone]          Patient Name: Dana Cook  : 1950  Patient Phone #: [phone]

## 2020-02-26 NOTE — TELEPHONE ENCOUNTER
Dr. Johnson is requesting c-scope records from:    Kayy Elizabeth MD  Gastroenterologist in [place], [place]  Address: [address], [place] [id number]  Phone: [phone]

## 2020-02-26 NOTE — PROGRESS NOTES
Subjective:     @Patient ID: Dana Cook is a 69 y.o. female.    Chief Complaint: Annual Exam    HPI    69 y.o. female here for annual exam. Pt reports she is doing well. Recently returned from a cruise from [place]. Reports had lost weight from last year but gained weight when on the cruise. Has been following a low cholesterol diet     Lipid disorders/ASCVD risk (ages >/= 45 or >/= 20 if increased risk ): ordered  DM (>45y yearly or if obese, HTN): A1c n/a  Hepatitis C (one time if born between 6112-5177): done  Eye exam: Done 2020  Breast Cancer (40-50y discretion of pt, 50-74y every 1-2 years): Mammogram Done 1/17/2020  Colorectal Cancer (normal risk 50-75yr): Colonoscopy done 11/1/2019;    DEXA (F>66 yo, M >71yo, M&F 50-68 yo with risk factors (smoking, previous fx, wt <70kg; etoh abuse, chronic steroids, RA)):Done 2/8/19        Vaccines:   Influenza (yearly) done 10/4/19  Tetanus (every 10 yrs - 1st tdap)  Done  PPSV23(>66yo or <65 w/ lung dz, smoking, DM) Done  PCV13 (> 65 or <65 w/ immunocompromised) Done  Zoster (>61yo) Done Zostavax 2015     Exercise: walking  Diet: low cholesterol diet         Review of Systems   Constitutional: Positive for unexpected weight change. Negative for activity change.   HENT: Negative for hearing loss, rhinorrhea and trouble swallowing.    Eyes: Negative for discharge and visual disturbance.   Respiratory: Negative for chest tightness and wheezing.    Cardiovascular: Negative for chest pain and palpitations.   Gastrointestinal: Negative for blood in stool, constipation, diarrhea and vomiting.   Endocrine: Positive for polydipsia and polyuria.   Genitourinary: Negative for difficulty urinating, dysuria, hematuria and menstrual problem.   Musculoskeletal: Positive for arthralgias and neck pain. Negative for joint swelling.   Neurological: Negative for weakness and headaches.   Psychiatric/Behavioral: Negative for confusion and dysphoric mood.     Past medical history,  "surgical history, and family medical history reviewed and updated as appropriate.    Medications and allergies reviewed.     Objective:     Vitals:    02/26/20 0909   BP: 130/62   BP Location: Right arm   Patient Position: Sitting   BP Method: Medium (Manual)   Pulse: 60   Resp: 16   Temp: 97.7 °F (36.5 °C)   TempSrc: Oral   Weight: 83.6 kg (184 lb 4.9 oz)   Height: 5' 5" (1.651 m)     Body mass index is 30.67 kg/m².  Physical Exam   Constitutional: She is oriented to person, place, and time. She appears well-developed and well-nourished. No distress.   HENT:   Head: Normocephalic and atraumatic.   Right Ear: External ear normal.   Left Ear: External ear normal.   Mouth/Throat: Oropharynx is clear and moist. No oropharyngeal exudate.   Eyes: Right eye exhibits no discharge. Left eye exhibits no discharge.   Mild redness of b/l eyes- per pt chronic   Neck: Normal range of motion. Neck supple.   Cardiovascular: Normal rate, regular rhythm and intact distal pulses. Exam reveals no friction rub.   No murmur heard.  Pulmonary/Chest: Effort normal and breath sounds normal.   Abdominal: Soft. Bowel sounds are normal. She exhibits no distension. There is no tenderness. There is no guarding.   Musculoskeletal: Normal range of motion. She exhibits no edema.   Neurological: She is alert and oriented to person, place, and time.   Skin: Skin is warm and dry.   Psychiatric: She has a normal mood and affect. Her behavior is normal.   Vitals reviewed.      Lab Results   Component Value Date    WBC 3.73 (L) 02/25/2019    HGB 13.0 02/25/2019    HCT 38.6 02/25/2019     02/25/2019    CHOL 257 (H) 02/25/2019    TRIG 112 02/25/2019    HDL 68 02/25/2019    ALT 34 02/25/2019    AST 28 02/25/2019     02/25/2019    K 3.8 02/25/2019     02/25/2019    CREATININE 1.0 02/25/2019    BUN 18 02/25/2019    CO2 27 02/25/2019    TSH 1.266 02/25/2019    HGBA1C 5.8 11/18/2015       Assessment:     1. Encounter for preventive health " examination    2. Mixed hyperlipidemia    3. Obesity (BMI 30.0-34.9)    4. Osteopenia, unspecified location    5. Tendinosis of rotator cuff    6. Encounter to establish care with new doctor    7. Essential hypertension    8. Sarcoidosis      Plan:   Dana was seen today for annual exam.    Diagnoses and all orders for this visit:    Encounter for preventive health examination  - Labs pending. Pt is current on immunizations. Reports she has already had 1st dose of shingrix at [facility]. Plans to get 2nd dose later this year     Mixed hyperlipidemia  - lipid panel pending     Obesity (BMI 30.0-34.9)  - pt working on weight loss. Continues to walk for exercise    Osteopenia, unspecified location  - continue fosamax per last endocrine note. Repeat dexa in 2021    Tendinosis of rotator cuff  - stable    Encounter to establish care with new doctor  -     Ambulatory referral/consult to Obstetrics / Gynecology; Future    HTN  - BP controlled. Continue chlorthalidone,     Sarcoidosis  - has eye involvement but follows closely with her eye specialists. Not on chronic steroids      Follow up in about 1 year (around 2/26/2021), or if symptoms worsen or fail to improve.    Mariam Johnson MD  Internal Medicine    2/26/2020

## 2020-02-28 ENCOUNTER — OFFICE VISIT (OUTPATIENT)
Dept: OBSTETRICS AND GYNECOLOGY | Facility: CLINIC | Age: 70
End: 2020-02-28
Payer: MEDICARE

## 2020-02-28 ENCOUNTER — PATIENT MESSAGE (OUTPATIENT)
Dept: INTERNAL MEDICINE | Facility: CLINIC | Age: 70
End: 2020-02-28

## 2020-02-28 VITALS — BODY MASS INDEX: 30.81 KG/M2 | WEIGHT: 184.94 LBS | HEIGHT: 65 IN

## 2020-02-28 DIAGNOSIS — Z76.89 ENCOUNTER TO ESTABLISH CARE WITH NEW DOCTOR: ICD-10-CM

## 2020-02-28 DIAGNOSIS — Z01.419 WELL WOMAN EXAM WITH ROUTINE GYNECOLOGICAL EXAM: Primary | ICD-10-CM

## 2020-02-28 PROCEDURE — G0101 CA SCREEN;PELVIC/BREAST EXAM: HCPCS | Mod: S$PBB,,, | Performed by: OBSTETRICS & GYNECOLOGY

## 2020-02-28 PROCEDURE — 99999 PR PBB SHADOW E&M-EST. PATIENT-LVL III: ICD-10-PCS | Mod: PBBFAC,,, | Performed by: OBSTETRICS & GYNECOLOGY

## 2020-02-28 PROCEDURE — 99213 OFFICE O/P EST LOW 20 MIN: CPT | Mod: PBBFAC,25 | Performed by: OBSTETRICS & GYNECOLOGY

## 2020-02-28 PROCEDURE — G0101 CA SCREEN;PELVIC/BREAST EXAM: HCPCS | Mod: 25,PBBFAC | Performed by: OBSTETRICS & GYNECOLOGY

## 2020-02-28 PROCEDURE — G0101 PR CA SCREEN;PELVIC/BREAST EXAM: ICD-10-PCS | Mod: S$PBB,,, | Performed by: OBSTETRICS & GYNECOLOGY

## 2020-02-28 PROCEDURE — 88175 CYTOPATH C/V AUTO FLUID REDO: CPT

## 2020-02-28 PROCEDURE — 99999 PR PBB SHADOW E&M-EST. PATIENT-LVL III: CPT | Mod: PBBFAC,,, | Performed by: OBSTETRICS & GYNECOLOGY

## 2020-02-28 NOTE — PROGRESS NOTES
History & Physical  Gynecology      SUBJECTIVE:     Chief Complaint: Annual Exam       History of Present Illness:  Annual Exam-Postmenopausal  Patient presents for annual exam. The patient has no complaints today. Patient denies post-menopausal vaginal bleeding. The patient is not sexually active. The patient is not taking hormone replacement therapy.  The patient participates in regular exercise: yes.  She does not smoke.     GYN screening history: last pap: approximate date  and was normal  Mammogram history: 2020  Colonoscopy history:   Dexa history:     FH:   Breast cancer: paternal aunt x 4, sister, maternal aunt  Colon cancer: neg  Ovarian cancer: neg    Review of patient's allergies indicates:   Allergen Reactions    Other Other (See Comments)     Allergy to tape.     Percocet [oxycodone-acetaminophen] Nausea Only       Past Medical History:   Diagnosis Date    Anxiety     Colon polyps     Depression     Dry eyes     Dry mouth     Dupuytren's contracture     Hypertension     left arm fracture     hairline    Murmur, heart     Osteopenia     PONV (postoperative nausea and vomiting)     with wisdom teeth    Sarcoidosis      Past Surgical History:   Procedure Laterality Date    Laparoscopy for possible endometriosis   1985    SHOULDER SURGERY  2018    WRIST SURGERY Left     Dupuytrens contracture     OB History        1    Para   1    Term   1            AB        Living   1       SAB        TAB        Ectopic        Multiple        Live Births   1               Family History   Problem Relation Age of Onset    Cancer Father         ? type    Hypertension Father     Hypertension Mother     Leukemia Paternal Grandmother     Breast cancer Paternal Aunt         maternal aunt    Cervical cancer Paternal Aunt     Cancer Paternal Aunt         breast cancer    Uterine cancer Sister     Breast cancer Sister 70    Diabetes Sister     Basal cell carcinoma  Sister         brother    Uterine cancer Maternal Grandmother     Hypertension Maternal Grandmother     Breast cancer Maternal Aunt     Cancer Maternal Aunt         breast cancer    Cancer Brother         nasal cancer     Breast cancer Paternal Aunt     Breast cancer Paternal Aunt     Breast cancer Paternal Aunt     Colon cancer Neg Hx     Ovarian cancer Neg Hx      Social History     Tobacco Use    Smoking status: Never Smoker    Smokeless tobacco: Never Used   Substance Use Topics    Alcohol use: Yes     Alcohol/week: 5.0 - 6.0 standard drinks     Types: 5 - 6 Standard drinks or equivalent per week     Comment: wine every night    Drug use: No       Current Outpatient Medications   Medication Sig    alendronate (FOSAMAX) 70 MG tablet TAKE 1 TABLET BY MOUTH EVERY 7 DAYS    ascorbic acid (VITAMIN C) 1000 MG tablet     buPROPion (WELLBUTRIN XL) 300 MG 24 hr tablet TAKE 1 TABLET BY MOUTH EVERY DAY    chlorthalidone (HYGROTEN) 25 MG Tab TAKE 1 TABLET BY MOUTH EVERY DAY    cyanocobalamin, vitamin B-12, (VITAMIN B-12) 1,000 mcg Lozg     fluocinonide (LIDEX) 0.05 % external solution USE ONCE TO BID ON SCALP FOR RASH OR IRRITATION    glucosamine-chondroitin 500-400 mg tablet Take 1 tablet by mouth daily as needed.     hydrOXYzine HCl (ATARAX) 10 MG Tab 10 mg as needed.     ketoconazole (NIZORAL) 2 % shampoo USE ON SCALP THREE TIMES A WEEK    lidocaine (LIDODERM) 5 % Place 1 patch onto the skin once daily. Remove & Discard patch within 12 hours. Do not use more than 1 patch in 24 hours.    multivitamin with minerals (MULTIPLE VITAMIN-MINERALS) tablet Take by mouth. 1 Tablet Oral Every day    omega-3 fatty acids-vitamin E (FISH OIL) 1,000 mg Cap     red yeast rice 600 mg Cap Take by mouth.    SUBHAHS'S WORT/S.GINSGR (SUBHASH'S WORT-SIBER.GINSENG) 450-90 mg Tab     VALERIAN ROOT ORAL Take by mouth 2 (two) times daily as needed.    vitamin E 1000 UNIT capsule Take 1,000 Units by mouth once  daily.     No current facility-administered medications for this visit.        Review of Systems:  Review of Systems   Constitutional: Negative for appetite change, fever and unexpected weight change.   Respiratory: Negative for shortness of breath.    Cardiovascular: Negative for chest pain.   Gastrointestinal: Negative for nausea and vomiting.   Genitourinary: Negative for dyspareunia, frequency, genital sores, pelvic pain, urgency, vaginal bleeding, vaginal discharge, vaginal pain, urinary incontinence, postcoital bleeding, postmenopausal bleeding and vaginal odor.   Integumentary:  Negative for breast mass.   Breast: Negative for lump, mass and mastodynia       OBJECTIVE:     Physical Exam:  Physical Exam   Constitutional: She is oriented to person, place, and time. She appears well-developed and well-nourished.   Neck: Normal range of motion. Neck supple. No tracheal deviation present. No thyromegaly present.   Cardiovascular: Normal rate, regular rhythm and normal heart sounds.   Pulmonary/Chest: Effort normal and breath sounds normal. Right breast exhibits no inverted nipple, no mass, no nipple discharge, no skin change and no tenderness. Left breast exhibits no inverted nipple, no mass, no nipple discharge, no skin change and no tenderness. Breasts are symmetrical.   Abdominal: Soft.   Genitourinary: Vagina normal and uterus normal. No labial fusion. There is no rash, tenderness, lesion or injury on the right labia. There is no rash, tenderness, lesion or injury on the left labia. Uterus is not deviated, not enlarged, not fixed and not tender. Cervix exhibits no motion tenderness, no discharge and no friability. Right adnexum displays no mass, no tenderness and no fullness. Left adnexum displays no mass, no tenderness and no fullness. No erythema, tenderness or bleeding in the vagina. No foreign body in the vagina. No signs of injury around the vagina. No vaginal discharge found.   Genitourinary Comments:  Urethra: normal appearing urethra with no masses, tenderness or lesions  Urethral meatus: normal size, anterior vaginal wall with no prolapse, no lesions   Neurological: She is alert and oriented to person, place, and time.   Psychiatric: She has a normal mood and affect. Her behavior is normal. Judgment and thought content normal.   Nursing note and vitals reviewed.      Chaperoned by: Abhay    ASSESSMENT:       ICD-10-CM ICD-9-CM    1. Well woman exam with routine gynecological exam Z01.419 V72.31 Liquid-Based Pap Smear, Screening   2. Encounter to establish care with new doctor Z76.89 V65.8 Ambulatory referral/consult to Obstetrics / Gynecology          Plan:      Dana was seen today for annual exam.    Diagnoses and all orders for this visit:    Well woman exam with routine gynecological exam  -     Liquid-Based Pap Smear, Screening    Encounter to establish care with new doctor  -     Ambulatory referral/consult to Obstetrics / Gynecology        No orders of the defined types were placed in this encounter.      Well Woman:   - Pap smear: done today per request  - Mammogram: up to date  - Colonoscopy: done recently per patient  - Dexa: with pcp  - Immunizations: with pcp  - Labs: with pcp  - Exercise counseling provided    Follow up in one year for annual, or prn.    Tere Huynh

## 2020-02-28 NOTE — LETTER
February 28, 2020      Mariam Johnson MD  [address]  [place] [address]           [facility] - OB/GYN 5th Floor  [address]  [place] [address]  Phone: [phone]          Patient: Dana Cook   MR Number: [id number]   YOB: 1950   Date of Visit: 2/28/2020       Dear Dr. Mariam Johnson:    Thank you for referring Dana Cook to me for evaluation. Attached you will find relevant portions of my assessment and plan of care.    If you have questions, please do not hesitate to call me. I look forward to following Dana Cook along with you.    Sincerely,    Tere Huynh MD    Enclosure  CC:  No Recipients    If you would like to receive this communication electronically, please contact [email or web address] or [phone] to request more information on [employer] access.    For providers and/or their staff who would like to refer a patient to [facility], please contact us through our one-stop-shop provider referral line, [facility], at [phone].    If you feel you have received this communication in error or would no longer like to receive these types of communications, please e-mail [email or web address]

## 2020-02-28 NOTE — TELEPHONE ENCOUNTER
The patient will get a copy of her immunizations and bring them in on her next visit or she will have [employer] fax us a copy.

## 2020-03-02 ENCOUNTER — PATIENT MESSAGE (OUTPATIENT)
Dept: INTERNAL MEDICINE | Facility: CLINIC | Age: 70
End: 2020-03-02

## 2020-03-02 DIAGNOSIS — E78.2 MIXED HYPERLIPIDEMIA: Primary | ICD-10-CM

## 2020-03-14 RX ORDER — CHLORTHALIDONE 25 MG/1
TABLET ORAL
Qty: 90 TABLET | Refills: 0 | Status: SHIPPED | OUTPATIENT
Start: 2020-03-14 | End: 2020-06-07 | Stop reason: SDUPTHER

## 2020-03-28 LAB
FINAL PATHOLOGIC DIAGNOSIS: NORMAL
Lab: NORMAL

## 2020-05-20 ENCOUNTER — OFFICE VISIT (OUTPATIENT)
Dept: INTERNAL MEDICINE | Facility: CLINIC | Age: 70
End: 2020-05-20
Payer: MEDICARE

## 2020-05-20 ENCOUNTER — LAB VISIT (OUTPATIENT)
Dept: LAB | Facility: HOSPITAL | Age: 70
End: 2020-05-20
Attending: HOSPITALIST
Payer: MEDICARE

## 2020-05-20 VITALS
HEART RATE: 70 BPM | DIASTOLIC BLOOD PRESSURE: 70 MMHG | HEIGHT: 65 IN | BODY MASS INDEX: 29.02 KG/M2 | WEIGHT: 174.19 LBS | RESPIRATION RATE: 18 BRPM | OXYGEN SATURATION: 98 % | SYSTOLIC BLOOD PRESSURE: 112 MMHG | TEMPERATURE: 98 F

## 2020-05-20 DIAGNOSIS — F32.A DEPRESSION, UNSPECIFIED DEPRESSION TYPE: ICD-10-CM

## 2020-05-20 DIAGNOSIS — E78.2 MIXED HYPERLIPIDEMIA: Primary | ICD-10-CM

## 2020-05-20 DIAGNOSIS — Z20.822 EXPOSURE TO COVID-19 VIRUS: ICD-10-CM

## 2020-05-20 DIAGNOSIS — I10 ESSENTIAL HYPERTENSION: ICD-10-CM

## 2020-05-20 DIAGNOSIS — E78.2 MIXED HYPERLIPIDEMIA: ICD-10-CM

## 2020-05-20 LAB
CHOLEST SERPL-MCNC: 241 MG/DL (ref 120–199)
CHOLEST/HDLC SERPL: 3.7 {RATIO} (ref 2–5)
HDLC SERPL-MCNC: 65 MG/DL (ref 40–75)
HDLC SERPL: 27 % (ref 20–50)
LDLC SERPL CALC-MCNC: 149.2 MG/DL (ref 63–159)
NONHDLC SERPL-MCNC: 176 MG/DL
TRIGL SERPL-MCNC: 134 MG/DL (ref 30–150)

## 2020-05-20 PROCEDURE — 99999 PR PBB SHADOW E&M-EST. PATIENT-LVL III: CPT | Mod: PBBFAC,,, | Performed by: HOSPITALIST

## 2020-05-20 PROCEDURE — 99213 OFFICE O/P EST LOW 20 MIN: CPT | Mod: PBBFAC,PO | Performed by: HOSPITALIST

## 2020-05-20 PROCEDURE — 80061 LIPID PANEL: CPT

## 2020-05-20 PROCEDURE — 36415 COLL VENOUS BLD VENIPUNCTURE: CPT | Mod: PO

## 2020-05-20 PROCEDURE — 99213 PR OFFICE/OUTPT VISIT, EST, LEVL III, 20-29 MIN: ICD-10-PCS | Mod: S$PBB,,, | Performed by: HOSPITALIST

## 2020-05-20 PROCEDURE — 99213 OFFICE O/P EST LOW 20 MIN: CPT | Mod: S$PBB,,, | Performed by: HOSPITALIST

## 2020-05-20 PROCEDURE — 99999 PR PBB SHADOW E&M-EST. PATIENT-LVL III: ICD-10-PCS | Mod: PBBFAC,,, | Performed by: HOSPITALIST

## 2020-06-07 ENCOUNTER — PATIENT MESSAGE (OUTPATIENT)
Dept: INTERNAL MEDICINE | Facility: CLINIC | Age: 70
End: 2020-06-07

## 2020-06-07 RX ORDER — CHLORTHALIDONE 25 MG/1
25 TABLET ORAL DAILY
Qty: 90 TABLET | Refills: 1 | Status: SHIPPED | OUTPATIENT
Start: 2020-06-07 | End: 2020-12-22

## 2020-06-23 ENCOUNTER — PATIENT MESSAGE (OUTPATIENT)
Dept: INTERNAL MEDICINE | Facility: CLINIC | Age: 70
End: 2020-06-23

## 2020-09-09 ENCOUNTER — PATIENT MESSAGE (OUTPATIENT)
Dept: INTERNAL MEDICINE | Facility: CLINIC | Age: 70
End: 2020-09-09

## 2020-09-09 DIAGNOSIS — F32.A DEPRESSION, UNSPECIFIED DEPRESSION TYPE: ICD-10-CM

## 2020-09-09 RX ORDER — BUPROPION HYDROCHLORIDE 300 MG/1
300 TABLET ORAL DAILY
Qty: 90 TABLET | Refills: 3 | Status: SHIPPED | OUTPATIENT
Start: 2020-09-09 | End: 2021-08-27

## 2020-09-09 RX ORDER — ATORVASTATIN CALCIUM 10 MG/1
10 TABLET, FILM COATED ORAL DAILY
Qty: 90 TABLET | Refills: 3 | Status: SHIPPED | OUTPATIENT
Start: 2020-09-09 | End: 2021-08-27

## 2020-09-10 ENCOUNTER — PATIENT MESSAGE (OUTPATIENT)
Dept: INTERNAL MEDICINE | Facility: CLINIC | Age: 70
End: 2020-09-10

## 2020-12-30 ENCOUNTER — PATIENT MESSAGE (OUTPATIENT)
Dept: OBSTETRICS AND GYNECOLOGY | Facility: CLINIC | Age: 70
End: 2020-12-30

## 2020-12-30 DIAGNOSIS — Z12.31 ENCOUNTER FOR SCREENING MAMMOGRAM FOR MALIGNANT NEOPLASM OF BREAST: Primary | ICD-10-CM

## 2021-01-04 ENCOUNTER — PATIENT MESSAGE (OUTPATIENT)
Dept: INTERNAL MEDICINE | Facility: CLINIC | Age: 71
End: 2021-01-04

## 2021-01-05 ENCOUNTER — PATIENT MESSAGE (OUTPATIENT)
Dept: INTERNAL MEDICINE | Facility: CLINIC | Age: 71
End: 2021-01-05

## 2021-03-04 ENCOUNTER — HOSPITAL ENCOUNTER (OUTPATIENT)
Dept: RADIOLOGY | Facility: HOSPITAL | Age: 71
Discharge: HOME | End: 2021-03-04
Attending: OBSTETRICS & GYNECOLOGY
Payer: MEDICARE

## 2021-03-04 ENCOUNTER — OFFICE VISIT (OUTPATIENT)
Dept: OBSTETRICS AND GYNECOLOGY | Facility: CLINIC | Age: 71
End: 2021-03-04
Payer: MEDICARE

## 2021-03-04 VITALS
WEIGHT: 179.69 LBS | HEIGHT: 65 IN | DIASTOLIC BLOOD PRESSURE: 100 MMHG | SYSTOLIC BLOOD PRESSURE: 158 MMHG | BODY MASS INDEX: 29.94 KG/M2

## 2021-03-04 DIAGNOSIS — Z12.31 ENCOUNTER FOR SCREENING MAMMOGRAM FOR MALIGNANT NEOPLASM OF BREAST: ICD-10-CM

## 2021-03-04 DIAGNOSIS — Z01.419 WELL WOMAN EXAM WITH ROUTINE GYNECOLOGICAL EXAM: Primary | ICD-10-CM

## 2021-03-04 PROCEDURE — 88175 CYTOPATH C/V AUTO FLUID REDO: CPT | Performed by: OBSTETRICS & GYNECOLOGY

## 2021-03-04 PROCEDURE — 77063 BREAST TOMOSYNTHESIS BI: CPT | Mod: 26,,, | Performed by: RADIOLOGY

## 2021-03-04 PROCEDURE — 77067 MAMMO DIGITAL SCREENING BILAT WITH TOMO: ICD-10-PCS | Mod: 26,,, | Performed by: RADIOLOGY

## 2021-03-04 PROCEDURE — 99213 OFFICE O/P EST LOW 20 MIN: CPT | Mod: PBBFAC,25 | Performed by: OBSTETRICS & GYNECOLOGY

## 2021-03-04 PROCEDURE — G0101 PR CA SCREEN;PELVIC/BREAST EXAM: ICD-10-PCS | Mod: S$PBB,,, | Performed by: OBSTETRICS & GYNECOLOGY

## 2021-03-04 PROCEDURE — 77063 MAMMO DIGITAL SCREENING BILAT WITH TOMO: ICD-10-PCS | Mod: 26,,, | Performed by: RADIOLOGY

## 2021-03-04 PROCEDURE — 99999 PR PBB SHADOW E&M-EST. PATIENT-LVL III: ICD-10-PCS | Mod: PBBFAC,,, | Performed by: OBSTETRICS & GYNECOLOGY

## 2021-03-04 PROCEDURE — 77067 SCR MAMMO BI INCL CAD: CPT | Mod: TC

## 2021-03-04 PROCEDURE — G0101 CA SCREEN;PELVIC/BREAST EXAM: HCPCS | Mod: S$PBB,,, | Performed by: OBSTETRICS & GYNECOLOGY

## 2021-03-04 PROCEDURE — 99999 PR PBB SHADOW E&M-EST. PATIENT-LVL III: CPT | Mod: PBBFAC,,, | Performed by: OBSTETRICS & GYNECOLOGY

## 2021-03-04 PROCEDURE — G0101 CA SCREEN;PELVIC/BREAST EXAM: HCPCS | Mod: PBBFAC | Performed by: OBSTETRICS & GYNECOLOGY

## 2021-03-04 PROCEDURE — 77067 SCR MAMMO BI INCL CAD: CPT | Mod: 26,,, | Performed by: RADIOLOGY

## 2021-03-05 ENCOUNTER — TELEPHONE (OUTPATIENT)
Dept: RADIOLOGY | Facility: HOSPITAL | Age: 71
End: 2021-03-05

## 2021-03-08 ENCOUNTER — HOSPITAL ENCOUNTER (OUTPATIENT)
Dept: RADIOLOGY | Facility: HOSPITAL | Age: 71
Discharge: HOME | End: 2021-03-08
Attending: OBSTETRICS & GYNECOLOGY
Payer: MEDICARE

## 2021-03-08 DIAGNOSIS — R92.8 ABNORMAL MAMMOGRAM: ICD-10-CM

## 2021-03-08 PROCEDURE — 77061 MAMMO DIGITAL DIAGNOSTIC LEFT WITH TOMO: ICD-10-PCS | Mod: 26,LT,, | Performed by: RADIOLOGY

## 2021-03-08 PROCEDURE — 77061 BREAST TOMOSYNTHESIS UNI: CPT | Mod: 26,LT,, | Performed by: RADIOLOGY

## 2021-03-08 PROCEDURE — 77061 BREAST TOMOSYNTHESIS UNI: CPT | Mod: TC,LT

## 2021-03-08 PROCEDURE — 76642 US BREAST LEFT LIMITED: ICD-10-PCS | Mod: 26,LT,, | Performed by: RADIOLOGY

## 2021-03-08 PROCEDURE — 77065 DX MAMMO INCL CAD UNI: CPT | Mod: 26,LT,, | Performed by: RADIOLOGY

## 2021-03-08 PROCEDURE — 76642 ULTRASOUND BREAST LIMITED: CPT | Mod: TC,LT

## 2021-03-08 PROCEDURE — 76642 ULTRASOUND BREAST LIMITED: CPT | Mod: 26,LT,, | Performed by: RADIOLOGY

## 2021-03-08 PROCEDURE — 77065 MAMMO DIGITAL DIAGNOSTIC LEFT WITH TOMO: ICD-10-PCS | Mod: 26,LT,, | Performed by: RADIOLOGY

## 2021-03-09 ENCOUNTER — TELEPHONE (OUTPATIENT)
Dept: RADIOLOGY | Facility: HOSPITAL | Age: 71
End: 2021-03-09

## 2021-03-16 LAB
FINAL PATHOLOGIC DIAGNOSIS: NORMAL
Lab: NORMAL

## 2021-03-19 ENCOUNTER — HOSPITAL ENCOUNTER (OUTPATIENT)
Dept: RADIOLOGY | Facility: HOSPITAL | Age: 71
Discharge: HOME | End: 2021-03-19
Attending: OBSTETRICS & GYNECOLOGY
Payer: MEDICARE

## 2021-03-19 DIAGNOSIS — R92.8 ABNORMAL MAMMOGRAM: ICD-10-CM

## 2021-03-19 PROCEDURE — 77065 MAMMO DIGITAL DIAGNOSTIC LEFT: ICD-10-PCS | Mod: 26,LT,, | Performed by: RADIOLOGY

## 2021-03-19 PROCEDURE — 19083 BX BREAST 1ST LESION US IMAG: CPT | Mod: LT,,, | Performed by: RADIOLOGY

## 2021-03-19 PROCEDURE — 19083 US BREAST BIOPSY WITH IMAGING 1ST SITE LEFT: ICD-10-PCS | Mod: LT,,, | Performed by: RADIOLOGY

## 2021-03-19 PROCEDURE — 88342 CHG IMMUNOCYTOCHEMISTRY: ICD-10-PCS | Mod: 26,XU,, | Performed by: PATHOLOGY

## 2021-03-19 PROCEDURE — 88342 IMHCHEM/IMCYTCHM 1ST ANTB: CPT | Mod: 59 | Performed by: PATHOLOGY

## 2021-03-19 PROCEDURE — 19081 MAMMO BREAST STEREOTACTIC BREAST BIOPSY LEFT: ICD-10-PCS | Mod: LT,,, | Performed by: RADIOLOGY

## 2021-03-19 PROCEDURE — 77065 DX MAMMO INCL CAD UNI: CPT | Mod: TC,LT

## 2021-03-19 PROCEDURE — 88377 M/PHMTRC ALYS ISHQUANT/SEMIQ: CPT | Performed by: PATHOLOGY

## 2021-03-19 PROCEDURE — 25000003 PHARM REV CODE 250: Performed by: OBSTETRICS & GYNECOLOGY

## 2021-03-19 PROCEDURE — 88360 TUMOR IMMUNOHISTOCHEM/MANUAL: CPT | Mod: 26,,, | Performed by: PATHOLOGY

## 2021-03-19 PROCEDURE — 88360 TUMOR IMMUNOHISTOCHEM/MANUAL: CPT | Performed by: PATHOLOGY

## 2021-03-19 PROCEDURE — 88342 IMHCHEM/IMCYTCHM 1ST ANTB: CPT | Mod: 26,XU,, | Performed by: PATHOLOGY

## 2021-03-19 PROCEDURE — 19081 BX BREAST 1ST LESION STRTCTC: CPT | Mod: LT,,, | Performed by: RADIOLOGY

## 2021-03-19 PROCEDURE — 27200939 MAMMO BREAST STEREOTACTIC BREAST BIOPSY LEFT

## 2021-03-19 PROCEDURE — 88305 TISSUE EXAM BY PATHOLOGIST: CPT | Mod: 59 | Performed by: PATHOLOGY

## 2021-03-19 PROCEDURE — 77065 DX MAMMO INCL CAD UNI: CPT | Mod: 26,LT,, | Performed by: RADIOLOGY

## 2021-03-19 PROCEDURE — 88305 TISSUE EXAM BY PATHOLOGIST: CPT | Mod: 26,,, | Performed by: PATHOLOGY

## 2021-03-19 PROCEDURE — 88360 PR  TUMOR IMMUNOHISTOCHEM/MANUAL: ICD-10-PCS | Mod: 26,,, | Performed by: PATHOLOGY

## 2021-03-19 PROCEDURE — 88305 TISSUE EXAM BY PATHOLOGIST: ICD-10-PCS | Mod: 26,,, | Performed by: PATHOLOGY

## 2021-03-19 PROCEDURE — 27201068 US BREAST BIOPSY WITH IMAGING 1ST SITE LEFT

## 2021-03-19 RX ORDER — LIDOCAINE HYDROCHLORIDE 10 MG/ML
5 INJECTION INFILTRATION; PERINEURAL ONCE
Status: COMPLETED | OUTPATIENT
Start: 2021-03-19 | End: 2021-03-19

## 2021-03-19 RX ORDER — LIDOCAINE HYDROCHLORIDE AND EPINEPHRINE 20; 10 MG/ML; UG/ML
20 INJECTION, SOLUTION INFILTRATION; PERINEURAL ONCE
Status: COMPLETED | OUTPATIENT
Start: 2021-03-19 | End: 2021-03-19

## 2021-03-19 RX ADMIN — LIDOCAINE HYDROCHLORIDE AND EPINEPHRINE 10 ML: 20; 10 INJECTION, SOLUTION INFILTRATION; PERINEURAL at 12:03

## 2021-03-19 RX ADMIN — LIDOCAINE HYDROCHLORIDE 3 ML: 10 INJECTION, SOLUTION EPIDURAL; INFILTRATION; INTRACAUDAL; PERINEURAL at 12:03

## 2021-03-19 RX ADMIN — LIDOCAINE HYDROCHLORIDE AND EPINEPHRINE 20 ML: 20; 10 INJECTION, SOLUTION INFILTRATION; PERINEURAL at 12:03

## 2021-03-25 ENCOUNTER — TELEPHONE (OUTPATIENT)
Dept: SURGERY | Facility: CLINIC | Age: 71
End: 2021-03-25

## 2021-03-25 ENCOUNTER — PATIENT MESSAGE (OUTPATIENT)
Dept: INTERNAL MEDICINE | Facility: CLINIC | Age: 71
End: 2021-03-25

## 2021-03-25 DIAGNOSIS — C50.919 BREAST CANCER: Primary | ICD-10-CM

## 2021-03-29 ENCOUNTER — TELEPHONE (OUTPATIENT)
Dept: INTERNAL MEDICINE | Facility: CLINIC | Age: 71
End: 2021-03-29

## 2021-03-30 ENCOUNTER — TELEPHONE (OUTPATIENT)
Dept: INTERNAL MEDICINE | Facility: CLINIC | Age: 71
End: 2021-03-30

## 2021-03-30 ENCOUNTER — PATIENT MESSAGE (OUTPATIENT)
Dept: HEMATOLOGY/ONCOLOGY | Facility: CLINIC | Age: 71
End: 2021-03-30

## 2021-03-30 DIAGNOSIS — I10 ESSENTIAL HYPERTENSION: ICD-10-CM

## 2021-03-30 DIAGNOSIS — E66.9 OBESITY (BMI 30.0-34.9): ICD-10-CM

## 2021-03-30 DIAGNOSIS — E78.2 MIXED HYPERLIPIDEMIA: Primary | ICD-10-CM

## 2021-03-30 DIAGNOSIS — C50.919 BREAST CANCER: Primary | ICD-10-CM

## 2021-04-01 ENCOUNTER — HOSPITAL ENCOUNTER (OUTPATIENT)
Dept: RADIOLOGY | Facility: OTHER | Age: 71
Discharge: HOME | End: 2021-04-01
Attending: SURGERY
Payer: MEDICARE

## 2021-04-01 DIAGNOSIS — C50.919 BREAST CANCER: ICD-10-CM

## 2021-04-01 PROCEDURE — 25500020 PHARM REV CODE 255: Performed by: SURGERY

## 2021-04-01 PROCEDURE — C8937 CAD BREAST MRI: HCPCS | Mod: TC

## 2021-04-01 PROCEDURE — 77049 MRI BREAST W/WO CONTRAST, W/CAD, BILATERAL: ICD-10-PCS | Mod: 26,,, | Performed by: RADIOLOGY

## 2021-04-01 PROCEDURE — A9577 INJ MULTIHANCE: HCPCS | Performed by: SURGERY

## 2021-04-01 PROCEDURE — 77049 MRI BREAST C-+ W/CAD BI: CPT | Mod: 26,,, | Performed by: RADIOLOGY

## 2021-04-01 RX ADMIN — GADOBENATE DIMEGLUMINE 16 ML: 529 INJECTION, SOLUTION INTRAVENOUS at 08:04

## 2021-04-05 LAB
FINAL PATHOLOGIC DIAGNOSIS: NORMAL
GROSS: NORMAL
Lab: NORMAL
SUPPLEMENTAL DIAGNOSIS: NORMAL

## 2021-04-06 ENCOUNTER — PATIENT MESSAGE (OUTPATIENT)
Dept: SURGERY | Facility: CLINIC | Age: 71
End: 2021-04-06

## 2021-04-06 ENCOUNTER — HOSPITAL ENCOUNTER (OUTPATIENT)
Dept: RADIOLOGY | Facility: HOSPITAL | Age: 71
Discharge: HOME | End: 2021-04-06
Attending: SURGERY
Payer: MEDICARE

## 2021-04-06 ENCOUNTER — OFFICE VISIT (OUTPATIENT)
Dept: SURGERY | Facility: CLINIC | Age: 71
End: 2021-04-06
Payer: MEDICARE

## 2021-04-06 VITALS
WEIGHT: 180 LBS | SYSTOLIC BLOOD PRESSURE: 161 MMHG | HEART RATE: 72 BPM | HEIGHT: 65 IN | BODY MASS INDEX: 29.99 KG/M2 | DIASTOLIC BLOOD PRESSURE: 68 MMHG | TEMPERATURE: 96 F

## 2021-04-06 DIAGNOSIS — R92.8 ABNORMAL MAMMOGRAM: ICD-10-CM

## 2021-04-06 DIAGNOSIS — C50.312 MALIGNANT NEOPLASM OF LOWER-INNER QUADRANT OF LEFT BREAST IN FEMALE, ESTROGEN RECEPTOR POSITIVE: Primary | ICD-10-CM

## 2021-04-06 DIAGNOSIS — C50.919 INVASIVE LOBULAR CARCINOMA OF BREAST IN FEMALE: ICD-10-CM

## 2021-04-06 DIAGNOSIS — Z17.0 MALIGNANT NEOPLASM OF LOWER-INNER QUADRANT OF LEFT BREAST IN FEMALE, ESTROGEN RECEPTOR POSITIVE: Primary | ICD-10-CM

## 2021-04-06 PROCEDURE — 77066 DX MAMMO INCL CAD BI: CPT | Mod: 26,,, | Performed by: RADIOLOGY

## 2021-04-06 PROCEDURE — 76642 ULTRASOUND BREAST LIMITED: CPT | Mod: TC,50

## 2021-04-06 PROCEDURE — 76642 ULTRASOUND BREAST LIMITED: CPT | Mod: 26,50,, | Performed by: RADIOLOGY

## 2021-04-06 PROCEDURE — 77066 DX MAMMO INCL CAD BI: CPT | Mod: TC

## 2021-04-06 PROCEDURE — 99999 PR PBB SHADOW E&M-EST. PATIENT-LVL IV: ICD-10-PCS | Mod: PBBFAC,,, | Performed by: SURGERY

## 2021-04-06 PROCEDURE — 76642 US BREAST BILATERAL LIMITED: ICD-10-PCS | Mod: 26,50,, | Performed by: RADIOLOGY

## 2021-04-06 PROCEDURE — 99999 PR PBB SHADOW E&M-EST. PATIENT-LVL IV: CPT | Mod: PBBFAC,,, | Performed by: SURGERY

## 2021-04-06 PROCEDURE — 99205 PR OFFICE/OUTPT VISIT, NEW, LEVL V, 60-74 MIN: ICD-10-PCS | Mod: S$PBB,,, | Performed by: SURGERY

## 2021-04-06 PROCEDURE — 77066 MAMMO DIGITAL DIAGNOSTIC BILAT WITH TOMO: ICD-10-PCS | Mod: 26,,, | Performed by: RADIOLOGY

## 2021-04-06 PROCEDURE — 77062 BREAST TOMOSYNTHESIS BI: CPT | Mod: 26,,, | Performed by: RADIOLOGY

## 2021-04-06 PROCEDURE — 77062 MAMMO DIGITAL DIAGNOSTIC BILAT WITH TOMO: ICD-10-PCS | Mod: 26,,, | Performed by: RADIOLOGY

## 2021-04-06 PROCEDURE — 99205 OFFICE O/P NEW HI 60 MIN: CPT | Mod: S$PBB,,, | Performed by: SURGERY

## 2021-04-06 PROCEDURE — 99214 OFFICE O/P EST MOD 30 MIN: CPT | Mod: PBBFAC | Performed by: SURGERY

## 2021-04-07 ENCOUNTER — DOCUMENTATION ONLY (OUTPATIENT)
Dept: HEMATOLOGY/ONCOLOGY | Facility: CLINIC | Age: 71
End: 2021-04-07

## 2021-04-07 ENCOUNTER — TELEPHONE (OUTPATIENT)
Dept: RADIOLOGY | Facility: HOSPITAL | Age: 71
End: 2021-04-07

## 2021-04-09 ENCOUNTER — PATIENT MESSAGE (OUTPATIENT)
Dept: SURGERY | Facility: CLINIC | Age: 71
End: 2021-04-09

## 2021-04-09 ENCOUNTER — TELEPHONE (OUTPATIENT)
Dept: SURGERY | Facility: CLINIC | Age: 71
End: 2021-04-09

## 2021-04-13 ENCOUNTER — HOSPITAL ENCOUNTER (OUTPATIENT)
Dept: RADIOLOGY | Facility: HOSPITAL | Age: 71
Discharge: HOME | End: 2021-04-13
Attending: SURGERY
Payer: MEDICARE

## 2021-04-13 DIAGNOSIS — C50.312 MALIGNANT NEOPLASM OF LOWER-INNER QUADRANT OF LEFT BREAST IN FEMALE, ESTROGEN RECEPTOR POSITIVE: ICD-10-CM

## 2021-04-13 DIAGNOSIS — Z17.0 MALIGNANT NEOPLASM OF LOWER-INNER QUADRANT OF LEFT BREAST IN FEMALE, ESTROGEN RECEPTOR POSITIVE: ICD-10-CM

## 2021-04-13 PROCEDURE — 77066 DX MAMMO INCL CAD BI: CPT | Mod: TC

## 2021-04-13 PROCEDURE — 27200939 MRI BREAST BIOPSY WITH IMAGING EA ADD SITE

## 2021-04-13 PROCEDURE — 19086 MRI BREAST BIOPSY WITH IMAGING EA ADD SITE: ICD-10-PCS | Mod: LT,,, | Performed by: RADIOLOGY

## 2021-04-13 PROCEDURE — 19085 BX BREAST 1ST LESION MR IMAG: CPT | Mod: RT,,, | Performed by: RADIOLOGY

## 2021-04-13 PROCEDURE — 25000003 PHARM REV CODE 250: Performed by: SURGERY

## 2021-04-13 PROCEDURE — 77066 DX MAMMO INCL CAD BI: CPT | Mod: 26,,, | Performed by: RADIOLOGY

## 2021-04-13 PROCEDURE — 88305 TISSUE EXAM BY PATHOLOGIST: CPT | Mod: 59 | Performed by: PATHOLOGY

## 2021-04-13 PROCEDURE — 19086 BX BREAST ADD LESION MR IMAG: CPT | Mod: TC

## 2021-04-13 PROCEDURE — 88305 TISSUE EXAM BY PATHOLOGIST: CPT | Mod: 26,,, | Performed by: PATHOLOGY

## 2021-04-13 PROCEDURE — 19085 PR BX BRST, 1ST LESION, MR GUIDANCE: ICD-10-PCS | Mod: RT,,, | Performed by: RADIOLOGY

## 2021-04-13 PROCEDURE — A9577 INJ MULTIHANCE: HCPCS | Performed by: SURGERY

## 2021-04-13 PROCEDURE — 25500020 PHARM REV CODE 255: Performed by: SURGERY

## 2021-04-13 PROCEDURE — 19086 BX BREAST ADD LESION MR IMAG: CPT | Mod: LT,,, | Performed by: RADIOLOGY

## 2021-04-13 PROCEDURE — 88305 TISSUE EXAM BY PATHOLOGIST: ICD-10-PCS | Mod: 26,,, | Performed by: PATHOLOGY

## 2021-04-13 PROCEDURE — 77066 MAMMO DIGITAL DIAGNOSTIC BILAT: ICD-10-PCS | Mod: 26,,, | Performed by: RADIOLOGY

## 2021-04-13 PROCEDURE — 27200939 MRI BREAST BIOPSY WITH IMAGING 1ST SITE

## 2021-04-13 RX ORDER — LIDOCAINE HYDROCHLORIDE AND EPINEPHRINE 20; 10 MG/ML; UG/ML
20 INJECTION, SOLUTION INFILTRATION; PERINEURAL ONCE
Status: DISCONTINUED | OUTPATIENT
Start: 2021-04-13 | End: 2021-04-14 | Stop reason: HOSPADM

## 2021-04-13 RX ORDER — LIDOCAINE HYDROCHLORIDE 10 MG/ML
2 INJECTION, SOLUTION EPIDURAL; INFILTRATION; INTRACAUDAL; PERINEURAL ONCE
Status: COMPLETED | OUTPATIENT
Start: 2021-04-13 | End: 2021-04-13

## 2021-04-13 RX ORDER — LIDOCAINE HYDROCHLORIDE 10 MG/ML
1.5 INJECTION, SOLUTION EPIDURAL; INFILTRATION; INTRACAUDAL; PERINEURAL ONCE
Status: COMPLETED | OUTPATIENT
Start: 2021-04-13 | End: 2021-04-13

## 2021-04-13 RX ORDER — LIDOCAINE HYDROCHLORIDE AND EPINEPHRINE 10; 10 MG/ML; UG/ML
20 INJECTION, SOLUTION INFILTRATION; PERINEURAL ONCE
Status: COMPLETED | OUTPATIENT
Start: 2021-04-13 | End: 2021-04-13

## 2021-04-13 RX ADMIN — GADOBENATE DIMEGLUMINE 18 ML: 529 INJECTION, SOLUTION INTRAVENOUS at 09:04

## 2021-04-13 RX ADMIN — LIDOCAINE HYDROCHLORIDE 1.5 ML: 10 INJECTION, SOLUTION EPIDURAL; INFILTRATION; INTRACAUDAL; PERINEURAL at 08:04

## 2021-04-13 RX ADMIN — LIDOCAINE HYDROCHLORIDE,EPINEPHRINE BITARTRATE 20 ML: 10; .01 INJECTION, SOLUTION INFILTRATION; PERINEURAL at 08:04

## 2021-04-13 RX ADMIN — LIDOCAINE HYDROCHLORIDE 2 ML: 10 INJECTION, SOLUTION EPIDURAL; INFILTRATION; INTRACAUDAL; PERINEURAL at 08:04

## 2021-04-14 ENCOUNTER — TELEPHONE (OUTPATIENT)
Dept: SURGERY | Facility: CLINIC | Age: 71
End: 2021-04-14

## 2021-04-14 LAB
COMMENT: NORMAL
FINAL PATHOLOGIC DIAGNOSIS: NORMAL
GROSS: NORMAL
Lab: NORMAL

## 2021-04-15 ENCOUNTER — PATIENT MESSAGE (OUTPATIENT)
Dept: INTERNAL MEDICINE | Facility: CLINIC | Age: 71
End: 2021-04-15

## 2021-04-15 ENCOUNTER — OFFICE VISIT (OUTPATIENT)
Dept: INTERNAL MEDICINE | Facility: CLINIC | Age: 71
End: 2021-04-15
Payer: MEDICARE

## 2021-04-15 VITALS
WEIGHT: 180.56 LBS | DIASTOLIC BLOOD PRESSURE: 83 MMHG | BODY MASS INDEX: 30.08 KG/M2 | SYSTOLIC BLOOD PRESSURE: 130 MMHG | OXYGEN SATURATION: 97 % | TEMPERATURE: 98 F | HEART RATE: 61 BPM | HEIGHT: 65 IN

## 2021-04-15 DIAGNOSIS — Z17.0 MALIGNANT NEOPLASM OF LOWER-INNER QUADRANT OF LEFT BREAST IN FEMALE, ESTROGEN RECEPTOR POSITIVE: ICD-10-CM

## 2021-04-15 DIAGNOSIS — F32.A DEPRESSION, UNSPECIFIED DEPRESSION TYPE: ICD-10-CM

## 2021-04-15 DIAGNOSIS — M85.80 OSTEOPENIA, UNSPECIFIED LOCATION: ICD-10-CM

## 2021-04-15 DIAGNOSIS — C50.312 MALIGNANT NEOPLASM OF LOWER-INNER QUADRANT OF LEFT BREAST IN FEMALE, ESTROGEN RECEPTOR POSITIVE: ICD-10-CM

## 2021-04-15 DIAGNOSIS — E78.2 MIXED HYPERLIPIDEMIA: ICD-10-CM

## 2021-04-15 DIAGNOSIS — Z00.00 ENCOUNTER FOR PREVENTIVE HEALTH EXAMINATION: Primary | ICD-10-CM

## 2021-04-15 DIAGNOSIS — E66.9 OBESITY (BMI 30.0-34.9): ICD-10-CM

## 2021-04-15 DIAGNOSIS — I10 ESSENTIAL HYPERTENSION: ICD-10-CM

## 2021-04-15 PROCEDURE — 99215 PR OFFICE/OUTPT VISIT, EST, LEVL V, 40-54 MIN: ICD-10-PCS | Mod: S$PBB,,, | Performed by: HOSPITALIST

## 2021-04-15 PROCEDURE — 99213 OFFICE O/P EST LOW 20 MIN: CPT | Mod: PBBFAC,PO | Performed by: HOSPITALIST

## 2021-04-15 PROCEDURE — 99215 OFFICE O/P EST HI 40 MIN: CPT | Mod: S$PBB,,, | Performed by: HOSPITALIST

## 2021-04-15 PROCEDURE — 99999 PR PBB SHADOW E&M-EST. PATIENT-LVL III: ICD-10-PCS | Mod: PBBFAC,,, | Performed by: HOSPITALIST

## 2021-04-15 PROCEDURE — 99999 PR PBB SHADOW E&M-EST. PATIENT-LVL III: CPT | Mod: PBBFAC,,, | Performed by: HOSPITALIST

## 2021-04-16 ENCOUNTER — PATIENT MESSAGE (OUTPATIENT)
Dept: INTERNAL MEDICINE | Facility: CLINIC | Age: 71
End: 2021-04-16

## 2021-04-16 RX ORDER — COLCHICINE 0.6 MG/1
TABLET ORAL
Qty: 10 TABLET | Refills: 2 | Status: SHIPPED | OUTPATIENT
Start: 2021-04-16

## 2021-04-17 ENCOUNTER — PATIENT MESSAGE (OUTPATIENT)
Dept: INTERNAL MEDICINE | Facility: CLINIC | Age: 71
End: 2021-04-17

## 2021-04-19 ENCOUNTER — OFFICE VISIT (OUTPATIENT)
Dept: SURGERY | Facility: CLINIC | Age: 71
End: 2021-04-19
Payer: MEDICARE

## 2021-04-19 VITALS
HEIGHT: 65 IN | SYSTOLIC BLOOD PRESSURE: 156 MMHG | BODY MASS INDEX: 29.99 KG/M2 | DIASTOLIC BLOOD PRESSURE: 72 MMHG | HEART RATE: 62 BPM | WEIGHT: 180 LBS

## 2021-04-19 DIAGNOSIS — C50.312 MALIGNANT NEOPLASM OF LOWER-INNER QUADRANT OF LEFT BREAST IN FEMALE, ESTROGEN RECEPTOR POSITIVE: Primary | ICD-10-CM

## 2021-04-19 DIAGNOSIS — Z17.0 MALIGNANT NEOPLASM OF LOWER-INNER QUADRANT OF LEFT BREAST IN FEMALE, ESTROGEN RECEPTOR POSITIVE: Primary | ICD-10-CM

## 2021-04-19 PROCEDURE — 99999 PR PBB SHADOW E&M-EST. PATIENT-LVL IV: ICD-10-PCS | Mod: PBBFAC,,, | Performed by: SURGERY

## 2021-04-19 PROCEDURE — 99215 OFFICE O/P EST HI 40 MIN: CPT | Mod: S$PBB,,, | Performed by: SURGERY

## 2021-04-19 PROCEDURE — 99999 PR PBB SHADOW E&M-EST. PATIENT-LVL IV: CPT | Mod: PBBFAC,,, | Performed by: SURGERY

## 2021-04-19 PROCEDURE — 99214 OFFICE O/P EST MOD 30 MIN: CPT | Mod: PBBFAC | Performed by: SURGERY

## 2021-04-19 PROCEDURE — 99215 PR OFFICE/OUTPT VISIT, EST, LEVL V, 40-54 MIN: ICD-10-PCS | Mod: S$PBB,,, | Performed by: SURGERY

## 2021-04-20 ENCOUNTER — TELEPHONE (OUTPATIENT)
Dept: HEMATOLOGY/ONCOLOGY | Facility: CLINIC | Age: 71
End: 2021-04-20

## 2021-04-20 ENCOUNTER — PATIENT MESSAGE (OUTPATIENT)
Dept: INTERNAL MEDICINE | Facility: CLINIC | Age: 71
End: 2021-04-20

## 2021-04-22 ENCOUNTER — SURGICAL CONSULT (OUTPATIENT)
Dept: PLASTIC SURGERY | Facility: CLINIC | Age: 71
End: 2021-04-22
Attending: PLASTIC SURGERY
Payer: MEDICARE

## 2021-04-22 VITALS
WEIGHT: 179.88 LBS | SYSTOLIC BLOOD PRESSURE: 118 MMHG | HEART RATE: 68 BPM | HEIGHT: 65 IN | DIASTOLIC BLOOD PRESSURE: 71 MMHG | BODY MASS INDEX: 29.97 KG/M2

## 2021-04-22 DIAGNOSIS — Z17.0 MALIGNANT NEOPLASM OF LOWER-INNER QUADRANT OF LEFT BREAST IN FEMALE, ESTROGEN RECEPTOR POSITIVE: Primary | ICD-10-CM

## 2021-04-22 DIAGNOSIS — C50.312 MALIGNANT NEOPLASM OF LOWER-INNER QUADRANT OF LEFT BREAST IN FEMALE, ESTROGEN RECEPTOR POSITIVE: Primary | ICD-10-CM

## 2021-04-22 PROCEDURE — 99202 OFFICE O/P NEW SF 15 MIN: CPT | Mod: S$GLB,,, | Performed by: PLASTIC SURGERY

## 2021-04-22 PROCEDURE — 99202 PR OFFICE/OUTPT VISIT, NEW, LEVL II, 15-29 MIN: ICD-10-PCS | Mod: S$GLB,,, | Performed by: PLASTIC SURGERY

## 2021-04-23 DIAGNOSIS — Z01.818 PRE-OP TESTING: ICD-10-CM

## 2021-04-26 RX ORDER — SODIUM CHLORIDE 9 MG/ML
INJECTION, SOLUTION INTRAVENOUS CONTINUOUS
Status: CANCELLED | OUTPATIENT
Start: 2021-04-26

## 2021-04-26 RX ORDER — CEFAZOLIN SODIUM 2 G/50ML
2 SOLUTION INTRAVENOUS
Status: CANCELLED | OUTPATIENT
Start: 2021-04-26

## 2021-04-27 ENCOUNTER — ANESTHESIA EVENT (OUTPATIENT)
Dept: SURGERY | Facility: OTHER | Age: 71
End: 2021-04-27
Payer: MEDICARE

## 2021-04-27 ENCOUNTER — HOSPITAL ENCOUNTER (OUTPATIENT)
Dept: PREADMISSION TESTING | Facility: OTHER | Age: 71
Discharge: HOME | End: 2021-04-27
Attending: PLASTIC SURGERY
Payer: MEDICARE

## 2021-04-27 ENCOUNTER — OFFICE VISIT (OUTPATIENT)
Dept: PLASTIC SURGERY | Facility: CLINIC | Age: 71
End: 2021-04-27
Attending: PLASTIC SURGERY
Payer: MEDICARE

## 2021-04-27 VITALS
HEIGHT: 65 IN | DIASTOLIC BLOOD PRESSURE: 63 MMHG | BODY MASS INDEX: 29.82 KG/M2 | SYSTOLIC BLOOD PRESSURE: 136 MMHG | HEART RATE: 57 BPM | WEIGHT: 179 LBS

## 2021-04-27 VITALS
BODY MASS INDEX: 29.82 KG/M2 | HEIGHT: 65 IN | TEMPERATURE: 97 F | DIASTOLIC BLOOD PRESSURE: 69 MMHG | OXYGEN SATURATION: 97 % | WEIGHT: 179 LBS | HEART RATE: 60 BPM | SYSTOLIC BLOOD PRESSURE: 157 MMHG

## 2021-04-27 DIAGNOSIS — C50.312 MALIGNANT NEOPLASM OF LOWER-INNER QUADRANT OF LEFT BREAST IN FEMALE, ESTROGEN RECEPTOR POSITIVE: Primary | ICD-10-CM

## 2021-04-27 DIAGNOSIS — Z17.0 MALIGNANT NEOPLASM OF LOWER-INNER QUADRANT OF LEFT BREAST IN FEMALE, ESTROGEN RECEPTOR POSITIVE: ICD-10-CM

## 2021-04-27 DIAGNOSIS — Z17.0 MALIGNANT NEOPLASM OF LOWER-INNER QUADRANT OF LEFT BREAST IN FEMALE, ESTROGEN RECEPTOR POSITIVE: Primary | ICD-10-CM

## 2021-04-27 DIAGNOSIS — C50.312 MALIGNANT NEOPLASM OF LOWER-INNER QUADRANT OF LEFT BREAST IN FEMALE, ESTROGEN RECEPTOR POSITIVE: ICD-10-CM

## 2021-04-27 DIAGNOSIS — Z01.818 PRE-OP TESTING: ICD-10-CM

## 2021-04-27 PROCEDURE — 99211 OFF/OP EST MAY X REQ PHY/QHP: CPT | Mod: S$GLB,,, | Performed by: PLASTIC SURGERY

## 2021-04-27 PROCEDURE — U0003 INFECTIOUS AGENT DETECTION BY NUCLEIC ACID (DNA OR RNA); SEVERE ACUTE RESPIRATORY SYNDROME CORONAVIRUS 2 (SARS-COV-2) (CORONAVIRUS DISEASE [COVID-19]), AMPLIFIED PROBE TECHNIQUE, MAKING USE OF HIGH THROUGHPUT TECHNOLOGIES AS DESCRIBED BY CMS-2020-01-R: HCPCS | Performed by: PLASTIC SURGERY

## 2021-04-27 PROCEDURE — 93010 ELECTROCARDIOGRAM REPORT: CPT | Mod: ,,, | Performed by: INTERNAL MEDICINE

## 2021-04-27 PROCEDURE — 99211 PR OFFICE/OUTPT VISIT, EST, LEVL I: ICD-10-PCS | Mod: S$GLB,,, | Performed by: PLASTIC SURGERY

## 2021-04-27 PROCEDURE — U0005 INFEC AGEN DETEC AMPLI PROBE: HCPCS | Performed by: PLASTIC SURGERY

## 2021-04-27 PROCEDURE — 93005 ELECTROCARDIOGRAM TRACING: CPT

## 2021-04-27 PROCEDURE — 93010 EKG 12-LEAD: ICD-10-PCS | Mod: ,,, | Performed by: INTERNAL MEDICINE

## 2021-04-27 RX ORDER — ACETAMINOPHEN 500 MG
1000 TABLET ORAL
Status: CANCELLED | OUTPATIENT
Start: 2021-04-27 | End: 2021-04-27

## 2021-04-27 RX ORDER — SODIUM CHLORIDE, SODIUM LACTATE, POTASSIUM CHLORIDE, CALCIUM CHLORIDE 600; 310; 30; 20 MG/100ML; MG/100ML; MG/100ML; MG/100ML
INJECTION, SOLUTION INTRAVENOUS CONTINUOUS
Status: CANCELLED | OUTPATIENT
Start: 2021-04-27

## 2021-04-27 RX ORDER — LIDOCAINE HYDROCHLORIDE 10 MG/ML
0.5 INJECTION, SOLUTION EPIDURAL; INFILTRATION; INTRACAUDAL; PERINEURAL ONCE
Status: CANCELLED | OUTPATIENT
Start: 2021-04-27 | End: 2021-04-27

## 2021-04-27 RX ORDER — FAMOTIDINE 20 MG/1
20 TABLET, FILM COATED ORAL
Status: CANCELLED | OUTPATIENT
Start: 2021-04-27 | End: 2021-04-27

## 2021-04-28 ENCOUNTER — HOSPITAL ENCOUNTER (OUTPATIENT)
Dept: RADIOLOGY | Facility: HOSPITAL | Age: 71
Discharge: HOME | End: 2021-04-28
Attending: SURGERY
Payer: MEDICARE

## 2021-04-28 DIAGNOSIS — R92.8 ABNORMAL MAMMOGRAM: ICD-10-CM

## 2021-04-28 LAB — SARS-COV-2 RNA RESP QL NAA+PROBE: NOT DETECTED

## 2021-04-28 PROCEDURE — 25000003 PHARM REV CODE 250: Performed by: SURGERY

## 2021-04-28 PROCEDURE — A4648 IMPLANTABLE TISSUE MARKER: HCPCS

## 2021-04-28 PROCEDURE — 19281 PERQ DEVICE BREAST 1ST IMAG: CPT | Mod: RT,,, | Performed by: RADIOLOGY

## 2021-04-28 PROCEDURE — 77066 DX MAMMO INCL CAD BI: CPT | Mod: 26,,, | Performed by: RADIOLOGY

## 2021-04-28 PROCEDURE — 77066 DX MAMMO INCL CAD BI: CPT | Mod: TC

## 2021-04-28 PROCEDURE — 77066 MAMMO DIGITAL DIAGNOSTIC BILAT: ICD-10-PCS | Mod: 26,,, | Performed by: RADIOLOGY

## 2021-04-28 PROCEDURE — 19282 PR PERQ PLCMNT DEV, EA ADDL LES W/MAMMOGRPH GUIDE: ICD-10-PCS | Mod: LT,,, | Performed by: RADIOLOGY

## 2021-04-28 PROCEDURE — 19281 MAMMO BREAST RADAR REFLECTOR LOC W/MAMMO GUIDANCE, 1ST LESION, RIGHT: ICD-10-PCS | Mod: RT,,, | Performed by: RADIOLOGY

## 2021-04-28 PROCEDURE — 19282 PERQ DEVICE BREAST EA IMAG: CPT | Mod: LT,,, | Performed by: RADIOLOGY

## 2021-04-28 RX ORDER — LIDOCAINE HYDROCHLORIDE 20 MG/ML
10 INJECTION, SOLUTION INFILTRATION; PERINEURAL ONCE
Status: COMPLETED | OUTPATIENT
Start: 2021-04-28 | End: 2021-04-28

## 2021-04-28 RX ADMIN — LIDOCAINE HYDROCHLORIDE 3 ML: 20 INJECTION, SOLUTION INFILTRATION; PERINEURAL at 10:04

## 2021-04-29 ENCOUNTER — TELEPHONE (OUTPATIENT)
Dept: SURGERY | Facility: CLINIC | Age: 71
End: 2021-04-29

## 2021-04-30 ENCOUNTER — ANESTHESIA (OUTPATIENT)
Dept: SURGERY | Facility: OTHER | Age: 71
End: 2021-04-30
Payer: MEDICARE

## 2021-04-30 ENCOUNTER — HOSPITAL ENCOUNTER (OUTPATIENT)
Dept: RADIOLOGY | Facility: OTHER | Age: 71
Discharge: HOME | End: 2021-04-30
Attending: SURGERY
Payer: MEDICARE

## 2021-04-30 ENCOUNTER — HOSPITAL ENCOUNTER (OUTPATIENT)
Facility: OTHER | Age: 71
Discharge: HOME | End: 2021-05-01
Attending: PLASTIC SURGERY | Admitting: PLASTIC SURGERY
Payer: MEDICARE

## 2021-04-30 DIAGNOSIS — Z17.0 MALIGNANT NEOPLASM OF LOWER-INNER QUADRANT OF LEFT BREAST IN FEMALE, ESTROGEN RECEPTOR POSITIVE: Primary | ICD-10-CM

## 2021-04-30 DIAGNOSIS — C50.312 MALIGNANT NEOPLASM OF LOWER-INNER QUADRANT OF LEFT BREAST IN FEMALE, ESTROGEN RECEPTOR POSITIVE: ICD-10-CM

## 2021-04-30 DIAGNOSIS — Z17.0 MALIGNANT NEOPLASM OF LOWER-INNER QUADRANT OF LEFT BREAST IN FEMALE, ESTROGEN RECEPTOR POSITIVE: ICD-10-CM

## 2021-04-30 DIAGNOSIS — C50.312 MALIGNANT NEOPLASM OF LOWER-INNER QUADRANT OF LEFT BREAST IN FEMALE, ESTROGEN RECEPTOR POSITIVE: Primary | ICD-10-CM

## 2021-04-30 PROCEDURE — 88342 CHG IMMUNOCYTOCHEMISTRY: ICD-10-PCS | Mod: 26,,, | Performed by: PATHOLOGY

## 2021-04-30 PROCEDURE — 63600175 PHARM REV CODE 636 W HCPCS: Performed by: ANESTHESIOLOGY

## 2021-04-30 PROCEDURE — 25000003 PHARM REV CODE 250: Performed by: NURSE ANESTHETIST, CERTIFIED REGISTERED

## 2021-04-30 PROCEDURE — 37000009 HC ANESTHESIA EA ADD 15 MINS: Performed by: PLASTIC SURGERY

## 2021-04-30 PROCEDURE — 63600175 PHARM REV CODE 636 W HCPCS: Performed by: SPECIALIST

## 2021-04-30 PROCEDURE — 88341 IMHCHEM/IMCYTCHM EA ADD ANTB: CPT | Mod: 59 | Performed by: PATHOLOGY

## 2021-04-30 PROCEDURE — 19301 PR MASTECTOMY, PARTIAL: ICD-10-PCS | Mod: LT,,, | Performed by: SURGERY

## 2021-04-30 PROCEDURE — 88307 TISSUE EXAM BY PATHOLOGIST: CPT | Performed by: PATHOLOGY

## 2021-04-30 PROCEDURE — 63600175 PHARM REV CODE 636 W HCPCS: Performed by: SURGERY

## 2021-04-30 PROCEDURE — 19125 PR EXCISE BREAST LES W XRAY MARKER: ICD-10-PCS | Mod: 50,59,, | Performed by: SURGERY

## 2021-04-30 PROCEDURE — P9045 ALBUMIN (HUMAN), 5%, 250 ML: HCPCS | Mod: JG | Performed by: NURSE ANESTHETIST, CERTIFIED REGISTERED

## 2021-04-30 PROCEDURE — 88341 IMHCHEM/IMCYTCHM EA ADD ANTB: CPT | Mod: 26,,, | Performed by: PATHOLOGY

## 2021-04-30 PROCEDURE — 38792 PR IDENTIFY SENTINEL 2DE: ICD-10-PCS | Mod: LT,,, | Performed by: SURGERY

## 2021-04-30 PROCEDURE — 25000003 PHARM REV CODE 250: Performed by: SPECIALIST

## 2021-04-30 PROCEDURE — 88342 IMHCHEM/IMCYTCHM 1ST ANTB: CPT | Mod: 26,,, | Performed by: PATHOLOGY

## 2021-04-30 PROCEDURE — 25000003 PHARM REV CODE 250: Performed by: STUDENT IN AN ORGANIZED HEALTH CARE EDUCATION/TRAINING PROGRAM

## 2021-04-30 PROCEDURE — 19125 EXCISION BREAST LESION: CPT | Mod: 50,59,, | Performed by: SURGERY

## 2021-04-30 PROCEDURE — 38525 BIOPSY/REMOVAL LYMPH NODES: CPT | Mod: 51,LT,, | Performed by: SURGERY

## 2021-04-30 PROCEDURE — 88305 TISSUE EXAM BY PATHOLOGIST: CPT | Mod: 59 | Performed by: PATHOLOGY

## 2021-04-30 PROCEDURE — 37000008 HC ANESTHESIA 1ST 15 MINUTES: Performed by: PLASTIC SURGERY

## 2021-04-30 PROCEDURE — 88342 IMHCHEM/IMCYTCHM 1ST ANTB: CPT | Mod: 59 | Performed by: PATHOLOGY

## 2021-04-30 PROCEDURE — 36000707: Performed by: PLASTIC SURGERY

## 2021-04-30 PROCEDURE — 36000706: Performed by: PLASTIC SURGERY

## 2021-04-30 PROCEDURE — 71000039 HC RECOVERY, EACH ADD'L HOUR: Performed by: PLASTIC SURGERY

## 2021-04-30 PROCEDURE — 88305 TISSUE EXAM BY PATHOLOGIST: ICD-10-PCS | Mod: 26,,, | Performed by: PATHOLOGY

## 2021-04-30 PROCEDURE — 38792 RA TRACER ID OF SENTINL NODE: CPT | Mod: LT,,, | Performed by: SURGERY

## 2021-04-30 PROCEDURE — 19301 PARTIAL MASTECTOMY: CPT | Mod: LT,,, | Performed by: SURGERY

## 2021-04-30 PROCEDURE — 88305 TISSUE EXAM BY PATHOLOGIST: CPT | Mod: 26,,, | Performed by: PATHOLOGY

## 2021-04-30 PROCEDURE — 27201423 OPTIME MED/SURG SUP & DEVICES STERILE SUPPLY: Performed by: PLASTIC SURGERY

## 2021-04-30 PROCEDURE — 71000033 HC RECOVERY, INTIAL HOUR: Performed by: PLASTIC SURGERY

## 2021-04-30 PROCEDURE — 88341 PR IHC OR ICC EACH ADD'L SINGLE ANTIBODY  STAINPR: ICD-10-PCS | Mod: 26,,, | Performed by: PATHOLOGY

## 2021-04-30 PROCEDURE — 25000003 PHARM REV CODE 250: Performed by: PLASTIC SURGERY

## 2021-04-30 PROCEDURE — 63600175 PHARM REV CODE 636 W HCPCS: Performed by: NURSE ANESTHETIST, CERTIFIED REGISTERED

## 2021-04-30 PROCEDURE — A9520 TC99 TILMANOCEPT DIAG 0.5MCI: HCPCS

## 2021-04-30 PROCEDURE — 88307 TISSUE EXAM BY PATHOLOGIST: CPT | Mod: 26,,, | Performed by: PATHOLOGY

## 2021-04-30 PROCEDURE — 94761 N-INVAS EAR/PLS OXIMETRY MLT: CPT

## 2021-04-30 PROCEDURE — 88307 PR  SURG PATH,LEVEL V: ICD-10-PCS | Mod: 26,,, | Performed by: PATHOLOGY

## 2021-04-30 PROCEDURE — 38525 PR BIOPSY/REM LYMPH NODES, AXILLARY: ICD-10-PCS | Mod: 51,LT,, | Performed by: SURGERY

## 2021-04-30 RX ORDER — SUCCINYLCHOLINE CHLORIDE 20 MG/ML
INJECTION INTRAMUSCULAR; INTRAVENOUS
Status: DISCONTINUED | OUTPATIENT
Start: 2021-04-30 | End: 2021-04-30

## 2021-04-30 RX ORDER — LIDOCAINE HYDROCHLORIDE 10 MG/ML
1 INJECTION, SOLUTION EPIDURAL; INFILTRATION; INTRACAUDAL; PERINEURAL ONCE
Status: DISCONTINUED | OUTPATIENT
Start: 2021-04-30 | End: 2021-04-30

## 2021-04-30 RX ORDER — LIDOCAINE HYDROCHLORIDE AND EPINEPHRINE 10; 10 MG/ML; UG/ML
INJECTION, SOLUTION INFILTRATION; PERINEURAL
Status: DISCONTINUED | OUTPATIENT
Start: 2021-04-30 | End: 2021-04-30 | Stop reason: HOSPADM

## 2021-04-30 RX ORDER — SODIUM CHLORIDE 0.9 % (FLUSH) 0.9 %
3 SYRINGE (ML) INJECTION
Status: DISCONTINUED | OUTPATIENT
Start: 2021-04-30 | End: 2021-05-01 | Stop reason: HOSPADM

## 2021-04-30 RX ORDER — HYDROMORPHONE HYDROCHLORIDE 2 MG/ML
0.4 INJECTION, SOLUTION INTRAMUSCULAR; INTRAVENOUS; SUBCUTANEOUS EVERY 5 MIN PRN
Status: DISCONTINUED | OUTPATIENT
Start: 2021-04-30 | End: 2021-04-30 | Stop reason: HOSPADM

## 2021-04-30 RX ORDER — ACETAMINOPHEN 325 MG/1
650 TABLET ORAL EVERY 8 HOURS PRN
Status: DISCONTINUED | OUTPATIENT
Start: 2021-04-30 | End: 2021-05-01 | Stop reason: HOSPADM

## 2021-04-30 RX ORDER — DIPHENHYDRAMINE HYDROCHLORIDE 50 MG/ML
25 INJECTION INTRAMUSCULAR; INTRAVENOUS EVERY 6 HOURS PRN
Status: DISCONTINUED | OUTPATIENT
Start: 2021-04-30 | End: 2021-05-01 | Stop reason: HOSPADM

## 2021-04-30 RX ORDER — TALC
6 POWDER (GRAM) TOPICAL NIGHTLY PRN
Status: DISCONTINUED | OUTPATIENT
Start: 2021-04-30 | End: 2021-05-01 | Stop reason: HOSPADM

## 2021-04-30 RX ORDER — ALBUMIN HUMAN 50 G/1000ML
SOLUTION INTRAVENOUS CONTINUOUS PRN
Status: DISCONTINUED | OUTPATIENT
Start: 2021-04-30 | End: 2021-04-30

## 2021-04-30 RX ORDER — LIDOCAINE HCL/PF 100 MG/5ML
SYRINGE (ML) INTRAVENOUS
Status: DISCONTINUED | OUTPATIENT
Start: 2021-04-30 | End: 2021-04-30

## 2021-04-30 RX ORDER — ONDANSETRON 2 MG/ML
4 INJECTION INTRAMUSCULAR; INTRAVENOUS DAILY PRN
Status: DISCONTINUED | OUTPATIENT
Start: 2021-04-30 | End: 2021-04-30 | Stop reason: HOSPADM

## 2021-04-30 RX ORDER — FENTANYL CITRATE 50 UG/ML
INJECTION, SOLUTION INTRAMUSCULAR; INTRAVENOUS
Status: DISCONTINUED | OUTPATIENT
Start: 2021-04-30 | End: 2021-04-30

## 2021-04-30 RX ORDER — ONDANSETRON 8 MG/1
8 TABLET, ORALLY DISINTEGRATING ORAL EVERY 8 HOURS PRN
Status: DISCONTINUED | OUTPATIENT
Start: 2021-04-30 | End: 2021-05-01 | Stop reason: HOSPADM

## 2021-04-30 RX ORDER — DEXAMETHASONE SODIUM PHOSPHATE 4 MG/ML
INJECTION, SOLUTION INTRA-ARTICULAR; INTRALESIONAL; INTRAMUSCULAR; INTRAVENOUS; SOFT TISSUE
Status: DISCONTINUED | OUTPATIENT
Start: 2021-04-30 | End: 2021-04-30

## 2021-04-30 RX ORDER — CEPHALEXIN 500 MG/1
500 CAPSULE ORAL EVERY 12 HOURS
Status: DISCONTINUED | OUTPATIENT
Start: 2021-04-30 | End: 2021-05-01 | Stop reason: HOSPADM

## 2021-04-30 RX ORDER — PROPOFOL 10 MG/ML
VIAL (ML) INTRAVENOUS
Status: DISCONTINUED | OUTPATIENT
Start: 2021-04-30 | End: 2021-04-30

## 2021-04-30 RX ORDER — SODIUM CHLORIDE, SODIUM LACTATE, POTASSIUM CHLORIDE, CALCIUM CHLORIDE 600; 310; 30; 20 MG/100ML; MG/100ML; MG/100ML; MG/100ML
INJECTION, SOLUTION INTRAVENOUS CONTINUOUS
Status: DISCONTINUED | OUTPATIENT
Start: 2021-04-30 | End: 2021-04-30

## 2021-04-30 RX ORDER — ACETAMINOPHEN 500 MG
1000 TABLET ORAL
Status: COMPLETED | OUTPATIENT
Start: 2021-04-30 | End: 2021-04-30

## 2021-04-30 RX ORDER — DIPHENHYDRAMINE HYDROCHLORIDE 50 MG/ML
12.5 INJECTION INTRAMUSCULAR; INTRAVENOUS EVERY 30 MIN PRN
Status: DISCONTINUED | OUTPATIENT
Start: 2021-04-30 | End: 2021-04-30 | Stop reason: HOSPADM

## 2021-04-30 RX ORDER — KETAMINE HCL IN 0.9 % NACL 50 MG/5 ML
SYRINGE (ML) INTRAVENOUS
Status: DISCONTINUED | OUTPATIENT
Start: 2021-04-30 | End: 2021-04-30

## 2021-04-30 RX ORDER — HYDROCODONE BITARTRATE AND ACETAMINOPHEN 5; 325 MG/1; MG/1
1 TABLET ORAL
Status: DISCONTINUED | OUTPATIENT
Start: 2021-04-30 | End: 2021-04-30 | Stop reason: HOSPADM

## 2021-04-30 RX ORDER — FAMOTIDINE 20 MG/1
20 TABLET, FILM COATED ORAL
Status: COMPLETED | OUTPATIENT
Start: 2021-04-30 | End: 2021-04-30

## 2021-04-30 RX ORDER — ONDANSETRON 2 MG/ML
INJECTION INTRAMUSCULAR; INTRAVENOUS
Status: DISCONTINUED | OUTPATIENT
Start: 2021-04-30 | End: 2021-04-30

## 2021-04-30 RX ORDER — LIDOCAINE HYDROCHLORIDE 10 MG/ML
0.5 INJECTION, SOLUTION EPIDURAL; INFILTRATION; INTRACAUDAL; PERINEURAL ONCE
Status: DISCONTINUED | OUTPATIENT
Start: 2021-04-30 | End: 2021-04-30 | Stop reason: HOSPADM

## 2021-04-30 RX ORDER — OXYCODONE AND ACETAMINOPHEN 10; 325 MG/1; MG/1
1 TABLET ORAL EVERY 6 HOURS PRN
Status: DISCONTINUED | OUTPATIENT
Start: 2021-04-30 | End: 2021-05-01 | Stop reason: HOSPADM

## 2021-04-30 RX ORDER — SODIUM CHLORIDE 9 MG/ML
INJECTION, SOLUTION INTRAVENOUS CONTINUOUS
Status: DISCONTINUED | OUTPATIENT
Start: 2021-04-30 | End: 2021-04-30

## 2021-04-30 RX ORDER — ENOXAPARIN SODIUM 100 MG/ML
40 INJECTION SUBCUTANEOUS EVERY 24 HOURS
Status: DISCONTINUED | OUTPATIENT
Start: 2021-05-01 | End: 2021-05-01 | Stop reason: HOSPADM

## 2021-04-30 RX ORDER — OXYCODONE AND ACETAMINOPHEN 5; 325 MG/1; MG/1
1 TABLET ORAL EVERY 6 HOURS PRN
Status: DISCONTINUED | OUTPATIENT
Start: 2021-04-30 | End: 2021-05-01 | Stop reason: HOSPADM

## 2021-04-30 RX ORDER — SODIUM CHLORIDE 0.9 % (FLUSH) 0.9 %
10 SYRINGE (ML) INJECTION
Status: DISCONTINUED | OUTPATIENT
Start: 2021-04-30 | End: 2021-05-01 | Stop reason: HOSPADM

## 2021-04-30 RX ORDER — EPHEDRINE SULFATE 50 MG/ML
INJECTION, SOLUTION INTRAVENOUS
Status: DISCONTINUED | OUTPATIENT
Start: 2021-04-30 | End: 2021-04-30

## 2021-04-30 RX ORDER — CEFAZOLIN SODIUM 1 G/3ML
2 INJECTION, POWDER, FOR SOLUTION INTRAMUSCULAR; INTRAVENOUS
Status: COMPLETED | OUTPATIENT
Start: 2021-04-30 | End: 2021-04-30

## 2021-04-30 RX ORDER — MIDAZOLAM HYDROCHLORIDE 1 MG/ML
INJECTION INTRAMUSCULAR; INTRAVENOUS
Status: DISCONTINUED | OUTPATIENT
Start: 2021-04-30 | End: 2021-04-30

## 2021-04-30 RX ORDER — ONDANSETRON 2 MG/ML
8 INJECTION INTRAMUSCULAR; INTRAVENOUS EVERY 6 HOURS PRN
Status: DISCONTINUED | OUTPATIENT
Start: 2021-04-30 | End: 2021-05-01 | Stop reason: HOSPADM

## 2021-04-30 RX ORDER — ROCURONIUM BROMIDE 10 MG/ML
INJECTION, SOLUTION INTRAVENOUS
Status: DISCONTINUED | OUTPATIENT
Start: 2021-04-30 | End: 2021-04-30

## 2021-04-30 RX ADMIN — LIDOCAINE HYDROCHLORIDE 100 MG: 20 INJECTION, SOLUTION INTRAVENOUS at 07:04

## 2021-04-30 RX ADMIN — FENTANYL CITRATE 50 MCG: 50 INJECTION, SOLUTION INTRAMUSCULAR; INTRAVENOUS at 09:04

## 2021-04-30 RX ADMIN — FENTANYL CITRATE 50 MCG: 50 INJECTION, SOLUTION INTRAMUSCULAR; INTRAVENOUS at 08:04

## 2021-04-30 RX ADMIN — ALBUMIN (HUMAN): 12.5 SOLUTION INTRAVENOUS at 07:04

## 2021-04-30 RX ADMIN — DEXAMETHASONE SODIUM PHOSPHATE 8 MG: 4 INJECTION, SOLUTION INTRAMUSCULAR; INTRAVENOUS at 07:04

## 2021-04-30 RX ADMIN — ROCURONIUM BROMIDE 20 MG: 10 INJECTION, SOLUTION INTRAVENOUS at 08:04

## 2021-04-30 RX ADMIN — CEPHALEXIN 500 MG: 500 CAPSULE ORAL at 08:04

## 2021-04-30 RX ADMIN — ROCURONIUM BROMIDE 10 MG: 10 INJECTION, SOLUTION INTRAVENOUS at 07:04

## 2021-04-30 RX ADMIN — FENTANYL CITRATE 50 MCG: 50 INJECTION, SOLUTION INTRAMUSCULAR; INTRAVENOUS at 10:04

## 2021-04-30 RX ADMIN — FAMOTIDINE 20 MG: 20 TABLET, FILM COATED ORAL at 05:04

## 2021-04-30 RX ADMIN — SODIUM CHLORIDE, SODIUM LACTATE, POTASSIUM CHLORIDE, AND CALCIUM CHLORIDE: 600; 310; 30; 20 INJECTION, SOLUTION INTRAVENOUS at 06:04

## 2021-04-30 RX ADMIN — FENTANYL CITRATE 50 MCG: 50 INJECTION, SOLUTION INTRAMUSCULAR; INTRAVENOUS at 07:04

## 2021-04-30 RX ADMIN — SUCCINYLCHOLINE CHLORIDE 140 MG: 20 INJECTION, SOLUTION INTRAMUSCULAR; INTRAVENOUS at 07:04

## 2021-04-30 RX ADMIN — EPHEDRINE SULFATE 10 MG: 50 INJECTION INTRAVENOUS at 09:04

## 2021-04-30 RX ADMIN — CEFAZOLIN 2 G: 330 INJECTION, POWDER, FOR SOLUTION INTRAMUSCULAR; INTRAVENOUS at 07:04

## 2021-04-30 RX ADMIN — EPHEDRINE SULFATE 5 MG: 50 INJECTION INTRAVENOUS at 07:04

## 2021-04-30 RX ADMIN — MIDAZOLAM HYDROCHLORIDE 2 MG: 1 INJECTION, SOLUTION INTRAMUSCULAR; INTRAVENOUS at 07:04

## 2021-04-30 RX ADMIN — Medication 30 MG: at 07:04

## 2021-04-30 RX ADMIN — ONDANSETRON 4 MG: 2 INJECTION INTRAMUSCULAR; INTRAVENOUS at 11:04

## 2021-04-30 RX ADMIN — SODIUM CHLORIDE, SODIUM LACTATE, POTASSIUM CHLORIDE, AND CALCIUM CHLORIDE: 600; 310; 30; 20 INJECTION, SOLUTION INTRAVENOUS at 10:04

## 2021-04-30 RX ADMIN — PROPOFOL 150 MG: 10 INJECTION, EMULSION INTRAVENOUS at 07:04

## 2021-04-30 RX ADMIN — EPHEDRINE SULFATE 10 MG: 50 INJECTION INTRAVENOUS at 10:04

## 2021-04-30 RX ADMIN — HYDROMORPHONE HYDROCHLORIDE 0.4 MG: 2 INJECTION INTRAMUSCULAR; INTRAVENOUS; SUBCUTANEOUS at 12:04

## 2021-04-30 RX ADMIN — EPHEDRINE SULFATE 10 MG: 50 INJECTION INTRAVENOUS at 07:04

## 2021-04-30 RX ADMIN — ONDANSETRON HYDROCHLORIDE 4 MG: 2 INJECTION INTRAMUSCULAR; INTRAVENOUS at 07:04

## 2021-04-30 RX ADMIN — ACETAMINOPHEN 1000 MG: 500 TABLET, FILM COATED ORAL at 05:04

## 2021-04-30 RX ADMIN — OXYCODONE HYDROCHLORIDE AND ACETAMINOPHEN 1 TABLET: 5; 325 TABLET ORAL at 08:04

## 2021-05-01 ENCOUNTER — PATIENT MESSAGE (OUTPATIENT)
Dept: SURGERY | Facility: CLINIC | Age: 71
End: 2021-05-01

## 2021-05-01 ENCOUNTER — PATIENT MESSAGE (OUTPATIENT)
Dept: PLASTIC SURGERY | Facility: CLINIC | Age: 71
End: 2021-05-01

## 2021-05-01 VITALS
HEIGHT: 65 IN | TEMPERATURE: 98 F | OXYGEN SATURATION: 95 % | DIASTOLIC BLOOD PRESSURE: 58 MMHG | WEIGHT: 179.25 LBS | RESPIRATION RATE: 16 BRPM | BODY MASS INDEX: 29.87 KG/M2 | HEART RATE: 63 BPM | SYSTOLIC BLOOD PRESSURE: 112 MMHG

## 2021-05-01 PROCEDURE — 25000003 PHARM REV CODE 250: Performed by: STUDENT IN AN ORGANIZED HEALTH CARE EDUCATION/TRAINING PROGRAM

## 2021-05-01 PROCEDURE — 94761 N-INVAS EAR/PLS OXIMETRY MLT: CPT

## 2021-05-01 RX ORDER — ONDANSETRON 4 MG/1
4 TABLET, ORALLY DISINTEGRATING ORAL EVERY 6 HOURS PRN
Qty: 20 TABLET | Refills: 0 | Status: SHIPPED | OUTPATIENT
Start: 2021-05-01 | End: 2021-11-17

## 2021-05-01 RX ORDER — OXYCODONE AND ACETAMINOPHEN 5; 325 MG/1; MG/1
1 TABLET ORAL EVERY 6 HOURS PRN
Qty: 23 TABLET | Refills: 0 | Status: SHIPPED | OUTPATIENT
Start: 2021-05-01 | End: 2021-10-01

## 2021-05-01 RX ADMIN — ONDANSETRON 8 MG: 8 TABLET, ORALLY DISINTEGRATING ORAL at 09:05

## 2021-05-01 RX ADMIN — CEPHALEXIN 500 MG: 500 CAPSULE ORAL at 09:05

## 2021-05-01 RX ADMIN — OXYCODONE HYDROCHLORIDE AND ACETAMINOPHEN 1 TABLET: 5; 325 TABLET ORAL at 09:05

## 2021-05-04 ENCOUNTER — PATIENT MESSAGE (OUTPATIENT)
Dept: PLASTIC SURGERY | Facility: CLINIC | Age: 71
End: 2021-05-04

## 2021-05-05 ENCOUNTER — PATIENT MESSAGE (OUTPATIENT)
Dept: PLASTIC SURGERY | Facility: CLINIC | Age: 71
End: 2021-05-05

## 2021-05-06 ENCOUNTER — OFFICE VISIT (OUTPATIENT)
Dept: PLASTIC SURGERY | Facility: CLINIC | Age: 71
End: 2021-05-06
Attending: PLASTIC SURGERY
Payer: MEDICARE

## 2021-05-06 VITALS — HEART RATE: 67 BPM | SYSTOLIC BLOOD PRESSURE: 165 MMHG | DIASTOLIC BLOOD PRESSURE: 74 MMHG

## 2021-05-06 DIAGNOSIS — Z17.0 MALIGNANT NEOPLASM OF LOWER-INNER QUADRANT OF LEFT BREAST IN FEMALE, ESTROGEN RECEPTOR POSITIVE: Primary | ICD-10-CM

## 2021-05-06 DIAGNOSIS — C50.312 MALIGNANT NEOPLASM OF LOWER-INNER QUADRANT OF LEFT BREAST IN FEMALE, ESTROGEN RECEPTOR POSITIVE: Primary | ICD-10-CM

## 2021-05-06 PROCEDURE — 99024 POSTOP FOLLOW-UP VISIT: CPT | Mod: S$GLB,POP,, | Performed by: PLASTIC SURGERY

## 2021-05-06 PROCEDURE — 99024 PR POST-OP FOLLOW-UP VISIT: ICD-10-PCS | Mod: S$GLB,POP,, | Performed by: PLASTIC SURGERY

## 2021-05-17 ENCOUNTER — OFFICE VISIT (OUTPATIENT)
Dept: SURGERY | Facility: CLINIC | Age: 71
End: 2021-05-17
Payer: MEDICARE

## 2021-05-17 ENCOUNTER — DOCUMENTATION ONLY (OUTPATIENT)
Dept: HEMATOLOGY/ONCOLOGY | Facility: CLINIC | Age: 71
End: 2021-05-17

## 2021-05-17 ENCOUNTER — LAB VISIT (OUTPATIENT)
Dept: LAB | Facility: HOSPITAL | Age: 71
End: 2021-05-17
Attending: SURGERY
Payer: MEDICARE

## 2021-05-17 VITALS
DIASTOLIC BLOOD PRESSURE: 83 MMHG | HEART RATE: 63 BPM | WEIGHT: 174 LBS | SYSTOLIC BLOOD PRESSURE: 183 MMHG | BODY MASS INDEX: 28.96 KG/M2

## 2021-05-17 DIAGNOSIS — C50.312 MALIGNANT NEOPLASM OF LOWER-INNER QUADRANT OF LEFT BREAST IN FEMALE, ESTROGEN RECEPTOR POSITIVE: ICD-10-CM

## 2021-05-17 DIAGNOSIS — Z17.0 MALIGNANT NEOPLASM OF LOWER-INNER QUADRANT OF LEFT BREAST IN FEMALE, ESTROGEN RECEPTOR POSITIVE: Primary | ICD-10-CM

## 2021-05-17 DIAGNOSIS — C50.312 MALIGNANT NEOPLASM OF LOWER-INNER QUADRANT OF LEFT BREAST IN FEMALE, ESTROGEN RECEPTOR POSITIVE: Primary | ICD-10-CM

## 2021-05-17 DIAGNOSIS — Z17.0 MALIGNANT NEOPLASM OF LOWER-INNER QUADRANT OF LEFT BREAST IN FEMALE, ESTROGEN RECEPTOR POSITIVE: ICD-10-CM

## 2021-05-17 PROCEDURE — 99999 PR PBB SHADOW E&M-EST. PATIENT-LVL III: CPT | Mod: PBBFAC,,, | Performed by: SURGERY

## 2021-05-17 PROCEDURE — 99999 PR PBB SHADOW E&M-EST. PATIENT-LVL III: ICD-10-PCS | Mod: PBBFAC,,, | Performed by: SURGERY

## 2021-05-17 PROCEDURE — 99024 PR POST-OP FOLLOW-UP VISIT: ICD-10-PCS | Mod: POP,,, | Performed by: SURGERY

## 2021-05-17 PROCEDURE — 99213 OFFICE O/P EST LOW 20 MIN: CPT | Mod: PBBFAC | Performed by: SURGERY

## 2021-05-17 PROCEDURE — 99024 POSTOP FOLLOW-UP VISIT: CPT | Mod: POP,,, | Performed by: SURGERY

## 2021-05-25 ENCOUNTER — DOCUMENTATION ONLY (OUTPATIENT)
Dept: HEMATOLOGY/ONCOLOGY | Facility: CLINIC | Age: 71
End: 2021-05-25

## 2021-05-25 ENCOUNTER — TUMOR BOARD CONFERENCE (OUTPATIENT)
Dept: SURGERY | Facility: CLINIC | Age: 71
End: 2021-05-25

## 2021-05-27 LAB
COMMENT: NORMAL
FINAL PATHOLOGIC DIAGNOSIS: NORMAL
GROSS: NORMAL
Lab: NORMAL
SUPPLEMENTAL DIAGNOSIS: NORMAL

## 2021-05-31 ENCOUNTER — APPOINTMENT (OUTPATIENT)
Dept: RADIOLOGY | Facility: CLINIC | Age: 71
End: 2021-05-31
Attending: INTERNAL MEDICINE
Payer: MEDICARE

## 2021-05-31 ENCOUNTER — NURSE TRIAGE (OUTPATIENT)
Dept: ADMINISTRATIVE | Facility: CLINIC | Age: 71
End: 2021-05-31

## 2021-05-31 ENCOUNTER — OFFICE VISIT (OUTPATIENT)
Dept: PLASTIC SURGERY | Facility: CLINIC | Age: 71
End: 2021-05-31
Attending: PLASTIC SURGERY
Payer: MEDICARE

## 2021-05-31 ENCOUNTER — PATIENT MESSAGE (OUTPATIENT)
Dept: SURGERY | Facility: CLINIC | Age: 71
End: 2021-05-31

## 2021-05-31 VITALS
WEIGHT: 173.94 LBS | SYSTOLIC BLOOD PRESSURE: 140 MMHG | BODY MASS INDEX: 28.98 KG/M2 | HEIGHT: 65 IN | HEART RATE: 73 BPM | DIASTOLIC BLOOD PRESSURE: 77 MMHG

## 2021-05-31 DIAGNOSIS — M81.0 OSTEOPOROSIS, UNSPECIFIED OSTEOPOROSIS TYPE, UNSPECIFIED PATHOLOGICAL FRACTURE PRESENCE: ICD-10-CM

## 2021-05-31 DIAGNOSIS — Z17.0 MALIGNANT NEOPLASM OF LOWER-INNER QUADRANT OF LEFT BREAST IN FEMALE, ESTROGEN RECEPTOR POSITIVE: Primary | ICD-10-CM

## 2021-05-31 DIAGNOSIS — C50.312 MALIGNANT NEOPLASM OF LOWER-INNER QUADRANT OF LEFT BREAST IN FEMALE, ESTROGEN RECEPTOR POSITIVE: Primary | ICD-10-CM

## 2021-05-31 PROCEDURE — 99024 PR POST-OP FOLLOW-UP VISIT: ICD-10-PCS | Mod: S$GLB,POP,, | Performed by: PLASTIC SURGERY

## 2021-05-31 PROCEDURE — 77080 DEXA BONE DENSITY SPINE HIP: ICD-10-PCS | Mod: 26,,, | Performed by: INTERNAL MEDICINE

## 2021-05-31 PROCEDURE — 77080 DXA BONE DENSITY AXIAL: CPT | Mod: 26,,, | Performed by: INTERNAL MEDICINE

## 2021-05-31 PROCEDURE — 99024 POSTOP FOLLOW-UP VISIT: CPT | Mod: S$GLB,POP,, | Performed by: PLASTIC SURGERY

## 2021-05-31 PROCEDURE — 77080 DXA BONE DENSITY AXIAL: CPT | Mod: TC,PO

## 2021-06-03 ENCOUNTER — OFFICE VISIT (OUTPATIENT)
Dept: HEMATOLOGY/ONCOLOGY | Facility: CLINIC | Age: 71
End: 2021-06-03
Payer: MEDICARE

## 2021-06-03 ENCOUNTER — OFFICE VISIT (OUTPATIENT)
Dept: SURGERY | Facility: CLINIC | Age: 71
End: 2021-06-03
Payer: MEDICARE

## 2021-06-03 VITALS
HEIGHT: 65 IN | WEIGHT: 173.94 LBS | WEIGHT: 173.94 LBS | TEMPERATURE: 98 F | OXYGEN SATURATION: 96 % | BODY MASS INDEX: 27.95 KG/M2 | DIASTOLIC BLOOD PRESSURE: 66 MMHG | SYSTOLIC BLOOD PRESSURE: 126 MMHG | SYSTOLIC BLOOD PRESSURE: 126 MMHG | BODY MASS INDEX: 28.98 KG/M2 | RESPIRATION RATE: 16 BRPM | HEIGHT: 66 IN | TEMPERATURE: 98 F | HEART RATE: 66 BPM | HEART RATE: 66 BPM | DIASTOLIC BLOOD PRESSURE: 66 MMHG

## 2021-06-03 DIAGNOSIS — C50.312 MALIGNANT NEOPLASM OF LOWER-INNER QUADRANT OF LEFT BREAST IN FEMALE, ESTROGEN RECEPTOR POSITIVE: Primary | ICD-10-CM

## 2021-06-03 DIAGNOSIS — Z17.0 MALIGNANT NEOPLASM OF LOWER-INNER QUADRANT OF LEFT BREAST IN FEMALE, ESTROGEN RECEPTOR POSITIVE: Primary | ICD-10-CM

## 2021-06-03 PROCEDURE — 99204 PR OFFICE/OUTPT VISIT, NEW, LEVL IV, 45-59 MIN: ICD-10-PCS | Mod: S$PBB,,, | Performed by: RADIOLOGY

## 2021-06-03 PROCEDURE — 99204 PR OFFICE/OUTPT VISIT, NEW, LEVL IV, 45-59 MIN: ICD-10-PCS | Mod: S$PBB,,, | Performed by: INTERNAL MEDICINE

## 2021-06-03 PROCEDURE — 99213 OFFICE O/P EST LOW 20 MIN: CPT | Mod: PBBFAC,27 | Performed by: RADIOLOGY

## 2021-06-03 PROCEDURE — 99204 OFFICE O/P NEW MOD 45 MIN: CPT | Mod: S$PBB,,, | Performed by: RADIOLOGY

## 2021-06-03 PROCEDURE — 99999 PR PBB SHADOW E&M-EST. PATIENT-LVL III: CPT | Mod: PBBFAC,,, | Performed by: RADIOLOGY

## 2021-06-03 PROCEDURE — 99999 PR PBB SHADOW E&M-EST. PATIENT-LVL III: ICD-10-PCS | Mod: PBBFAC,,, | Performed by: RADIOLOGY

## 2021-06-03 PROCEDURE — 99999 PR PBB SHADOW E&M-EST. PATIENT-LVL V: CPT | Mod: PBBFAC,,, | Performed by: INTERNAL MEDICINE

## 2021-06-03 PROCEDURE — 99215 OFFICE O/P EST HI 40 MIN: CPT | Mod: PBBFAC | Performed by: INTERNAL MEDICINE

## 2021-06-03 PROCEDURE — 99204 OFFICE O/P NEW MOD 45 MIN: CPT | Mod: S$PBB,,, | Performed by: INTERNAL MEDICINE

## 2021-06-03 PROCEDURE — 99999 PR PBB SHADOW E&M-EST. PATIENT-LVL V: ICD-10-PCS | Mod: PBBFAC,,, | Performed by: INTERNAL MEDICINE

## 2021-06-03 RX ORDER — SULFAMETHOXAZOLE AND TRIMETHOPRIM 400; 80 MG/1; MG/1
TABLET ORAL
COMMUNITY
Start: 2021-05-31 | End: 2021-10-01

## 2021-06-03 RX ORDER — LETROZOLE 2.5 MG/1
2.5 TABLET, FILM COATED ORAL DAILY
Qty: 30 TABLET | Refills: 11 | Status: SHIPPED | OUTPATIENT
Start: 2021-06-03 | End: 2021-07-03

## 2021-06-05 ENCOUNTER — PATIENT MESSAGE (OUTPATIENT)
Dept: SURGERY | Facility: CLINIC | Age: 71
End: 2021-06-05

## 2021-06-05 ENCOUNTER — PATIENT MESSAGE (OUTPATIENT)
Dept: INTERNAL MEDICINE | Facility: CLINIC | Age: 71
End: 2021-06-05

## 2021-06-05 DIAGNOSIS — E78.2 MIXED HYPERLIPIDEMIA: Primary | ICD-10-CM

## 2021-06-08 ENCOUNTER — OFFICE VISIT (OUTPATIENT)
Dept: PLASTIC SURGERY | Facility: CLINIC | Age: 71
End: 2021-06-08
Attending: PLASTIC SURGERY
Payer: MEDICARE

## 2021-06-08 VITALS
DIASTOLIC BLOOD PRESSURE: 69 MMHG | HEIGHT: 65 IN | BODY MASS INDEX: 28.98 KG/M2 | WEIGHT: 173.94 LBS | SYSTOLIC BLOOD PRESSURE: 124 MMHG | HEART RATE: 62 BPM

## 2021-06-08 DIAGNOSIS — C50.312 MALIGNANT NEOPLASM OF LOWER-INNER QUADRANT OF LEFT BREAST IN FEMALE, ESTROGEN RECEPTOR POSITIVE: Primary | ICD-10-CM

## 2021-06-08 DIAGNOSIS — Z17.0 MALIGNANT NEOPLASM OF LOWER-INNER QUADRANT OF LEFT BREAST IN FEMALE, ESTROGEN RECEPTOR POSITIVE: Primary | ICD-10-CM

## 2021-06-08 PROCEDURE — 99024 POSTOP FOLLOW-UP VISIT: CPT | Mod: S$GLB,POP,, | Performed by: PLASTIC SURGERY

## 2021-06-08 PROCEDURE — 99024 PR POST-OP FOLLOW-UP VISIT: ICD-10-PCS | Mod: S$GLB,POP,, | Performed by: PLASTIC SURGERY

## 2021-06-09 ENCOUNTER — PATIENT MESSAGE (OUTPATIENT)
Dept: PLASTIC SURGERY | Facility: CLINIC | Age: 71
End: 2021-06-09

## 2021-06-09 RX ORDER — SULFAMETHOXAZOLE AND TRIMETHOPRIM 400; 80 MG/1; MG/1
1 TABLET ORAL 2 TIMES DAILY
Qty: 28 TABLET | Refills: 0 | Status: SHIPPED | OUTPATIENT
Start: 2021-06-09 | End: 2021-10-01

## 2021-06-15 ENCOUNTER — OFFICE VISIT (OUTPATIENT)
Dept: HEMATOLOGY/ONCOLOGY | Facility: CLINIC | Age: 71
End: 2021-06-15
Payer: MEDICARE

## 2021-06-15 DIAGNOSIS — Z15.01 MONOALLELIC MUTATION OF ATM GENE: ICD-10-CM

## 2021-06-15 DIAGNOSIS — Z15.89 MONOALLELIC MUTATION OF ATM GENE: ICD-10-CM

## 2021-06-15 DIAGNOSIS — Z80.8 FAMILY HISTORY OF SKIN CANCER: ICD-10-CM

## 2021-06-15 DIAGNOSIS — Z15.09 MONOALLELIC MUTATION OF ATM GENE: ICD-10-CM

## 2021-06-15 DIAGNOSIS — Z71.83 ENCOUNTER FOR NONPROCREATIVE GENETIC COUNSELING: Primary | ICD-10-CM

## 2021-06-15 PROCEDURE — 99999 PR PBB SHADOW E&M-EST. PATIENT-LVL III: ICD-10-PCS | Mod: PBBFAC,,, | Performed by: NURSE PRACTITIONER

## 2021-06-15 PROCEDURE — 99215 PR OFFICE/OUTPT VISIT, EST, LEVL V, 40-54 MIN: ICD-10-PCS | Mod: S$PBB,,, | Performed by: NURSE PRACTITIONER

## 2021-06-15 PROCEDURE — 99213 OFFICE O/P EST LOW 20 MIN: CPT | Mod: PBBFAC | Performed by: NURSE PRACTITIONER

## 2021-06-15 PROCEDURE — 99999 PR PBB SHADOW E&M-EST. PATIENT-LVL III: CPT | Mod: PBBFAC,,, | Performed by: NURSE PRACTITIONER

## 2021-06-15 PROCEDURE — 99215 OFFICE O/P EST HI 40 MIN: CPT | Mod: S$PBB,,, | Performed by: NURSE PRACTITIONER

## 2021-06-16 ENCOUNTER — OFFICE VISIT (OUTPATIENT)
Dept: PLASTIC SURGERY | Facility: CLINIC | Age: 71
End: 2021-06-16
Attending: PLASTIC SURGERY
Payer: MEDICARE

## 2021-06-16 VITALS
DIASTOLIC BLOOD PRESSURE: 76 MMHG | SYSTOLIC BLOOD PRESSURE: 130 MMHG | HEIGHT: 65 IN | BODY MASS INDEX: 28.98 KG/M2 | WEIGHT: 173.94 LBS | HEART RATE: 56 BPM

## 2021-06-16 DIAGNOSIS — Z17.0 MALIGNANT NEOPLASM OF LOWER-INNER QUADRANT OF LEFT BREAST IN FEMALE, ESTROGEN RECEPTOR POSITIVE: Primary | ICD-10-CM

## 2021-06-16 DIAGNOSIS — C50.312 MALIGNANT NEOPLASM OF LOWER-INNER QUADRANT OF LEFT BREAST IN FEMALE, ESTROGEN RECEPTOR POSITIVE: Primary | ICD-10-CM

## 2021-06-16 PROCEDURE — 99024 PR POST-OP FOLLOW-UP VISIT: ICD-10-PCS | Mod: S$GLB,POP,, | Performed by: PLASTIC SURGERY

## 2021-06-16 PROCEDURE — 99024 POSTOP FOLLOW-UP VISIT: CPT | Mod: S$GLB,POP,, | Performed by: PLASTIC SURGERY

## 2021-06-22 ENCOUNTER — HOSPITAL ENCOUNTER (OUTPATIENT)
Dept: RADIATION THERAPY | Facility: HOSPITAL | Age: 71
Discharge: HOME | End: 2021-06-22
Attending: RADIOLOGY
Payer: MEDICARE

## 2021-06-22 PROCEDURE — 77263 PR  RADIATION THERAPY PLAN COMPLEX: ICD-10-PCS | Mod: ,,, | Performed by: RADIOLOGY

## 2021-06-22 PROCEDURE — 77290 THER RAD SIMULAJ FIELD CPLX: CPT | Mod: 26,,, | Performed by: RADIOLOGY

## 2021-06-22 PROCEDURE — 77014 HC CT GUIDANCE RADIATION THERAPY FLDS PLACEMENT: CPT | Mod: TC | Performed by: RADIOLOGY

## 2021-06-22 PROCEDURE — 77334 RADIATION TREATMENT AID(S): CPT | Mod: TC | Performed by: RADIOLOGY

## 2021-06-22 PROCEDURE — 77290 PR  SET RADN THERAPY FIELD COMPLEX: ICD-10-PCS | Mod: 26,,, | Performed by: RADIOLOGY

## 2021-06-22 PROCEDURE — 77290 THER RAD SIMULAJ FIELD CPLX: CPT | Mod: TC | Performed by: RADIOLOGY

## 2021-06-22 PROCEDURE — 77334 RADIATION TREATMENT AID(S): CPT | Mod: 26,,, | Performed by: RADIOLOGY

## 2021-06-22 PROCEDURE — 77334 PR  RADN TREATMENT AID(S) COMPLX: ICD-10-PCS | Mod: 26,,, | Performed by: RADIOLOGY

## 2021-06-22 PROCEDURE — 77263 THER RADIOLOGY TX PLNG CPLX: CPT | Mod: ,,, | Performed by: RADIOLOGY

## 2021-06-23 ENCOUNTER — PATIENT MESSAGE (OUTPATIENT)
Dept: INTERNAL MEDICINE | Facility: CLINIC | Age: 71
End: 2021-06-23

## 2021-06-23 ENCOUNTER — TELEPHONE (OUTPATIENT)
Dept: RADIATION ONCOLOGY | Facility: CLINIC | Age: 71
End: 2021-06-23

## 2021-06-23 PROCEDURE — 77300 RADIATION THERAPY DOSE PLAN: CPT | Mod: TC | Performed by: RADIOLOGY

## 2021-06-23 PROCEDURE — 77334 RADIATION TREATMENT AID(S): CPT | Mod: TC | Performed by: RADIOLOGY

## 2021-06-23 PROCEDURE — 77300 PR RADIATION THERAPY,DOSIMETRY PLAN: ICD-10-PCS | Mod: 26,,, | Performed by: RADIOLOGY

## 2021-06-23 PROCEDURE — 77334 RADIATION TREATMENT AID(S): CPT | Mod: 26,,, | Performed by: RADIOLOGY

## 2021-06-23 PROCEDURE — 77300 RADIATION THERAPY DOSE PLAN: CPT | Mod: 26,,, | Performed by: RADIOLOGY

## 2021-06-23 PROCEDURE — 77295 3-D RADIOTHERAPY PLAN: CPT | Mod: 26,,, | Performed by: RADIOLOGY

## 2021-06-23 PROCEDURE — 77295 PR 3D RADIOTHERAPY PLAN: ICD-10-PCS | Mod: 26,,, | Performed by: RADIOLOGY

## 2021-06-23 PROCEDURE — 77295 3-D RADIOTHERAPY PLAN: CPT | Mod: TC | Performed by: RADIOLOGY

## 2021-06-23 PROCEDURE — 77334 PR  RADN TREATMENT AID(S) COMPLX: ICD-10-PCS | Mod: 26,,, | Performed by: RADIOLOGY

## 2021-06-24 ENCOUNTER — PATIENT MESSAGE (OUTPATIENT)
Dept: INTERNAL MEDICINE | Facility: CLINIC | Age: 71
End: 2021-06-24

## 2021-06-25 ENCOUNTER — OFFICE VISIT (OUTPATIENT)
Dept: INTERNAL MEDICINE | Facility: CLINIC | Age: 71
End: 2021-06-25
Payer: MEDICARE

## 2021-06-25 VITALS
BODY MASS INDEX: 29.05 KG/M2 | RESPIRATION RATE: 16 BRPM | HEIGHT: 65 IN | DIASTOLIC BLOOD PRESSURE: 66 MMHG | HEART RATE: 64 BPM | SYSTOLIC BLOOD PRESSURE: 130 MMHG | WEIGHT: 174.38 LBS | TEMPERATURE: 98 F

## 2021-06-25 DIAGNOSIS — M54.2 NECK PAIN: ICD-10-CM

## 2021-06-25 DIAGNOSIS — T14.8XXA MUSCLE STRAIN: Primary | ICD-10-CM

## 2021-06-25 PROCEDURE — 99214 OFFICE O/P EST MOD 30 MIN: CPT | Mod: S$PBB,,, | Performed by: NURSE PRACTITIONER

## 2021-06-25 PROCEDURE — 99214 PR OFFICE/OUTPT VISIT, EST, LEVL IV, 30-39 MIN: ICD-10-PCS | Mod: S$PBB,,, | Performed by: NURSE PRACTITIONER

## 2021-06-25 PROCEDURE — 99999 PR PBB SHADOW E&M-EST. PATIENT-LVL IV: CPT | Mod: PBBFAC,,, | Performed by: NURSE PRACTITIONER

## 2021-06-25 PROCEDURE — 99999 PR PBB SHADOW E&M-EST. PATIENT-LVL IV: ICD-10-PCS | Mod: PBBFAC,,, | Performed by: NURSE PRACTITIONER

## 2021-06-25 PROCEDURE — 99214 OFFICE O/P EST MOD 30 MIN: CPT | Mod: PBBFAC,PO | Performed by: NURSE PRACTITIONER

## 2021-06-30 ENCOUNTER — DOCUMENTATION ONLY (OUTPATIENT)
Dept: RADIATION ONCOLOGY | Facility: CLINIC | Age: 71
End: 2021-06-30

## 2021-06-30 ENCOUNTER — OFFICE VISIT (OUTPATIENT)
Dept: PLASTIC SURGERY | Facility: CLINIC | Age: 71
End: 2021-06-30
Attending: PLASTIC SURGERY
Payer: MEDICARE

## 2021-06-30 VITALS
WEIGHT: 174.38 LBS | BODY MASS INDEX: 29.05 KG/M2 | HEART RATE: 63 BPM | SYSTOLIC BLOOD PRESSURE: 129 MMHG | HEIGHT: 65 IN | DIASTOLIC BLOOD PRESSURE: 70 MMHG

## 2021-06-30 DIAGNOSIS — C50.312 MALIGNANT NEOPLASM OF LOWER-INNER QUADRANT OF LEFT BREAST IN FEMALE, ESTROGEN RECEPTOR POSITIVE: Primary | ICD-10-CM

## 2021-06-30 DIAGNOSIS — Z17.0 MALIGNANT NEOPLASM OF LOWER-INNER QUADRANT OF LEFT BREAST IN FEMALE, ESTROGEN RECEPTOR POSITIVE: Primary | ICD-10-CM

## 2021-06-30 PROCEDURE — 77280 THER RAD SIMULAJ FIELD SMPL: CPT | Mod: 26,,, | Performed by: RADIOLOGY

## 2021-06-30 PROCEDURE — 99024 PR POST-OP FOLLOW-UP VISIT: ICD-10-PCS | Mod: S$GLB,POP,, | Performed by: PLASTIC SURGERY

## 2021-06-30 PROCEDURE — 77280 PR  SET RADN THERAPY FIELD SIMPLE: ICD-10-PCS | Mod: 26,,, | Performed by: RADIOLOGY

## 2021-06-30 PROCEDURE — 77280 THER RAD SIMULAJ FIELD SMPL: CPT | Mod: TC | Performed by: RADIOLOGY

## 2021-06-30 PROCEDURE — 99024 POSTOP FOLLOW-UP VISIT: CPT | Mod: S$GLB,POP,, | Performed by: PLASTIC SURGERY

## 2021-07-01 ENCOUNTER — HOSPITAL ENCOUNTER (OUTPATIENT)
Dept: RADIATION THERAPY | Facility: HOSPITAL | Age: 71
Discharge: HOME | End: 2021-07-01
Attending: RADIOLOGY
Payer: MEDICARE

## 2021-07-06 PROCEDURE — 77412 RADIATION TX DELIVERY LVL 3: CPT | Performed by: RADIOLOGY

## 2021-07-06 PROCEDURE — G6002 PR STEREOSCOPIC XRAY GUIDE FOR RADIATION TX DELIV: ICD-10-PCS | Mod: 26,,, | Performed by: RADIOLOGY

## 2021-07-06 PROCEDURE — G6002 STEREOSCOPIC X-RAY GUIDANCE: HCPCS | Mod: 26,,, | Performed by: RADIOLOGY

## 2021-07-06 PROCEDURE — 77387 GUIDANCE FOR RADJ TX DLVR: CPT | Mod: TC | Performed by: RADIOLOGY

## 2021-07-07 ENCOUNTER — DOCUMENTATION ONLY (OUTPATIENT)
Dept: RADIATION ONCOLOGY | Facility: CLINIC | Age: 71
End: 2021-07-07

## 2021-07-07 PROCEDURE — 77412 RADIATION TX DELIVERY LVL 3: CPT | Performed by: RADIOLOGY

## 2021-07-07 PROCEDURE — G6002 STEREOSCOPIC X-RAY GUIDANCE: HCPCS | Mod: 26,,, | Performed by: RADIOLOGY

## 2021-07-07 PROCEDURE — 77387 GUIDANCE FOR RADJ TX DLVR: CPT | Mod: TC | Performed by: RADIOLOGY

## 2021-07-07 PROCEDURE — G6002 PR STEREOSCOPIC XRAY GUIDE FOR RADIATION TX DELIV: ICD-10-PCS | Mod: 26,,, | Performed by: RADIOLOGY

## 2021-07-08 PROCEDURE — 77387 GUIDANCE FOR RADJ TX DLVR: CPT | Mod: TC | Performed by: RADIOLOGY

## 2021-07-08 PROCEDURE — G6002 PR STEREOSCOPIC XRAY GUIDE FOR RADIATION TX DELIV: ICD-10-PCS | Mod: 26,,, | Performed by: RADIOLOGY

## 2021-07-08 PROCEDURE — 77412 RADIATION TX DELIVERY LVL 3: CPT | Performed by: RADIOLOGY

## 2021-07-08 PROCEDURE — G6002 STEREOSCOPIC X-RAY GUIDANCE: HCPCS | Mod: 26,,, | Performed by: RADIOLOGY

## 2021-07-09 ENCOUNTER — LAB VISIT (OUTPATIENT)
Dept: LAB | Facility: HOSPITAL | Age: 71
End: 2021-07-09
Attending: HOSPITALIST
Payer: MEDICARE

## 2021-07-09 DIAGNOSIS — E78.2 MIXED HYPERLIPIDEMIA: ICD-10-CM

## 2021-07-09 LAB
CHOLEST SERPL-MCNC: 211 MG/DL (ref 120–199)
CHOLEST/HDLC SERPL: 2.9 {RATIO} (ref 2–5)
HDLC SERPL-MCNC: 74 MG/DL (ref 40–75)
HDLC SERPL: 35.1 % (ref 20–50)
LDLC SERPL CALC-MCNC: 113.2 MG/DL (ref 63–159)
NONHDLC SERPL-MCNC: 137 MG/DL
TRIGL SERPL-MCNC: 119 MG/DL (ref 30–150)

## 2021-07-09 PROCEDURE — 36415 COLL VENOUS BLD VENIPUNCTURE: CPT | Mod: PO | Performed by: HOSPITALIST

## 2021-07-09 PROCEDURE — 77412 RADIATION TX DELIVERY LVL 3: CPT | Performed by: RADIOLOGY

## 2021-07-09 PROCEDURE — G6002 PR STEREOSCOPIC XRAY GUIDE FOR RADIATION TX DELIV: ICD-10-PCS | Mod: 26,,, | Performed by: RADIOLOGY

## 2021-07-09 PROCEDURE — 80061 LIPID PANEL: CPT | Performed by: HOSPITALIST

## 2021-07-09 PROCEDURE — 77336 RADIATION PHYSICS CONSULT: CPT | Performed by: RADIOLOGY

## 2021-07-09 PROCEDURE — 77387 GUIDANCE FOR RADJ TX DLVR: CPT | Mod: TC | Performed by: RADIOLOGY

## 2021-07-09 PROCEDURE — G6002 STEREOSCOPIC X-RAY GUIDANCE: HCPCS | Mod: 26,,, | Performed by: RADIOLOGY

## 2021-07-12 PROCEDURE — G6002 PR STEREOSCOPIC XRAY GUIDE FOR RADIATION TX DELIV: ICD-10-PCS | Mod: 26,,, | Performed by: RADIOLOGY

## 2021-07-12 PROCEDURE — 77387 GUIDANCE FOR RADJ TX DLVR: CPT | Mod: TC | Performed by: RADIOLOGY

## 2021-07-12 PROCEDURE — 77412 RADIATION TX DELIVERY LVL 3: CPT | Performed by: RADIOLOGY

## 2021-07-12 PROCEDURE — G6002 STEREOSCOPIC X-RAY GUIDANCE: HCPCS | Mod: 26,,, | Performed by: RADIOLOGY

## 2021-07-13 ENCOUNTER — DOCUMENTATION ONLY (OUTPATIENT)
Dept: RADIATION ONCOLOGY | Facility: CLINIC | Age: 71
End: 2021-07-13

## 2021-07-13 PROCEDURE — 77412 RADIATION TX DELIVERY LVL 3: CPT | Performed by: RADIOLOGY

## 2021-07-13 PROCEDURE — 77387 GUIDANCE FOR RADJ TX DLVR: CPT | Mod: TC | Performed by: RADIOLOGY

## 2021-07-13 PROCEDURE — 77417 THER RADIOLOGY PORT IMAGE(S): CPT | Performed by: RADIOLOGY

## 2021-07-13 PROCEDURE — G6002 PR STEREOSCOPIC XRAY GUIDE FOR RADIATION TX DELIV: ICD-10-PCS | Mod: 26,,, | Performed by: RADIOLOGY

## 2021-07-13 PROCEDURE — G6002 STEREOSCOPIC X-RAY GUIDANCE: HCPCS | Mod: 26,,, | Performed by: RADIOLOGY

## 2021-07-14 PROCEDURE — G6002 STEREOSCOPIC X-RAY GUIDANCE: HCPCS | Mod: 26,,, | Performed by: RADIOLOGY

## 2021-07-14 PROCEDURE — 77412 RADIATION TX DELIVERY LVL 3: CPT | Performed by: RADIOLOGY

## 2021-07-14 PROCEDURE — G6002 PR STEREOSCOPIC XRAY GUIDE FOR RADIATION TX DELIV: ICD-10-PCS | Mod: 26,,, | Performed by: RADIOLOGY

## 2021-07-14 PROCEDURE — 77387 GUIDANCE FOR RADJ TX DLVR: CPT | Mod: TC | Performed by: RADIOLOGY

## 2021-07-15 PROCEDURE — 77412 RADIATION TX DELIVERY LVL 3: CPT | Performed by: RADIOLOGY

## 2021-07-15 PROCEDURE — 77387 GUIDANCE FOR RADJ TX DLVR: CPT | Mod: TC | Performed by: RADIOLOGY

## 2021-07-15 PROCEDURE — G6002 STEREOSCOPIC X-RAY GUIDANCE: HCPCS | Mod: 26,,, | Performed by: RADIOLOGY

## 2021-07-15 PROCEDURE — G6002 PR STEREOSCOPIC XRAY GUIDE FOR RADIATION TX DELIV: ICD-10-PCS | Mod: 26,,, | Performed by: RADIOLOGY

## 2021-07-16 PROCEDURE — 77412 RADIATION TX DELIVERY LVL 3: CPT | Performed by: RADIOLOGY

## 2021-07-16 PROCEDURE — 77387 GUIDANCE FOR RADJ TX DLVR: CPT | Mod: TC | Performed by: RADIOLOGY

## 2021-07-16 PROCEDURE — G6002 STEREOSCOPIC X-RAY GUIDANCE: HCPCS | Mod: 26,,, | Performed by: RADIOLOGY

## 2021-07-16 PROCEDURE — G6002 PR STEREOSCOPIC XRAY GUIDE FOR RADIATION TX DELIV: ICD-10-PCS | Mod: 26,,, | Performed by: RADIOLOGY

## 2021-07-19 PROCEDURE — G6002 STEREOSCOPIC X-RAY GUIDANCE: HCPCS | Mod: 26,,, | Performed by: RADIOLOGY

## 2021-07-19 PROCEDURE — 77387 GUIDANCE FOR RADJ TX DLVR: CPT | Mod: TC | Performed by: RADIOLOGY

## 2021-07-19 PROCEDURE — G6002 PR STEREOSCOPIC XRAY GUIDE FOR RADIATION TX DELIV: ICD-10-PCS | Mod: 26,,, | Performed by: RADIOLOGY

## 2021-07-19 PROCEDURE — 77412 RADIATION TX DELIVERY LVL 3: CPT | Performed by: RADIOLOGY

## 2021-07-19 PROCEDURE — 77336 RADIATION PHYSICS CONSULT: CPT | Performed by: RADIOLOGY

## 2021-07-20 ENCOUNTER — DOCUMENTATION ONLY (OUTPATIENT)
Dept: RADIATION ONCOLOGY | Facility: CLINIC | Age: 71
End: 2021-07-20

## 2021-07-20 PROCEDURE — 77417 THER RADIOLOGY PORT IMAGE(S): CPT | Performed by: RADIOLOGY

## 2021-07-20 PROCEDURE — G6002 PR STEREOSCOPIC XRAY GUIDE FOR RADIATION TX DELIV: ICD-10-PCS | Mod: 26,,, | Performed by: RADIOLOGY

## 2021-07-20 PROCEDURE — 77412 RADIATION TX DELIVERY LVL 3: CPT | Performed by: RADIOLOGY

## 2021-07-20 PROCEDURE — 77387 GUIDANCE FOR RADJ TX DLVR: CPT | Mod: TC | Performed by: RADIOLOGY

## 2021-07-20 PROCEDURE — G6002 STEREOSCOPIC X-RAY GUIDANCE: HCPCS | Mod: 26,,, | Performed by: RADIOLOGY

## 2021-07-21 PROCEDURE — G6002 STEREOSCOPIC X-RAY GUIDANCE: HCPCS | Mod: 26,,, | Performed by: RADIOLOGY

## 2021-07-21 PROCEDURE — 77387 GUIDANCE FOR RADJ TX DLVR: CPT | Mod: TC | Performed by: RADIOLOGY

## 2021-07-21 PROCEDURE — G6002 PR STEREOSCOPIC XRAY GUIDE FOR RADIATION TX DELIV: ICD-10-PCS | Mod: 26,,, | Performed by: RADIOLOGY

## 2021-07-21 PROCEDURE — 77412 RADIATION TX DELIVERY LVL 3: CPT | Performed by: RADIOLOGY

## 2021-07-22 PROCEDURE — G6002 PR STEREOSCOPIC XRAY GUIDE FOR RADIATION TX DELIV: ICD-10-PCS | Mod: 26,,, | Performed by: RADIOLOGY

## 2021-07-22 PROCEDURE — 77387 GUIDANCE FOR RADJ TX DLVR: CPT | Mod: TC | Performed by: RADIOLOGY

## 2021-07-22 PROCEDURE — 77412 RADIATION TX DELIVERY LVL 3: CPT | Performed by: RADIOLOGY

## 2021-07-22 PROCEDURE — G6002 STEREOSCOPIC X-RAY GUIDANCE: HCPCS | Mod: 26,,, | Performed by: RADIOLOGY

## 2021-07-23 PROCEDURE — 77387 GUIDANCE FOR RADJ TX DLVR: CPT | Mod: TC | Performed by: RADIOLOGY

## 2021-07-23 PROCEDURE — G6002 STEREOSCOPIC X-RAY GUIDANCE: HCPCS | Mod: 26,,, | Performed by: RADIOLOGY

## 2021-07-23 PROCEDURE — G6002 PR STEREOSCOPIC XRAY GUIDE FOR RADIATION TX DELIV: ICD-10-PCS | Mod: 26,,, | Performed by: RADIOLOGY

## 2021-07-23 PROCEDURE — 77412 RADIATION TX DELIVERY LVL 3: CPT | Performed by: RADIOLOGY

## 2021-07-26 PROCEDURE — 77387 GUIDANCE FOR RADJ TX DLVR: CPT | Mod: TC | Performed by: RADIOLOGY

## 2021-07-26 PROCEDURE — G6002 STEREOSCOPIC X-RAY GUIDANCE: HCPCS | Mod: 26,,, | Performed by: RADIOLOGY

## 2021-07-26 PROCEDURE — 77412 RADIATION TX DELIVERY LVL 3: CPT | Performed by: RADIOLOGY

## 2021-07-26 PROCEDURE — G6002 PR STEREOSCOPIC XRAY GUIDE FOR RADIATION TX DELIV: ICD-10-PCS | Mod: 26,,, | Performed by: RADIOLOGY

## 2021-07-26 PROCEDURE — 77336 RADIATION PHYSICS CONSULT: CPT | Performed by: RADIOLOGY

## 2021-07-27 ENCOUNTER — DOCUMENTATION ONLY (OUTPATIENT)
Dept: RADIATION ONCOLOGY | Facility: CLINIC | Age: 71
End: 2021-07-27

## 2021-07-27 PROCEDURE — 77307 TELETHX ISODOSE PLAN CPLX: CPT | Mod: TC | Performed by: RADIOLOGY

## 2021-07-27 PROCEDURE — 77412 RADIATION TX DELIVERY LVL 3: CPT | Performed by: RADIOLOGY

## 2021-07-27 PROCEDURE — 77307 TELETHX ISODOSE PLAN CPLX: CPT | Mod: 26,,, | Performed by: RADIOLOGY

## 2021-07-27 PROCEDURE — 77387 GUIDANCE FOR RADJ TX DLVR: CPT | Mod: TC | Performed by: RADIOLOGY

## 2021-07-27 PROCEDURE — 77307 PR TELETHERAPY ISODOSE PLAN; COMPLEX: ICD-10-PCS | Mod: 26,,, | Performed by: RADIOLOGY

## 2021-07-27 PROCEDURE — G6002 STEREOSCOPIC X-RAY GUIDANCE: HCPCS | Mod: 26,,, | Performed by: RADIOLOGY

## 2021-07-27 PROCEDURE — 77300 RADIATION THERAPY DOSE PLAN: CPT | Mod: TC | Performed by: RADIOLOGY

## 2021-07-27 PROCEDURE — G6002 PR STEREOSCOPIC XRAY GUIDE FOR RADIATION TX DELIV: ICD-10-PCS | Mod: 26,,, | Performed by: RADIOLOGY

## 2021-07-28 PROCEDURE — 77412 RADIATION TX DELIVERY LVL 3: CPT | Performed by: RADIOLOGY

## 2021-07-29 PROCEDURE — 77412 RADIATION TX DELIVERY LVL 3: CPT | Performed by: RADIOLOGY

## 2021-08-02 ENCOUNTER — HOSPITAL ENCOUNTER (OUTPATIENT)
Dept: RADIATION THERAPY | Facility: HOSPITAL | Age: 71
Discharge: HOME | End: 2021-08-02
Attending: RADIOLOGY
Payer: MEDICARE

## 2021-08-02 PROCEDURE — 77412 RADIATION TX DELIVERY LVL 3: CPT | Performed by: RADIOLOGY

## 2021-08-03 ENCOUNTER — DOCUMENTATION ONLY (OUTPATIENT)
Dept: RADIATION ONCOLOGY | Facility: CLINIC | Age: 71
End: 2021-08-03

## 2021-08-03 PROCEDURE — 77336 RADIATION PHYSICS CONSULT: CPT | Performed by: RADIOLOGY

## 2021-08-03 PROCEDURE — 77412 RADIATION TX DELIVERY LVL 3: CPT | Performed by: RADIOLOGY

## 2021-08-04 ENCOUNTER — PATIENT MESSAGE (OUTPATIENT)
Dept: PLASTIC SURGERY | Facility: CLINIC | Age: 71
End: 2021-08-04

## 2021-08-17 ENCOUNTER — TELEPHONE (OUTPATIENT)
Dept: RADIATION ONCOLOGY | Facility: CLINIC | Age: 71
End: 2021-08-17

## 2021-08-18 ENCOUNTER — TELEPHONE (OUTPATIENT)
Dept: RADIATION ONCOLOGY | Facility: CLINIC | Age: 71
End: 2021-08-18

## 2021-08-19 ENCOUNTER — OFFICE VISIT (OUTPATIENT)
Dept: PLASTIC SURGERY | Facility: CLINIC | Age: 71
End: 2021-08-19
Attending: PLASTIC SURGERY
Payer: MEDICARE

## 2021-08-19 VITALS
BODY MASS INDEX: 29.02 KG/M2 | HEART RATE: 61 BPM | SYSTOLIC BLOOD PRESSURE: 144 MMHG | DIASTOLIC BLOOD PRESSURE: 67 MMHG | WEIGHT: 174.38 LBS

## 2021-08-19 DIAGNOSIS — Z17.0 MALIGNANT NEOPLASM OF LOWER-INNER QUADRANT OF LEFT BREAST IN FEMALE, ESTROGEN RECEPTOR POSITIVE: Primary | ICD-10-CM

## 2021-08-19 DIAGNOSIS — C50.312 MALIGNANT NEOPLASM OF LOWER-INNER QUADRANT OF LEFT BREAST IN FEMALE, ESTROGEN RECEPTOR POSITIVE: Primary | ICD-10-CM

## 2021-08-19 PROCEDURE — 99211 OFF/OP EST MAY X REQ PHY/QHP: CPT | Mod: S$GLB,,, | Performed by: PLASTIC SURGERY

## 2021-08-19 PROCEDURE — 99211 PR OFFICE/OUTPT VISIT, EST, LEVL I: ICD-10-PCS | Mod: S$GLB,,, | Performed by: PLASTIC SURGERY

## 2021-08-23 DIAGNOSIS — M81.0 OSTEOPOROSIS, UNSPECIFIED OSTEOPOROSIS TYPE, UNSPECIFIED PATHOLOGICAL FRACTURE PRESENCE: ICD-10-CM

## 2021-08-23 RX ORDER — ALENDRONATE SODIUM 70 MG/1
TABLET ORAL
Qty: 12 TABLET | Refills: 3 | Status: SHIPPED | OUTPATIENT
Start: 2021-08-23 | End: 2022-07-21

## 2021-08-24 ENCOUNTER — TELEPHONE (OUTPATIENT)
Dept: ENDOSCOPY | Facility: HOSPITAL | Age: 71
End: 2021-08-24

## 2021-08-24 ENCOUNTER — PATIENT MESSAGE (OUTPATIENT)
Dept: HEMATOLOGY/ONCOLOGY | Facility: CLINIC | Age: 71
End: 2021-08-24

## 2021-08-24 ENCOUNTER — TELEPHONE (OUTPATIENT)
Dept: GYNECOLOGIC ONCOLOGY | Facility: CLINIC | Age: 71
End: 2021-08-24

## 2021-08-26 ENCOUNTER — PATIENT MESSAGE (OUTPATIENT)
Dept: INTERNAL MEDICINE | Facility: CLINIC | Age: 71
End: 2021-08-26

## 2021-08-26 ENCOUNTER — TELEPHONE (OUTPATIENT)
Dept: GYNECOLOGIC ONCOLOGY | Facility: CLINIC | Age: 71
End: 2021-08-26

## 2021-08-26 ENCOUNTER — TELEPHONE (OUTPATIENT)
Dept: HEMATOLOGY/ONCOLOGY | Facility: CLINIC | Age: 71
End: 2021-08-26

## 2021-09-10 ENCOUNTER — HOSPITAL ENCOUNTER (OUTPATIENT)
Dept: RADIOLOGY | Facility: HOSPITAL | Age: 71
Discharge: HOME | End: 2021-09-10
Attending: NURSE PRACTITIONER
Payer: MEDICARE

## 2021-09-10 DIAGNOSIS — Z15.89 MONOALLELIC MUTATION OF ATM GENE: ICD-10-CM

## 2021-09-10 DIAGNOSIS — Z15.09 MONOALLELIC MUTATION OF ATM GENE: ICD-10-CM

## 2021-09-10 DIAGNOSIS — Z15.01 MONOALLELIC MUTATION OF ATM GENE: ICD-10-CM

## 2021-09-10 PROCEDURE — 76830 US PELVIS COMP WITH TRANSVAG NON-OB (XPD): ICD-10-PCS | Mod: 26,,, | Performed by: INTERNAL MEDICINE

## 2021-09-10 PROCEDURE — 76830 TRANSVAGINAL US NON-OB: CPT | Mod: 26,,, | Performed by: INTERNAL MEDICINE

## 2021-09-10 PROCEDURE — 76856 US EXAM PELVIC COMPLETE: CPT | Mod: 26,,, | Performed by: INTERNAL MEDICINE

## 2021-09-10 PROCEDURE — 76856 US EXAM PELVIC COMPLETE: CPT | Mod: TC

## 2021-09-10 PROCEDURE — 76856 US PELVIS COMP WITH TRANSVAG NON-OB (XPD): ICD-10-PCS | Mod: 26,,, | Performed by: INTERNAL MEDICINE

## 2021-09-14 ENCOUNTER — OFFICE VISIT (OUTPATIENT)
Dept: GYNECOLOGIC ONCOLOGY | Facility: CLINIC | Age: 71
End: 2021-09-14
Payer: MEDICARE

## 2021-09-14 ENCOUNTER — PATIENT MESSAGE (OUTPATIENT)
Dept: GYNECOLOGIC ONCOLOGY | Facility: CLINIC | Age: 71
End: 2021-09-14

## 2021-09-14 ENCOUNTER — LAB VISIT (OUTPATIENT)
Dept: LAB | Facility: HOSPITAL | Age: 71
End: 2021-09-14
Attending: OBSTETRICS & GYNECOLOGY
Payer: MEDICARE

## 2021-09-14 ENCOUNTER — OFFICE VISIT (OUTPATIENT)
Dept: RADIATION ONCOLOGY | Facility: CLINIC | Age: 71
End: 2021-09-14
Payer: MEDICARE

## 2021-09-14 VITALS
DIASTOLIC BLOOD PRESSURE: 79 MMHG | SYSTOLIC BLOOD PRESSURE: 183 MMHG | BODY MASS INDEX: 28.84 KG/M2 | WEIGHT: 173.31 LBS | HEART RATE: 59 BPM

## 2021-09-14 VITALS
SYSTOLIC BLOOD PRESSURE: 183 MMHG | HEART RATE: 59 BPM | BODY MASS INDEX: 28.86 KG/M2 | DIASTOLIC BLOOD PRESSURE: 79 MMHG | OXYGEN SATURATION: 97 % | RESPIRATION RATE: 17 BRPM | HEIGHT: 65 IN | WEIGHT: 173.19 LBS

## 2021-09-14 DIAGNOSIS — C50.919 BREAST CANCER ASSOCIATED WITH MUTATION IN ATM GENE: ICD-10-CM

## 2021-09-14 DIAGNOSIS — Z15.09 MONOALLELIC MUTATION OF ATM GENE: ICD-10-CM

## 2021-09-14 DIAGNOSIS — C50.919 BREAST CANCER ASSOCIATED WITH MUTATION IN ATM GENE: Primary | ICD-10-CM

## 2021-09-14 DIAGNOSIS — Z15.89 MONOALLELIC MUTATION OF ATM GENE: ICD-10-CM

## 2021-09-14 DIAGNOSIS — Z17.0 MALIGNANT NEOPLASM OF LOWER-INNER QUADRANT OF LEFT BREAST IN FEMALE, ESTROGEN RECEPTOR POSITIVE: Primary | ICD-10-CM

## 2021-09-14 DIAGNOSIS — Z15.01 MONOALLELIC MUTATION OF ATM GENE: ICD-10-CM

## 2021-09-14 DIAGNOSIS — R97.8 OTHER ABNORMAL TUMOR MARKERS: ICD-10-CM

## 2021-09-14 DIAGNOSIS — C50.312 MALIGNANT NEOPLASM OF LOWER-INNER QUADRANT OF LEFT BREAST IN FEMALE, ESTROGEN RECEPTOR POSITIVE: Primary | ICD-10-CM

## 2021-09-14 LAB — CANCER AG125 SERPL-ACNC: 7 U/ML (ref 0–30)

## 2021-09-14 PROCEDURE — 99214 OFFICE O/P EST MOD 30 MIN: CPT | Mod: PBBFAC,27 | Performed by: OBSTETRICS & GYNECOLOGY

## 2021-09-14 PROCEDURE — 99215 OFFICE O/P EST HI 40 MIN: CPT | Mod: PBBFAC | Performed by: RADIOLOGY

## 2021-09-14 PROCEDURE — 99999 PR PBB SHADOW E&M-EST. PATIENT-LVL IV: CPT | Mod: PBBFAC,,, | Performed by: OBSTETRICS & GYNECOLOGY

## 2021-09-14 PROCEDURE — 99999 PR PBB SHADOW E&M-EST. PATIENT-LVL V: CPT | Mod: PBBFAC,,, | Performed by: RADIOLOGY

## 2021-09-14 PROCEDURE — 99213 OFFICE O/P EST LOW 20 MIN: CPT | Mod: S$PBB,,, | Performed by: RADIOLOGY

## 2021-09-14 PROCEDURE — 36415 COLL VENOUS BLD VENIPUNCTURE: CPT | Performed by: OBSTETRICS & GYNECOLOGY

## 2021-09-14 PROCEDURE — 99204 OFFICE O/P NEW MOD 45 MIN: CPT | Mod: S$PBB,,, | Performed by: OBSTETRICS & GYNECOLOGY

## 2021-09-14 PROCEDURE — 99204 PR OFFICE/OUTPT VISIT, NEW, LEVL IV, 45-59 MIN: ICD-10-PCS | Mod: S$PBB,,, | Performed by: OBSTETRICS & GYNECOLOGY

## 2021-09-14 PROCEDURE — 86304 IMMUNOASSAY TUMOR CA 125: CPT | Performed by: OBSTETRICS & GYNECOLOGY

## 2021-09-14 PROCEDURE — 99999 PR PBB SHADOW E&M-EST. PATIENT-LVL IV: ICD-10-PCS | Mod: PBBFAC,,, | Performed by: OBSTETRICS & GYNECOLOGY

## 2021-09-14 PROCEDURE — 99999 PR PBB SHADOW E&M-EST. PATIENT-LVL V: ICD-10-PCS | Mod: PBBFAC,,, | Performed by: RADIOLOGY

## 2021-09-14 PROCEDURE — 99213 PR OFFICE/OUTPT VISIT, EST, LEVL III, 20-29 MIN: ICD-10-PCS | Mod: S$PBB,,, | Performed by: RADIOLOGY

## 2021-09-16 ENCOUNTER — PATIENT MESSAGE (OUTPATIENT)
Dept: INTERNAL MEDICINE | Facility: CLINIC | Age: 71
End: 2021-09-16

## 2021-09-20 ENCOUNTER — PATIENT MESSAGE (OUTPATIENT)
Dept: INTERNAL MEDICINE | Facility: CLINIC | Age: 71
End: 2021-09-20

## 2021-10-01 ENCOUNTER — OFFICE VISIT (OUTPATIENT)
Dept: HEMATOLOGY/ONCOLOGY | Facility: CLINIC | Age: 71
End: 2021-10-01
Payer: MEDICARE

## 2021-10-01 VITALS
RESPIRATION RATE: 18 BRPM | WEIGHT: 173.75 LBS | OXYGEN SATURATION: 99 % | SYSTOLIC BLOOD PRESSURE: 170 MMHG | TEMPERATURE: 98 F | BODY MASS INDEX: 27.92 KG/M2 | HEART RATE: 64 BPM | DIASTOLIC BLOOD PRESSURE: 79 MMHG | HEIGHT: 66 IN

## 2021-10-01 DIAGNOSIS — I10 ESSENTIAL HYPERTENSION: ICD-10-CM

## 2021-10-01 DIAGNOSIS — C50.312 MALIGNANT NEOPLASM OF LOWER-INNER QUADRANT OF LEFT BREAST IN FEMALE, ESTROGEN RECEPTOR POSITIVE: Primary | ICD-10-CM

## 2021-10-01 DIAGNOSIS — E78.2 MIXED HYPERLIPIDEMIA: ICD-10-CM

## 2021-10-01 DIAGNOSIS — N18.31 STAGE 3A CHRONIC KIDNEY DISEASE: ICD-10-CM

## 2021-10-01 DIAGNOSIS — E66.9 OBESITY (BMI 30.0-34.9): ICD-10-CM

## 2021-10-01 DIAGNOSIS — M85.80 OSTEOPENIA, UNSPECIFIED LOCATION: ICD-10-CM

## 2021-10-01 DIAGNOSIS — Z17.0 MALIGNANT NEOPLASM OF LOWER-INNER QUADRANT OF LEFT BREAST IN FEMALE, ESTROGEN RECEPTOR POSITIVE: Primary | ICD-10-CM

## 2021-10-01 PROCEDURE — 99214 PR OFFICE/OUTPT VISIT, EST, LEVL IV, 30-39 MIN: ICD-10-PCS | Mod: S$PBB,,, | Performed by: NURSE PRACTITIONER

## 2021-10-01 PROCEDURE — 99999 PR PBB SHADOW E&M-EST. PATIENT-LVL V: ICD-10-PCS | Mod: PBBFAC,,, | Performed by: NURSE PRACTITIONER

## 2021-10-01 PROCEDURE — 99999 PR PBB SHADOW E&M-EST. PATIENT-LVL V: CPT | Mod: PBBFAC,,, | Performed by: NURSE PRACTITIONER

## 2021-10-01 PROCEDURE — 99215 OFFICE O/P EST HI 40 MIN: CPT | Mod: PBBFAC | Performed by: NURSE PRACTITIONER

## 2021-10-01 PROCEDURE — 99214 OFFICE O/P EST MOD 30 MIN: CPT | Mod: S$PBB,,, | Performed by: NURSE PRACTITIONER

## 2021-10-01 RX ORDER — LETROZOLE 2.5 MG/1
2.5 TABLET, FILM COATED ORAL DAILY
COMMUNITY
Start: 2021-09-26 | End: 2022-06-20

## 2021-10-19 ENCOUNTER — PATIENT MESSAGE (OUTPATIENT)
Dept: GYNECOLOGIC ONCOLOGY | Facility: CLINIC | Age: 71
End: 2021-10-19
Payer: MEDICARE

## 2021-10-21 ENCOUNTER — TELEPHONE (OUTPATIENT)
Dept: GYNECOLOGIC ONCOLOGY | Facility: CLINIC | Age: 71
End: 2021-10-21

## 2021-10-22 ENCOUNTER — PATIENT MESSAGE (OUTPATIENT)
Dept: GYNECOLOGIC ONCOLOGY | Facility: CLINIC | Age: 71
End: 2021-10-22
Payer: MEDICARE

## 2021-10-22 ENCOUNTER — TELEPHONE (OUTPATIENT)
Dept: GYNECOLOGIC ONCOLOGY | Facility: CLINIC | Age: 71
End: 2021-10-22

## 2021-10-22 DIAGNOSIS — Z15.01 MONOALLELIC MUTATION OF ATM GENE: Primary | ICD-10-CM

## 2021-10-22 DIAGNOSIS — Z15.89 MONOALLELIC MUTATION OF ATM GENE: Primary | ICD-10-CM

## 2021-10-22 DIAGNOSIS — Z15.09 MONOALLELIC MUTATION OF ATM GENE: Primary | ICD-10-CM

## 2021-11-03 ENCOUNTER — TELEPHONE (OUTPATIENT)
Dept: GYNECOLOGIC ONCOLOGY | Facility: CLINIC | Age: 71
End: 2021-11-03
Payer: MEDICARE

## 2021-11-15 ENCOUNTER — TELEPHONE (OUTPATIENT)
Dept: GASTROENTEROLOGY | Facility: CLINIC | Age: 71
End: 2021-11-15
Payer: MEDICARE

## 2021-11-16 ENCOUNTER — TELEPHONE (OUTPATIENT)
Dept: PREADMISSION TESTING | Facility: HOSPITAL | Age: 71
End: 2021-11-16
Payer: MEDICARE

## 2021-11-16 ENCOUNTER — TELEPHONE (OUTPATIENT)
Dept: INTERNAL MEDICINE | Facility: CLINIC | Age: 71
End: 2021-11-16
Payer: MEDICARE

## 2021-11-16 PROBLEM — M54.50 CHRONIC LOWER BACK PAIN: Status: RESOLVED | Noted: 2018-05-07 | Resolved: 2019-01-21

## 2021-11-16 PROBLEM — M67.819 TENDINOSIS OF ROTATOR CUFF: Status: RESOLVED | Noted: 2020-02-26 | Resolved: 2021-11-16

## 2021-11-16 PROBLEM — I65.23 ASYMPTOMATIC BILATERAL CAROTID ARTERY STENOSIS: Status: ACTIVE | Noted: 2021-11-16

## 2021-11-16 PROBLEM — M25.511 CHRONIC RIGHT SHOULDER PAIN: Status: RESOLVED | Noted: 2018-03-22 | Resolved: 2021-11-16

## 2021-11-16 PROBLEM — G89.29 CHRONIC RIGHT SHOULDER PAIN: Status: RESOLVED | Noted: 2018-03-22 | Resolved: 2021-11-16

## 2021-11-16 PROBLEM — G89.29 CHRONIC LOWER BACK PAIN: Status: RESOLVED | Noted: 2018-05-07 | Resolved: 2019-01-21

## 2021-11-16 PROBLEM — G89.29 CHRONIC PAIN IN RIGHT SHOULDER: Status: RESOLVED | Noted: 2018-05-07 | Resolved: 2019-01-21

## 2021-11-16 PROBLEM — R52 PAIN: Status: RESOLVED | Noted: 2019-08-28 | Resolved: 2019-09-06

## 2021-11-16 PROBLEM — M25.511 CHRONIC PAIN IN RIGHT SHOULDER: Status: RESOLVED | Noted: 2018-05-07 | Resolved: 2019-01-21

## 2021-11-17 ENCOUNTER — LAB VISIT (OUTPATIENT)
Dept: LAB | Facility: HOSPITAL | Age: 71
End: 2021-11-17
Attending: ANESTHESIOLOGY
Payer: MEDICARE

## 2021-11-17 ENCOUNTER — OFFICE VISIT (OUTPATIENT)
Dept: INTERNAL MEDICINE | Facility: CLINIC | Age: 71
End: 2021-11-17
Payer: MEDICARE

## 2021-11-17 ENCOUNTER — OFFICE VISIT (OUTPATIENT)
Dept: SURGERY | Facility: CLINIC | Age: 71
End: 2021-11-17
Payer: MEDICARE

## 2021-11-17 ENCOUNTER — OFFICE VISIT (OUTPATIENT)
Dept: GYNECOLOGIC ONCOLOGY | Facility: CLINIC | Age: 71
End: 2021-11-17
Payer: MEDICARE

## 2021-11-17 VITALS
HEIGHT: 66 IN | WEIGHT: 159 LBS | SYSTOLIC BLOOD PRESSURE: 147 MMHG | DIASTOLIC BLOOD PRESSURE: 69 MMHG | BODY MASS INDEX: 25.55 KG/M2

## 2021-11-17 VITALS
DIASTOLIC BLOOD PRESSURE: 61 MMHG | HEART RATE: 63 BPM | BODY MASS INDEX: 28.79 KG/M2 | SYSTOLIC BLOOD PRESSURE: 135 MMHG | WEIGHT: 177 LBS

## 2021-11-17 VITALS
BODY MASS INDEX: 25.61 KG/M2 | DIASTOLIC BLOOD PRESSURE: 62 MMHG | HEIGHT: 66 IN | WEIGHT: 159.38 LBS | SYSTOLIC BLOOD PRESSURE: 120 MMHG | HEART RATE: 68 BPM

## 2021-11-17 DIAGNOSIS — N18.31 STAGE 3A CHRONIC KIDNEY DISEASE: ICD-10-CM

## 2021-11-17 DIAGNOSIS — I65.23 ASYMPTOMATIC BILATERAL CAROTID ARTERY STENOSIS: ICD-10-CM

## 2021-11-17 DIAGNOSIS — Z01.818 PREOP EXAM FOR INTERNAL MEDICINE: Primary | ICD-10-CM

## 2021-11-17 DIAGNOSIS — Z15.89 MONOALLELIC MUTATION OF ATM GENE: ICD-10-CM

## 2021-11-17 DIAGNOSIS — Z01.818 PREOP TESTING: ICD-10-CM

## 2021-11-17 DIAGNOSIS — Z15.09 MONOALLELIC MUTATION OF ATM GENE: ICD-10-CM

## 2021-11-17 DIAGNOSIS — Z91.89 HIGH RISK OF OVARIAN CANCER: ICD-10-CM

## 2021-11-17 DIAGNOSIS — Z98.890 S/P LUMPECTOMY, LEFT BREAST: ICD-10-CM

## 2021-11-17 DIAGNOSIS — Z85.3 PERSONAL HISTORY OF BREAST CANCER: Primary | ICD-10-CM

## 2021-11-17 DIAGNOSIS — I10 ESSENTIAL HYPERTENSION: ICD-10-CM

## 2021-11-17 DIAGNOSIS — Z15.01 MONOALLELIC MUTATION OF ATM GENE: ICD-10-CM

## 2021-11-17 DIAGNOSIS — Z12.31 ENCOUNTER FOR SCREENING MAMMOGRAM FOR BREAST CANCER: ICD-10-CM

## 2021-11-17 LAB
ABO + RH BLD: NORMAL
ALBUMIN SERPL BCP-MCNC: 3.9 G/DL (ref 3.5–5.2)
ALP SERPL-CCNC: 74 U/L (ref 55–135)
ALT SERPL W/O P-5'-P-CCNC: 37 U/L (ref 10–44)
ANION GAP SERPL CALC-SCNC: 10 MMOL/L (ref 8–16)
AST SERPL-CCNC: 33 U/L (ref 10–40)
BILIRUB SERPL-MCNC: 0.6 MG/DL (ref 0.1–1)
BLD GP AB SCN CELLS X3 SERPL QL: NORMAL
BUN SERPL-MCNC: 16 MG/DL (ref 8–23)
CALCIUM SERPL-MCNC: 10.3 MG/DL (ref 8.7–10.5)
CHLORIDE SERPL-SCNC: 98 MMOL/L (ref 95–110)
CO2 SERPL-SCNC: 31 MMOL/L (ref 23–29)
CREAT SERPL-MCNC: 1 MG/DL (ref 0.5–1.4)
ERYTHROCYTE [DISTWIDTH] IN BLOOD BY AUTOMATED COUNT: 12.9 % (ref 11.5–14.5)
EST. GFR  (AFRICAN AMERICAN): >60 ML/MIN/1.73 M^2
EST. GFR  (NON AFRICAN AMERICAN): 57.2 ML/MIN/1.73 M^2
GLUCOSE SERPL-MCNC: 119 MG/DL (ref 70–110)
HCT VFR BLD AUTO: 37.2 % (ref 37–48.5)
HGB BLD-MCNC: 12.5 G/DL (ref 12–16)
MCH RBC QN AUTO: 31.4 PG (ref 27–31)
MCHC RBC AUTO-ENTMCNC: 33.6 G/DL (ref 32–36)
MCV RBC AUTO: 94 FL (ref 82–98)
PLATELET # BLD AUTO: 304 K/UL (ref 150–450)
PMV BLD AUTO: 9 FL (ref 9.2–12.9)
POTASSIUM SERPL-SCNC: 2.9 MMOL/L (ref 3.5–5.1)
PROT SERPL-MCNC: 7.5 G/DL (ref 6–8.4)
RBC # BLD AUTO: 3.98 M/UL (ref 4–5.4)
SODIUM SERPL-SCNC: 139 MMOL/L (ref 136–145)
WBC # BLD AUTO: 4.45 K/UL (ref 3.9–12.7)

## 2021-11-17 PROCEDURE — 99214 PR OFFICE/OUTPT VISIT, EST, LEVL IV, 30-39 MIN: ICD-10-PCS | Mod: S$PBB,,, | Performed by: INTERNAL MEDICINE

## 2021-11-17 PROCEDURE — 99999 PR PBB SHADOW E&M-EST. PATIENT-LVL IV: ICD-10-PCS | Mod: PBBFAC,,, | Performed by: PHYSICIAN ASSISTANT

## 2021-11-17 PROCEDURE — 99213 PR OFFICE/OUTPT VISIT, EST, LEVL III, 20-29 MIN: ICD-10-PCS | Mod: S$PBB,,, | Performed by: OBSTETRICS & GYNECOLOGY

## 2021-11-17 PROCEDURE — 99213 OFFICE O/P EST LOW 20 MIN: CPT | Mod: PBBFAC,27 | Performed by: INTERNAL MEDICINE

## 2021-11-17 PROCEDURE — 36415 COLL VENOUS BLD VENIPUNCTURE: CPT | Performed by: ANESTHESIOLOGY

## 2021-11-17 PROCEDURE — 85027 COMPLETE CBC AUTOMATED: CPT | Performed by: ANESTHESIOLOGY

## 2021-11-17 PROCEDURE — 99212 OFFICE O/P EST SF 10 MIN: CPT | Mod: PBBFAC,25,27 | Performed by: OBSTETRICS & GYNECOLOGY

## 2021-11-17 PROCEDURE — 99999 PR PBB SHADOW E&M-EST. PATIENT-LVL IV: CPT | Mod: PBBFAC,,, | Performed by: PHYSICIAN ASSISTANT

## 2021-11-17 PROCEDURE — 99214 OFFICE O/P EST MOD 30 MIN: CPT | Mod: PBBFAC,25 | Performed by: PHYSICIAN ASSISTANT

## 2021-11-17 PROCEDURE — 86900 BLOOD TYPING SEROLOGIC ABO: CPT | Performed by: ANESTHESIOLOGY

## 2021-11-17 PROCEDURE — 80053 COMPREHEN METABOLIC PANEL: CPT | Performed by: ANESTHESIOLOGY

## 2021-11-17 PROCEDURE — 99999 PR PBB SHADOW E&M-EST. PATIENT-LVL III: ICD-10-PCS | Mod: PBBFAC,,, | Performed by: INTERNAL MEDICINE

## 2021-11-17 PROCEDURE — 99999 PR PBB SHADOW E&M-EST. PATIENT-LVL III: CPT | Mod: PBBFAC,,, | Performed by: INTERNAL MEDICINE

## 2021-11-17 PROCEDURE — 99213 OFFICE O/P EST LOW 20 MIN: CPT | Mod: S$PBB,,, | Performed by: PHYSICIAN ASSISTANT

## 2021-11-17 PROCEDURE — 99999 PR PBB SHADOW E&M-EST. PATIENT-LVL II: CPT | Mod: PBBFAC,,, | Performed by: OBSTETRICS & GYNECOLOGY

## 2021-11-17 PROCEDURE — 99999 PR PBB SHADOW E&M-EST. PATIENT-LVL II: ICD-10-PCS | Mod: PBBFAC,,, | Performed by: OBSTETRICS & GYNECOLOGY

## 2021-11-17 PROCEDURE — 99214 OFFICE O/P EST MOD 30 MIN: CPT | Mod: S$PBB,,, | Performed by: INTERNAL MEDICINE

## 2021-11-17 PROCEDURE — 99213 PR OFFICE/OUTPT VISIT, EST, LEVL III, 20-29 MIN: ICD-10-PCS | Mod: S$PBB,,, | Performed by: PHYSICIAN ASSISTANT

## 2021-11-17 PROCEDURE — 99213 OFFICE O/P EST LOW 20 MIN: CPT | Mod: S$PBB,,, | Performed by: OBSTETRICS & GYNECOLOGY

## 2021-11-19 ENCOUNTER — ANESTHESIA EVENT (OUTPATIENT)
Dept: SURGERY | Facility: HOSPITAL | Age: 71
DRG: 743 | End: 2021-11-19
Payer: MEDICARE

## 2021-11-22 ENCOUNTER — HOSPITAL ENCOUNTER (INPATIENT)
Facility: HOSPITAL | Age: 71
LOS: 1 days | Discharge: HOME | DRG: 743 | End: 2021-11-23
Attending: OBSTETRICS & GYNECOLOGY | Admitting: OBSTETRICS & GYNECOLOGY
Payer: MEDICARE

## 2021-11-22 ENCOUNTER — ANESTHESIA (OUTPATIENT)
Dept: SURGERY | Facility: HOSPITAL | Age: 71
DRG: 743 | End: 2021-11-22
Payer: MEDICARE

## 2021-11-22 DIAGNOSIS — Z90.710 STATUS POST TOTAL HYSTERECTOMY AND BILATERAL SALPINGO-OOPHORECTOMY (BSO): Primary | ICD-10-CM

## 2021-11-22 DIAGNOSIS — Z01.818 PREOP TESTING: ICD-10-CM

## 2021-11-22 DIAGNOSIS — Z90.722 STATUS POST TOTAL HYSTERECTOMY AND BILATERAL SALPINGO-OOPHORECTOMY (BSO): Primary | ICD-10-CM

## 2021-11-22 DIAGNOSIS — Z90.79 STATUS POST TOTAL HYSTERECTOMY AND BILATERAL SALPINGO-OOPHORECTOMY (BSO): Primary | ICD-10-CM

## 2021-11-22 PROBLEM — Z15.01 MONOALLELIC MUTATION OF ATM GENE: Status: ACTIVE | Noted: 2021-11-22

## 2021-11-22 PROBLEM — Z15.09 MONOALLELIC MUTATION OF ATM GENE: Status: ACTIVE | Noted: 2021-11-22

## 2021-11-22 PROBLEM — Z15.89 MONOALLELIC MUTATION OF ATM GENE: Status: ACTIVE | Noted: 2021-11-22

## 2021-11-22 LAB — POTASSIUM SERPL-SCNC: 3.3 MMOL/L (ref 3.5–5.1)

## 2021-11-22 PROCEDURE — D9220A PRA ANESTHESIA: Mod: ANES,,, | Performed by: SURGERY

## 2021-11-22 PROCEDURE — 25000003 PHARM REV CODE 250: Performed by: ANESTHESIOLOGY

## 2021-11-22 PROCEDURE — D9220A PRA ANESTHESIA: ICD-10-PCS | Mod: CRNA,,, | Performed by: NURSE ANESTHETIST, CERTIFIED REGISTERED

## 2021-11-22 PROCEDURE — 36415 COLL VENOUS BLD VENIPUNCTURE: CPT | Performed by: OBSTETRICS & GYNECOLOGY

## 2021-11-22 PROCEDURE — 25000003 PHARM REV CODE 250: Performed by: NURSE ANESTHETIST, CERTIFIED REGISTERED

## 2021-11-22 PROCEDURE — 25000003 PHARM REV CODE 250: Performed by: STUDENT IN AN ORGANIZED HEALTH CARE EDUCATION/TRAINING PROGRAM

## 2021-11-22 PROCEDURE — 71000016 HC POSTOP RECOV ADDL HR: Performed by: OBSTETRICS & GYNECOLOGY

## 2021-11-22 PROCEDURE — 71000033 HC RECOVERY, INTIAL HOUR: Performed by: OBSTETRICS & GYNECOLOGY

## 2021-11-22 PROCEDURE — 20600001 HC STEP DOWN PRIVATE ROOM

## 2021-11-22 PROCEDURE — 88307 PR  SURG PATH,LEVEL V: ICD-10-PCS | Mod: 26,,, | Performed by: PATHOLOGY

## 2021-11-22 PROCEDURE — 71000015 HC POSTOP RECOV 1ST HR: Performed by: OBSTETRICS & GYNECOLOGY

## 2021-11-22 PROCEDURE — 37000008 HC ANESTHESIA 1ST 15 MINUTES: Performed by: OBSTETRICS & GYNECOLOGY

## 2021-11-22 PROCEDURE — 63600175 PHARM REV CODE 636 W HCPCS: Performed by: STUDENT IN AN ORGANIZED HEALTH CARE EDUCATION/TRAINING PROGRAM

## 2021-11-22 PROCEDURE — D9220A PRA ANESTHESIA: Mod: CRNA,,, | Performed by: NURSE ANESTHETIST, CERTIFIED REGISTERED

## 2021-11-22 PROCEDURE — 25000003 PHARM REV CODE 250: Performed by: SURGERY

## 2021-11-22 PROCEDURE — 88307 TISSUE EXAM BY PATHOLOGIST: CPT | Performed by: PATHOLOGY

## 2021-11-22 PROCEDURE — 84132 ASSAY OF SERUM POTASSIUM: CPT | Performed by: OBSTETRICS & GYNECOLOGY

## 2021-11-22 PROCEDURE — 63600175 PHARM REV CODE 636 W HCPCS: Performed by: SURGERY

## 2021-11-22 PROCEDURE — 88307 TISSUE EXAM BY PATHOLOGIST: CPT | Mod: 26,,, | Performed by: PATHOLOGY

## 2021-11-22 PROCEDURE — 27201423 OPTIME MED/SURG SUP & DEVICES STERILE SUPPLY: Performed by: OBSTETRICS & GYNECOLOGY

## 2021-11-22 PROCEDURE — D9220A PRA ANESTHESIA: ICD-10-PCS | Mod: ANES,,, | Performed by: SURGERY

## 2021-11-22 PROCEDURE — 36000712 HC OR TIME LEV V 1ST 15 MIN: Performed by: OBSTETRICS & GYNECOLOGY

## 2021-11-22 PROCEDURE — 58571 TLH W/T/O 250 G OR LESS: CPT | Mod: ,,, | Performed by: OBSTETRICS & GYNECOLOGY

## 2021-11-22 PROCEDURE — 63600175 PHARM REV CODE 636 W HCPCS: Performed by: NURSE ANESTHETIST, CERTIFIED REGISTERED

## 2021-11-22 PROCEDURE — 36000713 HC OR TIME LEV V EA ADD 15 MIN: Performed by: OBSTETRICS & GYNECOLOGY

## 2021-11-22 PROCEDURE — 58571 PR LAPAROSCOPY W TOT HYSTERECTUTERUS <=250 GRAM  W TUBE/OVARY: ICD-10-PCS | Mod: ,,, | Performed by: OBSTETRICS & GYNECOLOGY

## 2021-11-22 PROCEDURE — 58571 PR LAPAROSCOPY W TOT HYSTERECTUTERUS <=250 GRAM  W TUBE/OVARY: ICD-10-PCS | Mod: AS,,, | Performed by: NURSE PRACTITIONER

## 2021-11-22 PROCEDURE — 63600175 PHARM REV CODE 636 W HCPCS: Performed by: ANESTHESIOLOGY

## 2021-11-22 PROCEDURE — 37000009 HC ANESTHESIA EA ADD 15 MINS: Performed by: OBSTETRICS & GYNECOLOGY

## 2021-11-22 PROCEDURE — 58571 TLH W/T/O 250 G OR LESS: CPT | Mod: AS,,, | Performed by: NURSE PRACTITIONER

## 2021-11-22 PROCEDURE — 94761 N-INVAS EAR/PLS OXIMETRY MLT: CPT

## 2021-11-22 RX ORDER — OXYCODONE HYDROCHLORIDE 5 MG/1
5 TABLET ORAL EVERY 6 HOURS PRN
Status: DISCONTINUED | OUTPATIENT
Start: 2021-11-22 | End: 2021-11-23 | Stop reason: HOSPADM

## 2021-11-22 RX ORDER — ROCURONIUM BROMIDE 10 MG/ML
INJECTION, SOLUTION INTRAVENOUS
Status: DISCONTINUED | OUTPATIENT
Start: 2021-11-22 | End: 2021-11-22

## 2021-11-22 RX ORDER — OXYCODONE HYDROCHLORIDE 5 MG/1
5 TABLET ORAL EVERY 4 HOURS PRN
Qty: 35 TABLET | Refills: 0 | Status: SHIPPED | OUTPATIENT
Start: 2021-11-22 | End: 2021-11-22 | Stop reason: SDUPTHER

## 2021-11-22 RX ORDER — DEXAMETHASONE SODIUM PHOSPHATE 4 MG/ML
INJECTION, SOLUTION INTRA-ARTICULAR; INTRALESIONAL; INTRAMUSCULAR; INTRAVENOUS; SOFT TISSUE
Status: DISCONTINUED | OUTPATIENT
Start: 2021-11-22 | End: 2021-11-22

## 2021-11-22 RX ORDER — ONDANSETRON 2 MG/ML
INJECTION INTRAMUSCULAR; INTRAVENOUS
Status: DISCONTINUED | OUTPATIENT
Start: 2021-11-22 | End: 2021-11-22

## 2021-11-22 RX ORDER — ONDANSETRON 2 MG/ML
4 INJECTION INTRAMUSCULAR; INTRAVENOUS DAILY PRN
Status: DISCONTINUED | OUTPATIENT
Start: 2021-11-22 | End: 2021-11-22

## 2021-11-22 RX ORDER — NEOSTIGMINE METHYLSULFATE 0.5 MG/ML
INJECTION, SOLUTION INTRAVENOUS
Status: DISCONTINUED | OUTPATIENT
Start: 2021-11-22 | End: 2021-11-22

## 2021-11-22 RX ORDER — CHLORTHALIDONE 25 MG/1
25 TABLET ORAL ONCE
Status: COMPLETED | OUTPATIENT
Start: 2021-11-22 | End: 2021-11-22

## 2021-11-22 RX ORDER — ATORVASTATIN CALCIUM 10 MG/1
10 TABLET, FILM COATED ORAL DAILY
Status: DISCONTINUED | OUTPATIENT
Start: 2021-11-23 | End: 2021-11-23 | Stop reason: HOSPADM

## 2021-11-22 RX ORDER — SCOLOPAMINE TRANSDERMAL SYSTEM 1 MG/1
1 PATCH, EXTENDED RELEASE TRANSDERMAL ONCE
Status: DISCONTINUED | OUTPATIENT
Start: 2021-11-22 | End: 2021-11-23 | Stop reason: HOSPADM

## 2021-11-22 RX ORDER — FENTANYL CITRATE 50 UG/ML
25 INJECTION, SOLUTION INTRAMUSCULAR; INTRAVENOUS EVERY 5 MIN PRN
Status: DISCONTINUED | OUTPATIENT
Start: 2021-11-22 | End: 2021-11-22

## 2021-11-22 RX ORDER — ACETAMINOPHEN 500 MG
1000 TABLET ORAL EVERY 8 HOURS
Qty: 30 TABLET | Refills: 1 | Status: SHIPPED | OUTPATIENT
Start: 2021-11-22 | End: 2023-10-19

## 2021-11-22 RX ORDER — SODIUM CHLORIDE 0.9 % (FLUSH) 0.9 %
10 SYRINGE (ML) INJECTION
Status: DISCONTINUED | OUTPATIENT
Start: 2021-11-22 | End: 2021-11-22

## 2021-11-22 RX ORDER — ACETAMINOPHEN 325 MG/1
650 TABLET ORAL EVERY 8 HOURS
Status: DISCONTINUED | OUTPATIENT
Start: 2021-11-22 | End: 2021-11-23 | Stop reason: HOSPADM

## 2021-11-22 RX ORDER — HALOPERIDOL 5 MG/ML
0.5 INJECTION INTRAMUSCULAR
Status: DISCONTINUED | OUTPATIENT
Start: 2021-11-22 | End: 2021-11-22

## 2021-11-22 RX ORDER — TALC
9 POWDER (GRAM) TOPICAL NIGHTLY PRN
Status: DISCONTINUED | OUTPATIENT
Start: 2021-11-22 | End: 2021-11-23 | Stop reason: HOSPADM

## 2021-11-22 RX ORDER — OXYCODONE HYDROCHLORIDE 10 MG/1
10 TABLET ORAL EVERY 6 HOURS PRN
Status: DISCONTINUED | OUTPATIENT
Start: 2021-11-22 | End: 2021-11-23 | Stop reason: HOSPADM

## 2021-11-22 RX ORDER — SODIUM CHLORIDE 9 MG/ML
INJECTION, SOLUTION INTRAVENOUS CONTINUOUS
Status: DISCONTINUED | OUTPATIENT
Start: 2021-11-22 | End: 2021-11-22

## 2021-11-22 RX ORDER — CEFAZOLIN SODIUM 1 G/3ML
INJECTION, POWDER, FOR SOLUTION INTRAMUSCULAR; INTRAVENOUS
Status: DISCONTINUED | OUTPATIENT
Start: 2021-11-22 | End: 2021-11-22

## 2021-11-22 RX ORDER — HYDROMORPHONE HYDROCHLORIDE 1 MG/ML
0.2 INJECTION, SOLUTION INTRAMUSCULAR; INTRAVENOUS; SUBCUTANEOUS EVERY 5 MIN PRN
Status: DISCONTINUED | OUTPATIENT
Start: 2021-11-22 | End: 2021-11-22

## 2021-11-22 RX ORDER — FENTANYL CITRATE 50 UG/ML
INJECTION, SOLUTION INTRAMUSCULAR; INTRAVENOUS
Status: DISCONTINUED | OUTPATIENT
Start: 2021-11-22 | End: 2021-11-22

## 2021-11-22 RX ORDER — OXYCODONE HYDROCHLORIDE 5 MG/1
5 TABLET ORAL EVERY 4 HOURS PRN
Qty: 35 TABLET | Refills: 0 | Status: SHIPPED | OUTPATIENT
Start: 2021-11-22 | End: 2023-06-13

## 2021-11-22 RX ORDER — ONDANSETRON 8 MG/1
8 TABLET, ORALLY DISINTEGRATING ORAL EVERY 8 HOURS PRN
Status: DISCONTINUED | OUTPATIENT
Start: 2021-11-22 | End: 2021-11-23 | Stop reason: HOSPADM

## 2021-11-22 RX ORDER — PROCHLORPERAZINE EDISYLATE 5 MG/ML
5 INJECTION INTRAMUSCULAR; INTRAVENOUS EVERY 6 HOURS PRN
Status: DISCONTINUED | OUTPATIENT
Start: 2021-11-22 | End: 2021-11-23 | Stop reason: HOSPADM

## 2021-11-22 RX ORDER — LIDOCAINE HYDROCHLORIDE 20 MG/ML
INJECTION, SOLUTION EPIDURAL; INFILTRATION; INTRACAUDAL; PERINEURAL
Status: DISCONTINUED | OUTPATIENT
Start: 2021-11-22 | End: 2021-11-22

## 2021-11-22 RX ORDER — MUPIROCIN 20 MG/G
OINTMENT TOPICAL
Status: DISCONTINUED | OUTPATIENT
Start: 2021-11-22 | End: 2021-11-22

## 2021-11-22 RX ORDER — DIPHENHYDRAMINE HCL 25 MG
25 CAPSULE ORAL EVERY 4 HOURS PRN
Status: DISCONTINUED | OUTPATIENT
Start: 2021-11-22 | End: 2021-11-23 | Stop reason: HOSPADM

## 2021-11-22 RX ORDER — HYDRALAZINE HYDROCHLORIDE 20 MG/ML
10 INJECTION INTRAMUSCULAR; INTRAVENOUS EVERY 6 HOURS PRN
Status: DISCONTINUED | OUTPATIENT
Start: 2021-11-22 | End: 2021-11-23 | Stop reason: HOSPADM

## 2021-11-22 RX ORDER — HYDROMORPHONE HYDROCHLORIDE 2 MG/ML
INJECTION, SOLUTION INTRAMUSCULAR; INTRAVENOUS; SUBCUTANEOUS
Status: DISCONTINUED | OUTPATIENT
Start: 2021-11-22 | End: 2021-11-22

## 2021-11-22 RX ORDER — MUPIROCIN 20 MG/G
OINTMENT TOPICAL 2 TIMES DAILY
Status: DISCONTINUED | OUTPATIENT
Start: 2021-11-22 | End: 2021-11-23 | Stop reason: HOSPADM

## 2021-11-22 RX ORDER — HYDROMORPHONE HYDROCHLORIDE 1 MG/ML
1 INJECTION, SOLUTION INTRAMUSCULAR; INTRAVENOUS; SUBCUTANEOUS EVERY 6 HOURS PRN
Status: DISCONTINUED | OUTPATIENT
Start: 2021-11-22 | End: 2021-11-23 | Stop reason: HOSPADM

## 2021-11-22 RX ORDER — BUPROPION HYDROCHLORIDE 150 MG/1
300 TABLET ORAL DAILY
Status: DISCONTINUED | OUTPATIENT
Start: 2021-11-23 | End: 2021-11-23 | Stop reason: HOSPADM

## 2021-11-22 RX ORDER — BISACODYL 10 MG
10 SUPPOSITORY, RECTAL RECTAL DAILY PRN
Status: DISCONTINUED | OUTPATIENT
Start: 2021-11-22 | End: 2021-11-23 | Stop reason: HOSPADM

## 2021-11-22 RX ORDER — DIPHENHYDRAMINE HYDROCHLORIDE 50 MG/ML
25 INJECTION INTRAMUSCULAR; INTRAVENOUS EVERY 4 HOURS PRN
Status: DISCONTINUED | OUTPATIENT
Start: 2021-11-22 | End: 2021-11-23 | Stop reason: HOSPADM

## 2021-11-22 RX ORDER — PROPOFOL 10 MG/ML
VIAL (ML) INTRAVENOUS
Status: DISCONTINUED | OUTPATIENT
Start: 2021-11-22 | End: 2021-11-22

## 2021-11-22 RX ORDER — ACETAMINOPHEN 500 MG
1000 TABLET ORAL EVERY 8 HOURS
Qty: 30 TABLET | Refills: 1 | Status: SHIPPED | OUTPATIENT
Start: 2021-11-22 | End: 2021-11-22 | Stop reason: SDUPTHER

## 2021-11-22 RX ADMIN — FENTANYL CITRATE 100 MCG: 50 INJECTION INTRAMUSCULAR; INTRAVENOUS at 01:11

## 2021-11-22 RX ADMIN — MIDAZOLAM HYDROCHLORIDE 2 MG: 1 INJECTION, SOLUTION INTRAMUSCULAR; INTRAVENOUS at 01:11

## 2021-11-22 RX ADMIN — HYDROMORPHONE HYDROCHLORIDE 0.4 MG: 2 INJECTION INTRAMUSCULAR; INTRAVENOUS; SUBCUTANEOUS at 03:11

## 2021-11-22 RX ADMIN — GLYCOPYRROLATE 0.4 MG: 0.2 INJECTION, SOLUTION INTRAMUSCULAR; INTRAVITREAL at 03:11

## 2021-11-22 RX ADMIN — ROCURONIUM BROMIDE 50 MG: 10 INJECTION INTRAVENOUS at 01:11

## 2021-11-22 RX ADMIN — DEXAMETHASONE SODIUM PHOSPHATE 4 MG: 4 INJECTION, SOLUTION INTRAMUSCULAR; INTRAVENOUS at 02:11

## 2021-11-22 RX ADMIN — MUPIROCIN: 20 OINTMENT TOPICAL at 10:11

## 2021-11-22 RX ADMIN — GLYCOPYRROLATE 0.2 MG: 0.2 INJECTION, SOLUTION INTRAMUSCULAR; INTRAVITREAL at 02:11

## 2021-11-22 RX ADMIN — SODIUM CHLORIDE: 0.9 INJECTION, SOLUTION INTRAVENOUS at 09:11

## 2021-11-22 RX ADMIN — CHLORTHALIDONE 25 MG: 25 TABLET ORAL at 08:11

## 2021-11-22 RX ADMIN — ONDANSETRON 4 MG: 2 INJECTION INTRAMUSCULAR; INTRAVENOUS at 04:11

## 2021-11-22 RX ADMIN — SODIUM CHLORIDE: 0.9 INJECTION, SOLUTION INTRAVENOUS at 01:11

## 2021-11-22 RX ADMIN — VANCOMYCIN HYDROCHLORIDE 1250 MG: 1 INJECTION, POWDER, LYOPHILIZED, FOR SOLUTION INTRAVENOUS at 01:11

## 2021-11-22 RX ADMIN — ACETAMINOPHEN 650 MG: 325 TABLET ORAL at 11:11

## 2021-11-22 RX ADMIN — PROPOFOL 200 MG: 10 INJECTION, EMULSION INTRAVENOUS at 01:11

## 2021-11-22 RX ADMIN — SCOPALAMINE 1 PATCH: 1 PATCH, EXTENDED RELEASE TRANSDERMAL at 09:11

## 2021-11-22 RX ADMIN — NEOSTIGMINE METHYLSULFATE 4 MG: 0.5 INJECTION INTRAVENOUS at 03:11

## 2021-11-22 RX ADMIN — ONDANSETRON 4 MG: 2 INJECTION INTRAMUSCULAR; INTRAVENOUS at 03:11

## 2021-11-22 RX ADMIN — MUPIROCIN: 20 OINTMENT TOPICAL at 09:11

## 2021-11-22 RX ADMIN — HALOPERIDOL LACTATE 0.5 MG: 5 INJECTION, SOLUTION INTRAMUSCULAR at 08:11

## 2021-11-22 RX ADMIN — LIDOCAINE HYDROCHLORIDE 100 MG: 20 INJECTION, SOLUTION EPIDURAL; INFILTRATION; INTRACAUDAL at 01:11

## 2021-11-22 RX ADMIN — CEFAZOLIN 2 G: 330 INJECTION, POWDER, FOR SOLUTION INTRAMUSCULAR; INTRAVENOUS at 02:11

## 2021-11-23 VITALS
WEIGHT: 177 LBS | SYSTOLIC BLOOD PRESSURE: 103 MMHG | HEIGHT: 66 IN | HEART RATE: 61 BPM | RESPIRATION RATE: 18 BRPM | DIASTOLIC BLOOD PRESSURE: 76 MMHG | TEMPERATURE: 98 F | BODY MASS INDEX: 28.45 KG/M2 | OXYGEN SATURATION: 96 %

## 2021-11-23 PROCEDURE — 99024 POSTOP FOLLOW-UP VISIT: CPT | Mod: POP,,, | Performed by: OBSTETRICS & GYNECOLOGY

## 2021-11-23 PROCEDURE — 99024 PR POST-OP FOLLOW-UP VISIT: ICD-10-PCS | Mod: POP,,, | Performed by: OBSTETRICS & GYNECOLOGY

## 2021-11-23 PROCEDURE — 99900035 HC TECH TIME PER 15 MIN (STAT)

## 2021-11-23 PROCEDURE — 94799 UNLISTED PULMONARY SVC/PX: CPT

## 2021-11-23 PROCEDURE — 94761 N-INVAS EAR/PLS OXIMETRY MLT: CPT

## 2021-11-23 PROCEDURE — 25000003 PHARM REV CODE 250: Performed by: STUDENT IN AN ORGANIZED HEALTH CARE EDUCATION/TRAINING PROGRAM

## 2021-11-23 PROCEDURE — 87086 URINE CULTURE/COLONY COUNT: CPT | Performed by: STUDENT IN AN ORGANIZED HEALTH CARE EDUCATION/TRAINING PROGRAM

## 2021-11-23 RX ORDER — MIDAZOLAM HYDROCHLORIDE 1 MG/ML
INJECTION, SOLUTION INTRAMUSCULAR; INTRAVENOUS
Status: DISCONTINUED | OUTPATIENT
Start: 2021-11-22 | End: 2021-11-23

## 2021-11-23 RX ADMIN — ATORVASTATIN CALCIUM 10 MG: 10 TABLET, FILM COATED ORAL at 08:11

## 2021-11-23 RX ADMIN — BISACODYL 10 MG: 10 SUPPOSITORY RECTAL at 05:11

## 2021-11-23 RX ADMIN — MUPIROCIN: 20 OINTMENT TOPICAL at 08:11

## 2021-11-23 RX ADMIN — BUPROPION HYDROCHLORIDE 300 MG: 150 TABLET, FILM COATED, EXTENDED RELEASE ORAL at 08:11

## 2021-11-23 RX ADMIN — ACETAMINOPHEN 650 MG: 325 TABLET ORAL at 05:11

## 2021-11-24 LAB — BACTERIA UR CULT: NO GROWTH

## 2021-12-02 LAB
FINAL PATHOLOGIC DIAGNOSIS: NORMAL
Lab: NORMAL

## 2021-12-06 ENCOUNTER — TELEPHONE (OUTPATIENT)
Dept: GYNECOLOGIC ONCOLOGY | Facility: CLINIC | Age: 71
End: 2021-12-06
Payer: MEDICARE

## 2021-12-07 NOTE — PROGRESS NOTES
"Subjective:     @Patient ID: Dana Cook is a 69 y.o. female.    Chief Complaint: Follow-up    HPI  70 yo F presents for f/u of hld. Reports has been eating healthier and exercising. Reports lipid panel was high few months ago due to recent cruise trip. States she may have been exposed to covid19 on the cruise ship as someone tested positive at that time. She would like antibody test  Reports mood is stable on wellbutrin    Review of Systems   Constitutional: Negative for activity change and unexpected weight change.   HENT: Negative for hearing loss, rhinorrhea and trouble swallowing.    Eyes: Negative for discharge and visual disturbance.   Respiratory: Negative for chest tightness and wheezing.    Cardiovascular: Negative for chest pain and palpitations.   Gastrointestinal: Negative for blood in stool, constipation, diarrhea and vomiting.   Endocrine: Negative for polydipsia and polyuria.   Genitourinary: Negative for difficulty urinating, dysuria, hematuria and menstrual problem.   Musculoskeletal: Negative for arthralgias, joint swelling and neck pain.   Neurological: Negative for weakness and headaches.   Psychiatric/Behavioral: Positive for dysphoric mood. Negative for confusion.     Past medical history, surgical history, and family medical history reviewed and updated as appropriate.    Medications and allergies reviewed.     Objective:     Vitals:    05/20/20 0911   BP: 112/70   BP Location: Right arm   Patient Position: Sitting   BP Method: Medium (Manual)   Pulse: 70   Resp: 18   Temp: 98.2 °F (36.8 °C)   TempSrc: Temporal   SpO2: 98%   Weight: 79 kg (174 lb 2.6 oz)   Height: 5' 5" (1.651 m)     Body mass index is 28.98 kg/m².  Physical Exam   Constitutional: She is oriented to person, place, and time. She appears well-developed and well-nourished. No distress.   HENT:   Head: Normocephalic and atraumatic.   Eyes: Conjunctivae are normal. Right eye exhibits no discharge. Left eye exhibits no " discharge.   Neck: Normal range of motion. Neck supple.   Cardiovascular: Normal rate, regular rhythm and intact distal pulses. Exam reveals no friction rub.   No murmur heard.  Pulmonary/Chest: Effort normal and breath sounds normal.   Musculoskeletal: Normal range of motion.   Neurological: She is alert and oriented to person, place, and time.   Skin: Skin is warm and dry.   Psychiatric: She has a normal mood and affect. Her behavior is normal.   Vitals reviewed.      Lab Results   Component Value Date    WBC 5.41 02/26/2020    HGB 13.8 02/26/2020    HCT 44.0 02/26/2020     02/26/2020    CHOL 332 (H) 02/26/2020    TRIG 253 (H) 02/26/2020    HDL 73 02/26/2020    ALT 33 02/26/2020    AST 29 02/26/2020     02/26/2020    K 3.4 (L) 02/26/2020    CL 99 02/26/2020    CREATININE 1.1 02/26/2020    BUN 16 02/26/2020    CO2 29 02/26/2020    TSH 1.676 02/26/2020    HGBA1C 5.8 11/18/2015       Assessment:     1. Mixed hyperlipidemia    2. Exposure to Covid-19 Virus    3. Essential hypertension    4. Depression, unspecified depression type      Plan:   Dana was seen today for follow-up.    Diagnoses and all orders for this visit:    Mixed hyperlipidemia         - Repeat lipid panel     Exposure to Covid-19 Virus  -     COVID-19 (SARS CoV-2) IgG Antibody; Future    Essential hypertension        - bp controlled    Depression, unspecified depression type        - Stable on wellbutrin      No follow-ups on file.    Mariam Johnson MD  Internal Medicine    5/20/2020     Quality 130: Documentation Of Current Medications In The Medical Record: Current Medications Documented Detail Level: Generalized

## 2022-01-04 ENCOUNTER — OFFICE VISIT (OUTPATIENT)
Dept: GYNECOLOGIC ONCOLOGY | Facility: CLINIC | Age: 72
End: 2022-01-04
Payer: MEDICARE

## 2022-01-04 VITALS
WEIGHT: 175.25 LBS | HEIGHT: 65 IN | DIASTOLIC BLOOD PRESSURE: 75 MMHG | BODY MASS INDEX: 29.2 KG/M2 | SYSTOLIC BLOOD PRESSURE: 155 MMHG | HEART RATE: 64 BPM

## 2022-01-04 DIAGNOSIS — Z90.710 STATUS POST TOTAL HYSTERECTOMY AND BILATERAL SALPINGO-OOPHORECTOMY (BSO): ICD-10-CM

## 2022-01-04 DIAGNOSIS — Z90.79 STATUS POST TOTAL HYSTERECTOMY AND BILATERAL SALPINGO-OOPHORECTOMY (BSO): ICD-10-CM

## 2022-01-04 DIAGNOSIS — Z15.01 MONOALLELIC MUTATION OF ATM GENE: Primary | ICD-10-CM

## 2022-01-04 DIAGNOSIS — Z90.722 STATUS POST TOTAL HYSTERECTOMY AND BILATERAL SALPINGO-OOPHORECTOMY (BSO): ICD-10-CM

## 2022-01-04 DIAGNOSIS — Z15.09 MONOALLELIC MUTATION OF ATM GENE: Primary | ICD-10-CM

## 2022-01-04 DIAGNOSIS — Z15.89 MONOALLELIC MUTATION OF ATM GENE: Primary | ICD-10-CM

## 2022-01-04 PROCEDURE — 99999 PR PBB SHADOW E&M-EST. PATIENT-LVL IV: ICD-10-PCS | Mod: PBBFAC,,, | Performed by: OBSTETRICS & GYNECOLOGY

## 2022-01-04 PROCEDURE — 99024 POSTOP FOLLOW-UP VISIT: CPT | Mod: POP,,, | Performed by: OBSTETRICS & GYNECOLOGY

## 2022-01-04 PROCEDURE — 99999 PR PBB SHADOW E&M-EST. PATIENT-LVL IV: CPT | Mod: PBBFAC,,, | Performed by: OBSTETRICS & GYNECOLOGY

## 2022-01-04 PROCEDURE — 99024 PR POST-OP FOLLOW-UP VISIT: ICD-10-PCS | Mod: POP,,, | Performed by: OBSTETRICS & GYNECOLOGY

## 2022-01-04 PROCEDURE — 99214 OFFICE O/P EST MOD 30 MIN: CPT | Mod: PBBFAC | Performed by: OBSTETRICS & GYNECOLOGY

## 2022-01-04 NOTE — PROGRESS NOTES
Subjective:      Patient ID: Dana Cook is a 71 y.o. female.    Chief Complaint: Post-op Evaluation      HPI  Counseling from our last visit: Long discussion today regarding pathogenic CHACHO mutations and risk of ovarian cancer. This is a lower penetrance gene with a potential slight increased risk of ovarian cancer and current evidence is insufficient for risk reducing BSO. Decisions for risk reducing surgery should be individualized and take into account family history. We discussed the use of  and pelvic US for ovarian cancer screening and its limitations. These interventions are of uncertain benefit but can be considered on an individualized basis. Genetic testing, as well as personal and family history, is dynamic and should be re-evaluated over time.   At this time she will consider risk reducing surgery and will plan to get back in touch with me regarding surgery scheduling.      She has elected to move forward with definitive surgical management.     S/p RTLH/BSO 11/22/2021  Uncomplicated post op course.   Final pathology reviewed and benign.     Presents today for post operative visit. Recovering appropriately from surgery. Up and about, eating, +BM.     Referral history:  Referred for known deleterious CHACHO mutation and risk management considerations for ovarian cancer .   Germline CHACHO gene mutation, which was identified through [employer] genetic testing company.  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     Person history of breast cancer with Stage IA, invasive lobular carcinoma of the left breast. Currently on endocrine therapy. Dr. Tao.     Prior abdominal surgeries include diagnostic laparoscopy. Denies family history of ovarian cancer.      Imaging and laboratory results reviewed.   9/2021   7  Pelvic US  - The uterus is normal in size and measures 5.3 x 2.1 x 3.5 cm.  The endometrial stripe is normal and measures 2mm.  - The right ovary measures 2.3 x 1.3 x 1.1  cm. It demonstrates no sonographic abnormality.  The left ovary is not visualized.  Review of Systems   Constitutional: Negative for appetite change, chills, fatigue and fever.   HENT: Negative for mouth sores.    Respiratory: Negative for cough and shortness of breath.    Cardiovascular: Negative for leg swelling.   Gastrointestinal: Negative for abdominal pain, blood in stool, constipation and diarrhea.   Endocrine: Negative for cold intolerance.   Genitourinary: Negative for dysuria and vaginal bleeding.   Musculoskeletal: Negative for myalgias.   Skin: Negative for rash.   Allergic/Immunologic: Negative.    Neurological: Negative for weakness and numbness.   Hematological: Negative for adenopathy. Does not bruise/bleed easily.   Psychiatric/Behavioral: Negative for confusion.       Objective:   Physical Exam:   Constitutional: She is oriented to person, place, and time. She appears well-developed and well-nourished.    HENT:   Head: Normocephalic and atraumatic.    Eyes: Pupils are equal, round, and reactive to light. EOM are normal.    Neck: No thyromegaly present.    Cardiovascular: Normal rate, regular rhythm and intact distal pulses.     Pulmonary/Chest: Effort normal and breath sounds normal. No respiratory distress. She has no wheezes.        Abdominal: Soft. Bowel sounds are normal. She exhibits abdominal incision. She exhibits no distension, no ascites and no mass. There is no abdominal tenderness.     Genitourinary:    Vagina normal.      Pelvic exam was performed with patient supine.   Right adnexum displays no mass. Left adnexum displays no mass. Cervix is absent.Uterus is absent.    Genitourinary Comments: Vaginal cuff healing well              Musculoskeletal: Normal range of motion and moves all extremeties.      Lymphadenopathy:     She has no cervical adenopathy.        Right: No supraclavicular adenopathy present.        Left: No supraclavicular adenopathy present.    Neurological: She is alert  and oriented to person, place, and time.    Skin: Skin is warm and dry. No rash noted.    Psychiatric: She has a normal mood and affect.       Assessment:     1. Monoallelic mutation of CHACHO gene    2. Status post robot-assisted total hysterectomy and bilateral salpingo-oophorectomy (BSO)        Plan:   No orders of the defined types were placed in this encounter.    Recovering appropriately from surgery.   Benign risk reducing surgery.   Will return care to primary provider. Recommend annual pelvic exam.   May follow up with me as needed.

## 2022-01-12 ENCOUNTER — TELEPHONE (OUTPATIENT)
Dept: GYNECOLOGIC ONCOLOGY | Facility: CLINIC | Age: 72
End: 2022-01-12
Payer: MEDICARE

## 2022-01-12 ENCOUNTER — TELEPHONE (OUTPATIENT)
Dept: ORTHOPEDICS | Facility: CLINIC | Age: 72
End: 2022-01-12
Payer: MEDICARE

## 2022-01-12 NOTE — TELEPHONE ENCOUNTER
----- Message from Ariella Chambers sent at 1/12/2022  9:54 AM CST -----  Pt would like to have orders for her Mammogram put in so she can have it done in March. Pt can be reached at [phone]

## 2022-01-13 ENCOUNTER — HOSPITAL ENCOUNTER (OUTPATIENT)
Dept: RADIOLOGY | Facility: HOSPITAL | Age: 72
Discharge: HOME | End: 2022-01-13
Attending: PHYSICIAN ASSISTANT
Payer: MEDICARE

## 2022-01-13 ENCOUNTER — PATIENT MESSAGE (OUTPATIENT)
Dept: GYNECOLOGIC ONCOLOGY | Facility: CLINIC | Age: 72
End: 2022-01-13
Payer: MEDICARE

## 2022-01-13 ENCOUNTER — OFFICE VISIT (OUTPATIENT)
Dept: ORTHOPEDICS | Facility: CLINIC | Age: 72
End: 2022-01-13
Payer: MEDICARE

## 2022-01-13 VITALS — BODY MASS INDEX: 29.2 KG/M2 | WEIGHT: 175.25 LBS | HEIGHT: 65 IN

## 2022-01-13 DIAGNOSIS — M17.11 PRIMARY OSTEOARTHRITIS OF RIGHT KNEE: Primary | ICD-10-CM

## 2022-01-13 DIAGNOSIS — M25.561 RIGHT KNEE PAIN, UNSPECIFIED CHRONICITY: ICD-10-CM

## 2022-01-13 PROCEDURE — 73562 X-RAY EXAM OF KNEE 3: CPT | Mod: 26,LT,, | Performed by: RADIOLOGY

## 2022-01-13 PROCEDURE — 99999 PR PBB SHADOW E&M-EST. PATIENT-LVL IV: ICD-10-PCS | Mod: PBBFAC,,, | Performed by: PHYSICIAN ASSISTANT

## 2022-01-13 PROCEDURE — 99214 OFFICE O/P EST MOD 30 MIN: CPT | Mod: PBBFAC | Performed by: PHYSICIAN ASSISTANT

## 2022-01-13 PROCEDURE — 73564 XR KNEE ORTHO RIGHT WITH FLEXION: ICD-10-PCS | Mod: 26,RT,, | Performed by: RADIOLOGY

## 2022-01-13 PROCEDURE — 99213 PR OFFICE/OUTPT VISIT, EST, LEVL III, 20-29 MIN: ICD-10-PCS | Mod: S$PBB,,, | Performed by: PHYSICIAN ASSISTANT

## 2022-01-13 PROCEDURE — 99999 PR PBB SHADOW E&M-EST. PATIENT-LVL IV: CPT | Mod: PBBFAC,,, | Performed by: PHYSICIAN ASSISTANT

## 2022-01-13 PROCEDURE — 73562 X-RAY EXAM OF KNEE 3: CPT | Mod: TC,LT

## 2022-01-13 PROCEDURE — 99213 OFFICE O/P EST LOW 20 MIN: CPT | Mod: S$PBB,,, | Performed by: PHYSICIAN ASSISTANT

## 2022-01-13 PROCEDURE — 73564 X-RAY EXAM KNEE 4 OR MORE: CPT | Mod: 26,RT,, | Performed by: RADIOLOGY

## 2022-01-13 PROCEDURE — 73562 XR KNEE ORTHO RIGHT WITH FLEXION: ICD-10-PCS | Mod: 26,LT,, | Performed by: RADIOLOGY

## 2022-01-13 NOTE — PROGRESS NOTES
Subjective:      Patient ID: Dana Cook is a 71 y.o. female.    Chief Complaint: Pain of the Right Knee    HPI  71 year old female presents with chief complaint of right knee pain x 3 weeks. No recent trauma. She started exercising recently. She walks on a treadmill with a little incline. Pain is medial. She has a knee sleeve that really helps. Pain is worse if she doesn't wear the sleeve, does increased walking and standing. She reports clicking. She denies swelling. She does not take medicine for it. She has h/o CKD. She does not use assistive devices.   Review of Systems   Constitutional: Negative for chills, fever and night sweats.   Cardiovascular: Negative for chest pain.   Respiratory: Negative for cough and shortness of breath.    Hematologic/Lymphatic: Does not bruise/bleed easily.   Skin: Negative for color change.   Gastrointestinal: Negative for heartburn.   Genitourinary: Negative for dysuria.   Neurological: Negative for numbness and paresthesias.   Psychiatric/Behavioral: Negative for altered mental status.   Allergic/Immunologic: Negative for persistent infections.         Objective:            Ortho/SPM Exam  General :   alert, appears stated age and cooperative   Gait: Normal. The patient can bear weight on the injured extremity.   Right Lower Extremity  Hip Palpation:  no tenderness over the greater  trochanter   Hip ROM: 100% of normal    Knee Effusion:  None.   Ecchymosis:  none   Knee ROM:  0 to 120 degrees without subpatellar   crepitance.   Patella:  Patella does track normally.  Patellar apprehension test: negative  Patellar compression test: negative   Tenderness: medial joint line   Stability:  Lachman's test: negative  Posterior drawer: negative  Medial collateral ligament: negative  Lateral collateral ligament: negative         Gonzales's Test:  negative with no joint line tenderness   Sensation:   intact to light touch   Pulses: normal DP and PT pulses         X-rays were ordered  and reviewed by me. They show OA changes at the knee.         Assessment:       Encounter Diagnosis   Name Primary?    Primary osteoarthritis of right knee Yes          Plan:       Discussed treatment options with patient including activity modification. She will walk flat with no incline. Continue knee sleeve and heat. She will try tylenol arthritis and voltaren gel. RTC prn.

## 2022-01-24 ENCOUNTER — OFFICE VISIT (OUTPATIENT)
Dept: GASTROENTEROLOGY | Facility: CLINIC | Age: 72
End: 2022-01-24
Payer: MEDICARE

## 2022-01-24 VITALS
BODY MASS INDEX: 29.27 KG/M2 | SYSTOLIC BLOOD PRESSURE: 136 MMHG | WEIGHT: 175.69 LBS | DIASTOLIC BLOOD PRESSURE: 76 MMHG | HEIGHT: 65 IN | HEART RATE: 53 BPM

## 2022-01-24 DIAGNOSIS — Z15.89 MONOALLELIC MUTATION OF ATM GENE: ICD-10-CM

## 2022-01-24 DIAGNOSIS — Z15.09 MONOALLELIC MUTATION OF ATM GENE: ICD-10-CM

## 2022-01-24 DIAGNOSIS — Z15.01 MONOALLELIC MUTATION OF ATM GENE: ICD-10-CM

## 2022-01-24 PROCEDURE — 99999 PR PBB SHADOW E&M-EST. PATIENT-LVL V: ICD-10-PCS | Mod: PBBFAC,,, | Performed by: INTERNAL MEDICINE

## 2022-01-24 PROCEDURE — 99215 OFFICE O/P EST HI 40 MIN: CPT | Mod: PBBFAC | Performed by: INTERNAL MEDICINE

## 2022-01-24 PROCEDURE — 99999 PR PBB SHADOW E&M-EST. PATIENT-LVL V: CPT | Mod: PBBFAC,,, | Performed by: INTERNAL MEDICINE

## 2022-01-24 PROCEDURE — 99203 PR OFFICE/OUTPT VISIT, NEW, LEVL III, 30-44 MIN: ICD-10-PCS | Mod: S$PBB,,, | Performed by: INTERNAL MEDICINE

## 2022-01-24 PROCEDURE — 99203 OFFICE O/P NEW LOW 30 MIN: CPT | Mod: S$PBB,,, | Performed by: INTERNAL MEDICINE

## 2022-01-24 NOTE — PROGRESS NOTES
"[facility]    Referral from Cristian Frank DNP    CC: germline CHACHO gene mutation    HPI 71 y.o. female who presents for evaluation for an upper EUS to screen her for pancreatic cancer due to her germline CHACHO gene mutation.    She denies abdominal pain, nausea, vomiting, melena, hematochezia, weight loss and jaundice.  She has constipation improved with smoothies.  She is up to date on her colonoscopy.  She has them outside of the system and is due for a repeat colonoscopy in 2024.    Past Medical History  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/19/2021    left    Breast cancer 03/19/2021    left    Colon polyps     Depression     Dry eyes     Dry mouth     Dupuytren's contracture     Hypertension     left arm fracture     hairline    Murmur, heart     Osteopenia     PONV (postoperative nausea and vomiting)     with wisdom teeth    Sarcoidosis      Past Surgical History  Breast surgery  Hysterectomy  Eye surgeries  Laparoscopy    Review of Systems  General ROS: negative for chills, fever or weight loss  Cardiovascular ROS: no chest pain or dyspnea on exertion  Gastrointestinal ROS: no abdominal pain, change in bowel habits, or black/ bloody stools    Physical Examination  /76   Pulse (!) 53   Ht 5' 5" (1.651 m)   Wt 79.7 kg (175 lb 11.3 oz)   BMI 29.24 kg/m²   General appearance: alert, cooperative, no distress  HENT: Normocephalic, atraumatic, neck symmetrical, no nasal discharge   Lungs: clear to auscultation bilaterally, no dullness to percussion bilaterally  Heart: regular rate and rhythm without rub; no displacement of the PMI   Abdomen: soft, non-tender; bowel sounds normoactive; no organomegaly  Extremities: extremities symmetric; no clubbing, cyanosis, or edema  Neurologic: Alert and oriented X 3, normal strength, normal coordination and gait    Labs:  Lab Results   Component Value Date    WBC 4.45 11/17/2021    HGB 12.5 " 11/17/2021    HCT 37.2 11/17/2021    MCV 94 11/17/2021     11/17/2021         CMP  Sodium   Date Value Ref Range Status   11/17/2021 139 136 - 145 mmol/L Final     Potassium   Date Value Ref Range Status   11/22/2021 3.3 (L) 3.5 - 5.1 mmol/L Final     Chloride   Date Value Ref Range Status   11/17/2021 98 95 - 110 mmol/L Final     CO2   Date Value Ref Range Status   11/17/2021 31 (H) 23 - 29 mmol/L Final     Glucose   Date Value Ref Range Status   11/17/2021 119 (H) 70 - 110 mg/dL Final     BUN   Date Value Ref Range Status   11/17/2021 16 8 - 23 mg/dL Final     Creatinine   Date Value Ref Range Status   11/17/2021 1.0 0.5 - 1.4 mg/dL Final     Calcium   Date Value Ref Range Status   11/17/2021 10.3 8.7 - 10.5 mg/dL Final     Total Protein   Date Value Ref Range Status   11/17/2021 7.5 6.0 - 8.4 g/dL Final     Albumin   Date Value Ref Range Status   11/17/2021 3.9 3.5 - 5.2 g/dL Final     Total Bilirubin   Date Value Ref Range Status   11/17/2021 0.6 0.1 - 1.0 mg/dL Final     Comment:     For infants and newborns, interpretation of results should be based  on gestational age, weight and in agreement with clinical  observations.    Premature Infant recommended reference ranges:  Up to 24 hours.............<8.0 mg/dL  Up to 48 hours............<12.0 mg/dL  3-5 days..................<15.0 mg/dL  6-29 days.................<15.0 mg/dL       Alkaline Phosphatase   Date Value Ref Range Status   11/17/2021 74 55 - 135 U/L Final     AST   Date Value Ref Range Status   11/17/2021 33 10 - 40 U/L Final     ALT   Date Value Ref Range Status   11/17/2021 37 10 - 44 U/L Final     Anion Gap   Date Value Ref Range Status   11/17/2021 10 8 - 16 mmol/L Final     eGFR if    Date Value Ref Range Status   11/17/2021 >60.0 >60 mL/min/1.73 m^2 Final     eGFR if non    Date Value Ref Range Status   11/17/2021 57.2 (A) >60 mL/min/1.73 m^2 Final     Comment:     Calculation used to obtain the estimated  glomerular filtration  rate (eGFR) is the CKD-EPI equation.            Assessment:   1. Monoallelic mutation of CHACHO gene        Plan:  -EUS requested to screen for pancreatic cancer.    Re Rojas MD

## 2022-01-25 ENCOUNTER — TELEPHONE (OUTPATIENT)
Dept: ENDOSCOPY | Facility: HOSPITAL | Age: 72
End: 2022-01-25
Payer: MEDICARE

## 2022-01-25 DIAGNOSIS — Z15.89 GENE MUTATION: Primary | ICD-10-CM

## 2022-01-26 NOTE — TELEPHONE ENCOUNTER
----- Message from Pancho Ty MD sent at 1/25/2022 12:28 PM CST -----  Regarding: RE: Referral for EUS  Need EUS (linear) for increased risk for pancreatic cancer due to gene mutation. [facility]. 45 minutes.  Referring: Re Rojas MD  Thanks,  Pancho Ty MD  ----- Message -----  From: Edel Cabrera MA  Sent: 1/24/2022   4:12 PM CST  To: Pancho Ty MD  Subject: FW: Referral for EUS                               ----- Message -----  From: Re Rojas MD  Sent: 1/24/2022   2:52 PM CST  To: Edel Cabrera MA  Subject: Referral for EUS                                 Juan Valle,    This patient was referred from Genetics for an EUS due to a gene mutation that puts her at increased risk for pancreatic cancer.  Could you have her scheduled for an EUS?  Thanks!    Re

## 2022-01-31 ENCOUNTER — TELEPHONE (OUTPATIENT)
Dept: ENDOSCOPY | Facility: HOSPITAL | Age: 72
End: 2022-01-31
Payer: MEDICARE

## 2022-01-31 NOTE — TELEPHONE ENCOUNTER
Spoke with patient about arrival time @ 0800.   Covid test = Rapid     NPO status reviewed: Patient may eat until 7:00pm.  After 7pm, pt may have CLEAR liquids ONLY until completely NPO at Midnight.    Medications: Do not take Insulin or oral diabetic medications the day of the procedure.    Take as prescribed: heart, seizure and blood pressure medication in the morning with a sip of water (less than an ounce).  Take any breathing medications and bring inhalers to hospital with you.     Leave all valuables and jewelry at home. Wear comfortable clothes to procedure to change into hospital gown.   You cannot drive for 24 hours after your procedure because you will receive sedation for your procedure to make you comfortable.    A ride must be provided at discharge.

## 2022-02-02 ENCOUNTER — ANESTHESIA EVENT (OUTPATIENT)
Dept: ENDOSCOPY | Facility: HOSPITAL | Age: 72
End: 2022-02-02
Payer: MEDICARE

## 2022-02-02 ENCOUNTER — ANESTHESIA (OUTPATIENT)
Dept: ENDOSCOPY | Facility: HOSPITAL | Age: 72
End: 2022-02-02
Payer: MEDICARE

## 2022-02-02 ENCOUNTER — HOSPITAL ENCOUNTER (OUTPATIENT)
Facility: HOSPITAL | Age: 72
Discharge: HOME | End: 2022-02-02
Attending: INTERNAL MEDICINE | Admitting: INTERNAL MEDICINE
Payer: MEDICARE

## 2022-02-02 VITALS
BODY MASS INDEX: 29.16 KG/M2 | HEIGHT: 65 IN | SYSTOLIC BLOOD PRESSURE: 119 MMHG | HEART RATE: 61 BPM | OXYGEN SATURATION: 99 % | DIASTOLIC BLOOD PRESSURE: 63 MMHG | WEIGHT: 175 LBS | RESPIRATION RATE: 20 BRPM | TEMPERATURE: 98 F

## 2022-02-02 DIAGNOSIS — Z15.89 MONOALLELIC MUTATION OF ATM GENE: Primary | ICD-10-CM

## 2022-02-02 DIAGNOSIS — Z15.09 MONOALLELIC MUTATION OF ATM GENE: Primary | ICD-10-CM

## 2022-02-02 DIAGNOSIS — Z15.01 MONOALLELIC MUTATION OF ATM GENE: Primary | ICD-10-CM

## 2022-02-02 LAB
CTP QC/QA: YES
SARS-COV-2 AG RESP QL IA.RAPID: NEGATIVE

## 2022-02-02 PROCEDURE — 25000003 PHARM REV CODE 250: Performed by: NURSE ANESTHETIST, CERTIFIED REGISTERED

## 2022-02-02 PROCEDURE — 63600175 PHARM REV CODE 636 W HCPCS: Performed by: NURSE ANESTHETIST, CERTIFIED REGISTERED

## 2022-02-02 PROCEDURE — 43259 PR ENDOSCOPIC ULTRASOUND EXAM: ICD-10-PCS | Mod: ,,, | Performed by: INTERNAL MEDICINE

## 2022-02-02 PROCEDURE — 37000009 HC ANESTHESIA EA ADD 15 MINS: Performed by: INTERNAL MEDICINE

## 2022-02-02 PROCEDURE — 25000003 PHARM REV CODE 250: Performed by: INTERNAL MEDICINE

## 2022-02-02 PROCEDURE — 37000008 HC ANESTHESIA 1ST 15 MINUTES: Performed by: INTERNAL MEDICINE

## 2022-02-02 PROCEDURE — 43259 EGD US EXAM DUODENUM/JEJUNUM: CPT | Performed by: INTERNAL MEDICINE

## 2022-02-02 PROCEDURE — 43259 EGD US EXAM DUODENUM/JEJUNUM: CPT | Mod: ,,, | Performed by: INTERNAL MEDICINE

## 2022-02-02 RX ORDER — ONDANSETRON 2 MG/ML
INJECTION INTRAMUSCULAR; INTRAVENOUS
Status: DISCONTINUED | OUTPATIENT
Start: 2022-02-02 | End: 2022-02-02

## 2022-02-02 RX ORDER — PROPOFOL 10 MG/ML
VIAL (ML) INTRAVENOUS
Status: DISCONTINUED | OUTPATIENT
Start: 2022-02-02 | End: 2022-02-02

## 2022-02-02 RX ORDER — SODIUM CHLORIDE 0.9 % (FLUSH) 0.9 %
10 SYRINGE (ML) INJECTION
Status: DISCONTINUED | OUTPATIENT
Start: 2022-02-02 | End: 2022-02-02 | Stop reason: HOSPADM

## 2022-02-02 RX ORDER — FENTANYL CITRATE 50 UG/ML
INJECTION, SOLUTION INTRAMUSCULAR; INTRAVENOUS
Status: DISCONTINUED | OUTPATIENT
Start: 2022-02-02 | End: 2022-02-02

## 2022-02-02 RX ORDER — PROPOFOL 10 MG/ML
VIAL (ML) INTRAVENOUS CONTINUOUS PRN
Status: DISCONTINUED | OUTPATIENT
Start: 2022-02-02 | End: 2022-02-02

## 2022-02-02 RX ORDER — LIDOCAINE HYDROCHLORIDE 20 MG/ML
INJECTION INTRAVENOUS
Status: DISCONTINUED | OUTPATIENT
Start: 2022-02-02 | End: 2022-02-02

## 2022-02-02 RX ORDER — SODIUM CHLORIDE 9 MG/ML
INJECTION, SOLUTION INTRAVENOUS CONTINUOUS
Status: DISCONTINUED | OUTPATIENT
Start: 2022-02-02 | End: 2022-02-02 | Stop reason: HOSPADM

## 2022-02-02 RX ADMIN — PROPOFOL 150 MCG/KG/MIN: 10 INJECTION, EMULSION INTRAVENOUS at 09:02

## 2022-02-02 RX ADMIN — PROPOFOL 60 MG: 10 INJECTION, EMULSION INTRAVENOUS at 09:02

## 2022-02-02 RX ADMIN — FENTANYL CITRATE 25 MCG: 50 INJECTION, SOLUTION INTRAMUSCULAR; INTRAVENOUS at 09:02

## 2022-02-02 RX ADMIN — ONDANSETRON 8 MG: 2 INJECTION, SOLUTION INTRAMUSCULAR; INTRAVENOUS at 09:02

## 2022-02-02 RX ADMIN — GLYCOPYRROLATE 0.2 MG: 0.2 INJECTION, SOLUTION INTRAMUSCULAR; INTRAVITREAL at 09:02

## 2022-02-02 RX ADMIN — SODIUM CHLORIDE: 0.9 INJECTION, SOLUTION INTRAVENOUS at 08:02

## 2022-02-02 RX ADMIN — LIDOCAINE HYDROCHLORIDE 60 MG: 20 INJECTION, SOLUTION INTRAVENOUS at 09:02

## 2022-02-02 NOTE — [employer] PATIENT INSTRUCTIONS
Discharge Summary/Instructions after an Endoscopic Procedure  Patient Name: Dana Cook  Patient MRN: [id number]  Patient YOB: 1950 Wednesday, February 2, 2022  Pancho Ty MD  Dear patient,  As a result of recent federal legislation (The Federal Cures Act), you may   receive lab or pathology results from your procedure in your [facility]   account before your physician is able to contact you. Your physician or   their representative will relay the results to you with their   recommendations at their soonest availability.  Thank you,  Your health is very important to us during the Covid Crisis. Following your   procedure today, you will receive a daily text for 2 weeks asking about   signs or symptoms of Covid 19.  Please respond to this text when you   receive it so we can follow up and keep you as safe as possible.   RESTRICTIONS:  During your procedure today, you received medications for sedation.  These   medications may affect your judgment, balance and coordination.  Therefore,   for 24 hours, you have the following restrictions:   - DO NOT drive a car, operate machinery, make legal/financial decisions,   sign important papers or drink alcohol.    ACTIVITY:  Today: no heavy lifting, straining or running due to procedural   sedation/anesthesia.  The following day: return to full activity including work.  DIET:  Eat and drink normally unless instructed otherwise.     TREATMENT FOR COMMON SIDE EFFECTS:  - Mild abdominal pain, nausea, belching, bloating or excessive gas:  rest,   eat lightly and use a heating pad.  - Sore Throat: treat with throat lozenges and/or gargle with warm salt   water.  - Because air was used during the procedure, expelling large amounts of air   from your rectum or belching is normal.  - If a bowel prep was taken, you may not have a bowel movement for 1-3 days.    This is normal.  SYMPTOMS TO WATCH FOR AND REPORT TO YOUR PHYSICIAN:  1. Abdominal pain or bloating, other than  gas cramps.  2. Chest pain.  3. Back pain.  4. Signs of infection such as: chills or fever occurring within 24 hours   after the procedure.  5. Rectal bleeding, which would show as bright red, maroon, or black stools.   (A tablespoon of blood from the rectum is not serious, especially if   hemorrhoids are present.)  6. Vomiting.  7. Weakness or dizziness.  GO DIRECTLY TO THE NEAREST EMERGENCY ROOM IF YOU HAVE ANY OF THE FOLLOWING:      Difficulty breathing              Chills and/or fever over 101 F   Persistent vomiting and/or vomiting blood   Severe abdominal pain   Severe chest pain   Black, tarry stools   Bleeding- more than one tablespoon   Any other symptom or condition that you feel may need urgent attention  Your doctor recommends these additional instructions:  If any biopsies were taken, your doctors clinic will contact you in 1 to 2   weeks with any results.  - Discharge patient to home (ambulatory).   - Perform magnetic resonance imaging (MRI) with gadolinium in 1 year for   screening.  For questions, problems or results please call your physician - Pancho Ty MD.  EMERGENCY PHONE NUMBER: [phone],  LAB RESULTS: [phone]  IF A COMPLICATION OR EMERGENCY SITUATION ARISES AND YOU ARE UNABLE TO REACH   YOUR PHYSICIAN - GO DIRECTLY TO THE EMERGENCY ROOM.  Pancho Ty MD  2/2/2022 9:51:39 AM  This report has been verified and signed electronically.  Dear patient,  As a result of recent federal legislation (The Federal Cures Act), you may   receive lab or pathology results from your procedure in your [facility]   account before your physician is able to contact you. Your physician or   their representative will relay the results to you with their   recommendations at their soonest availability.  Thank you,  [employer]ATION

## 2022-02-02 NOTE — BRIEF OP NOTE
Discharge Summary/Instructions after an Endoscopic Procedure    Patient Name: Dana Cook  Patient MRN: [id number]  Patient YOB: 1950 Wednesday, February 2, 2022  Pancho Ty MD  Dear patient,  As a result of recent federal legislation (The Federal Cures Act), you may receive lab or pathology results from your procedure in your [facility] account before your physician is able to contact you. Your physician or their representative will relay the results to you with their recommendations at their soonest availability.  Thank you,    Your health is very important to us during the Covid Crisis. Following your procedure today, you will receive a daily text for 2 weeks asking about signs or symptoms of Covid 19.  Please respond to this text when you receive it so we can follow up and keep you as safe as possible.     RESTRICTIONS:  During your procedure today, you received medications for sedation.  These medications may affect your judgment, balance and coordination.  Therefore, for 24 hours, you have the following restrictions:     - DO NOT drive a car, operate machinery, make legal/financial decisions, sign important papers or drink alcohol.      ACTIVITY:  Today: no heavy lifting, straining or running due to procedural sedation/anesthesia.  The following day: return to full activity including work.    DIET:  Eat and drink normally unless instructed otherwise.     TREATMENT FOR COMMON SIDE EFFECTS:  - Mild abdominal pain, nausea, belching, bloating or excessive gas:  rest, eat lightly and use a heating pad.  - Sore Throat: treat with throat lozenges and/or gargle with warm salt water.  - Because air was used during the procedure, expelling large amounts of air from your rectum or belching is normal.  - If a bowel prep was taken, you may not have a bowel movement for 1-3 days.  This is normal.      SYMPTOMS TO WATCH FOR AND REPORT TO YOUR PHYSICIAN:  1. Abdominal pain or bloating, other than gas  cramps.  2. Chest pain.  3. Back pain.  4. Signs of infection such as: chills or fever occurring within 24 hours after the procedure.  5. Rectal bleeding, which would show as bright red, maroon, or black stools. (A tablespoon of blood from the rectum is not serious, especially if hemorrhoids are present.)  6. Vomiting.  7. Weakness or dizziness.      GO DIRECTLY TO THE NEAREST EMERGENCY ROOM IF YOU HAVE ANY OF THE FOLLOWING:     Difficulty breathing              Chills and/or fever over 101 F   Persistent vomiting and/or vomiting blood   Severe abdominal pain   Severe chest pain   Black, tarry stools   Bleeding- more than one tablespoon   Any other symptom or condition that you feel may need urgent attention    Your doctor recommends these additional instructions:  If any biopsies were taken, your doctors clinic will contact you in 1 to 2 weeks with any results.    - Discharge patient to home (ambulatory).   - Perform magnetic resonance imaging (MRI) with gadolinium in 1 year for screening.    For questions, problems or results please call your physician - Pancho Ty MD.    EMERGENCY PHONE NUMBER: [phone],  LAB RESULTS: [phone]    IF A COMPLICATION OR EMERGENCY SITUATION ARISES AND YOU ARE UNABLE TO REACH YOUR PHYSICIAN - GO DIRECTLY TO THE EMERGENCY ROOM.

## 2022-02-02 NOTE — ANESTHESIA POSTPROCEDURE EVALUATION
Anesthesia Post Evaluation    Patient: Dana Cook    Procedure(s) Performed: Procedure(s) (LRB):  ULTRASOUND, UPPER GI TRACT, ENDOSCOPIC (N/A)    Final Anesthesia Type: MAC      Patient location during evaluation: GI PACU  Patient participation: Yes- Able to Participate  Level of consciousness: awake and alert and oriented  Post-procedure vital signs: reviewed and stable  Pain management: adequate  Airway patency: patent    PONV status at discharge: No PONV  Anesthetic complications: no      Cardiovascular status: blood pressure returned to baseline and hemodynamically stable  Respiratory status: unassisted, spontaneous ventilation and room air  Hydration status: euvolemic  Follow-up not needed.          Vitals Value Taken Time   BP  02/02/22 0939   Temp  02/02/22 0939   Pulse  02/02/22 0939   Resp  02/02/22 0939   SpO2  02/02/22 0939         No case tracking events are documented in the log.      Pain/Yuan Score: No data recorded

## 2022-02-02 NOTE — TRANSFER OF CARE
"Anesthesia Transfer of Care Note    Patient: Dana Cook    Procedure(s) Performed: Procedure(s) (LRB):  ULTRASOUND, UPPER GI TRACT, ENDOSCOPIC (N/A)    Patient location: GI    Anesthesia Type: MAC    Transport from OR: Transported from OR on room air with adequate spontaneous ventilation    Post pain: adequate analgesia    Post assessment: no apparent anesthetic complications and tolerated procedure well    Post vital signs: stable    Level of consciousness: awake, alert and oriented    Nausea/Vomiting: no nausea/vomiting    Complications: none    Transfer of care protocol was followed      Last vitals:   Visit Vitals  /66 (BP Location: Left arm, Patient Position: Lying)   Pulse (!) 58   Temp 37.3 °C (99.1 °F) (Skin)   Resp 18   Ht 5' 5" (1.651 m)   Wt 79.4 kg (175 lb)   SpO2 95%   Breastfeeding No   BMI 29.12 kg/m²     "

## 2022-02-02 NOTE — H&P
History & Physical - Short Stay  Gastroenterology      SUBJECTIVE:     Procedure: EUS    Chief Complaint/Indication for Procedure: Screening    History of Present Illness:  Patient is a 71 y.o. female presents with CHACHO gene mutation here for pancreatic cancer screening. No family history of pancreatic cancer.    PTA Medications   Medication Sig    alendronate (FOSAMAX) 70 MG tablet TAKE 1 TABLET BY MOUTH EVERY 7 DAYS    atorvastatin (LIPITOR) 10 MG tablet TAKE 1 TABLET(10 MG) BY MOUTH EVERY DAY    buPROPion (WELLBUTRIN XL) 300 MG 24 hr tablet TAKE 1 TABLET(300 MG) BY MOUTH EVERY DAY    CALCIUM-VITAMIN D3 ORAL Take by mouth. 600 mg - 20 mcg. Take 1 cap oral twice a day    chlorthalidone (HYGROTEN) 25 MG Tab TAKE 1 TABLET BY MOUTH ONCE DAILY    hydrOXYzine HCl (ATARAX) 10 MG Tab 10 mg as needed.    letrozole (FEMARA) 2.5 mg Tab Take 2.5 mg by mouth once daily.    acetaminophen (TYLENOL) 500 MG tablet Take 2 tablets (1,000 mg total) by mouth every 8 (eight) hours.    ascorbic acid, vitamin C, (VITAMIN C) 1000 MG tablet     BIOTIN ORAL Take by mouth once daily.    colchicine (COLCRYS) 0.6 mg tablet On 1st day of gout flare take 1 tablet p.o t.i.d; From day 2 onwards, take 1 tablet p.o. b.i.d. until flare resolves.    fluocinonide (LIDEX) 0.05 % external solution USE ONCE TO BID ON SCALP FOR RASH OR IRRITATION    lidocaine (LIDODERM) 5 % Place 1 patch onto the skin once daily. Remove & Discard patch within 12 hours. Do not use more than 1 patch in 24 hours. (Patient not taking: Reported on 1/24/2022)    oxyCODONE (ROXICODONE) 5 MG immediate release tablet Take 1 tablet (5 mg total) by mouth every 4 (four) hours as needed for Pain.    prenatal vit calc,iron,folic (PRENATAL VITAMIN ORAL) Take by mouth once daily.    RED YEAST RICE ORAL Take by mouth once daily.    SUBHASH'S WORT/S.GINSGR (SUBHASH'S WORT-SIBER.GINSENG) 450-90 mg Tab     TURMERIC ORAL Take by mouth once daily.    UNABLE TO FIND Hemp seed  oil    VALERIAN ROOT ORAL Take by mouth 2 (two) times daily as needed.       Review of patient's allergies indicates:   Allergen Reactions    Adhesive      Surgical tape/ burned sckin    Percocet [oxycodone-acetaminophen] Nausea Only     Does not know what else she can take; can take tylenol  Pt states she is not allergic to percocet, just upsets her stomach.  States she has shoulder surgery here and has probably had dilaudid. 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    left    Breast cancer 03/19/2021    left    Colon polyps     Depression     Dry eyes     Dry mouth     Dupuytren's contracture     Hypertension     left arm fracture     hairline    Murmur, heart     Osteopenia     PONV (postoperative nausea and vomiting)     with wisdom teeth    Sarcoidosis      Past Surgical History:   Procedure Laterality Date    BREAST BIOPSY Right 2016    RT axilla-sarcoidosis    BREAST BIOPSY Left 03/19/2021    cancer    BREAST BIOPSY Left 03/19/2021    [facility]    Laparoscopy for possible endometriosis   1985    MASTECTOMY WITH SENTINEL NODE BIOPSY AND AXILLARY LYMPH NODE DISSECTION Left 4/30/2021    Procedure: MASTECTOMY, WITH SENTINEL NODE BIOPSY AND AXILLARY LYMPHADENECTOMY;  Surgeon: Madyson Reza MD;  Location: [facility];  Service: Plastics;  Laterality: Left;    MASTECTOMY, PARTIAL Bilateral 4/30/2021    Procedure: MASTECTOMY, PARTIAL - BILATERAL PARTIAL MASTECTOMY WITH  radiological marker.;  Surgeon: Madyson Reza MD;  Location: [facility] OR;  Service: Plastics;  Laterality: Bilateral;  LEFT BREAST X 3, RIGHT BREAST X 1 -    REDUCTION OF BOTH BREASTS Bilateral 4/30/2021    Procedure: MAMMOPLASTY, REDUCTION, BILATERAL;  Surgeon: Jason Meraz MD;  Location: [facility] OR;  Service: Plastics;  Laterality: Bilateral;    SHOULDER SURGERY  03/22/2018    WRIST SURGERY Left     Dupuytrens contracture    XI ROBOTIC HYSTERECTOMY, WITH  SALPINGO-OOPHORECTOMY Bilateral 11/22/2021    Procedure: XI ROBOTIC HYSTERECTOMY,WITH SALPINGO-OOPHORECTOMY;  Surgeon: Modesta Roberts MD;  Location: [facility] OR [address] [place] [address];  Service: OB/GYN;  Laterality: Bilateral;     Family History   Problem Relation Age of Onset    Cancer Father 68        malignant fibrous histocytoma of heart    Hypertension Father     Hypertension Mother     Other Mother         tumor    Cancer Paternal Grandmother         leukemia    Breast cancer Paternal Aunt         dx 60s    Uterine cancer Sister 49    Breast cancer Sister 70    Diabetes Sister     Cancer Sister         BCC skin of face    Uterine cancer Maternal Grandmother 86    Hypertension Maternal Grandmother     Breast cancer Maternal Aunt         late 60s    Heart failure Maternal Aunt         CHF    Cancer Brother         melanoma    Breast cancer Other         dx 60s    Breast cancer Other 55    Cancer Paternal Aunt 77        cervical    Breast cancer Paternal Cousin 55        genetics?    Cancer Paternal Aunt         cervical    Other Son         overweight    Cancer Maternal Grandfather         tongue (smoked)    Mental illness Paternal Grandfather     Breast cancer Paternal Aunt         maybe    Other Maternal Aunt         history?    Cancer Paternal Cousin         thyroid    Breast cancer Paternal Cousin     Multiple sclerosis Paternal Cousin     Colon cancer Neg Hx     Ovarian cancer Neg Hx     Anesthesia problems Neg Hx      Social History     Tobacco Use    Smoking status: Never Smoker    Smokeless tobacco: Never Used   Substance Use Topics    Alcohol use: Yes     Alcohol/week: 5.0 - 6.0 standard drinks     Types: 5 - 6 Standard drinks or equivalent per week     Comment: wine every night    Drug use: Never       Review of Systems:  Constitutional: no fever or chills  Respiratory: no cough or shortness of breath  Cardiovascular: no chest pain or palpitations    OBJECTIVE:     Vital Signs (Most  Recent)  Temp: 99.1 °F (37.3 °C) (02/02/22 0807)  Pulse: (!) 58 (02/02/22 0807)  Resp: 18 (02/02/22 0807)  BP: 139/66 (02/02/22 0807)  SpO2: 95 % (02/02/22 0807)    Physical Exam:  General: well developed, well nourished  Lungs:  normal respiratory effort  Heart: regular rate, S1, S2 normal    Laboratory  CBC: No results for input(s): WBC, RBC, HGB, HCT, PLT, MCV, MCH, MCHC in the last 168 hours.  CMP: No results for input(s): GLU, CALCIUM, ALBUMIN, PROT, NA, K, CO2, CL, BUN, CREATININE, ALKPHOS, ALT, AST, BILITOT in the last 168 hours.  Coagulation: No results for input(s): LABPROT, INR, APTT in the last 168 hours.      Diagnostic Results:      ASSESSMENT/PLAN:     CHACHO gene mutation  Pancreas cancer screening    Plan: EUS    Anesthesia Plan: MAC    ASA Grade: ASA 3 - Patient with moderate systemic disease with functional limitations       The impression and plan was discussed in detail with the patient. All questions have been answered and the patient voices understanding of our plan at this point. The risk of the procedure was discussed in detail which includes but not limited to bleeding, infection, perforation in some cases requiring surgery with its spectrum of complications.

## 2022-02-02 NOTE — ANESTHESIA PREPROCEDURE EVALUATION
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    left    Breast cancer 03/19/2021    left    Colon polyps     Depression     Dry eyes     Dry mouth     Dupuytren's contracture     Hypertension     left arm fracture     hairline    Murmur, heart     Osteopenia     PONV (postoperative nausea and vomiting)     with wisdom teeth    Sarcoidosis      Past Surgical History:   Procedure Laterality Date    BREAST BIOPSY Right 2016    RT axilla-sarcoidosis    BREAST BIOPSY Left 03/19/2021    cancer    BREAST BIOPSY Left 03/19/2021    [facility]    Laparoscopy for possible endometriosis   1985    MASTECTOMY WITH SENTINEL NODE BIOPSY AND AXILLARY LYMPH NODE DISSECTION Left 4/30/2021    Procedure: MASTECTOMY, WITH SENTINEL NODE BIOPSY AND AXILLARY LYMPHADENECTOMY;  Surgeon: Madyson Reza MD;  Location: [facility];  Service: Plastics;  Laterality: Left;    MASTECTOMY, PARTIAL Bilateral 4/30/2021    Procedure: MASTECTOMY, PARTIAL - BILATERAL PARTIAL MASTECTOMY WITH  radiological marker.;  Surgeon: Madyson Reza MD;  Location: [facility];  Service: Plastics;  Laterality: Bilateral;  LEFT BREAST X 3, RIGHT BREAST X 1 -    REDUCTION OF BOTH BREASTS Bilateral 4/30/2021    Procedure: MAMMOPLASTY, REDUCTION, BILATERAL;  Surgeon: Jason Meraz MD;  Location: [facility];  Service: Plastics;  Laterality: Bilateral;    SHOULDER SURGERY  03/22/2018    WRIST SURGERY Left     Dupuytrens contracture    XI ROBOTIC HYSTERECTOMY, WITH SALPINGO-OOPHORECTOMY Bilateral 11/22/2021    Procedure: XI ROBOTIC HYSTERECTOMY,WITH SALPINGO-OOPHORECTOMY;  Surgeon: Modesta Roberts MD;  Location: [address];  Service: OB/GYN;  Laterality: Bilateral;         Anesthesia Evaluation    I have reviewed the Patient Summary  Reports.    I have reviewed the Nursing Notes. I have reviewed the NPO Status.      Review of Systems  Anesthesia Hx:  History of prior surgery of interest to airway management or planning:  Denies Personal Hx of Anesthesia complications.       Physical Exam  General:  Well nourished    Airway/Jaw/Neck:  Airway Findings: Mouth Opening: Normal Tongue: Normal  General Airway Assessment: Adult  Mallampati: II       Chest/Lungs:  Chest/Lungs Clear    Heart/Vascular:  Heart Findings: Normal            Anesthesia Plan  Type of Anesthesia, risks & benefits discussed:  Anesthesia Type:  general, MAC    Patient's Preference:   Plan Factors:          Intra-op Monitoring Plan:   Intra-op Monitoring Plan Comments:   Post Op Pain Control Plan:   Post Op Pain Control Plan Comments:     Induction:    Beta Blocker:         Informed Consent: Patient understands risks and agrees with Anesthesia plan.  Questions answered. Anesthesia consent signed with patient.  ASA Score: 3     Day of Surgery Review of History & Physical:            Ready For Surgery From Anesthesia Perspective.

## 2022-02-03 ENCOUNTER — TELEPHONE (OUTPATIENT)
Dept: ENDOSCOPY | Facility: HOSPITAL | Age: 72
End: 2022-02-03
Payer: MEDICARE

## 2022-02-03 NOTE — TELEPHONE ENCOUNTER
Post procedure f/u call, no answer, message left to call [phone] for any post procedure concerns

## 2022-02-14 ENCOUNTER — TELEPHONE (OUTPATIENT)
Dept: INTERNAL MEDICINE | Facility: CLINIC | Age: 72
End: 2022-02-14
Payer: MEDICARE

## 2022-02-14 DIAGNOSIS — E78.2 MIXED HYPERLIPIDEMIA: ICD-10-CM

## 2022-02-14 DIAGNOSIS — Z00.00 ENCOUNTER FOR PREVENTIVE HEALTH EXAMINATION: ICD-10-CM

## 2022-02-14 DIAGNOSIS — I10 ESSENTIAL HYPERTENSION: Primary | ICD-10-CM

## 2022-02-14 NOTE — TELEPHONE ENCOUNTER
----- Message from Bianca Sainz sent at 2/14/2022 12:08 PM CST -----  Contact: [phone]  Doctor appointment and lab have been scheduled.  Please link lab orders to the lab appointment.  Date of doctor appointment:  04/18  Date of lab appointment:  03/11  Physical or F/U: physical  Comments:

## 2022-02-15 ENCOUNTER — OFFICE VISIT (OUTPATIENT)
Dept: HEMATOLOGY/ONCOLOGY | Facility: CLINIC | Age: 72
End: 2022-02-15
Payer: MEDICARE

## 2022-02-15 VITALS
DIASTOLIC BLOOD PRESSURE: 77 MMHG | RESPIRATION RATE: 16 BRPM | HEIGHT: 66 IN | BODY MASS INDEX: 28.28 KG/M2 | OXYGEN SATURATION: 97 % | TEMPERATURE: 98 F | SYSTOLIC BLOOD PRESSURE: 173 MMHG | WEIGHT: 176 LBS | HEART RATE: 67 BPM

## 2022-02-15 DIAGNOSIS — Z17.0 MALIGNANT NEOPLASM OF LOWER-INNER QUADRANT OF LEFT BREAST IN FEMALE, ESTROGEN RECEPTOR POSITIVE: Primary | ICD-10-CM

## 2022-02-15 DIAGNOSIS — C50.312 MALIGNANT NEOPLASM OF LOWER-INNER QUADRANT OF LEFT BREAST IN FEMALE, ESTROGEN RECEPTOR POSITIVE: Primary | ICD-10-CM

## 2022-02-15 PROCEDURE — 99999 PR PBB SHADOW E&M-EST. PATIENT-LVL IV: CPT | Mod: PBBFAC,,, | Performed by: INTERNAL MEDICINE

## 2022-02-15 PROCEDURE — 99213 PR OFFICE/OUTPT VISIT, EST, LEVL III, 20-29 MIN: ICD-10-PCS | Mod: S$PBB,,, | Performed by: INTERNAL MEDICINE

## 2022-02-15 PROCEDURE — 99999 PR PBB SHADOW E&M-EST. PATIENT-LVL IV: ICD-10-PCS | Mod: PBBFAC,,, | Performed by: INTERNAL MEDICINE

## 2022-02-15 PROCEDURE — 99214 OFFICE O/P EST MOD 30 MIN: CPT | Mod: PBBFAC | Performed by: INTERNAL MEDICINE

## 2022-02-15 PROCEDURE — 99213 OFFICE O/P EST LOW 20 MIN: CPT | Mod: S$PBB,,, | Performed by: INTERNAL MEDICINE

## 2022-02-15 NOTE — PROGRESS NOTES
Subjective:       Patient ID: Dana Cook is a 71 y.o. female.    Chief Complaint: No chief complaint on file.    HPI 71-year-old female seen for left breast cancer.  She is a patient Dr. Reza.  She is on Letrozole.    Overall, she is feeling well. Has had lots of appointments - retina, EGD.        History:  Screening mammogram on March 4, 2021 showed a focal asymmetry in the lower inner quadrant of the left breast.    Follow-up diagnostic mammogram and ultrasound on March 8, 2021 showed a focal asymmetry measuring 40 mm in the lower inner quadrant left breast.  Ultrasound showed a 5 x 5 x 3 mm hypoechoic mass at 3:00 a.m. position with seen in the lower inner quadrant area.    On March 19, 2021 biopsy of 2 areas in the left breast was performed.  The lower inner quadrant mass showed infiltrating lobular carcinoma which was histologic grade 3, nuclear grade 2, mitotic index 1.  Tumor cells were 95% ER positive in 95% NM positive.  HER2 was 2+ but negative by fish with a ratio 1.75, Ki-67  Biopsy 3:00 a.m. mass showed atypical ductal hyperplasia    MRI on April 1, 2021 showed multiple findings.  There was a 22 x 21 a 60 mm irregular mass in left breast lower inner quadrant, 8 mm to 9 is enhancement lymph breast at 3:00 a.m., 6 x 6 x 5 mm mass in the left breast lower outer quadrant and in the right breast in 19 mass 7 mm area of non-mass enhancement centrally.    On April 13, 2021 biopsy of the right breast non-mass enhancement in the left breast lower outer quadrant mass seen on MRI was performed.  Both of those biopsies were negative showing no atypia or malignancy.    On April 30, 2021 left breast lumpectomy and sentinel lymph node biopsy was performed.  Left breast showed a do by 1.7 x 1.2 cm infiltrating lobular carcinoma.  The sentinel lymph node was negative.  Right breast excisional biopsy was negative at that time as well.  Final pathological stage T1c N0 stage I A.    Oncotype score showed low risk  with a score 16.     [facility] genetics - CHACHO mutation    Radiation completed 8/3/21.    Letrozole started      PMH: HBP, sarcoid, renal damage from NSAIDs  Review of Systems   Constitutional: Negative.    HENT: Negative.    Respiratory: Negative.    Gastrointestinal: Negative.    Genitourinary: Negative.    Musculoskeletal: Negative.    Neurological: Negative.          Objective:      Physical Exam  Vitals reviewed.   Constitutional:       General: She is not in acute distress.     Appearance: Normal appearance.   HENT:      Head: Normocephalic.   Eyes:      General: No scleral icterus.  Cardiovascular:      Rate and Rhythm: Normal rate and regular rhythm.   Pulmonary:      Effort: Pulmonary effort is normal. No respiratory distress.      Breath sounds: Normal breath sounds. No rales.   Chest:       Abdominal:      Palpations: Abdomen is soft. There is no mass.      Tenderness: There is no abdominal tenderness.   Lymphadenopathy:      Cervical: No cervical adenopathy.   Skin:     Findings: No rash.   Neurological:      Mental Status: She is oriented to person, place, and time.   Psychiatric:         Mood and Affect: Mood normal.         Behavior: Behavior normal.         Thought Content: Thought content normal.         Judgment: Judgment normal.         Assessment:       1. Malignant neoplasm of lower-inner quadrant of left breast in female, estrogen receptor positive        Plan:         RTC 3 months to see surgery with mammograms.    See me in 6 M

## 2022-03-07 ENCOUNTER — HOSPITAL ENCOUNTER (OUTPATIENT)
Dept: RADIOLOGY | Facility: HOSPITAL | Age: 72
Discharge: HOME | End: 2022-03-07
Attending: PHYSICIAN ASSISTANT
Payer: MEDICARE

## 2022-03-07 DIAGNOSIS — Z85.3 PERSONAL HISTORY OF BREAST CANCER: ICD-10-CM

## 2022-03-07 DIAGNOSIS — Z12.31 ENCOUNTER FOR SCREENING MAMMOGRAM FOR BREAST CANCER: ICD-10-CM

## 2022-03-07 PROCEDURE — 77067 MAMMO DIGITAL SCREENING BILAT WITH TOMO: ICD-10-PCS | Mod: 26,,, | Performed by: RADIOLOGY

## 2022-03-07 PROCEDURE — 77063 BREAST TOMOSYNTHESIS BI: CPT | Mod: TC,PO

## 2022-03-07 PROCEDURE — 77067 SCR MAMMO BI INCL CAD: CPT | Mod: 26,,, | Performed by: RADIOLOGY

## 2022-03-07 PROCEDURE — 77067 SCR MAMMO BI INCL CAD: CPT | Mod: TC,PO

## 2022-03-07 PROCEDURE — 77063 BREAST TOMOSYNTHESIS BI: CPT | Mod: 26,,, | Performed by: RADIOLOGY

## 2022-03-07 PROCEDURE — 77063 MAMMO DIGITAL SCREENING BILAT WITH TOMO: ICD-10-PCS | Mod: 26,,, | Performed by: RADIOLOGY

## 2022-03-18 ENCOUNTER — LAB VISIT (OUTPATIENT)
Dept: LAB | Facility: HOSPITAL | Age: 72
End: 2022-03-18
Attending: HOSPITALIST
Payer: MEDICARE

## 2022-03-18 DIAGNOSIS — Z00.00 ENCOUNTER FOR PREVENTIVE HEALTH EXAMINATION: ICD-10-CM

## 2022-03-18 DIAGNOSIS — I10 ESSENTIAL HYPERTENSION: ICD-10-CM

## 2022-03-18 DIAGNOSIS — E78.2 MIXED HYPERLIPIDEMIA: ICD-10-CM

## 2022-03-18 LAB
ALBUMIN SERPL BCP-MCNC: 4.1 G/DL (ref 3.5–5.2)
ALP SERPL-CCNC: 70 U/L (ref 55–135)
ALT SERPL W/O P-5'-P-CCNC: 31 U/L (ref 10–44)
ANION GAP SERPL CALC-SCNC: 9 MMOL/L (ref 8–16)
AST SERPL-CCNC: 28 U/L (ref 10–40)
BASOPHILS # BLD AUTO: 0.05 K/UL (ref 0–0.2)
BASOPHILS NFR BLD: 1 % (ref 0–1.9)
BILIRUB SERPL-MCNC: 0.5 MG/DL (ref 0.1–1)
BUN SERPL-MCNC: 17 MG/DL (ref 8–23)
CALCIUM SERPL-MCNC: 10.3 MG/DL (ref 8.7–10.5)
CHLORIDE SERPL-SCNC: 100 MMOL/L (ref 95–110)
CHOLEST SERPL-MCNC: 206 MG/DL (ref 120–199)
CHOLEST/HDLC SERPL: 2.8 {RATIO} (ref 2–5)
CO2 SERPL-SCNC: 31 MMOL/L (ref 23–29)
CREAT SERPL-MCNC: 1 MG/DL (ref 0.5–1.4)
DIFFERENTIAL METHOD: ABNORMAL
EOSINOPHIL # BLD AUTO: 0.2 K/UL (ref 0–0.5)
EOSINOPHIL NFR BLD: 3 % (ref 0–8)
ERYTHROCYTE [DISTWIDTH] IN BLOOD BY AUTOMATED COUNT: 12.5 % (ref 11.5–14.5)
EST. GFR  (AFRICAN AMERICAN): >60 ML/MIN/1.73 M^2
EST. GFR  (NON AFRICAN AMERICAN): 56.8 ML/MIN/1.73 M^2
GLUCOSE SERPL-MCNC: 97 MG/DL (ref 70–110)
HCT VFR BLD AUTO: 39.9 % (ref 37–48.5)
HDLC SERPL-MCNC: 73 MG/DL (ref 40–75)
HDLC SERPL: 35.4 % (ref 20–50)
HGB BLD-MCNC: 12.7 G/DL (ref 12–16)
IMM GRANULOCYTES # BLD AUTO: 0.01 K/UL (ref 0–0.04)
IMM GRANULOCYTES NFR BLD AUTO: 0.2 % (ref 0–0.5)
LDLC SERPL CALC-MCNC: 112.8 MG/DL (ref 63–159)
LYMPHOCYTES # BLD AUTO: 1.4 K/UL (ref 1–4.8)
LYMPHOCYTES NFR BLD: 29 % (ref 18–48)
MCH RBC QN AUTO: 30.8 PG (ref 27–31)
MCHC RBC AUTO-ENTMCNC: 31.8 G/DL (ref 32–36)
MCV RBC AUTO: 97 FL (ref 82–98)
MONOCYTES # BLD AUTO: 0.6 K/UL (ref 0.3–1)
MONOCYTES NFR BLD: 11.5 % (ref 4–15)
NEUTROPHILS # BLD AUTO: 2.7 K/UL (ref 1.8–7.7)
NEUTROPHILS NFR BLD: 55.3 % (ref 38–73)
NONHDLC SERPL-MCNC: 133 MG/DL
NRBC BLD-RTO: 0 /100 WBC
PLATELET # BLD AUTO: 306 K/UL (ref 150–450)
PMV BLD AUTO: 9.2 FL (ref 9.2–12.9)
POTASSIUM SERPL-SCNC: 3.4 MMOL/L (ref 3.5–5.1)
PROT SERPL-MCNC: 7.8 G/DL (ref 6–8.4)
RBC # BLD AUTO: 4.13 M/UL (ref 4–5.4)
SODIUM SERPL-SCNC: 140 MMOL/L (ref 136–145)
TRIGL SERPL-MCNC: 101 MG/DL (ref 30–150)
TSH SERPL DL<=0.005 MIU/L-ACNC: 2.28 UIU/ML (ref 0.4–4)
WBC # BLD AUTO: 4.96 K/UL (ref 3.9–12.7)

## 2022-03-18 PROCEDURE — 85025 COMPLETE CBC W/AUTO DIFF WBC: CPT | Performed by: HOSPITALIST

## 2022-03-18 PROCEDURE — 80061 LIPID PANEL: CPT | Performed by: HOSPITALIST

## 2022-03-18 PROCEDURE — 84443 ASSAY THYROID STIM HORMONE: CPT | Performed by: HOSPITALIST

## 2022-03-18 PROCEDURE — 36415 COLL VENOUS BLD VENIPUNCTURE: CPT | Performed by: HOSPITALIST

## 2022-03-18 PROCEDURE — 80053 COMPREHEN METABOLIC PANEL: CPT | Performed by: HOSPITALIST

## 2022-04-05 DIAGNOSIS — Z71.89 COMPLEX CARE COORDINATION: ICD-10-CM

## 2022-04-18 ENCOUNTER — OFFICE VISIT (OUTPATIENT)
Dept: INTERNAL MEDICINE | Facility: CLINIC | Age: 72
End: 2022-04-18
Payer: MEDICARE

## 2022-04-18 VITALS
RESPIRATION RATE: 21 BRPM | DIASTOLIC BLOOD PRESSURE: 72 MMHG | OXYGEN SATURATION: 96 % | BODY MASS INDEX: 30.34 KG/M2 | SYSTOLIC BLOOD PRESSURE: 136 MMHG | HEIGHT: 65 IN | WEIGHT: 182.13 LBS | TEMPERATURE: 97 F | HEART RATE: 70 BPM

## 2022-04-18 DIAGNOSIS — N18.31 STAGE 3A CHRONIC KIDNEY DISEASE: ICD-10-CM

## 2022-04-18 DIAGNOSIS — Z17.0 MALIGNANT NEOPLASM OF LOWER-INNER QUADRANT OF LEFT BREAST IN FEMALE, ESTROGEN RECEPTOR POSITIVE: ICD-10-CM

## 2022-04-18 DIAGNOSIS — C50.312 MALIGNANT NEOPLASM OF LOWER-INNER QUADRANT OF LEFT BREAST IN FEMALE, ESTROGEN RECEPTOR POSITIVE: ICD-10-CM

## 2022-04-18 DIAGNOSIS — E78.2 MIXED HYPERLIPIDEMIA: ICD-10-CM

## 2022-04-18 DIAGNOSIS — F32.A DEPRESSION, UNSPECIFIED DEPRESSION TYPE: ICD-10-CM

## 2022-04-18 DIAGNOSIS — I65.23 ASYMPTOMATIC BILATERAL CAROTID ARTERY STENOSIS: ICD-10-CM

## 2022-04-18 DIAGNOSIS — E66.9 OBESITY (BMI 30.0-34.9): ICD-10-CM

## 2022-04-18 DIAGNOSIS — M85.80 OSTEOPENIA, UNSPECIFIED LOCATION: ICD-10-CM

## 2022-04-18 DIAGNOSIS — Z86.010 HISTORY OF COLON POLYPS: ICD-10-CM

## 2022-04-18 DIAGNOSIS — I10 ESSENTIAL HYPERTENSION: ICD-10-CM

## 2022-04-18 DIAGNOSIS — Z00.00 ENCOUNTER FOR PREVENTIVE HEALTH EXAMINATION: Primary | ICD-10-CM

## 2022-04-18 PROCEDURE — 99214 PR OFFICE/OUTPT VISIT, EST, LEVL IV, 30-39 MIN: ICD-10-PCS | Mod: S$PBB,,, | Performed by: HOSPITALIST

## 2022-04-18 PROCEDURE — 99999 PR PBB SHADOW E&M-EST. PATIENT-LVL III: CPT | Mod: PBBFAC,,, | Performed by: HOSPITALIST

## 2022-04-18 PROCEDURE — 99999 PR PBB SHADOW E&M-EST. PATIENT-LVL III: ICD-10-PCS | Mod: PBBFAC,,, | Performed by: HOSPITALIST

## 2022-04-18 PROCEDURE — 99214 OFFICE O/P EST MOD 30 MIN: CPT | Mod: S$PBB,,, | Performed by: HOSPITALIST

## 2022-04-18 PROCEDURE — 99213 OFFICE O/P EST LOW 20 MIN: CPT | Mod: PBBFAC,PO | Performed by: HOSPITALIST

## 2022-04-18 NOTE — PROGRESS NOTES
Subjective:     @Patient ID: Dana Cook is a 71 y.o. female.    Chief Complaint: Annual Exam (Labs done 3/18/22)    HPI  72 yo F with pmhx HTN, HLD, breast ca, sarcoidosis, ckd3a, depression presents for annual :    1. HTN: chlorthalidone 25 mg qday  2. HLD: atorvastatin 10 mg qday, red yeast rice   3. Breast ca:  dx with breast ca of left breast. S/p left breast lumpectomy and sentinel lymph node biopsy 4/30/21.  Left breast showed infiltrating lobular carcinoma. Currently follows with heme-onc. On letrozole    4. CKD3: baseljne Cr   5. Sarcoidosis: has eye involvement but follows closely with her eye specialists. Not on chronic steroids  6. Depression: on wellbutrin 300 mg qday   7. Carotid artery stenosis: seen on u/s 2019      Lipid disorders/ASCVD risk (ages >/= 45 or >/= 20 if increased risk ): ordered  DM (>45y yearly or if obese, HTN): A1c n/a  Hepatitis C (one time if born between 7916-9590): done  Eye exam: Done 2020. Will schedule for this year   Breast Cancer (40-50y discretion of pt, 50-74y every 1-2 years): Mammogram Done 3/2022   Colorectal Cancer (normal risk 50-75yr): Colonoscopy done 11/1/2019;    DEXA (F>64 yo, M >69yo, M&F 50-70 yo with risk factors (smoking, previous fx, wt <70kg; etoh abuse, chronic steroids, RA)):Done 2021  SKin: reports done 2021 with Dr Whitt      Vaccines:   Influenza (yearly) done    Tetanus (every 10 yrs - 1st tdap)  Done  PPSV23(>64yo or <65 w/ lung dz, smoking, DM) Done  PCV13 (> 65 or <65 w/ immunocompromised) Done  Zoster (>61yo) Done Zostavax 2015  Covid19: done. Has had 4th dose      Exercise: walking;    Diet: low cholesterol diet      Review of Systems   Constitutional: Negative for chills and fever.   HENT: Negative for congestion and sore throat.    Eyes: Negative for pain and visual disturbance.   Respiratory: Negative for cough and shortness of breath.    Cardiovascular: Negative for chest pain and leg swelling.   Gastrointestinal: Negative for  abdominal pain, nausea and vomiting.   Endocrine: Negative for polydipsia and polyuria.   Genitourinary: Negative for difficulty urinating and dysuria.   Musculoskeletal: Negative for arthralgias and back pain.   Skin: Negative for rash and wound.   Neurological: Negative for dizziness, weakness and headaches.   Psychiatric/Behavioral: Negative for agitation and confusion.     Past medical history, surgical history, and family medical history reviewed and updated as appropriate.    Medications and allergies reviewed.     Objective:     There were no vitals filed for this visit.  There is no height or weight on file to calculate BMI.  Physical Exam  Vitals reviewed.   Constitutional:       General: She is not in acute distress.     Appearance: Normal appearance. She is well-developed.   HENT:      Head: Normocephalic and atraumatic.      Right Ear: Tympanic membrane normal.      Left Ear: Tympanic membrane normal.      Mouth/Throat:      Mouth: Mucous membranes are moist.      Pharynx: No oropharyngeal exudate.   Eyes:      General:         Right eye: No discharge.         Left eye: No discharge.      Conjunctiva/sclera: Conjunctivae normal.   Cardiovascular:      Rate and Rhythm: Normal rate and regular rhythm.      Heart sounds: No murmur heard.    No friction rub.   Pulmonary:      Effort: Pulmonary effort is normal.      Breath sounds: Normal breath sounds.   Abdominal:      General: Bowel sounds are normal. There is no distension.      Palpations: Abdomen is soft.      Tenderness: There is no abdominal tenderness. There is no guarding.   Musculoskeletal:         General: Normal range of motion.      Cervical back: Normal range of motion and neck supple.      Right lower leg: No edema.      Left lower leg: No edema.   Lymphadenopathy:      Cervical: No cervical adenopathy.   Skin:     General: Skin is warm and dry.   Neurological:      Mental Status: She is alert and oriented to person, place, and time.    Psychiatric:         Mood and Affect: Mood normal.         Behavior: Behavior normal.         Lab Results   Component Value Date    WBC 4.96 03/18/2022    HGB 12.7 03/18/2022    HCT 39.9 03/18/2022     03/18/2022    CHOL 206 (H) 03/18/2022    TRIG 101 03/18/2022    HDL 73 03/18/2022    ALT 31 03/18/2022    AST 28 03/18/2022     03/18/2022    K 3.4 (L) 03/18/2022     03/18/2022    CREATININE 1.0 03/18/2022    BUN 17 03/18/2022    CO2 31 (H) 03/18/2022    TSH 2.284 03/18/2022    HGBA1C 5.8 11/18/2015       Assessment:     1. Encounter for preventive health examination    2. Essential hypertension    3. Mixed hyperlipidemia    4. Malignant neoplasm of lower-inner quadrant of left breast in female, estrogen receptor positive    5. Obesity (BMI 30.0-34.9)    6. Depression, unspecified depression type    7. Osteopenia, unspecified location    8. Stage 3a chronic kidney disease    9. Asymptomatic bilateral carotid artery stenosis    10. History of colon polyps      Plan:   Dana was seen today for annual exam.    Diagnoses and all orders for this visit:    Encounter for preventive health examination  - labs reviewed with pt     Essential hypertension  - bp stable. Continue chlorthalidone. Pt prefers to get otc potassium supplement and eat foods rich in potassium    Mixed hyperlipidemia  - Stable. Continue home meds     Malignant neoplasm of lower-inner quadrant of left breast in female, estrogen receptor positive  - continue f/u with heme-onc     Obesity (BMI 30.0-34.9)  - pt aware of following healthy diet and exercise    Depression, unspecified depression type  - Stable. Continue home meds     Osteopenia, unspecified location  - Stable. Continue home meds     Stage 3a chronic kidney disease  - Stable. Continue to monitor     Asymptomatic bilateral carotid artery stenosis  -     CV Ultrasound Bilateral Doppler Carotid; Future    History of colon polyps     rtc 1 year and prn     Mariam Johnson,  MD  Internal Medicine    4/18/2022

## 2022-04-22 ENCOUNTER — HOSPITAL ENCOUNTER (OUTPATIENT)
Dept: CARDIOLOGY | Facility: HOSPITAL | Age: 72
Discharge: HOME | End: 2022-04-22
Attending: HOSPITALIST
Payer: MEDICARE

## 2022-04-22 DIAGNOSIS — I65.23 ASYMPTOMATIC BILATERAL CAROTID ARTERY STENOSIS: ICD-10-CM

## 2022-04-22 LAB
LEFT ARM DIASTOLIC BLOOD PRESSURE: 80 MMHG
LEFT ARM SYSTOLIC BLOOD PRESSURE: 160 MMHG
LEFT CBA DIAS: 16 CM/S
LEFT CBA SYS: 61 CM/S
LEFT CCA DIST DIAS: 17 CM/S
LEFT CCA DIST SYS: 64 CM/S
LEFT CCA MID DIAS: 17 CM/S
LEFT CCA MID SYS: 67 CM/S
LEFT CCA PROX DIAS: 13 CM/S
LEFT CCA PROX SYS: 76 CM/S
LEFT ECA DIAS: 12 CM/S
LEFT ECA SYS: 102 CM/S
LEFT ICA DIST DIAS: 25 CM/S
LEFT ICA DIST SYS: 80 CM/S
LEFT ICA MID DIAS: 22 CM/S
LEFT ICA MID SYS: 73 CM/S
LEFT ICA PROX DIAS: 15 CM/S
LEFT ICA PROX SYS: 50 CM/S
LEFT VERTEBRAL DIAS: 11 CM/S
LEFT VERTEBRAL SYS: 52 CM/S
OHS CV CAROTID RIGHT ICA EDV HIGHEST: 21
OHS CV CAROTID ULTRASOUND LEFT ICA/CCA RATIO: 1.25
OHS CV CAROTID ULTRASOUND RIGHT ICA/CCA RATIO: 1.69
OHS CV PV CAROTID LEFT HIGHEST CCA: 76
OHS CV PV CAROTID LEFT HIGHEST ICA: 80
OHS CV PV CAROTID RIGHT HIGHEST CCA: 75
OHS CV PV CAROTID RIGHT HIGHEST ICA: 105
OHS CV US CAROTID LEFT HIGHEST EDV: 25
RIGHT ARM DIASTOLIC BLOOD PRESSURE: 80 MMHG
RIGHT ARM SYSTOLIC BLOOD PRESSURE: 160 MMHG
RIGHT CBA DIAS: 4 CM/S
RIGHT CBA SYS: 70 CM/S
RIGHT CCA DIST DIAS: 15 CM/S
RIGHT CCA DIST SYS: 62 CM/S
RIGHT CCA MID DIAS: 13 CM/S
RIGHT CCA MID SYS: 55 CM/S
RIGHT CCA PROX DIAS: 16 CM/S
RIGHT CCA PROX SYS: 75 CM/S
RIGHT ECA DIAS: 10 CM/S
RIGHT ECA SYS: 81 CM/S
RIGHT ICA DIST DIAS: 21 CM/S
RIGHT ICA DIST SYS: 105 CM/S
RIGHT ICA MID DIAS: 10 CM/S
RIGHT ICA MID SYS: 62 CM/S
RIGHT ICA PROX DIAS: 11 CM/S
RIGHT ICA PROX SYS: 48 CM/S
RIGHT VERTEBRAL DIAS: 16 CM/S
RIGHT VERTEBRAL SYS: 71 CM/S

## 2022-04-22 PROCEDURE — 93880 EXTRACRANIAL BILAT STUDY: CPT | Mod: 26,,, | Performed by: INTERNAL MEDICINE

## 2022-04-22 PROCEDURE — 93880 EXTRACRANIAL BILAT STUDY: CPT

## 2022-04-22 PROCEDURE — 93880 CV US DOPPLER CAROTID (CUPID ONLY): ICD-10-PCS | Mod: 26,,, | Performed by: INTERNAL MEDICINE

## 2022-05-03 ENCOUNTER — OFFICE VISIT (OUTPATIENT)
Dept: SURGERY | Facility: CLINIC | Age: 72
End: 2022-05-03
Payer: MEDICARE

## 2022-05-03 VITALS
DIASTOLIC BLOOD PRESSURE: 74 MMHG | WEIGHT: 180 LBS | BODY MASS INDEX: 29.99 KG/M2 | HEIGHT: 65 IN | SYSTOLIC BLOOD PRESSURE: 159 MMHG | HEART RATE: 68 BPM

## 2022-05-03 DIAGNOSIS — Z12.31 ENCOUNTER FOR SCREENING MAMMOGRAM FOR BREAST CANCER: ICD-10-CM

## 2022-05-03 DIAGNOSIS — Z98.890 S/P LUMPECTOMY, LEFT BREAST: ICD-10-CM

## 2022-05-03 DIAGNOSIS — Z85.3 PERSONAL HISTORY OF BREAST CANCER: Primary | ICD-10-CM

## 2022-05-03 PROCEDURE — 99214 OFFICE O/P EST MOD 30 MIN: CPT | Mod: PBBFAC | Performed by: PHYSICIAN ASSISTANT

## 2022-05-03 PROCEDURE — 99999 PR PBB SHADOW E&M-EST. PATIENT-LVL IV: CPT | Mod: PBBFAC,,, | Performed by: PHYSICIAN ASSISTANT

## 2022-05-03 PROCEDURE — 99999 PR PBB SHADOW E&M-EST. PATIENT-LVL IV: ICD-10-PCS | Mod: PBBFAC,,, | Performed by: PHYSICIAN ASSISTANT

## 2022-05-03 PROCEDURE — 99213 OFFICE O/P EST LOW 20 MIN: CPT | Mod: S$PBB,,, | Performed by: PHYSICIAN ASSISTANT

## 2022-05-03 PROCEDURE — 99213 PR OFFICE/OUTPT VISIT, EST, LEVL III, 20-29 MIN: ICD-10-PCS | Mod: S$PBB,,, | Performed by: PHYSICIAN ASSISTANT

## 2022-05-03 NOTE — PROGRESS NOTES
[facility]  Department of Surgery  05/03/2022    REFERRING PROVIDER:  Mariam Johnson MD  CHIEF COMPLAINT:   Chief Complaint   Patient presents with    Follow-up     6 mo f/u     DIAGNOSIS:   This is a 71 y.o. female with a history of stage T1cN0, grade 2, ER +, [place] +, HER2 - ILC of the left breast.    TREATMENT:   1. S/p left lumpectomy with L sentinel node biopsy on 2/2/2022. Dr. Juaquin M.D. Surgical Oncology. Bilateral oncoplastic reduction with Dr. Izaguirre.   2. XRT completed with Dr. Max M.D. Radiation Oncology   4. On Letrozole with Dr. Dinh M.D. Medical Oncology     HISTORY OF PRESENT ILLNESS:   Dana Cook is a 71 y.o. female comes in for oncological follow up.  Patient notes she is still not happy with the final result of her oncoplastic reduction, but does not desire further surgical revision. Patient also states she has occasional pain and itching, but that these are both mild. Otherwise she denies other breast complaints such as  new masses, skin or nipple changes, or nipple discharge. Past medical and surgical history is updated without new changes. There have been no changes to family history. The patient denies constitutional symptoms of night sweats, chills, weight loss, new headaches, visual changes, new back or bony pain, chest pain, or shortness of breath.      Review of Systems: See HPI/Interval History for other systems reviewed.     IMAGING:     none     MEDICATIONS/ALLERGIES:     Current Outpatient Medications   Medication Sig    acetaminophen (TYLENOL) 500 MG tablet Take 2 tablets (1,000 mg total) by mouth every 8 (eight) hours.    alendronate (FOSAMAX) 70 MG tablet TAKE 1 TABLET BY MOUTH EVERY 7 DAYS    ascorbic acid, vitamin C, (VITAMIN C) 1000 MG tablet     atorvastatin (LIPITOR) 10 MG tablet TAKE 1 TABLET(10 MG) BY MOUTH EVERY DAY    BIOTIN ORAL Take by mouth once daily.    buPROPion (WELLBUTRIN XL) 300 MG 24 hr tablet TAKE 1 TABLET(300 MG) BY MOUTH EVERY  "DAY    CALCIUM-VITAMIN D3 ORAL Take by mouth. 600 mg - 20 mcg. Take 1 cap oral twice a day    chlorthalidone (HYGROTEN) 25 MG Tab TAKE 1 TABLET BY MOUTH ONCE DAILY    colchicine (COLCRYS) 0.6 mg tablet On 1st day of gout flare take 1 tablet p.o t.i.d; From day 2 onwards, take 1 tablet p.o. b.i.d. until flare resolves.    fluocinonide (LIDEX) 0.05 % external solution USE ONCE TO BID ON SCALP FOR RASH OR IRRITATION    hydrOXYzine HCl (ATARAX) 10 MG Tab 10 mg as needed.    letrozole (FEMARA) 2.5 mg Tab Take 2.5 mg by mouth once daily.    lidocaine (LIDODERM) 5 % Place 1 patch onto the skin once daily. Remove & Discard patch within 12 hours. Do not use more than 1 patch in 24 hours.    oxyCODONE (ROXICODONE) 5 MG immediate release tablet Take 1 tablet (5 mg total) by mouth every 4 (four) hours as needed for Pain.    prenatal vit calc,iron,folic (PRENATAL VITAMIN ORAL) Take by mouth once daily.    RED YEAST RICE ORAL Take by mouth once daily.    SUBHASH'S WORT/S.GINSGR (SUBHASH'S WORT-SIBER.GINSENG) 450-90 mg Tab     TURMERIC ORAL Take by mouth once daily.    UNABLE TO FIND Hemp seed oil    VALERIAN ROOT ORAL Take by mouth 2 (two) times daily as needed.     No current facility-administered medications for this visit.      Review of patient's allergies indicates:   Allergen Reactions    Adhesive      Surgical tape/ burned sckin    Percocet [oxycodone-acetaminophen] Nausea Only     Does not know what else she can take; can take tylenol  Pt states she is not allergic to percocet, just upsets her stomach.  States she has shoulder surgery here and has probably had dilaudid. No real allergy         PHYSICAL EXAM:   BP (!) 159/74 (BP Location: Left arm, Patient Position: Sitting, BP Method: Medium (Automatic))   Pulse 68   Ht 5' 5" (1.651 m)   Wt 81.6 kg (180 lb)   BMI 29.95 kg/m²     Physical Exam   Vitals reviewed.  Constitutional: She is oriented to person, place, and time. She appears well-developed " and well-nourished. No distress.   HENT:   Head: Normocephalic and atraumatic.   Eyes: Pupils are equal, round, and reactive to light.   Pulmonary/Chest: Effort normal and breath sounds normal. No respiratory distress. She exhibits no mass, no tenderness and no edema. Right breast exhibits no inverted nipple, no mass, no nipple discharge, no skin change and no tenderness. Left breast exhibits no inverted nipple, no mass, no nipple discharge, no skin change and no tenderness. Breasts are asymmetrical.       Abdominal: Soft.   Neurological: She is alert and oriented to person, place, and time.   Skin: Skin is warm and dry. She is not diaphoretic.     Psychiatric: She has a normal mood and affect. Her behavior is normal.       ASSESSMENT:   This is a 71 y.o. female without evidence of recurrence by exam, history or imaging.       PLAN:   1. Continue to follow up with Dr. Tao in Medical Oncology  2. Continue monthly self breast exams and call the clinic with any changes or problems.  3. Advised patient to trial massaging Vitamin E over palpable fat necrosis and to also use the oil on the skin that is excoriated as the itchiness may simply be dry skin.   4. Mammogram due in March 2023.   5. Return to clinic in 1 year with imaging.     The patient is in agreement with the plan. Questions were encouraged and answered to patient's satisfaction. Dana will call our office with any questions or concerns.     Valentine Dominguez PA-C  Breast Surgery

## 2022-06-10 ENCOUNTER — PATIENT MESSAGE (OUTPATIENT)
Dept: HEMATOLOGY/ONCOLOGY | Facility: CLINIC | Age: 72
End: 2022-06-10
Payer: MEDICARE

## 2022-06-20 DIAGNOSIS — Z17.0 MALIGNANT NEOPLASM OF LOWER-INNER QUADRANT OF LEFT BREAST IN FEMALE, ESTROGEN RECEPTOR POSITIVE: Primary | ICD-10-CM

## 2022-06-20 DIAGNOSIS — C50.312 MALIGNANT NEOPLASM OF LOWER-INNER QUADRANT OF LEFT BREAST IN FEMALE, ESTROGEN RECEPTOR POSITIVE: Primary | ICD-10-CM

## 2022-06-20 RX ORDER — LETROZOLE 2.5 MG/1
TABLET, FILM COATED ORAL
Qty: 30 TABLET | Refills: 11 | Status: SHIPPED | OUTPATIENT
Start: 2022-06-20 | End: 2023-06-30 | Stop reason: SDUPTHER

## 2022-08-03 ENCOUNTER — PATIENT MESSAGE (OUTPATIENT)
Dept: HEMATOLOGY/ONCOLOGY | Facility: CLINIC | Age: 72
End: 2022-08-03

## 2022-08-03 ENCOUNTER — OFFICE VISIT (OUTPATIENT)
Dept: HEMATOLOGY/ONCOLOGY | Facility: CLINIC | Age: 72
End: 2022-08-03
Payer: MEDICARE

## 2022-08-03 ENCOUNTER — PATIENT MESSAGE (OUTPATIENT)
Dept: HEMATOLOGY/ONCOLOGY | Facility: CLINIC | Age: 72
End: 2022-08-03
Payer: MEDICARE

## 2022-08-03 DIAGNOSIS — Z71.83 ENCOUNTER FOR NONPROCREATIVE GENETIC COUNSELING: Primary | ICD-10-CM

## 2022-08-03 DIAGNOSIS — Z15.09 MONOALLELIC MUTATION OF ATM GENE: ICD-10-CM

## 2022-08-03 DIAGNOSIS — Z15.01 MONOALLELIC MUTATION OF ATM GENE: ICD-10-CM

## 2022-08-03 DIAGNOSIS — Z80.8 FAMILY HISTORY OF SKIN CANCER: ICD-10-CM

## 2022-08-03 DIAGNOSIS — Z15.89 MONOALLELIC MUTATION OF ATM GENE: ICD-10-CM

## 2022-08-03 DIAGNOSIS — Z85.3 PERSONAL HISTORY OF BREAST CANCER: ICD-10-CM

## 2022-08-03 PROCEDURE — 99215 PR OFFICE/OUTPT VISIT, EST, LEVL V, 40-54 MIN: ICD-10-PCS | Mod: S$PBB,,, | Performed by: NURSE PRACTITIONER

## 2022-08-03 PROCEDURE — 99215 OFFICE O/P EST HI 40 MIN: CPT | Mod: S$PBB,,, | Performed by: NURSE PRACTITIONER

## 2022-08-03 PROCEDURE — 99999 PR PBB SHADOW E&M-EST. PATIENT-LVL III: ICD-10-PCS | Mod: PBBFAC,,, | Performed by: NURSE PRACTITIONER

## 2022-08-03 PROCEDURE — 99213 OFFICE O/P EST LOW 20 MIN: CPT | Mod: PBBFAC | Performed by: NURSE PRACTITIONER

## 2022-08-03 PROCEDURE — 99999 PR PBB SHADOW E&M-EST. PATIENT-LVL III: CPT | Mod: PBBFAC,,, | Performed by: NURSE PRACTITIONER

## 2022-08-03 NOTE — PROGRESS NOTES
"Cancer Genetics  Hereditary and High-Risk Clinic  Department of Hematology and Oncology  [facility]    Date of Service:  8/3/22  Visit Provider:  Cristian Frank DNP  Collaborating Physician:  Tricia Chase MD    Patient Identification  Name: Dana Cook  : 1950  MRN: [id number]     SUBJECTIVE      Chief Complaint: Genetic Evaluation    History of Present Illness (HPI):  Dana Cook ("Dana"), 71 y.o., assigned female sex at birth, is an established patient of mine who returns today for CHACHO mutation check-in.  She initially presented on 06/15/2021 for CHACHO mutation genetic counseling.    Focused Medical History   Germline cancer genetic testing:  Yes, once  o            Cancer:  Yes  o Left breast invasive lobular carcinoma of the lower-inner quadrant, ER+WA+HER(-), no sentinel lymph node involvement identified, diagnosed at age 70  - Treatment:   Partial mastectomy   Adjuvant XRT     Colon polyp:  Yes  o Started colonoscopies at age 50 and had roughly 2 or 3 polyps prior to  -per patient  o 2004 (age 53):  - No polyps  - "Personal history of colonic polyps" indicated on the report  o 2009 (age 58):  - 3-mm, sessile, hyperplastic polyp in the sigmoid colon  o Most recent was about 1 year ago, without polyps -per patient     Other benign tumor/mass:  Yes  o Atypical lobular hyperplasia (ALH) of the left breast in the 3 o'clock region (2021)  o Radial scar, benign papillary proliferation, usual ductal hyperplasia of the right central breast, and duct ectasia and adenosis (2021)     Pancreatitis:  No    NF1-Specific History   Neurofibromas:  No   Café-au-lait spots:  No   Lisch nodule(s) of the eyes:  No   Hypertension:  Yes   Short stature:  No - Height is 5'5.75"   Macrocephaly:  No    Skeletal abnormality such as scoliosis:  No (Dupuytren's contracture only)   Attention-deficit/hyperactivity disorder (ADD/ADHD):  No    Focused Surgical " "History  []  Reproductive organs intact  [x]  S/p hysterectomy with cervicectomy  []  Ovaries intact  [x]  S/p salpingectomy-oophorectomy:  [x]Bilateral     []Right     []Left  [x]  Breasts intact  []  S/p mastectomy:  []Bilateral     []Right     []Left    Family Oncologic History  ** The pedigree below was placed into this note in a size that produced a legible font.  If it is appearing small/illegible on your screen, expand this note window horizontally. **       Hereditary cancer genetic testing:  Yes  o Sister with cancer:  "Predisposed to colon cancer"  o First cousin Kelly    Review of Systems   See HPI.     Dana offered to me today that she does not feel as "sharp" as she did prior to breast cancer treatment, and I discussed this with Dr. Tao while the patient was in clinic with me, and Dr. Tao expressed that it is possible that it's due to the letrozole and that the patient could see Neuro; the patient expressed relief that it could be due to the letrozole, does not wish to pursue a Neuro referral, and was advised to speak further about this issue with Dr. Tao at her upcoming (to be scheduled) 08/2022 appointment with him.     OBJECTIVE      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/19/2021    left    Breast cancer 03/19/2021    left    Colon polyps     Depression     Dry eyes     Dry mouth     Dupuytren's contracture     Hypertension     left arm fracture     hairline    Murmur, heart     Osteopenia     PONV (postoperative nausea and vomiting)     with wisdom teeth    Sarcoidosis      Patient Active Problem List    Diagnosis Date Noted    Monoallelic mutation of CHACHO gene 11/22/2021    Status post robot-assisted total hysterectomy and bilateral salpingo-oophorectomy (BSO) 11/22/2021    Asymptomatic bilateral carotid artery stenosis 11/16/2021    Stage 3a chronic kidney disease 10/01/2021    Malignant neoplasm of lower-inner quadrant of " left breast in female, estrogen receptor positive 04/15/2021    Essential hypertension 02/26/2020    Mixed hyperlipidemia 02/09/2017    Osteopenia     Depression     History of colon polyps     Sarcoidosis       Physical Exam  Very pleasant patient.  Unaccompanied.  Vitals signs:  Reviewed:  Vitals:    08/03/22 0900   PainSc: 0-No pain   Distress score:  Reviewed: (0/10).  Constitutional      Appearance:  She appears well developed and well nourished. No distress.   Pulmonary     Effort:  Pulmonary effort is normal.   Neurological     Mental Status:  She is alert and oriented.     Coordination:  Coordination is normal.   Psychiatric         Mood and Affect:  She has a normal mood and affect.     Thought Content:  Thought content is normal.         Speech:  Speech is normal.     Behavior:  Behavior is normal.     Judgment:  Judgment is normal.     ASSESSMENT/PLAN      Dana, 71 y.o., presented today for CHACHO mutation check-in.    CHACHO Mutation-Associated Risk Management    · Female breast cancer  1. Breast awareness, including periodic (monthly is generally an appropriate interval) breast self-exam (BSE), with prompt reporting to the breast specialist any changes or concerns.   2. Clinical breast exam (CBE), every 6 months.  Most recent CBE was on 05/03/2022 with ETELVINA Cruz, with [facility], and was without reported evidence of disease.  Next CBE due around 11/03/2022; patient is scheduled with her gynecologist Dr. Tere Huynh on 10/06/2022.  3. Annual mammogram (MMG).  Most recent mammogram was on 11/17/2021, with results reported as BI-RADS 2 (Benign).  Next MMG due on 11/18/2022 and has been ordered by ETELVINA Dominguez.  4. Annual breast MRI.  Most recent breast MRI was on 04/01/2021.  Patient is not interested in pursuing breast MRI at this time.  5. There are no data presently on the benefit of risk-reducing mastectomy (RRM) for CHACHO mutation carriers; however, RRM can be considered based on  family history.  Patient is not interested in pursuing RRM at this time.  6. Dana is currently taking letrozole.  · Pancreatic cancer  1. If there is a first- or second-degree relative on the same side of the family as the CHACHO mutation, consider pancreatic cancer screening, which would typically consist of annual MRI/MRCP [magnetic resonance cholangiopancreatography] and/or endoscopic ultrasound (EUS).  Patient met with gastroenterologist Dr. Re Rojas and subsequently underwent an upper EUS on 02/02/2022 which was without reported evidence of significant pancreatic pathology; at that time, the performing provider, pancreas specialist Dr. Pancho Ty, advised patient to undergo an MRI one year thereafter for screening.  · Colorectal cancer -- Estimated absolute risk of between 5% and 10%  1. Most recent colonoscopy was about 1 year ago and was without polyps, per patient.  · Ovarian, fallopian tube, and primary peritoneal cancers  1. Be aware that being status post-risk-reducing salpingectomy-oophorectomy (RRSO) has significantly reduced this cancer risk; however, there is still a small chance of developing a primary peritoneal cancer.  Advised patient to be mindful and report to her healthcare provider any abdominal or pelvic symptom or unexplained weight loss or gain.    Potential for Additional/Updated Cancer Genetics     Additional genes can be tested now or moving forward for Dana given her family cancer history and additional genes associated with family cancers available for testing.  She is not interested in pursuing such now and knows to reach out before her next visit with me if she does desire to pursue such testing but otherwise we will plan to re-discuss in 1 year.         Encounter Diagnoses:    1. Encounter for nonprocreative genetic counseling  - As above.    2. Monoallelic mutation of CHACHO gene  - As above.    3. Personal history of breast cancer  - Continue care with oncology team.    4.  Family history of skin cancer  - Patient plans to follow up with her dermatology provider Dr. Peres.          Follow-up:  Follow up in 1 year for CHACHO check-in and, if the patient does not reach out sooner to test, consideration of updated cancer genetic testing.        Questions were encouraged and answered to the patient's satisfaction, and she verbalized understanding of information and agreement with the plan.           Approximately 55 minutes were spent face-to-face with the patient.  Approximately 122 minutes in total were spent on this encounter, which includes face-to-face time and non-face-to-face time preparing to see the patient (e.g., review of tests), obtaining and/or reviewing separately obtained history, documenting clinical information in the electronic or other health record, independently interpreting results (not separately reported) and communicating results to the patient/family/caregiver, or care coordination (not separately reported).     Cristian Frank, DNP, APRN, FNP-BC, AOCNP  Nurse Practitioner, Hereditary and High-Risk Clinic  Department of Hematology and Oncology  [facility]

## 2022-08-04 ENCOUNTER — TELEPHONE (OUTPATIENT)
Dept: HEMATOLOGY/ONCOLOGY | Facility: CLINIC | Age: 72
End: 2022-08-04
Payer: MEDICARE

## 2022-08-11 ENCOUNTER — PATIENT MESSAGE (OUTPATIENT)
Dept: INTERNAL MEDICINE | Facility: CLINIC | Age: 72
End: 2022-08-11
Payer: MEDICARE

## 2022-08-16 ENCOUNTER — OFFICE VISIT (OUTPATIENT)
Dept: HEMATOLOGY/ONCOLOGY | Facility: CLINIC | Age: 72
End: 2022-08-16
Payer: MEDICARE

## 2022-08-16 VITALS
HEART RATE: 55 BPM | HEIGHT: 65 IN | SYSTOLIC BLOOD PRESSURE: 146 MMHG | BODY MASS INDEX: 29.49 KG/M2 | DIASTOLIC BLOOD PRESSURE: 70 MMHG | WEIGHT: 177 LBS | TEMPERATURE: 98 F | OXYGEN SATURATION: 96 % | RESPIRATION RATE: 20 BRPM

## 2022-08-16 DIAGNOSIS — C50.312 MALIGNANT NEOPLASM OF LOWER-INNER QUADRANT OF LEFT BREAST IN FEMALE, ESTROGEN RECEPTOR POSITIVE: Primary | ICD-10-CM

## 2022-08-16 DIAGNOSIS — Z17.0 MALIGNANT NEOPLASM OF LOWER-INNER QUADRANT OF LEFT BREAST IN FEMALE, ESTROGEN RECEPTOR POSITIVE: Primary | ICD-10-CM

## 2022-08-16 PROCEDURE — 99999 PR PBB SHADOW E&M-EST. PATIENT-LVL V: CPT | Mod: PBBFAC,,, | Performed by: INTERNAL MEDICINE

## 2022-08-16 PROCEDURE — 99213 PR OFFICE/OUTPT VISIT, EST, LEVL III, 20-29 MIN: ICD-10-PCS | Mod: S$PBB,,, | Performed by: INTERNAL MEDICINE

## 2022-08-16 PROCEDURE — 99213 OFFICE O/P EST LOW 20 MIN: CPT | Mod: S$PBB,,, | Performed by: INTERNAL MEDICINE

## 2022-08-16 PROCEDURE — 99215 OFFICE O/P EST HI 40 MIN: CPT | Mod: PBBFAC | Performed by: INTERNAL MEDICINE

## 2022-08-16 PROCEDURE — 99999 PR PBB SHADOW E&M-EST. PATIENT-LVL V: ICD-10-PCS | Mod: PBBFAC,,, | Performed by: INTERNAL MEDICINE

## 2022-08-16 NOTE — PROGRESS NOTES
Subjective:       Patient ID: Dana Cook is a 71 y.o. female.    Chief Complaint: No chief complaint on file.    HPI 71-year-old female seen for left breast cancer.  She is on Letrozole.    Her major issue is cognitive decline since radiation - short term memory is off - causes her issues with activities.      History:  Screening mammogram on March 4, 2021 showed a focal asymmetry in the lower inner quadrant of the left breast.    Follow-up diagnostic mammogram and ultrasound on March 8, 2021 showed a focal asymmetry measuring 40 mm in the lower inner quadrant left breast.  Ultrasound showed a 5 x 5 x 3 mm hypoechoic mass at 3:00 a.m. position with seen in the lower inner quadrant area.    On March 19, 2021 biopsy of 2 areas in the left breast was performed.  The lower inner quadrant mass showed infiltrating lobular carcinoma which was histologic grade 3, nuclear grade 2, mitotic index 1.  Tumor cells were 95% ER positive in 95% RI positive.  HER2 was 2+ but negative by fish with a ratio 1.75, Ki-67  Biopsy 3:00 a.m. mass showed atypical ductal hyperplasia    MRI on April 1, 2021 showed multiple findings.  There was a 22 x 21 a 60 mm irregular mass in left breast lower inner quadrant, 8 mm to 9 is enhancement lymph breast at 3:00 a.m., 6 x 6 x 5 mm mass in the left breast lower outer quadrant and in the right breast in 19 mass 7 mm area of non-mass enhancement centrally.    On April 13, 2021 biopsy of the right breast non-mass enhancement in the left breast lower outer quadrant mass seen on MRI was performed.  Both of those biopsies were negative showing no atypia or malignancy.    On April 30, 2021 left breast lumpectomy and sentinel lymph node biopsy was performed.  Left breast showed a do by 1.7 x 1.2 cm infiltrating lobular carcinoma.  The sentinel lymph node was negative.  Right breast excisional biopsy was negative at that time as well.  Final pathological stage T1c N0 stage I A.    Oncotype score showed  low risk with a score 16.     [facility] genetics - CHACHO mutation    Radiation completed 8/3/21.    Letrozole started after radiation.    Mammogram in March 2022 - negative.      PMH: HBP, sarcoid, renal damage from NSAIDs  Review of Systems   Constitutional: Negative.    HENT: Negative.    Respiratory: Negative.    Gastrointestinal: Negative.    Genitourinary: Negative.    Musculoskeletal: Negative.    Neurological: Negative.  Positive for memory loss.   Psychiatric/Behavioral: Positive for decreased concentration and dysphoric mood.         Objective:      Physical Exam  Vitals reviewed.   Constitutional:       General: She is not in acute distress.     Appearance: Normal appearance.   Eyes:      General: No scleral icterus.  Cardiovascular:      Rate and Rhythm: Normal rate and regular rhythm.   Pulmonary:      Effort: Pulmonary effort is normal. No respiratory distress.      Breath sounds: Normal breath sounds. No rales.   Chest:   Breasts:      Right: No mass, nipple discharge, axillary adenopathy or supraclavicular adenopathy.      Left: No mass, nipple discharge, axillary adenopathy or supraclavicular adenopathy.         Abdominal:      Palpations: Abdomen is soft. There is no mass.      Tenderness: There is no abdominal tenderness.   Lymphadenopathy:      Cervical: No cervical adenopathy.      Upper Body:      Right upper body: No supraclavicular or axillary adenopathy.      Left upper body: No supraclavicular or axillary adenopathy.   Skin:     Findings: No rash.   Neurological:      Mental Status: She is oriented to person, place, and time.   Psychiatric:         Mood and Affect: Mood normal.         Behavior: Behavior normal.         Thought Content: Thought content normal.         Judgment: Judgment normal.         Assessment:       1. Malignant neoplasm of lower-inner quadrant of left breast in female, estrogen receptor positive        Plan:       Hold letrozole and message us in 2 weeks.Consider change in  med at that time.  See me in 3 M          Route Chart for Scheduling    Med Onc Chart Routing      Follow up with physician 3 months.   Follow up with [facility]    Infusion scheduling note    Injection scheduling note    Labs    Imaging    Pharmacy appointment    Other referrals

## 2022-08-18 RX ORDER — ATORVASTATIN CALCIUM 10 MG/1
TABLET, FILM COATED ORAL
Qty: 90 TABLET | Refills: 3 | Status: SHIPPED | OUTPATIENT
Start: 2022-08-18 | End: 2023-08-23 | Stop reason: SDUPTHER

## 2022-08-18 NOTE — TELEPHONE ENCOUNTER
Refill Routing Note   Medication(s) are not appropriate for processing by [facility] for the following reason(s):      - Duplicate Therapy: atorvastatin, RED YEAST RICE ORAL    [facility] action(s):  Defer          Medication reconciliation completed: No     Appointments  past 12m or future 3m with PCP    Date Provider   Last Visit   4/18/2022 Mariam Johnson MD   Next Visit   Visit date not found Mariam Johnson MD   ED visits in past 90 days: 0        Note composed:6:56 AM 08/18/2022

## 2022-08-18 NOTE — TELEPHONE ENCOUNTER
No new care gaps identified.  [facility] Embedded Care Gaps. Reference number: [id number]. 8/18/2022   3:32:51 AM OCHOAT

## 2022-09-02 ENCOUNTER — PATIENT MESSAGE (OUTPATIENT)
Dept: HEMATOLOGY/ONCOLOGY | Facility: CLINIC | Age: 72
End: 2022-09-02
Payer: MEDICARE

## 2022-09-06 ENCOUNTER — PATIENT MESSAGE (OUTPATIENT)
Dept: HEMATOLOGY/ONCOLOGY | Facility: CLINIC | Age: 72
End: 2022-09-06
Payer: MEDICARE

## 2022-10-06 ENCOUNTER — OFFICE VISIT (OUTPATIENT)
Dept: OBSTETRICS AND GYNECOLOGY | Facility: CLINIC | Age: 72
End: 2022-10-06
Payer: MEDICARE

## 2022-10-06 VITALS
HEIGHT: 65 IN | BODY MASS INDEX: 29.27 KG/M2 | SYSTOLIC BLOOD PRESSURE: 130 MMHG | DIASTOLIC BLOOD PRESSURE: 80 MMHG | WEIGHT: 175.69 LBS

## 2022-10-06 DIAGNOSIS — Z91.89 GYN EXAM FOR HIGH-RISK MEDICARE PATIENT: ICD-10-CM

## 2022-10-06 DIAGNOSIS — Z01.419 WELL WOMAN EXAM WITH ROUTINE GYNECOLOGICAL EXAM: Primary | ICD-10-CM

## 2022-10-06 PROCEDURE — 99214 OFFICE O/P EST MOD 30 MIN: CPT | Mod: PBBFAC | Performed by: OBSTETRICS & GYNECOLOGY

## 2022-10-06 PROCEDURE — 99999 PR PBB SHADOW E&M-EST. PATIENT-LVL IV: ICD-10-PCS | Mod: PBBFAC,,, | Performed by: OBSTETRICS & GYNECOLOGY

## 2022-10-06 PROCEDURE — G0101 PR CA SCREEN;PELVIC/BREAST EXAM: ICD-10-PCS | Mod: S$PBB,,, | Performed by: OBSTETRICS & GYNECOLOGY

## 2022-10-06 PROCEDURE — 99999 PR PBB SHADOW E&M-EST. PATIENT-LVL IV: CPT | Mod: PBBFAC,,, | Performed by: OBSTETRICS & GYNECOLOGY

## 2022-10-06 PROCEDURE — G0101 CA SCREEN;PELVIC/BREAST EXAM: HCPCS | Mod: S$PBB,,, | Performed by: OBSTETRICS & GYNECOLOGY

## 2022-10-06 PROCEDURE — G0101 CA SCREEN;PELVIC/BREAST EXAM: HCPCS | Mod: PBBFAC | Performed by: OBSTETRICS & GYNECOLOGY

## 2022-10-06 NOTE — PROGRESS NOTES
History & Physical  Gynecology      SUBJECTIVE:     Chief Complaint: Well Woman       History of Present Illness:  Annual Exam-Postmenopausal  Patient presents for annual exam. She recently had a partial bilateral mastectomy for breast cancer as well as a robotic TLH/BSO.  The patient has no complaints today. Patient denies post-menopausal vaginal bleeding. The patient is not sexually active. She is currently on letrozole. The patient participates in regular exercise: yes/physical therapy.  She does not smoke.     GYN screening history: RATLH/BSO  Mammogram history: bilateral partial mastectomy and breast reduction; mmg ordered by breast surgery  Colonoscopy history: 2 years ago per patient- due 5 years  Dexa history: 2019    FH:   Breast cancer: pat aunt x 4; sister; maternal aunt, personal history  Colon cancer: neg  Ovarian cancer: neg  Vaginal melanoma: aunt  Cervical cancer: pat aunt x 2  Uterine cancer: sister, maternal grandmother    Review of patient's allergies indicates:   Allergen Reactions    Adhesive      Surgical tape/ burned sckin    Percocet [oxycodone-acetaminophen] Nausea Only     Does not know what else she can take; can take tylenol  Pt states she is not allergic to percocet, just upsets her stomach.  States she has shoulder surgery here and has probably had dilaudid. No real allergy         Past Medical History:   Diagnosis Date    Anxiety     Atypical hyperplasia of breast 03/19/2021    left    Breast cancer 03/19/2021    left    Colon polyps     Depression     Dry eyes     Dry mouth     Dupuytren's contracture     Hypertension     left arm fracture     hairline    Murmur, heart     Osteopenia     PONV (postoperative nausea and vomiting)     with wisdom teeth    Sarcoidosis      Past Surgical History:   Procedure Laterality Date    BREAST BIOPSY Right 2016    RT axilla-sarcoidosis    BREAST BIOPSY Left 03/19/2021    cancer    BREAST BIOPSY Left 03/19/2021    [facility]    BREAST LUMPECTOMY Left 2021     ENDOSCOPIC ULTRASOUND OF UPPER GASTROINTESTINAL TRACT N/A 2022    Procedure: ULTRASOUND, UPPER GI TRACT, ENDOSCOPIC;  Surgeon: Pancho Ty MD;  Location: [facility];  Service: Endoscopy;  Laterality: N/A;    Laparoscopy for possible endometriosis   1985    MASTECTOMY WITH SENTINEL NODE BIOPSY AND AXILLARY LYMPH NODE DISSECTION Left 2021    Procedure: MASTECTOMY, WITH SENTINEL NODE BIOPSY AND AXILLARY LYMPHADENECTOMY;  Surgeon: Madyson Reza MD;  Location: [facility];  Service: Plastics;  Laterality: Left;    MASTECTOMY, PARTIAL Bilateral 2021    Procedure: MASTECTOMY, PARTIAL - BILATERAL PARTIAL MASTECTOMY WITH  radiological marker.;  Surgeon: Madyson Reza MD;  Location: [facility];  Service: Plastics;  Laterality: Bilateral;  LEFT BREAST X 3, RIGHT BREAST X 1 -    REDUCTION OF BOTH BREASTS Bilateral 2021    Procedure: MAMMOPLASTY, REDUCTION, BILATERAL;  Surgeon: Jason Meraz MD;  Location: [facility];  Service: Plastics;  Laterality: Bilateral;    SHOULDER SURGERY  2018    TOTAL REDUCTION MAMMOPLASTY Bilateral     WRIST SURGERY Left     Dupuytrens contracture    XI ROBOTIC HYSTERECTOMY, WITH SALPINGO-OOPHORECTOMY Bilateral 2021    Procedure: XI ROBOTIC HYSTERECTOMY,WITH SALPINGO-OOPHORECTOMY;  Surgeon: Modesta Roberts MD;  Location: [address];  Service: OB/GYN;  Laterality: Bilateral;     OB History          1    Para   1    Term   1            AB        Living   1         SAB        IAB        Ectopic        Multiple        Live Births   1               Family History   Problem Relation Age of Onset    Hypertension Mother     Other Mother         tumor    Cancer Father 68        malignant fibrous histocytoma of heart    Hypertension Father     Uterine cancer Sister 49    Breast cancer Sister 70    Diabetes Sister     Cancer Sister         skin BCC of face    Cancer Brother         skin melanoma    Uterine cancer Maternal Grandmother 86     Hypertension Maternal Grandmother     Tongue cancer Maternal Grandfather         (smoked)    Leukemia Paternal Grandmother     Mental illness Paternal Grandfather     Other Son         overweight    Breast cancer Maternal Aunt         late 60s    Heart failure Maternal Aunt         CHF    Breast cancer Paternal Aunt         dx 60s    Cervical cancer Paternal Aunt 77    Cancer Paternal Aunt         vaginal melanoma    Cervical cancer Paternal Aunt     Breast cancer Paternal Aunt         maybe    Breast cancer Other         dx 60s    Breast cancer Other 55    Pancreatic cancer Other     Cancer Other         origins?    Breast cancer Paternal Cousin 55        s/p genetic testing    Cancer Paternal Cousin         thyroid    Breast cancer Paternal Cousin     Multiple sclerosis Paternal Cousin     Colon cancer Neg Hx     Ovarian cancer Neg Hx     Anesthesia problems Neg Hx      Social History     Tobacco Use    Smoking status: Never    Smokeless tobacco: Never   Substance Use Topics    Alcohol use: Yes     Alcohol/week: 5.0 - 6.0 standard drinks     Types: 5 - 6 Standard drinks or equivalent per week     Comment: wine every night    Drug use: Never       Current Outpatient Medications   Medication Sig    acetaminophen (TYLENOL) 500 MG tablet Take 2 tablets (1,000 mg total) by mouth every 8 (eight) hours.    alendronate (FOSAMAX) 70 MG tablet TAKE 1 TABLET BY MOUTH EVERY 7 DAYS    ascorbic acid, vitamin C, (VITAMIN C) 1000 MG tablet     atorvastatin (LIPITOR) 10 MG tablet TAKE 1 TABLET(10 MG) BY MOUTH EVERY DAY    BIOTIN ORAL Take by mouth once daily.    buPROPion (WELLBUTRIN XL) 300 MG 24 hr tablet TAKE 1 TABLET(300 MG) BY MOUTH EVERY DAY    CALCIUM-VITAMIN D3 ORAL Take by mouth. 600 mg - 20 mcg. Take 1 cap oral twice a day    chlorthalidone (HYGROTEN) 25 MG Tab TAKE 1 TABLET BY MOUTH ONCE DAILY    colchicine (COLCRYS) 0.6 mg tablet On 1st day of gout flare take 1 tablet p.o t.i.d; From day 2 onwards, take 1 tablet p.o.  b.i.d. until flare resolves. (Patient not taking: No sig reported)    fluocinonide (LIDEX) 0.05 % external solution USE ONCE TO BID ON SCALP FOR RASH OR IRRITATION    hydrOXYzine HCl (ATARAX) 10 MG Tab 10 mg as needed.    letrozole (FEMARA) 2.5 mg Tab TAKE 1 TABLET(2.5 MG) BY MOUTH EVERY DAY    lidocaine (LIDODERM) 5 % Place 1 patch onto the skin once daily. Remove & Discard patch within 12 hours. Do not use more than 1 patch in 24 hours.    oxyCODONE (ROXICODONE) 5 MG immediate release tablet Take 1 tablet (5 mg total) by mouth every 4 (four) hours as needed for Pain.    prenatal vit calc,iron,folic (PRENATAL VITAMIN ORAL) Take by mouth once daily.    RED YEAST RICE ORAL Take by mouth once daily.    SUBHASH'S WORT/S.GINSGR (SUBHASH'S WORT-SIBER.GINSENG) 450-90 mg Tab     TURMERIC ORAL Take by mouth once daily.    UNABLE TO FIND Hemp seed oil    VALERIAN ROOT ORAL Take by mouth 2 (two) times daily as needed.     No current facility-administered medications for this visit.       Review of Systems:  Review of Systems   Constitutional:  Negative for activity change, appetite change and fever.   Respiratory:  Negative for shortness of breath.    Cardiovascular:  Negative for chest pain.   Gastrointestinal:  Negative for abdominal pain, constipation, diarrhea, nausea and vomiting.   Genitourinary:  Negative for pelvic pain, vaginal bleeding, vaginal discharge, vaginal pain and postmenopausal bleeding.   Integumentary:  Negative for nipple discharge.   Neurological:  Negative for numbness and headaches.   Breast: Negative for mastodynia and nipple discharge     OBJECTIVE:     Physical Exam:  Physical Exam  Vitals and nursing note reviewed.   Constitutional:       Appearance: She is well-developed.   Cardiovascular:      Rate and Rhythm: Normal rate and regular rhythm.      Heart sounds: Normal heart sounds.   Pulmonary:      Effort: Pulmonary effort is normal.      Breath sounds: Normal breath sounds.   Chest:    Breasts:     Breasts are symmetrical.      Right: No inverted nipple, mass, nipple discharge, skin change or tenderness.      Left: No inverted nipple, mass, nipple discharge, skin change or tenderness.   Abdominal:      Palpations: Abdomen is soft.   Genitourinary:     General: Normal vulva.      Labia:         Right: No rash, tenderness, lesion or injury.         Left: No rash, tenderness, lesion or injury.       Urethra: No prolapse, urethral pain, urethral swelling or urethral lesion.      Vagina: Normal. No signs of injury and foreign body. No vaginal discharge, erythema, tenderness or bleeding.      Cervix: No cervical motion tenderness, discharge or friability.      Uterus: Not deviated, not enlarged, not fixed and not tender.       Adnexa:         Right: No mass, tenderness or fullness.          Left: No mass, tenderness or fullness.        Rectum: No anal fissure or external hemorrhoid.      Comments: Urethral meatus: normal size, anterior vaginal wall with no prolapse, no lesions  Bladder: no fullness, masses or tenderness  Musculoskeletal:      Cervical back: Normal range of motion.   Neurological:      Mental Status: She is alert and oriented to person, place, and time.   Psychiatric:         Behavior: Behavior normal.         Thought Content: Thought content normal.         Judgment: Judgment normal.       Chaperoned by: Georgina    ASSESSMENT:       ICD-10-CM ICD-9-CM    1. Well woman exam with routine gynecological exam  Z01.419 V72.31       2. GYN exam for high-risk Medicare patient  Z91.89 V72.31      V15.89                Plan:      Dana was seen today for well woman.    Diagnoses and all orders for this visit:    Well woman exam with routine gynecological exam    GYN exam for high-risk Medicare patient        No orders of the defined types were placed in this encounter.      Well Woman:   - Pap smear: not indicated  - Mammogram: ordered by breast surgery  - Colonoscopy: two years ago per  patient  - Dexa: 2019  - Immunizations: with pcp  - Labs: with pcp  - Exercise counseling provided    Follow up in one year for annual, or prn.    Tere Huynh

## 2022-11-03 DIAGNOSIS — Z71.89 COMPLEX CARE COORDINATION: ICD-10-CM

## 2022-11-21 ENCOUNTER — OFFICE VISIT (OUTPATIENT)
Dept: HEMATOLOGY/ONCOLOGY | Facility: CLINIC | Age: 72
End: 2022-11-21
Payer: MEDICARE

## 2022-11-21 ENCOUNTER — TELEPHONE (OUTPATIENT)
Dept: GASTROENTEROLOGY | Facility: CLINIC | Age: 72
End: 2022-11-21
Payer: MEDICARE

## 2022-11-21 VITALS
HEIGHT: 65 IN | BODY MASS INDEX: 29.31 KG/M2 | HEART RATE: 71 BPM | SYSTOLIC BLOOD PRESSURE: 178 MMHG | DIASTOLIC BLOOD PRESSURE: 97 MMHG | WEIGHT: 175.94 LBS | OXYGEN SATURATION: 98 % | TEMPERATURE: 98 F | RESPIRATION RATE: 18 BRPM

## 2022-11-21 DIAGNOSIS — C50.312 MALIGNANT NEOPLASM OF LOWER-INNER QUADRANT OF LEFT BREAST IN FEMALE, ESTROGEN RECEPTOR POSITIVE: Primary | ICD-10-CM

## 2022-11-21 DIAGNOSIS — Z17.0 MALIGNANT NEOPLASM OF LOWER-INNER QUADRANT OF LEFT BREAST IN FEMALE, ESTROGEN RECEPTOR POSITIVE: Primary | ICD-10-CM

## 2022-11-21 DIAGNOSIS — Z15.89 BIALLELIC MUTATION OF ATM GENE: Primary | ICD-10-CM

## 2022-11-21 PROCEDURE — 99213 OFFICE O/P EST LOW 20 MIN: CPT | Mod: S$PBB,,, | Performed by: INTERNAL MEDICINE

## 2022-11-21 PROCEDURE — 99213 PR OFFICE/OUTPT VISIT, EST, LEVL III, 20-29 MIN: ICD-10-PCS | Mod: S$PBB,,, | Performed by: INTERNAL MEDICINE

## 2022-11-21 PROCEDURE — 99999 PR PBB SHADOW E&M-EST. PATIENT-LVL IV: ICD-10-PCS | Mod: PBBFAC,,, | Performed by: INTERNAL MEDICINE

## 2022-11-21 PROCEDURE — 99214 OFFICE O/P EST MOD 30 MIN: CPT | Mod: PBBFAC | Performed by: INTERNAL MEDICINE

## 2022-11-21 PROCEDURE — 99999 PR PBB SHADOW E&M-EST. PATIENT-LVL IV: CPT | Mod: PBBFAC,,, | Performed by: INTERNAL MEDICINE

## 2022-11-21 NOTE — PROGRESS NOTES
Subjective:       Patient ID: Dana Cook is a 71 y.o. female.    Chief Complaint: No chief complaint on file.    HPI 71-year-old female seen for left breast cancer.  She is on Letrozole.  She had held that in August due to memory/cognitive issues.  That seems to be better since she started some extra vitamins.  It is most bothersome when she is tired.    She also complains of some diffuse arthralgias which are better with activity.  [place]        History:  Screening mammogram on March 4, 2021 showed a focal asymmetry in the lower inner quadrant of the left breast.    Follow-up diagnostic mammogram and ultrasound on March 8, 2021 showed a focal asymmetry measuring 40 mm in the lower inner quadrant left breast.  Ultrasound showed a 5 x 5 x 3 mm hypoechoic mass at 3:00 a.m. position with seen in the lower inner quadrant area.    On March 19, 2021 biopsy of 2 areas in the left breast was performed.  The lower inner quadrant mass showed infiltrating lobular carcinoma which was histologic grade 3, nuclear grade 2, mitotic index 1.  Tumor cells were 95% ER positive in 95% MT positive.  HER2 was 2+ but negative by fish with a ratio 1.75, Ki-67  Biopsy 3:00 a.m. mass showed atypical ductal hyperplasia    MRI on April 1, 2021 showed multiple findings.  There was a 22 x 21 a 60 mm irregular mass in left breast lower inner quadrant, 8 mm to 9 is enhancement lymph breast at 3:00 a.m., 6 x 6 x 5 mm mass in the left breast lower outer quadrant and in the right breast in 19 mass 7 mm area of non-mass enhancement centrally.    On April 13, 2021 biopsy of the right breast non-mass enhancement in the left breast lower outer quadrant mass seen on MRI was performed.  Both of those biopsies were negative showing no atypia or malignancy.    On April 30, 2021 left breast lumpectomy and sentinel lymph node biopsy was performed.  Left breast showed a do by 1.7 x 1.2 cm infiltrating  lobular carcinoma.  The sentinel lymph node was negative.  Right breast excisional biopsy was negative at that time as well.  Final pathological stage T1c N0 stage I A.    Oncotype score showed low risk with a score 16.     [facility] genetics - CHACHO mutation    Radiation completed 8/3/21.    Letrozole started after radiation.    Mammogram in March 2022 - negative.      PMH: HBP, sarcoid, renal damage from NSAIDs  Review of Systems   Constitutional: Negative.    HENT: Negative.     Respiratory: Negative.     Gastrointestinal: Negative.    Genitourinary: Negative.    Musculoskeletal:  Positive for arthralgias.   Neurological: Negative.    Psychiatric/Behavioral:  Positive for decreased concentration.        Objective:      Physical Exam  Vitals reviewed.   Constitutional:       General: She is not in acute distress.     Appearance: Normal appearance.   Eyes:      General: No scleral icterus.  Cardiovascular:      Rate and Rhythm: Normal rate and regular rhythm.   Pulmonary:      Effort: Pulmonary effort is normal. No respiratory distress.      Breath sounds: Normal breath sounds. No rales.   Chest:   Breasts:     Right: No mass or nipple discharge.      Left: No mass or nipple discharge.       Abdominal:      Palpations: Abdomen is soft. There is no mass.      Tenderness: There is no abdominal tenderness.   Lymphadenopathy:      Cervical: No cervical adenopathy.      Upper Body:      Right upper body: No supraclavicular or axillary adenopathy.      Left upper body: No supraclavicular or axillary adenopathy.   Skin:     Findings: No rash.   Neurological:      Mental Status: She is oriented to person, place, and time.   Psychiatric:         Mood and Affect: Mood normal.         Behavior: Behavior normal.         Thought Content: Thought content normal.         Judgment: Judgment normal.       Assessment:       1. Malignant neoplasm of lower-inner quadrant of left breast in female, estrogen receptor positive        Plan:        See me in 3 M

## 2022-11-22 NOTE — TELEPHONE ENCOUNTER
----- Message from Re Rojas MD sent at 11/21/2022 11:13 AM CST -----  Regarding: MRI abdomen and Creatinine  Can you reach out to this patient to schedule her for an MRI abdomen sometime in Feb 2023 with a creatinine prior to?  Thanks!    Re    
MA attempted to contact patient to schedule recommended MRI in February, but patient did not answer. A voicemail was left for patient to return a call to our office.  
MA spoke with patient. MRI and lab scheduled.   
Diabetes Mellitus Type II  dx 2002   pt does not take blood sugar  Dry eye    Endometrial hyperplasia  curremt  Fracture  right foot pt wearing a boot/ OA  HLD (Hyperlipidemia)    HTN (Hypertension)    Obesity  BMI 29  Peripheral Vascular Disease  lower extremity    PVD s/p fem/ pop bypass b/l legs

## 2022-11-30 ENCOUNTER — PATIENT MESSAGE (OUTPATIENT)
Dept: OBSTETRICS AND GYNECOLOGY | Facility: CLINIC | Age: 72
End: 2022-11-30
Payer: MEDICARE

## 2022-12-13 DIAGNOSIS — F32.A DEPRESSION, UNSPECIFIED DEPRESSION TYPE: ICD-10-CM

## 2022-12-13 RX ORDER — BUPROPION HYDROCHLORIDE 300 MG/1
300 TABLET ORAL DAILY
Qty: 90 TABLET | Refills: 3 | Status: CANCELLED | OUTPATIENT
Start: 2022-12-13

## 2022-12-13 NOTE — TELEPHONE ENCOUNTER
Care Due:                  Date            Visit Type   Department     Provider  --------------------------------------------------------------------------------                                EP -                              PRIMARY      MET INTERNAL  Last Visit: 04-      CARE ([facility])   MEDICINE       Mariam Johnson  Next Visit: None Scheduled  None         None Found                                                            Last  Test          Frequency    Reason                     Performed    Due Date  --------------------------------------------------------------------------------    CMP.........  12 months..  atorvastatin,              03- 03-                             chlorthalidone,                             colchicine...............    Lipid Panel.  12 months..  atorvastatin.............  03- 03-    Uric Acid...  12 months..  colchicine...............  Not Found    Overdue    [employer] Embedded Care Gaps. Reference number: [id number]. 12/13/2022   12:14:43 PM CST

## 2023-01-30 ENCOUNTER — LAB VISIT (OUTPATIENT)
Dept: LAB | Facility: HOSPITAL | Age: 73
End: 2023-01-30
Attending: INTERNAL MEDICINE
Payer: MEDICARE

## 2023-01-30 DIAGNOSIS — Z15.89 BIALLELIC MUTATION OF ATM GENE: ICD-10-CM

## 2023-01-30 LAB
CREAT SERPL-MCNC: 1 MG/DL (ref 0.5–1.4)
EST. GFR  (NO RACE VARIABLE): 59.9 ML/MIN/1.73 M^2

## 2023-01-30 PROCEDURE — 82565 ASSAY OF CREATININE: CPT | Performed by: INTERNAL MEDICINE

## 2023-01-30 PROCEDURE — 36415 COLL VENOUS BLD VENIPUNCTURE: CPT | Performed by: INTERNAL MEDICINE

## 2023-02-06 ENCOUNTER — HOSPITAL ENCOUNTER (OUTPATIENT)
Dept: RADIOLOGY | Facility: HOSPITAL | Age: 73
Discharge: HOME | End: 2023-02-06
Attending: INTERNAL MEDICINE
Payer: MEDICARE

## 2023-02-06 DIAGNOSIS — Z15.89 BIALLELIC MUTATION OF ATM GENE: ICD-10-CM

## 2023-02-06 PROCEDURE — 74183 MRI ABD W/O CNTR FLWD CNTR: CPT | Mod: 26,GC,, | Performed by: STUDENT IN AN ORGANIZED HEALTH CARE EDUCATION/TRAINING PROGRAM

## 2023-02-06 PROCEDURE — 25500020 PHARM REV CODE 255: Performed by: INTERNAL MEDICINE

## 2023-02-06 PROCEDURE — 74183 MRI ABDOMEN W WO CONTRAST: ICD-10-PCS | Mod: 26,GC,, | Performed by: STUDENT IN AN ORGANIZED HEALTH CARE EDUCATION/TRAINING PROGRAM

## 2023-02-06 PROCEDURE — A9585 GADOBUTROL INJECTION: HCPCS | Performed by: INTERNAL MEDICINE

## 2023-02-06 PROCEDURE — 74183 MRI ABD W/O CNTR FLWD CNTR: CPT | Mod: TC

## 2023-02-06 RX ORDER — GADOBUTROL 604.72 MG/ML
10 INJECTION INTRAVENOUS
Status: COMPLETED | OUTPATIENT
Start: 2023-02-06 | End: 2023-02-06

## 2023-02-06 RX ADMIN — GADOBUTROL 10 ML: 604.72 INJECTION INTRAVENOUS at 07:02

## 2023-03-08 ENCOUNTER — HOSPITAL ENCOUNTER (OUTPATIENT)
Dept: RADIOLOGY | Facility: HOSPITAL | Age: 73
Discharge: HOME | End: 2023-03-08
Attending: PHYSICIAN ASSISTANT
Payer: MEDICARE

## 2023-03-08 DIAGNOSIS — Z98.890 S/P LUMPECTOMY, LEFT BREAST: ICD-10-CM

## 2023-03-08 DIAGNOSIS — Z12.31 ENCOUNTER FOR SCREENING MAMMOGRAM FOR BREAST CANCER: ICD-10-CM

## 2023-03-08 DIAGNOSIS — Z85.3 PERSONAL HISTORY OF BREAST CANCER: ICD-10-CM

## 2023-03-08 PROCEDURE — 77067 SCR MAMMO BI INCL CAD: CPT | Mod: TC

## 2023-03-08 PROCEDURE — 77067 SCR MAMMO BI INCL CAD: CPT | Mod: 26,,, | Performed by: RADIOLOGY

## 2023-03-08 PROCEDURE — 77063 BREAST TOMOSYNTHESIS BI: CPT | Mod: 26,,, | Performed by: RADIOLOGY

## 2023-03-08 PROCEDURE — 77063 MAMMO DIGITAL SCREENING BILAT WITH TOMO: ICD-10-PCS | Mod: 26,,, | Performed by: RADIOLOGY

## 2023-03-08 PROCEDURE — 77067 MAMMO DIGITAL SCREENING BILAT WITH TOMO: ICD-10-PCS | Mod: 26,,, | Performed by: RADIOLOGY

## 2023-05-19 ENCOUNTER — PES CALL (OUTPATIENT)
Dept: ADMINISTRATIVE | Facility: CLINIC | Age: 73
End: 2023-05-19
Payer: MEDICARE

## 2023-05-23 ENCOUNTER — PATIENT MESSAGE (OUTPATIENT)
Dept: ADMINISTRATIVE | Facility: HOSPITAL | Age: 73
End: 2023-05-23
Payer: MEDICARE

## 2023-06-03 DIAGNOSIS — Z71.89 COMPLEX CARE COORDINATION: ICD-10-CM

## 2023-06-07 ENCOUNTER — PATIENT OUTREACH (OUTPATIENT)
Dept: ADMINISTRATIVE | Facility: HOSPITAL | Age: 73
End: 2023-06-07
Payer: MEDICARE

## 2023-06-12 ENCOUNTER — TELEPHONE (OUTPATIENT)
Dept: SURGERY | Facility: CLINIC | Age: 73
End: 2023-06-12
Payer: MEDICARE

## 2023-06-12 NOTE — TELEPHONE ENCOUNTER
"----- Message from Rona Domingo sent at 6/12/2023  8:31 AM CDT -----  Scheduling Request       Patient Status: Esat       Scheduling Appt: STEPHANIE; F/U       Time/Date Preference: soonest available       Relationship to Patient?:       Contact Preference?: [phone]       Treating Provider:       Do you feel you need to be seen today? No           Additional Notes:  "Thank you for all that you do for our patients"             "

## 2023-06-13 ENCOUNTER — TELEPHONE (OUTPATIENT)
Dept: HEMATOLOGY/ONCOLOGY | Facility: CLINIC | Age: 73
End: 2023-06-13
Payer: MEDICARE

## 2023-06-13 ENCOUNTER — HOSPITAL ENCOUNTER (OUTPATIENT)
Dept: RADIOLOGY | Facility: HOSPITAL | Age: 73
Discharge: HOME | End: 2023-06-13
Attending: HOSPITALIST
Payer: MEDICARE

## 2023-06-13 ENCOUNTER — OFFICE VISIT (OUTPATIENT)
Dept: INTERNAL MEDICINE | Facility: CLINIC | Age: 73
End: 2023-06-13
Payer: MEDICARE

## 2023-06-13 VITALS
SYSTOLIC BLOOD PRESSURE: 140 MMHG | WEIGHT: 170.19 LBS | HEART RATE: 77 BPM | RESPIRATION RATE: 14 BRPM | TEMPERATURE: 98 F | OXYGEN SATURATION: 98 % | DIASTOLIC BLOOD PRESSURE: 60 MMHG | BODY MASS INDEX: 28.32 KG/M2

## 2023-06-13 DIAGNOSIS — R11.0 NAUSEA: ICD-10-CM

## 2023-06-13 DIAGNOSIS — Z00.00 ENCOUNTER FOR PREVENTIVE HEALTH EXAMINATION: Primary | ICD-10-CM

## 2023-06-13 DIAGNOSIS — I10 ESSENTIAL HYPERTENSION: ICD-10-CM

## 2023-06-13 DIAGNOSIS — C50.312 MALIGNANT NEOPLASM OF LOWER-INNER QUADRANT OF LEFT BREAST IN FEMALE, ESTROGEN RECEPTOR POSITIVE: ICD-10-CM

## 2023-06-13 DIAGNOSIS — F41.9 ANXIETY: ICD-10-CM

## 2023-06-13 DIAGNOSIS — I65.23 ASYMPTOMATIC BILATERAL CAROTID ARTERY STENOSIS: ICD-10-CM

## 2023-06-13 DIAGNOSIS — M81.0 OSTEOPOROSIS, UNSPECIFIED OSTEOPOROSIS TYPE, UNSPECIFIED PATHOLOGICAL FRACTURE PRESENCE: ICD-10-CM

## 2023-06-13 DIAGNOSIS — Z17.0 MALIGNANT NEOPLASM OF LOWER-INNER QUADRANT OF LEFT BREAST IN FEMALE, ESTROGEN RECEPTOR POSITIVE: ICD-10-CM

## 2023-06-13 DIAGNOSIS — E66.3 OVERWEIGHT (BMI 25.0-29.9): ICD-10-CM

## 2023-06-13 DIAGNOSIS — R29.6 MULTIPLE FALLS: ICD-10-CM

## 2023-06-13 DIAGNOSIS — F32.A DEPRESSION, UNSPECIFIED DEPRESSION TYPE: ICD-10-CM

## 2023-06-13 DIAGNOSIS — N18.31 STAGE 3A CHRONIC KIDNEY DISEASE: ICD-10-CM

## 2023-06-13 DIAGNOSIS — Z78.0 POSTMENOPAUSAL: ICD-10-CM

## 2023-06-13 DIAGNOSIS — Z74.09 IMPAIRED FUNCTIONAL MOBILITY, BALANCE, GAIT, AND ENDURANCE: ICD-10-CM

## 2023-06-13 DIAGNOSIS — E78.2 MIXED HYPERLIPIDEMIA: ICD-10-CM

## 2023-06-13 DIAGNOSIS — Z86.010 HISTORY OF COLON POLYPS: ICD-10-CM

## 2023-06-13 DIAGNOSIS — K21.9 GASTROESOPHAGEAL REFLUX DISEASE, UNSPECIFIED WHETHER ESOPHAGITIS PRESENT: ICD-10-CM

## 2023-06-13 PROCEDURE — 99215 OFFICE O/P EST HI 40 MIN: CPT | Mod: S$PBB,,, | Performed by: HOSPITALIST

## 2023-06-13 PROCEDURE — 72110 X-RAY EXAM L-2 SPINE 4/>VWS: CPT | Mod: 26,,, | Performed by: RADIOLOGY

## 2023-06-13 PROCEDURE — 99215 PR OFFICE/OUTPT VISIT, EST, LEVL V, 40-54 MIN: ICD-10-PCS | Mod: S$PBB,,, | Performed by: HOSPITALIST

## 2023-06-13 PROCEDURE — 99999 PR PBB SHADOW E&M-EST. PATIENT-LVL V: ICD-10-PCS | Mod: PBBFAC,,, | Performed by: HOSPITALIST

## 2023-06-13 PROCEDURE — 72110 X-RAY EXAM L-2 SPINE 4/>VWS: CPT | Mod: TC,PO

## 2023-06-13 PROCEDURE — 99215 OFFICE O/P EST HI 40 MIN: CPT | Mod: PBBFAC,PO,25 | Performed by: HOSPITALIST

## 2023-06-13 PROCEDURE — 99999 PR PBB SHADOW E&M-EST. PATIENT-LVL V: CPT | Mod: PBBFAC,,, | Performed by: HOSPITALIST

## 2023-06-13 PROCEDURE — 72110 XR LUMBAR SPINE COMPLETE 5 VIEW: ICD-10-PCS | Mod: 26,,, | Performed by: RADIOLOGY

## 2023-06-13 RX ORDER — ASPIRIN 81 MG/1
81 TABLET ORAL DAILY
COMMUNITY
End: 2023-10-19

## 2023-06-13 RX ORDER — OXYCODONE HYDROCHLORIDE 5 MG/1
5 TABLET ORAL EVERY 4 HOURS PRN
Qty: 35 TABLET | Refills: 0 | Status: CANCELLED | OUTPATIENT
Start: 2023-06-13

## 2023-06-13 RX ORDER — PANTOPRAZOLE SODIUM 40 MG/1
40 TABLET, DELAYED RELEASE ORAL DAILY
Qty: 30 TABLET | Refills: 2 | Status: SHIPPED | OUTPATIENT
Start: 2023-06-13 | End: 2023-09-11 | Stop reason: SDUPTHER

## 2023-06-13 NOTE — TELEPHONE ENCOUNTER
----- Message from Juliana Leiva sent at 6/13/2023  8:33 AM CDT -----  Regarding: return call  Contact: [phone]  Pt is returning a missed call from Joseline ... in regards to a appointment ...Please call and adv @ [phone]

## 2023-06-13 NOTE — PROGRESS NOTES
Subjective:     @Patient ID: Dana Cook is a 72 y.o. female.    Chief Complaint: Annual Exam and Anxiety    HPI    71 yo F with  HTN, HLD, breast ca, osteoporosis, sarcoidosis, ckd3a, depression, carotid artery stenosis presents for annual :     1. HTN: chlorthalidone 25 mg qday  2. HLD, Carotid artery stenosis: atorvastatin 10 mg qday, red yeast rice   3. Breast ca:  dx with breast ca of left breast. S/p left breast lumpectomy and sentinel lymph node biopsy 4/30/21.  Left breast showed infiltrating lobular carcinoma. Currently follows with heme-onc. On letrozole. Has upcoming appt with   4. CKD3: baseline Cr 1-1.2   5. Sarcoidosis: has eye involvement but follows closely with her eye specialists. Not on chronic steroids  6. Depression: on wellbutrin 300 mg qday   7. Carotid artery stenosis: seen on u/s 2019, 2022. On statin.   8. 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 However states some recent staffing changes has helped lower her anxiety. She prefers to take natural supplement valerian root for her anxiety   9. OP: fosamax 70 mg weekly   10. Reports lately having decreased appetite, nausea after eating. Reports x 2 weeks. States she has never had this before. Also reports lately having some reflux   11. Endorsing multiple falls and balance issues over the past few months         Lipid disorders/ASCVD risk (ages >/= 45 or >/= 20 if increased risk ): ordered  DM (>45y yearly or if obese, HTN): A1c n/a    Eye exam:   Breast Cancer (40-50y discretion of pt, 50-74y every 1-2 years): Mammogram Done 3/2023  Colorectal Cancer (normal risk 50-75yr): Colonoscopy done 11/1/2019;    DEXA (F>66 yo, M >71yo, M&F 50-70 yo with risk factors (smoking, previous fx, wt <70kg; etoh abuse, chronic steroids, RA)):Done 2021  SKin: reports done 2021 with Dr Whitt       Vaccines:   Influenza (yearly) done    Tetanus (every 10 yrs - 1st tdap)  Done  PPSV23(>66yo or <65 w/ lung dz,  smoking, DM) Done  PCV13 (> 65 or <65 w/ immunocompromised) Done  Zoster (>61yo) Done Zostavax 2015  Covid19: done.        Exercise: walking;    Diet: low cholesterol diet      Review of Systems   Constitutional:  Negative for chills and fever.   HENT:  Negative for congestion and sore throat.    Eyes:  Negative for pain and visual disturbance.   Respiratory:  Negative for cough and shortness of breath.    Cardiovascular:  Negative for chest pain and leg swelling.   Gastrointestinal:  Negative for abdominal pain, nausea and vomiting.   Endocrine: Negative for polydipsia and polyuria.   Genitourinary:  Negative for difficulty urinating and dysuria.   Musculoskeletal:  Negative for arthralgias and back pain.   Skin:  Negative for rash and wound.   Neurological:  Positive for weakness. Negative for dizziness and headaches.   Psychiatric/Behavioral:  Negative for agitation and confusion. The patient is nervous/anxious.    Past medical history, surgical history, and family medical history reviewed and updated as appropriate.    Medications and allergies reviewed.     Objective:     There were no vitals filed for this visit.  There is no height or weight on file to calculate BMI.  Physical Exam  Vitals reviewed.   Constitutional:       General: She is not in acute distress.     Appearance: She is well-developed.   HENT:      Head: Normocephalic and atraumatic.      Right Ear: Tympanic membrane normal.      Left Ear: Tympanic membrane normal.      Mouth/Throat:      Mouth: Mucous membranes are moist.      Pharynx: No oropharyngeal exudate.   Eyes:      General:         Right eye: No discharge.         Left eye: No discharge.      Conjunctiva/sclera: Conjunctivae normal.   Cardiovascular:      Rate and Rhythm: Normal rate and regular rhythm.      Heart sounds: No murmur heard.    No friction rub.   Pulmonary:      Effort: Pulmonary effort is normal.      Breath sounds: Normal breath sounds.   Abdominal:      General: Bowel  sounds are normal. There is no distension.      Palpations: Abdomen is soft.      Tenderness: There is no abdominal tenderness. There is no guarding.   Musculoskeletal:         General: Normal range of motion.      Cervical back: Normal range of motion and neck supple.      Right lower leg: No edema.      Left lower leg: No edema.   Lymphadenopathy:      Cervical: No cervical adenopathy.   Skin:     General: Skin is warm and dry.   Neurological:      Mental Status: She is alert and oriented to person, place, and time.   Psychiatric:         Mood and Affect: Mood normal.         Behavior: Behavior normal.       Lab Results   Component Value Date    WBC 4.96 03/18/2022    HGB 12.7 03/18/2022    HCT 39.9 03/18/2022     03/18/2022    CHOL 206 (H) 03/18/2022    TRIG 101 03/18/2022    HDL 73 03/18/2022    ALT 31 03/18/2022    AST 28 03/18/2022     03/18/2022    K 3.4 (L) 03/18/2022     03/18/2022    CREATININE 1.0 01/30/2023    BUN 17 03/18/2022    CO2 31 (H) 03/18/2022    TSH 2.284 03/18/2022    HGBA1C 5.8 11/18/2015       Assessment:     1. Encounter for preventive health examination    2. Essential hypertension    3. Mixed hyperlipidemia    4. Malignant neoplasm of lower-inner quadrant of left breast in female, estrogen receptor positive    5. Stage 3a chronic kidney disease    6. Overweight (BMI 25.0-29.9)    7. Depression, unspecified depression type    8. Anxiety    9. Osteoporosis, unspecified osteoporosis type, unspecified pathological fracture presence    10. Postmenopausal    11. Asymptomatic bilateral carotid artery stenosis    12. History of colon polyps    13. Nausea    14. Gastroesophageal reflux disease, unspecified whether esophagitis present    15. Impaired functional mobility, balance, gait, and endurance    16. Multiple falls      Plan:   Dana was seen today for annual exam and anxiety.    Diagnoses and all orders for this visit:    Encounter for preventive health  examination    Essential hypertension  Bp not at goal. Pt will keep bp log and will get home readings in 2 weeks     -     Comprehensive Metabolic Panel; Future  -     CBC Auto Differential; Future  -     Lipid Panel; Future  -     TSH; Future  -     Urinalysis; Future    Mixed hyperlipidemia  - Stable. Continue home meds     -     Comprehensive Metabolic Panel; Future  -     CBC Auto Differential; Future  -     Lipid Panel; Future  -     TSH; Future  -     Urinalysis; Future    Malignant neoplasm of lower-inner quadrant of left breast in female, estrogen receptor positive  - stable. Continue f/u with heme onc    Stage 3a chronic kidney disease  - stable. Continue to monitor   -     Comprehensive Metabolic Panel; Future  -     CBC Auto Differential; Future  -     Lipid Panel; Future  -     TSH; Future  -     Urinalysis; Future  -     Protein / creatinine ratio, urine; Future    Overweight BMI 25-29  - improving. Continue to monitor     Depression, unspecified depression type  - Stable. Continue home meds     Anxiety  - stable. Continue home meds     Osteoporosis, unspecified osteoporosis type, unspecified pathological fracture presence  -     DXA Bone Density Axial Skeleton 1 or more sites; Future    Postmenopausal  -     DXA Bone Density Axial Skeleton 1 or more sites; Future    Asymptomatic bilateral carotid artery stenosis  -chronic. Mild. Ok to start asa 81 mg. Continue statin     History of colon polyps    Nausea  - New. Suspect 2/2 to GERD. Start PPI and pt would like to see gastro. Her last gastro doctor Dr Glenn Rubio is retired   -     Ambulatory referral/consult to Gastroenterology; Future    Gastroesophageal reflux disease, unspecified whether esophagitis present  - See nausea   -     Ambulatory referral/consult to Gastroenterology; Future    Impaired functional mobility, balance, gait, and endurance  -  New. Will check back xray and refer to PT   -     Ambulatory referral/consult to  Physical/Occupational Therapy; Future  -     X-Ray Lumbar Spine 5 View; Future    Multiple falls  -  New. Will check back xray and refer to PT   -     Ambulatory referral/consult to Physical/Occupational Therapy; Future  -     X-Ray Lumbar Spine 5 View; Future    Other orders  -     pantoprazole (PROTONIX) 40 MG tablet; Take 1 tablet (40 mg total) by mouth once daily.      Visit time 40 min. Includes pre-charting, face-to-face encounter, medical decision making and documentation.     Mariam Johnson MD  Internal Medicine    6/13/2023

## 2023-06-21 ENCOUNTER — LAB VISIT (OUTPATIENT)
Dept: LAB | Facility: HOSPITAL | Age: 73
End: 2023-06-21
Attending: HOSPITALIST
Payer: MEDICARE

## 2023-06-21 DIAGNOSIS — I10 ESSENTIAL HYPERTENSION: ICD-10-CM

## 2023-06-21 DIAGNOSIS — N18.31 STAGE 3A CHRONIC KIDNEY DISEASE: ICD-10-CM

## 2023-06-21 DIAGNOSIS — E78.2 MIXED HYPERLIPIDEMIA: ICD-10-CM

## 2023-06-21 LAB
ALBUMIN SERPL BCP-MCNC: 3.9 G/DL (ref 3.5–5.2)
ALP SERPL-CCNC: 67 U/L (ref 55–135)
ALT SERPL W/O P-5'-P-CCNC: 37 U/L (ref 10–44)
ANION GAP SERPL CALC-SCNC: 11 MMOL/L (ref 8–16)
AST SERPL-CCNC: 35 U/L (ref 10–40)
BASOPHILS # BLD AUTO: 0.03 K/UL (ref 0–0.2)
BASOPHILS NFR BLD: 0.7 % (ref 0–1.9)
BILIRUB SERPL-MCNC: 0.6 MG/DL (ref 0.1–1)
BUN SERPL-MCNC: 14 MG/DL (ref 8–23)
CALCIUM SERPL-MCNC: 10.1 MG/DL (ref 8.7–10.5)
CHLORIDE SERPL-SCNC: 102 MMOL/L (ref 95–110)
CHOLEST SERPL-MCNC: 181 MG/DL (ref 120–199)
CHOLEST/HDLC SERPL: 2.7 {RATIO} (ref 2–5)
CO2 SERPL-SCNC: 27 MMOL/L (ref 23–29)
CREAT SERPL-MCNC: 1.1 MG/DL (ref 0.5–1.4)
DIFFERENTIAL METHOD: ABNORMAL
EOSINOPHIL # BLD AUTO: 0.1 K/UL (ref 0–0.5)
EOSINOPHIL NFR BLD: 1.8 % (ref 0–8)
ERYTHROCYTE [DISTWIDTH] IN BLOOD BY AUTOMATED COUNT: 12.7 % (ref 11.5–14.5)
EST. GFR  (NO RACE VARIABLE): 53.4 ML/MIN/1.73 M^2
GLUCOSE SERPL-MCNC: 99 MG/DL (ref 70–110)
HCT VFR BLD AUTO: 36 % (ref 37–48.5)
HDLC SERPL-MCNC: 66 MG/DL (ref 40–75)
HDLC SERPL: 36.5 % (ref 20–50)
HGB BLD-MCNC: 12 G/DL (ref 12–16)
IMM GRANULOCYTES # BLD AUTO: 0.01 K/UL (ref 0–0.04)
IMM GRANULOCYTES NFR BLD AUTO: 0.2 % (ref 0–0.5)
LDLC SERPL CALC-MCNC: 97.2 MG/DL (ref 63–159)
LYMPHOCYTES # BLD AUTO: 1.2 K/UL (ref 1–4.8)
LYMPHOCYTES NFR BLD: 26 % (ref 18–48)
MCH RBC QN AUTO: 30.8 PG (ref 27–31)
MCHC RBC AUTO-ENTMCNC: 33.3 G/DL (ref 32–36)
MCV RBC AUTO: 93 FL (ref 82–98)
MONOCYTES # BLD AUTO: 0.5 K/UL (ref 0.3–1)
MONOCYTES NFR BLD: 12.2 % (ref 4–15)
NEUTROPHILS # BLD AUTO: 2.6 K/UL (ref 1.8–7.7)
NEUTROPHILS NFR BLD: 59.1 % (ref 38–73)
NONHDLC SERPL-MCNC: 115 MG/DL
NRBC BLD-RTO: 0 /100 WBC
PLATELET # BLD AUTO: 318 K/UL (ref 150–450)
PMV BLD AUTO: 9.8 FL (ref 9.2–12.9)
POTASSIUM SERPL-SCNC: 3 MMOL/L (ref 3.5–5.1)
PROT SERPL-MCNC: 7.2 G/DL (ref 6–8.4)
RBC # BLD AUTO: 3.89 M/UL (ref 4–5.4)
SODIUM SERPL-SCNC: 140 MMOL/L (ref 136–145)
TRIGL SERPL-MCNC: 89 MG/DL (ref 30–150)
TSH SERPL DL<=0.005 MIU/L-ACNC: 0.71 UIU/ML (ref 0.4–4)
WBC # BLD AUTO: 4.42 K/UL (ref 3.9–12.7)

## 2023-06-21 PROCEDURE — 36415 COLL VENOUS BLD VENIPUNCTURE: CPT | Mod: PO | Performed by: HOSPITALIST

## 2023-06-21 PROCEDURE — 80053 COMPREHEN METABOLIC PANEL: CPT | Performed by: HOSPITALIST

## 2023-06-21 PROCEDURE — 84443 ASSAY THYROID STIM HORMONE: CPT | Performed by: HOSPITALIST

## 2023-06-21 PROCEDURE — 85025 COMPLETE CBC W/AUTO DIFF WBC: CPT | Performed by: HOSPITALIST

## 2023-06-21 PROCEDURE — 80061 LIPID PANEL: CPT | Performed by: HOSPITALIST

## 2023-06-26 ENCOUNTER — OFFICE VISIT (OUTPATIENT)
Dept: HEMATOLOGY/ONCOLOGY | Facility: CLINIC | Age: 73
End: 2023-06-26
Payer: MEDICARE

## 2023-06-26 VITALS
OXYGEN SATURATION: 98 % | DIASTOLIC BLOOD PRESSURE: 75 MMHG | BODY MASS INDEX: 26.72 KG/M2 | SYSTOLIC BLOOD PRESSURE: 168 MMHG | TEMPERATURE: 98 F | RESPIRATION RATE: 16 BRPM | WEIGHT: 166.25 LBS | HEIGHT: 66 IN | HEART RATE: 78 BPM

## 2023-06-26 DIAGNOSIS — C50.312 MALIGNANT NEOPLASM OF LOWER-INNER QUADRANT OF LEFT BREAST IN FEMALE, ESTROGEN RECEPTOR POSITIVE: Primary | ICD-10-CM

## 2023-06-26 DIAGNOSIS — Z17.0 MALIGNANT NEOPLASM OF LOWER-INNER QUADRANT OF LEFT BREAST IN FEMALE, ESTROGEN RECEPTOR POSITIVE: Primary | ICD-10-CM

## 2023-06-26 PROCEDURE — 99214 OFFICE O/P EST MOD 30 MIN: CPT | Mod: PBBFAC | Performed by: INTERNAL MEDICINE

## 2023-06-26 PROCEDURE — 99213 PR OFFICE/OUTPT VISIT, EST, LEVL III, 20-29 MIN: ICD-10-PCS | Mod: S$PBB,,, | Performed by: INTERNAL MEDICINE

## 2023-06-26 PROCEDURE — 99213 OFFICE O/P EST LOW 20 MIN: CPT | Mod: S$PBB,,, | Performed by: INTERNAL MEDICINE

## 2023-06-26 PROCEDURE — 99999 PR PBB SHADOW E&M-EST. PATIENT-LVL IV: CPT | Mod: PBBFAC,,, | Performed by: INTERNAL MEDICINE

## 2023-06-26 PROCEDURE — 99999 PR PBB SHADOW E&M-EST. PATIENT-LVL IV: ICD-10-PCS | Mod: PBBFAC,,, | Performed by: INTERNAL MEDICINE

## 2023-06-26 NOTE — PROGRESS NOTES
Subjective:       Patient ID: Dana Cook is a 72 y.o. female.    Chief Complaint: No chief complaint on file.      HPI 72-year-old female seen for left breast cancer.  She is on Letrozole.    Her biggest issue has been gastrointestinal issues with some chronic intermittent nausea and some occasional diarrhea.  She is due to see Gastroenterology this week.    She also has significant stress.  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         History:  Screening mammogram on March 4, 2021 showed a focal asymmetry in the lower inner quadrant of the left breast.    Follow-up diagnostic mammogram and ultrasound on March 8, 2021 showed a focal asymmetry measuring 40 mm in the lower inner quadrant left breast.  Ultrasound showed a 5 x 5 x 3 mm hypoechoic mass at 3:00 a.m. position with seen in the lower inner quadrant area.    On March 19, 2021 biopsy of 2 areas in the left breast was performed.  The lower inner quadrant mass showed infiltrating lobular carcinoma which was histologic grade 3, nuclear grade 2, mitotic index 1.  Tumor cells were 95% ER positive in 95% NJ positive.  HER2 was 2+ but negative by fish with a ratio 1.75, Ki-67  Biopsy 3:00 a.m. mass showed atypical ductal hyperplasia    MRI on April 1, 2021 showed multiple findings.  There was a 22 x 21 a 60 mm irregular mass in left breast lower inner quadrant, 8 mm to 9 is enhancement lymph breast at 3:00 a.m., 6 x 6 x 5 mm mass in the left breast lower outer quadrant and in the right breast in 19 mass 7 mm area of non-mass enhancement centrally.    On April 13, 2021 biopsy of the right breast non-mass enhancement in the left breast lower outer quadrant mass seen on MRI was performed.  Both of those biopsies were negative showing no atypia or malignancy.    On April 30, 2021 left breast lumpectomy and sentinel lymph node biopsy was performed.  Left breast showed a do by 1.7 x 1.2 cm infiltrating lobular carcinoma.  The  sentinel lymph node was negative.  Right breast excisional biopsy was negative at that time as well.  Final pathological stage T1c N0 stage I A.    Oncotype score showed low risk with a score 16.     [facility] CHACHO mutation    Radiation completed 8/3/21.    Letrozole started after radiation.    Mammogram in March 2023 - negative.      PMH: HBP, sarcoid, renal damage from NSAIDs  Review of Systems   Constitutional: Negative.    HENT: Negative.     Respiratory: Negative.     Gastrointestinal:  Positive for diarrhea and nausea.   Genitourinary: Negative.    Musculoskeletal:  Positive for arthralgias.   Neurological: Negative.    Psychiatric/Behavioral:  Positive for decreased concentration. The patient is nervous/anxious.        Objective:      Physical Exam  Vitals reviewed.   Constitutional:       General: She is not in acute distress.     Appearance: Normal appearance.   Eyes:      General: No scleral icterus.  Cardiovascular:      Rate and Rhythm: Normal rate and regular rhythm.   Pulmonary:      Effort: Pulmonary effort is normal. No respiratory distress.      Breath sounds: Normal breath sounds. No rales.   Chest:   Breasts:     Right: No mass or nipple discharge.      Left: No mass or nipple discharge.       Abdominal:      Palpations: Abdomen is soft. There is no mass.      Tenderness: There is no abdominal tenderness.   Lymphadenopathy:      Cervical: No cervical adenopathy.      Upper Body:      Right upper body: No supraclavicular or axillary adenopathy.      Left upper body: No supraclavicular or axillary adenopathy.   Skin:     Findings: No rash.   Neurological:      Mental Status: She is oriented to person, place, and time.   Psychiatric:         Mood and Affect: Mood normal.         Behavior: Behavior normal.         Thought Content: Thought content normal.         Judgment: Judgment normal.       Assessment:       1. Malignant neoplasm of lower-inner quadrant of left breast in female, estrogen  receptor positive        Plan:     Continue letrozole and RTC in 4 M          Route Chart for Scheduling    Med Onc Chart Routing      Follow up with physician 4 months.   Follow up with [facility]    Infusion scheduling note    Injection scheduling note    Labs None   Scheduling:  Preferred lab:  Lab interval:     Imaging None      Pharmacy appointment    Other referrals     No additional referrals needed

## 2023-06-27 ENCOUNTER — TELEPHONE (OUTPATIENT)
Dept: INTERNAL MEDICINE | Facility: CLINIC | Age: 73
End: 2023-06-27
Payer: MEDICARE

## 2023-06-27 ENCOUNTER — PATIENT MESSAGE (OUTPATIENT)
Dept: INTERNAL MEDICINE | Facility: CLINIC | Age: 73
End: 2023-06-27
Payer: MEDICARE

## 2023-06-27 NOTE — TELEPHONE ENCOUNTER
----- Message from Mariam Johnson MD sent at 6/13/2023  3:05 PM CDT -----  Regarding: bp check  Please get pt's latest blood pressure readings. Update in vitals section and notify MD

## 2023-06-30 ENCOUNTER — TELEPHONE (OUTPATIENT)
Dept: GASTROENTEROLOGY | Facility: CLINIC | Age: 73
End: 2023-06-30
Payer: MEDICARE

## 2023-06-30 ENCOUNTER — TELEPHONE (OUTPATIENT)
Dept: INTERNAL MEDICINE | Facility: CLINIC | Age: 73
End: 2023-06-30
Payer: MEDICARE

## 2023-06-30 DIAGNOSIS — Z17.0 MALIGNANT NEOPLASM OF LOWER-INNER QUADRANT OF LEFT BREAST IN FEMALE, ESTROGEN RECEPTOR POSITIVE: ICD-10-CM

## 2023-06-30 DIAGNOSIS — C50.312 MALIGNANT NEOPLASM OF LOWER-INNER QUADRANT OF LEFT BREAST IN FEMALE, ESTROGEN RECEPTOR POSITIVE: ICD-10-CM

## 2023-06-30 RX ORDER — LETROZOLE 2.5 MG/1
2.5 TABLET, FILM COATED ORAL DAILY
Qty: 30 TABLET | Refills: 11 | Status: SHIPPED | OUTPATIENT
Start: 2023-06-30 | End: 2023-10-19

## 2023-06-30 NOTE — TELEPHONE ENCOUNTER
----- Message from Joseline Villanueva sent at 6/30/2023  8:21 AM CDT -----  Regarding: Refill  Contact: Pt  Pt requesting a refill request.        Medication: letrozole (FEMARA) 2.5 mg Tab          [id number] - [place] - [address] [place]  [address]  [place] [address]  Phone: [phone] Fax: [phone]       Confirmed contact below:   Contact Name:Dana Cook  Phone Number: [phone]

## 2023-06-30 NOTE — TELEPHONE ENCOUNTER
----- Message from Bianca Sainz sent at 6/30/2023  8:15 AM CDT -----  Contact: [phone]  Pt is calling she states she had a gastro appt scheduled at [facility] and they canceled the appt and made her an appt on the [facility] and [place] [facility] please give return call

## 2023-06-30 NOTE — TELEPHONE ENCOUNTER
Scheduled pt appt on 7/19 at 2:20 pm with fellow Dr. Bonilla. Pt verbalize understand, confirmed appt and thank me.   ----- Message from Andie Robertson sent at 6/30/2023  1:25 PM CDT -----  Patient would like to speak with someone regarding scheduling an appointment with GI. Pt would like to see another provider because [place] [facility] [place] could [place]. Please reach out to patient.

## 2023-07-10 ENCOUNTER — CLINICAL SUPPORT (OUTPATIENT)
Dept: REHABILITATION | Facility: HOSPITAL | Age: 73
End: 2023-07-10
Payer: MEDICARE

## 2023-07-10 DIAGNOSIS — Z74.09 IMPAIRED FUNCTIONAL MOBILITY, BALANCE, GAIT, AND ENDURANCE: ICD-10-CM

## 2023-07-10 DIAGNOSIS — R29.898 DECREASED STRENGTH OF LOWER EXTREMITY: ICD-10-CM

## 2023-07-10 DIAGNOSIS — R29.6 MULTIPLE FALLS: ICD-10-CM

## 2023-07-10 PROCEDURE — 97161 PT EVAL LOW COMPLEX 20 MIN: CPT

## 2023-07-10 PROCEDURE — 97112 NEUROMUSCULAR REEDUCATION: CPT

## 2023-07-12 DIAGNOSIS — M81.0 OSTEOPOROSIS, UNSPECIFIED OSTEOPOROSIS TYPE, UNSPECIFIED PATHOLOGICAL FRACTURE PRESENCE: ICD-10-CM

## 2023-07-12 RX ORDER — ALENDRONATE SODIUM 70 MG/1
70 TABLET ORAL
Qty: 12 TABLET | Refills: 3 | Status: SHIPPED | OUTPATIENT
Start: 2023-07-12

## 2023-07-16 PROBLEM — R29.898 DECREASED STRENGTH OF LOWER EXTREMITY: Status: ACTIVE | Noted: 2023-07-16

## 2023-07-16 NOTE — PLAN OF CARE
"[facility]   Physical Therapy Initial Evaluation        Name: Dana Cook  [facility] Number: [id number]    Therapy Diagnosis:   Encounter Diagnoses   Name Primary?    Impaired functional mobility, balance, gait, and endurance     Multiple falls     Decreased strength of lower extremity      Physician: Mariam Johnson MD     Physician Orders: PT Eval and Treat   Medical Diagnosis from Referral:   Z74.09 (ICD-10-CM) - Impaired functional mobility, balance, gait, and endurance   R29.6 (ICD-10-CM) - Multiple falls     Evaluation Date: 7/10/2023  Authorization Period Expiration: 6/12/2024  Plan of Care Expiration: 9/12/2023  Progress Note Due: 8/10/2023  Visit # / Visits authorized: 1/1   FOTO: 1/3    Precautions: Standard, cancer, and CKD stage III     Time In: 1:00 pm  Time Out: 1:50 pm  Total Appointment Time (timed & untimed codes): 50 minutes    Subjective     Date of onset: A couple years with worsening the past ~6 months     History of current condition - Dana reports: She reports she has been clumsy and stumbling more lately with ~10 falls / near falls within the last 6 months. She reports her most recent big fall was at the [facility] in August and she landed on her knee and really bothered her right knee. She also reports a past medical history of back pain but that has gotten better. She is nervous she is going to fall and finds herself with taking care of things like mowing the grass as she is nervous she is going to fall. She has difficulty with stairs as well and has to have railing. Going up is not as bad as going down the stairs. She knows she has some arthritis and she has been taking some "gummies" to the help with the arthritis pain and that has really helped.   She reports more recent issues of loosing some weight around 20 pounds and feeling nauseated like with overexerting herself and that she follows up with a gastro doctor about her stomach and her primary doctor " knows of her weight issues and is following up with that as well as she has a significant family history of cancer.   She reports a past medical history of Right shoulder surgery, Left wrist surgery, history of breast cancer to which she still follows-up for, and a hysterectomy. She reports a significant family history of multiple cancers.     Falls: See above     Imaging: See Epic    Prior Therapy: Yes, not for this   Social History: Lives by herself and then [place] and m[place] the grass and such which makes her nervous.   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    Prior Level of Function: Independent in all ADLs and able to take care of the grass at home and minimal discomfort with less falls.   Current Level of Function: Able to do everything on her own for the most part but progressively worse with clumsy and falling more frequently.     Patients goals:  To improve her fatigue, strength, and stamina. She was going to the [employer] working out and has not been lately.      Medical History:   Past Medical History:   Diagnosis Date    Anxiety     Atypical hyperplasia of breast 03/19/2021    left    Breast cancer 03/19/2021    left    Cancer     Colon polyps     Depression     Dry eyes     Dry mouth     Dupuytren's contracture     Hypertension     left arm fracture     hairline    Murmur, heart     Osteopenia     PONV (postoperative nausea and vomiting)     with wisdom teeth    Sarcoidosis        Surgical History:   Dana Cook  has a past surgical history that includes Wrist surgery (Left); Laparoscopy for possible endometriosis  (1985); Shoulder surgery (03/22/2018); Breast biopsy (Right, 2016); Breast biopsy (Left, 03/19/2021); Breast biopsy (Left, 03/19/2021); Reduction of both breasts (Bilateral, 04/30/2021); Mastectomy, partial (Bilateral, 04/30/2021); Mastectomy with sentinel node biopsy and axillary lymph node dissection  (Left, 04/30/2021); xi robotic hysterectomy, with salpingo-oophorectomy (Bilateral, 11/22/2021); Endoscopic ultrasound of upper gastrointestinal tract (N/A, 02/02/2022); Breast lumpectomy (Left, 2021); and Total Reduction Mammoplasty (Bilateral, 2021).    Medications:   Dana has a current medication list which includes the following prescription(s): acetaminophen, alendronate, ascorbic acid (vitamin c), aspirin, atorvastatin, biotin, bupropion, calcium/vitamin d3, chlorthalidone, colchicine, fluocinonide, letrozole, lidocaine, pantoprazole, prenatal vit no.124/iron/folic, red yeast rice, rachele's wort-siber.ginseng, turmeric, UNABLE TO FIND, and valerian root.    Allergies:   Review of patient's allergies indicates:   Allergen Reactions    Adhesive      Surgical tape/ burned sckin    Percocet [oxycodone-acetaminophen] Nausea Only     Does not know what else she can take; can take tylenol  Pt states she is not allergic to percocet, just upsets her stomach.  States she has shoulder surgery here and has probably had dilaudid. No real allergy          Objective      Observation: Patient presents with an overall pleasant general affect who does not appear to be in any acute distress. Patient presents wearing knee brace on the left knee.     Posture: Patient demonstrates standing posture or elevated right shoulder, rounded shoulders with slight increased thoracic kyphosis.     Gait: Patient ambulates into clinic independently with no assistance or assistive device. She displays decreased onesimo, decreased hip extension bilaterally, and decreased hip/knee flexion.     Sensation: Intact bilateral lower extremities to light touch.     Range of Motion:  Lumbar    Percentage  Pain    Flexion 70% No Pain    Extension 50% Pain L low back    Right Sidebend  60% Pain L low back    Left Sidebend  50% Pain L low back    Right Rotation 60% Discomfort    Left Rotation  60% Discomfort      Hip    Right  Left    Flexion 95 degrees   95 degrees    Extension 5 degrees  5 degrees    Internal Rotation 25 degrees  20 degrees    External Rotation  40 degrees  40 degrees      Knee    Right  Left    Flexion 120 degrees  115 degrees    Extension  -2 degrees (lack) -3 degrees (lack)     Lower Extremity Strength (MMT):   Right  Left    Hip Flexion 3/5 3/5   Hip Abduction 3/5 3/5   Knee Flexion 4/5 4-/5   Knee Extension 4/5 4-/5   Dorsiflexion 4-/5 4-/5   Plantarflexion  4/5  4/5     Joint Mobility: Decreased thoracic PA mobility, decreased inferior hip glides and PA joint mobility with increased lower lumbar PA joint mobility.     Palpation: No notable tenderness to palpation     Flexibility: Further assessment at follow-up sessions.     Functional Tests:  - TU sec with no AD  - 30 Sec Chair Rise: 9 Reps with slight UE use push off legs last reps   - Single Leg Balance: Right = 7 sec, Left = 2 sec     Special Tests:  - Flexion Bias: (-)   - Extension Bias: (-)   - Supine to Sit: L anterior rotated   - Flick: Hypomobile on L       Intake Outcome Measure for FOTO Knee Survey    Therapist reviewed FOTO scores for Dana Cook on 7/10/2023.   FOTO documents entered into [employer] - see Media section.    Intake Score: 54%        Treatment     Total Treatment time (time-based codes) separate from Evaluation: 20 minutes     Dana received the treatments listed below:      therapeutic exercises to develop strength, endurance, ROM, flexibility, posture, and core stabilization for 00 minutes including:      manual therapy techniques: Joint mobilizations and Soft tissue Mobilization were applied to the: SI, lumbar, hip  for 05 minutes, including:    METs for innominate 3x6 sec holds (2 rounds)  Long axis hip distraction grade II-III     neuromuscular re-education activities to improve: Balance, Coordination, Kinesthetic, Sense, Proprioception, and Posture for 15 minutes. The following activities were included:    Pt education on PT POC, Prognosis, and HEP   Hip  adduction with ball 10x10 sec holds   Hip abduction with belt 10x10 sec holds   TA activation 1x10 reps 5 sec holds   Sidelying clamshells YellowTB 1x10 reps 3-5 sec holds   LAQs 1x10 reps 3 sec holds     therapeutic activities to improve functional performance for 00 minutes, including:      gait training to improve functional mobility and safety for 00 minutes, including:        Patient Education and Home Exercises     Education provided:   - PT POC, Prognosis, and HEP     Written Home Exercises Provided: yes. Exercises were reviewed and Dana was able to demonstrate them prior to the end of the session.  Dana demonstrated good  understanding of the education provided. See EMR under Patient Instructions for exercises provided during therapy sessions.    Assessment     Dana is a 72 y.o. female referred to outpatient Physical Therapy with a medical diagnosis of Z74.09 (ICD-10-CM) - Impaired functional mobility, balance, gait, and endurance and R29.6 (ICD-10-CM) - Multiple falls. Patient presents with decreased range of motion, decreased strength, posture abnormality, decreased balance, and functional limitations with ADLs and prone to falls limiting tolerance to walking and stairs. Patient would benefit from skilled PT intervention to address above stated deficits and improve overall functional mobility.     Patient prognosis is Good.   Patient will benefit from skilled outpatient Physical Therapy to address the deficits stated above and in the chart below, provide patient /family education, and to maximize patientt's level of independence.     Plan of care discussed with patient: Yes  Patient's spiritual, cultural and educational needs considered and patient is agreeable to the plan of care and goals as stated below:     Anticipated Barriers for therapy: Co-morbidities, scheduling, age     Medical Necessity is demonstrated by the following  History  Co-morbidities and personal factors that may impact the plan of  care [] LOW: no personal factors / co-morbidities  [] MODERATE: 1-2 personal factors / co-morbidities  [x] HIGH: 3+ personal factors / co-morbidities    Moderate / High Support Documentation: Increased age, anxiety, depression, HTN, CKD stage III, history of cancer and familial, osteopenia      Examination  Body Structures and Functions, activity limitations and participation restrictions that may impact the plan of care [] LOW: addressing 1-2 elements  [] MODERATE: 3+ elements  [x] HIGH: 3+ elements (please support below)    Moderate / High Support Documentation: Range of motion, gait, strength, motor control, balance, and neuromuscular re-education     Clinical Presentation [x] LOW: stable  [] MODERATE: Evolving  [] HIGH: Unstable     Decision Making/ Complexity Score: low       Goals:  Short Term Goals: 4 weeks   Patient will be independent in initial HEP to help supplement PT.   Patient will improve lumbar AROM to >/= 75% in all planes to help with ADLs.  Patient will improve single leg balance to >/= 10 sec bilaterally to help decrease fall risk.     Long Term Goals: 8 weeks   Patient will be indpenedent in updated HEP to help supplement PT and maintain gains made in PT>   Patient will improve FOTO intake score to >/= predicted score to show improvement in condition.   Patient will improve Lumbar AROM to painfree to help with ADLs.   Patient will improve hip abduction strength by 1/2 MMT to help with gait mechanics.   Plan     Plan of care Certification: 7/10/2023 to 9/12/2023.    Outpatient Physical Therapy 1-2 times weekly for 8 weeks to include the following interventions: Gait Training, Manual Therapy, Moist Heat/ Ice, Neuromuscular Re-ed, Patient Education, Self Care, Therapeutic Activities, and Therapeutic Exercise.     Nadia Sarmiento, PT, DPT, OCS

## 2023-07-17 ENCOUNTER — CLINICAL SUPPORT (OUTPATIENT)
Dept: REHABILITATION | Facility: HOSPITAL | Age: 73
End: 2023-07-17
Payer: MEDICARE

## 2023-07-17 DIAGNOSIS — R29.898 DECREASED STRENGTH OF LOWER EXTREMITY: Primary | ICD-10-CM

## 2023-07-17 PROCEDURE — 97140 MANUAL THERAPY 1/> REGIONS: CPT

## 2023-07-17 PROCEDURE — 97110 THERAPEUTIC EXERCISES: CPT

## 2023-07-17 PROCEDURE — 97530 THERAPEUTIC ACTIVITIES: CPT

## 2023-07-17 PROCEDURE — 97112 NEUROMUSCULAR REEDUCATION: CPT

## 2023-07-17 NOTE — PROGRESS NOTES
[facility]   Physical Therapy Treatment Note     [id number]    Therapy Diagnosis:   Encounter Diagnosis   Name Primary?    Decreased strength of lower extremity Yes     Physician: Mariam Johnson MD    Visit Date: 7/17/2023  Physician Orders: PT Eval and Treat   Medical Diagnosis from Referral:   Z74.09 (ICD-10-CM) - Impaired functional mobility, balance, gait, and endurance   R29.6 (ICD-10-CM) - Multiple falls      Evaluation Date: 7/10/2023  Authorization Period Expiration: 12/31/2023  Plan of Care Expiration: 9/12/2023  Progress Note Due: 8/10/2023  Visit # / Visits authorized: 1/20  FOTO: 1/3    PTA Visit #: 0/5     FOTO first follow up:   FOTO second follow up:     Time In: 3:04 pm  Time Out: 3:58 pm   Total Billable Time: 54 minutes    Precautions: Standard, cancer, and CKD stage III     SUBJECTIVE     Pt reports: She had a busy weekend doing a lot of work outside especially mowing the grass so she is sore all over. She is having some discomfort in her left shoulder, low back and both of her knees. Felt some grinding in her knees with LAQ exercise at home but otherwise doing ok. Still feeling nauseous and following up with gastro later this week.   She was compliant with home exercise program.  Response to previous treatment: Independent in HEP  Functional change: Ongoing     Pain: 2/10  Location: bilateral back  and knee      OBJECTIVE     Objective Measures updated at progress report unless specified.     Treatment     Dana received the treatments listed below:      therapeutic exercises to develop strength, endurance, ROM, flexibility, posture, and core stabilization for 16 minutes including:    SAQ 3x10 reps 3# each   SLR 2x10 reps each   DKTC with physioball 2x10 reps   Sidelying clamshells YellowTB 2x10 reps 5 sec holds     manual therapy techniques: Joint mobilizations and Soft tissue Mobilization were applied to the: hip, knee for 10  minutes, including:    Hip/knee assessment   (B) EOM knee distraction   (R) Long axis hip distraction grade II-III    neuromuscular re-education activities to improve: Balance, Coordination, Kinesthetic, Sense, Proprioception, and Posture for 14 minutes. The following activities were included:    Bridge with GreenTB around knees 2x10 reps 3 sec holds   Step up 4-inch in // bars with 2-finger upper extremity support with opposite march 2x10 reps   Tandem stance balance 2x20 sec each  Paloff press with GreenTB 2x10 reps each     therapeutic activities to improve functional performance for 14 minutes, including:    Shuttle DL press 3x10 reps 62.5#   Shuttle SL press 3x8 reps 37.5#   NuStep end of session for range of motion, lower extremity strength, and cardiovascular endurance x6 min level 2.5     gait training to improve functional mobility and safety for 00 minutes, including:      Patient Education and Home Exercises     Home Exercises Provided and Patient Education Provided     Education provided:   - Continue with HEP   - Importance of slow and controlled with exercises and not overworking herself     Written Home Exercises Provided: Patient instructed to cont prior HEP. Exercises were reviewed and Dana was able to demonstrate them prior to the end of the session.  Dana demonstrated good  understanding of the education provided. See EMR under Patient Instructions for exercises provided during therapy sessions    ASSESSMENT     Dana tolerated therapy session well. She presented with increased soreness following increased activities at home. She responded well to manual therapy intervention offloading her knees and hip. Progressed with core stability to help offload her lumbar spine with good tolerance. Continued emphasis on lower extremity strengthening to help with balance and gait mechanics. End of session patient reported feeling much better than when she came in to therapy. Will continue to monitor and  progress as able.     Dana Is progressing well towards her goals.   Pt prognosis is Good.     Pt will continue to benefit from skilled outpatient physical therapy to address the deficits listed in the problem list box on initial evaluation, provide pt/family education and to maximize pt's level of independence in the home and community environment.     Pt's spiritual, cultural and educational needs considered and pt agreeable to plan of care and goals.     Anticipated barriers to physical therapy: Co-morbidities, scheduling, and age     Goals:   Short Term Goals: 4 weeks (Progressing, not met)  Patient will be independent in initial HEP to help supplement PT.   Patient will improve lumbar AROM to >/= 75% in all planes to help with ADLs.  Patient will improve single leg balance to >/= 10 sec bilaterally to help decrease fall risk.      Long Term Goals: 8 weeks (Progressing, not met)  Patient will be indpenedent in updated HEP to help supplement PT and maintain gains made in PT.  Patient will improve FOTO intake score to >/= predicted score to show improvement in condition.   Patient will improve Lumbar AROM to painfree to help with ADLs.   Patient will improve hip abduction strength by 1/2 MMT to help with gait mechanics.     PLAN   Plan of care Certification: 7/10/2023 to 9/12/2023.    Continue with current plan of care with emphasis on core stability, lower extremity strengthening, and balance/proprioception training.     Nadia Sarmiento, PT, DPT, OCS

## 2023-07-19 ENCOUNTER — TELEPHONE (OUTPATIENT)
Dept: ENDOSCOPY | Facility: HOSPITAL | Age: 73
End: 2023-07-19
Payer: MEDICARE

## 2023-07-19 ENCOUNTER — OFFICE VISIT (OUTPATIENT)
Dept: GASTROENTEROLOGY | Facility: CLINIC | Age: 73
End: 2023-07-19
Payer: MEDICARE

## 2023-07-19 VITALS
HEART RATE: 63 BPM | WEIGHT: 163.56 LBS | SYSTOLIC BLOOD PRESSURE: 131 MMHG | DIASTOLIC BLOOD PRESSURE: 83 MMHG | HEIGHT: 65 IN | BODY MASS INDEX: 27.25 KG/M2

## 2023-07-19 DIAGNOSIS — R11.10 VOMITING, UNSPECIFIED VOMITING TYPE, UNSPECIFIED WHETHER NAUSEA PRESENT: ICD-10-CM

## 2023-07-19 DIAGNOSIS — R63.4 WEIGHT LOSS: ICD-10-CM

## 2023-07-19 DIAGNOSIS — R11.0 NAUSEA: Primary | ICD-10-CM

## 2023-07-19 DIAGNOSIS — R63.4 WEIGHT LOSS: Primary | ICD-10-CM

## 2023-07-19 PROCEDURE — 99214 OFFICE O/P EST MOD 30 MIN: CPT | Mod: S$PBB,GC,, | Performed by: STUDENT IN AN ORGANIZED HEALTH CARE EDUCATION/TRAINING PROGRAM

## 2023-07-19 PROCEDURE — 99999 PR PBB SHADOW E&M-EST. PATIENT-LVL V: CPT | Mod: PBBFAC,GC,, | Performed by: STUDENT IN AN ORGANIZED HEALTH CARE EDUCATION/TRAINING PROGRAM

## 2023-07-19 PROCEDURE — 99215 OFFICE O/P EST HI 40 MIN: CPT | Mod: PBBFAC | Performed by: STUDENT IN AN ORGANIZED HEALTH CARE EDUCATION/TRAINING PROGRAM

## 2023-07-19 PROCEDURE — 99214 PR OFFICE/OUTPT VISIT, EST, LEVL IV, 30-39 MIN: ICD-10-PCS | Mod: S$PBB,GC,, | Performed by: STUDENT IN AN ORGANIZED HEALTH CARE EDUCATION/TRAINING PROGRAM

## 2023-07-19 PROCEDURE — 99999 PR PBB SHADOW E&M-EST. PATIENT-LVL V: ICD-10-PCS | Mod: PBBFAC,GC,, | Performed by: STUDENT IN AN ORGANIZED HEALTH CARE EDUCATION/TRAINING PROGRAM

## 2023-07-19 NOTE — PROGRESS NOTES
[facility]    Reason for visit: The primary encounter diagnosis was Nausea. Diagnoses of Vomiting, unspecified vomiting type, unspecified whether nausea present and Weight loss were also pertinent to this visit.  Referring Provider/PCP: Mariam Johnson MD    History of Present Illness:  Dana Cook is a 72 y.o. female with a history of breast cancer currently in remission, CHACHO gene mutation, diverticulosis, colon polyps who is presenting with a 3 month history of nausea and nonbloody vomiting. She states the nausea has been on a daily basis and has at least one episode of emesis per day. Two weeks ago, she was started on Protonix 40 mg daily, which has slightly improved the nausea but is still experiencing daily symptoms. She also reports a 20 lb unintentional weight loss since the symptoms started 3 months ago and early satiety. Denies any fevers, chills, abdominal pain, change in stool color, melena, NSAID use. She was previously seen by Dr. Rojas in 1/2022 for further evaluation of pancreatic screening given her increased risk with CHACHO gene mutation. EUS normal. MRI 2/2023 with normal appearing pancreas.    PEndoHx:  Colonoscopy 2020 at outside facility (report not available in epic): reportedly normal, due for repeat in 5 years,.   Colonoscopy 2009 at outside facility (report not available in epic): colon polyps, unsure of pathology  EUS 2/2022 for pancreatic screening: normal.    Surgical hx:  Hysterectomy    Review of Systems   Constitutional:  Positive for weight loss. Negative for chills, fever and malaise/fatigue.   Gastrointestinal:  Positive for nausea and vomiting. Negative for abdominal pain, blood in stool, diarrhea, heartburn and melena.     Medical History:  Past Medical History:   Diagnosis Date    Anxiety     Atypical hyperplasia of breast 03/19/2021    left    Breast cancer 03/19/2021    left    Cancer     Colon polyps     Depression     Dry eyes     Dry mouth      Dupuytren's contracture     Hypertension     left arm fracture     hairline    Murmur, heart     Osteopenia     PONV (postoperative nausea and vomiting)     with wisdom teeth    Sarcoidosis        Past Surgical History:   Procedure Laterality Date    BREAST BIOPSY Right 2016    RT axilla-sarcoidosis    BREAST BIOPSY Left 03/19/2021    cancer    BREAST BIOPSY Left 03/19/2021    [facility]    BREAST LUMPECTOMY Left 2021    ENDOSCOPIC ULTRASOUND OF UPPER GASTROINTESTINAL TRACT N/A 02/02/2022    Procedure: ULTRASOUND, UPPER GI TRACT, ENDOSCOPIC;  Surgeon: Pancho Ty MD;  Location: [facility];  Service: Endoscopy;  Laterality: N/A;    Laparoscopy for possible endometriosis   1985    MASTECTOMY WITH SENTINEL NODE BIOPSY AND AXILLARY LYMPH NODE DISSECTION Left 04/30/2021    Procedure: MASTECTOMY, WITH SENTINEL NODE BIOPSY AND AXILLARY LYMPHADENECTOMY;  Surgeon: Madyson Reza MD;  Location: [facility];  Service: Plastics;  Laterality: Left;    MASTECTOMY, PARTIAL Bilateral 04/30/2021    Procedure: MASTECTOMY, PARTIAL - BILATERAL PARTIAL MASTECTOMY WITH  radiological marker.;  Surgeon: Madyson Reza MD;  Location: [facility];  Service: Plastics;  Laterality: Bilateral;  LEFT BREAST X 3, RIGHT BREAST X 1 -    REDUCTION OF BOTH BREASTS Bilateral 04/30/2021    Procedure: MAMMOPLASTY, REDUCTION, BILATERAL;  Surgeon: Jason Meraz MD;  Location: [facility];  Service: Plastics;  Laterality: Bilateral;    SHOULDER SURGERY  03/22/2018    TOTAL REDUCTION MAMMOPLASTY Bilateral 2021    WRIST SURGERY Left     Dupuytrens contracture    XI ROBOTIC HYSTERECTOMY, WITH SALPINGO-OOPHORECTOMY Bilateral 11/22/2021    Procedure: XI ROBOTIC HYSTERECTOMY,WITH SALPINGO-OOPHORECTOMY;  Surgeon: Modesta Roberts MD;  Location: [address];  Service: OB/GYN;  Laterality: Bilateral;       Family History   Problem Relation Age of Onset    Hypertension Mother     Other Mother         tumor    Cancer Father 68        malignant fibrous histocytoma of heart     Hypertension Father     Uterine cancer Sister 49    Breast cancer Sister 70    Diabetes Sister     Cancer Sister         skin BCC of face    Cancer Brother         skin melanoma    Breast cancer Maternal Aunt         late 60s    Heart failure Maternal Aunt         CHF    Breast cancer Paternal Aunt         dx 60s    Cervical cancer Paternal Aunt 77    Cancer Paternal Aunt         vaginal melanoma    Cervical cancer Paternal Aunt     Breast cancer Paternal Aunt         maybe    Uterine cancer Maternal Grandmother 86    Hypertension Maternal Grandmother     Tongue cancer Maternal Grandfather         (smoked)    Leukemia Paternal Grandmother     Mental illness Paternal Grandfather     Other Son         overweight    Breast cancer Paternal Cousin 55        s/p genetic testing    Cancer Paternal Cousin         thyroid    Breast cancer Paternal Cousin     Multiple sclerosis Paternal Cousin     Breast cancer Other         dx 60s    Breast cancer Other 55    Pancreatic cancer Other     Cancer Other         origins?    Colon cancer Neg Hx     Ovarian cancer Neg Hx     Anesthesia problems Neg Hx     Esophageal cancer Neg Hx        Social History     Socioeconomic History    Marital status:     Number of children: 1   Occupational History    Occupation: retired     Comment: Part time security work   Tobacco Use    Smoking status: Never    Smokeless tobacco: Never   Substance and Sexual Activity    Alcohol use: Yes     Alcohol/week: 5.0 - 6.0 standard drinks     Types: 5 - 6 Standard drinks or equivalent per week     Comment: wine every night    Drug use: Never    Sexual activity: Not Currently     Partners: Male   Social History Narrative    Have a new cat and 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         Current Outpatient Medications on File Prior to Visit   Medication Sig Dispense Refill    letrozole (FEMARA) 2.5 mg Tab Take 1 tablet (2.5 mg total) by mouth once daily. 30 tablet 11    UNABLE TO FIND medication  name:myxtic gummies      acetaminophen (TYLENOL) 500 MG tablet Take 2 tablets (1,000 mg total) by mouth every 8 (eight) hours. (Patient not taking: Reported on 6/13/2023) 30 tablet 1    alendronate (FOSAMAX) 70 MG tablet Take 1 tablet (70 mg total) by mouth every 7 days. (Patient not taking: Reported on 7/19/2023) 12 tablet 3    ascorbic acid, vitamin C, (VITAMIN C) 1000 MG tablet       aspirin (ECOTRIN) 81 MG EC tablet Take 81 mg by mouth once daily.      atorvastatin (LIPITOR) 10 MG tablet TAKE 1 TABLET(10 MG) BY MOUTH EVERY DAY (Patient not taking: Reported on 7/19/2023) 90 tablet 3    BIOTIN ORAL Take by mouth once daily.      buPROPion (WELLBUTRIN XL) 300 MG 24 hr tablet TAKE 1 TABLET(300 MG) BY MOUTH EVERY DAY (Patient not taking: Reported on 7/19/2023) 90 tablet 3    CALCIUM-VITAMIN D3 ORAL Take by mouth. 600 mg - 20 mcg. Take 1 cap oral twice a day      chlorthalidone (HYGROTEN) 25 MG Tab TAKE 1 TABLET BY MOUTH EVERY DAY (Patient not taking: Reported on 7/19/2023) 90 tablet 1    colchicine (COLCRYS) 0.6 mg tablet On 1st day of gout flare take 1 tablet p.o t.i.d; From day 2 onwards, take 1 tablet p.o. b.i.d. until flare resolves. (Patient not taking: Reported on 8/3/2022) 10 tablet 2    fluocinonide (LIDEX) 0.05 % external solution USE ONCE TO BID ON SCALP FOR RASH OR IRRITATION      lidocaine (LIDODERM) 5 % Place 1 patch onto the skin once daily. Remove & Discard patch within 12 hours. Do not use more than 1 patch in 24 hours. (Patient not taking: Reported on 6/13/2023) 10 patch 0    pantoprazole (PROTONIX) 40 MG tablet Take 1 tablet (40 mg total) by mouth once daily. (Patient not taking: Reported on 7/19/2023) 30 tablet 2    prenatal vit calc,iron,folic (PRENATAL VITAMIN ORAL) Take by mouth once daily.      RED YEAST RICE ORAL Take by mouth once daily.      SUBHASH'S WORT/S.GINSGR (SUBHASH'S WORT-SIBER.GINSENG) 450-90 mg Tab       UNABLE TO FIND Hemp seed oil      VALERIAN ROOT ORAL Take by mouth 2 (two)  times daily as needed.      [DISCONTINUED] TURMERIC ORAL Take by mouth once daily.       No current facility-administered medications on file prior to visit.       Review of patient's allergies indicates:   Allergen Reactions    Adhesive      Surgical tape/ burned sckin    Percocet [oxycodone-acetaminophen] Nausea Only     Does not know what else she can take; can take tylenol  Pt states she is not allergic to percocet, just upsets her stomach.  States she has shoulder surgery here and has probably had dilaudid. No real allergy         Physical Exam:  Constitutional:  not in acute distress and well developed  HENT: Head: Normal, normocephalic, atraumatic.  Eyes: conjunctiva clear and sclera nonicteric  GI: soft, non-tender, without masses or organomegaly and nondistended  Skin: normal color  Neurological: alert, oriented x3  Psychiatric: mood and affect are within normal limits, pt is a good historian; no memory problems were noted    Laboratory:  Lab Results   Component Value Date     06/21/2023    K 3.0 (L) 06/21/2023     06/21/2023    CO2 27 06/21/2023    BUN 14 06/21/2023    CREATININE 1.1 06/21/2023    CALCIUM 10.1 06/21/2023    ANIONGAP 11 06/21/2023    ESTGFRAFRICA >60.0 03/18/2022    EGFRNONAA 56.8 (A) 03/18/2022       Lab Results   Component Value Date    ALT 37 06/21/2023    AST 35 06/21/2023    ALKPHOS 67 06/21/2023    BILITOT 0.6 06/21/2023       Lab Results   Component Value Date    WBC 4.42 06/21/2023    HGB 12.0 06/21/2023    HCT 36.0 (L) 06/21/2023    MCV 93 06/21/2023     06/21/2023       Microbiology:  No Pertinent Microbiology    Imaging:  MRI 2/2023: normal pancreas, diverticulosis, suspected hepatic steatosis    Assessment:  Dana Cook is a 72 y.o. female who is presenting for a 3 month history of nausea and vomiting. Started on PPI 2 weeks ago by PCP with some relief. Above symptoms are less common for GERD but still could be related; however, with weight loss and early  satiety, pursing endoscopic intervention would be the next best step. If unrevealing, will see patient back in clinic for further workup.    Problems:  Nausea and vomiting  Weight loss  Early satiety       Plan:  EGD  Continue PPI   Follow up in about 6 weeks (around 8/30/2023).    Seema Bonilla MD  Gastroenterology Fellow     Patient was discussed with Dr. Wheeler.

## 2023-07-19 NOTE — TELEPHONE ENCOUNTER
"----- Message from Seema Bonilla MD sent at 2023  3:01 PM CDT -----  Regarding: EGD  Procedure: EGD    Diagnosis: Weight loss    Procedure Timin-4 weeks    #If within 4 weeks selected, please drew as high priority#    #If greater than 12 weeks, please select "5-12 weeks" and delay sending until 2 months prior to requested date#     Provider: Any endoscopist    Location: No Preference    Additional Scheduling Information: No scheduling concerns    Prep Specifications:N/A    Have you attached a patient to this message: yes      "

## 2023-07-19 NOTE — TELEPHONE ENCOUNTER
Spoke to patient to schedule procedure(s) Upper Endoscopy (EGD)       Physician to perform procedure(s) Dr. LISA Smith  Date of Procedure (s) 8/4/23  Arrival Time 8:30 AM  Time of Procedure(s) 9:30 AM   Location of Procedure(s) [address]  Type of Rx Prep sent to patient: N/A  Instructions provided to patient via [facility]    Patient was informed on the following information and verbalized understanding. Screening questionnaire reviewed with patient and complete. If procedure requires anesthesia, a responsible adult needs to be present to accompany the patient home, patient cannot drive after receiving anesthesia. Appointment details are tentative, especially check-in time. Patient will receive a prep-op call 4 days prior to confirm check-in time for procedure. If applicable the patient should contact their pharmacy to verify Rx for procedure prep is ready for pick-up. Patient was advised to call the scheduling department at [phone] if pharmacy states no Rx is available. Patient was advised to call the endoscopy scheduling department if any questions or concerns arise.      [facility] Scheduling Department

## 2023-07-19 NOTE — H&P (VIEW-ONLY)
[facility]    Reason for visit: The primary encounter diagnosis was Nausea. Diagnoses of Vomiting, unspecified vomiting type, unspecified whether nausea present and Weight loss were also pertinent to this visit.  Referring Provider/PCP: Mariam Johnson MD    History of Present Illness:  Dana Cook is a 72 y.o. female with a history of breast cancer currently in remission, CHACHO gene mutation, diverticulosis, colon polyps who is presenting with a 3 month history of nausea and nonbloody vomiting. She states the nausea has been on a daily basis and has at least one episode of emesis per day. Two weeks ago, she was started on Protonix 40 mg daily, which has slightly improved the nausea but is still experiencing daily symptoms. She also reports a 20 lb unintentional weight loss since the symptoms started 3 months ago and early satiety. Denies any fevers, chills, abdominal pain, change in stool color, melena, NSAID use. She was previously seen by Dr. Rojas in 1/2022 for further evaluation of pancreatic screening given her increased risk with CHACHO gene mutation. EUS normal. MRI 2/2023 with normal appearing pancreas.    PEndoHx:  Colonoscopy 2020 at outside facility (report not available in epic): reportedly normal, due for repeat in 5 years,.   Colonoscopy 2009 at outside facility (report not available in epic): colon polyps, unsure of pathology  EUS 2/2022 for pancreatic screening: normal.    Surgical hx:  Hysterectomy    Review of Systems   Constitutional:  Positive for weight loss. Negative for chills, fever and malaise/fatigue.   Gastrointestinal:  Positive for nausea and vomiting. Negative for abdominal pain, blood in stool, diarrhea, heartburn and melena.     Medical History:  Past Medical History:   Diagnosis Date    Anxiety     Atypical hyperplasia of breast 03/19/2021    left    Breast cancer 03/19/2021    left    Cancer     Colon polyps     Depression     Dry eyes     Dry mouth      Dupuytren's contracture     Hypertension     left arm fracture     hairline    Murmur, heart     Osteopenia     PONV (postoperative nausea and vomiting)     with wisdom teeth    Sarcoidosis        Past Surgical History:   Procedure Laterality Date    BREAST BIOPSY Right 2016    RT axilla-sarcoidosis    BREAST BIOPSY Left 03/19/2021    cancer    BREAST BIOPSY Left 03/19/2021    [facility]    BREAST LUMPECTOMY Left 2021    ENDOSCOPIC ULTRASOUND OF UPPER GASTROINTESTINAL TRACT N/A 02/02/2022    Procedure: ULTRASOUND, UPPER GI TRACT, ENDOSCOPIC;  Surgeon: Pancho Ty MD;  Location: [facility];  Service: Endoscopy;  Laterality: N/A;    Laparoscopy for possible endometriosis   1985    MASTECTOMY WITH SENTINEL NODE BIOPSY AND AXILLARY LYMPH NODE DISSECTION Left 04/30/2021    Procedure: MASTECTOMY, WITH SENTINEL NODE BIOPSY AND AXILLARY LYMPHADENECTOMY;  Surgeon: Madyson Reza MD;  Location: [facility];  Service: Plastics;  Laterality: Left;    MASTECTOMY, PARTIAL Bilateral 04/30/2021    Procedure: MASTECTOMY, PARTIAL - BILATERAL PARTIAL MASTECTOMY WITH  radiological marker.;  Surgeon: Madyson Reza MD;  Location: [facility];  Service: Plastics;  Laterality: Bilateral;  LEFT BREAST X 3, RIGHT BREAST X 1 -    REDUCTION OF BOTH BREASTS Bilateral 04/30/2021    Procedure: MAMMOPLASTY, REDUCTION, BILATERAL;  Surgeon: Jason Meraz MD;  Location: [facility];  Service: Plastics;  Laterality: Bilateral;    SHOULDER SURGERY  03/22/2018    TOTAL REDUCTION MAMMOPLASTY Bilateral 2021    WRIST SURGERY Left     Dupuytrens contracture    XI ROBOTIC HYSTERECTOMY, WITH SALPINGO-OOPHORECTOMY Bilateral 11/22/2021    Procedure: XI ROBOTIC HYSTERECTOMY,WITH SALPINGO-OOPHORECTOMY;  Surgeon: Modesta Roberts MD;  Location: [address];  Service: OB/GYN;  Laterality: Bilateral;       Family History   Problem Relation Age of Onset    Hypertension Mother     Other Mother         tumor    Cancer Father 68        malignant fibrous histocytoma of heart     Hypertension Father     Uterine cancer Sister 49    Breast cancer Sister 70    Diabetes Sister     Cancer Sister         skin BCC of face    Cancer Brother         skin melanoma    Breast cancer Maternal Aunt         late 60s    Heart failure Maternal Aunt         CHF    Breast cancer Paternal Aunt         dx 60s    Cervical cancer Paternal Aunt 77    Cancer Paternal Aunt         vaginal melanoma    Cervical cancer Paternal Aunt     Breast cancer Paternal Aunt         maybe    Uterine cancer Maternal Grandmother 86    Hypertension Maternal Grandmother     Tongue cancer Maternal Grandfather         (smoked)    Leukemia Paternal Grandmother     Mental illness Paternal Grandfather     Other Son         overweight    Breast cancer Paternal Cousin 55        s/p genetic testing    Cancer Paternal Cousin         thyroid    Breast cancer Paternal Cousin     Multiple sclerosis Paternal Cousin     Breast cancer Other         dx 60s    Breast cancer Other 55    Pancreatic cancer Other     Cancer Other         origins?    Colon cancer Neg Hx     Ovarian cancer Neg Hx     Anesthesia problems Neg Hx     Esophageal cancer Neg Hx        Social History     Socioeconomic History    Marital status:     Number of children: 1   Occupational History    Occupation: retired     Comment: Part time security work   Tobacco Use    Smoking status: Never    Smokeless tobacco: Never   Substance and Sexual Activity    Alcohol use: Yes     Alcohol/week: 5.0 - 6.0 standard drinks     Types: 5 - 6 Standard drinks or equivalent per week     Comment: wine every night    Drug use: Never    Sexual activity: Not Currently     Partners: Male   Social History Narrative    Have a new cat and 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         Current Outpatient Medications on File Prior to Visit   Medication Sig Dispense Refill    letrozole (FEMARA) 2.5 mg Tab Take 1 tablet (2.5 mg total) by mouth once daily. 30 tablet 11    UNABLE TO FIND medication  name:myxtic gummies      acetaminophen (TYLENOL) 500 MG tablet Take 2 tablets (1,000 mg total) by mouth every 8 (eight) hours. (Patient not taking: Reported on 6/13/2023) 30 tablet 1    alendronate (FOSAMAX) 70 MG tablet Take 1 tablet (70 mg total) by mouth every 7 days. (Patient not taking: Reported on 7/19/2023) 12 tablet 3    ascorbic acid, vitamin C, (VITAMIN C) 1000 MG tablet       aspirin (ECOTRIN) 81 MG EC tablet Take 81 mg by mouth once daily.      atorvastatin (LIPITOR) 10 MG tablet TAKE 1 TABLET(10 MG) BY MOUTH EVERY DAY (Patient not taking: Reported on 7/19/2023) 90 tablet 3    BIOTIN ORAL Take by mouth once daily.      buPROPion (WELLBUTRIN XL) 300 MG 24 hr tablet TAKE 1 TABLET(300 MG) BY MOUTH EVERY DAY (Patient not taking: Reported on 7/19/2023) 90 tablet 3    CALCIUM-VITAMIN D3 ORAL Take by mouth. 600 mg - 20 mcg. Take 1 cap oral twice a day      chlorthalidone (HYGROTEN) 25 MG Tab TAKE 1 TABLET BY MOUTH EVERY DAY (Patient not taking: Reported on 7/19/2023) 90 tablet 1    colchicine (COLCRYS) 0.6 mg tablet On 1st day of gout flare take 1 tablet p.o t.i.d; From day 2 onwards, take 1 tablet p.o. b.i.d. until flare resolves. (Patient not taking: Reported on 8/3/2022) 10 tablet 2    fluocinonide (LIDEX) 0.05 % external solution USE ONCE TO BID ON SCALP FOR RASH OR IRRITATION      lidocaine (LIDODERM) 5 % Place 1 patch onto the skin once daily. Remove & Discard patch within 12 hours. Do not use more than 1 patch in 24 hours. (Patient not taking: Reported on 6/13/2023) 10 patch 0    pantoprazole (PROTONIX) 40 MG tablet Take 1 tablet (40 mg total) by mouth once daily. (Patient not taking: Reported on 7/19/2023) 30 tablet 2    prenatal vit calc,iron,folic (PRENATAL VITAMIN ORAL) Take by mouth once daily.      RED YEAST RICE ORAL Take by mouth once daily.      SUBHASH'S WORT/S.GINSGR (SUBHASH'S WORT-SIBER.GINSENG) 450-90 mg Tab       UNABLE TO FIND Hemp seed oil      VALERIAN ROOT ORAL Take by mouth 2 (two)  times daily as needed.      [DISCONTINUED] TURMERIC ORAL Take by mouth once daily.       No current facility-administered medications on file prior to visit.       Review of patient's allergies indicates:   Allergen Reactions    Adhesive      Surgical tape/ burned sckin    Percocet [oxycodone-acetaminophen] Nausea Only     Does not know what else she can take; can take tylenol  Pt states she is not allergic to percocet, just upsets her stomach.  States she has shoulder surgery here and has probably had dilaudid. No real allergy         Physical Exam:  Constitutional:  not in acute distress and well developed  HENT: Head: Normal, normocephalic, atraumatic.  Eyes: conjunctiva clear and sclera nonicteric  GI: soft, non-tender, without masses or organomegaly and nondistended  Skin: normal color  Neurological: alert, oriented x3  Psychiatric: mood and affect are within normal limits, pt is a good historian; no memory problems were noted    Laboratory:  Lab Results   Component Value Date     06/21/2023    K 3.0 (L) 06/21/2023     06/21/2023    CO2 27 06/21/2023    BUN 14 06/21/2023    CREATININE 1.1 06/21/2023    CALCIUM 10.1 06/21/2023    ANIONGAP 11 06/21/2023    ESTGFRAFRICA >60.0 03/18/2022    EGFRNONAA 56.8 (A) 03/18/2022       Lab Results   Component Value Date    ALT 37 06/21/2023    AST 35 06/21/2023    ALKPHOS 67 06/21/2023    BILITOT 0.6 06/21/2023       Lab Results   Component Value Date    WBC 4.42 06/21/2023    HGB 12.0 06/21/2023    HCT 36.0 (L) 06/21/2023    MCV 93 06/21/2023     06/21/2023       Microbiology:  No Pertinent Microbiology    Imaging:  MRI 2/2023: normal pancreas, diverticulosis, suspected hepatic steatosis    Assessment:  Dana Cook is a 72 y.o. female who is presenting for a 3 month history of nausea and vomiting. Started on PPI 2 weeks ago by PCP with some relief. Above symptoms are less common for GERD but still could be related; however, with weight loss and early  satiety, pursing endoscopic intervention would be the next best step. If unrevealing, will see patient back in clinic for further workup.    Problems:  Nausea and vomiting  Weight loss  Early satiety       Plan:  EGD  Continue PPI   Follow up in about 6 weeks (around 8/30/2023).    Seema Bonilla MD  Gastroenterology Fellow     Patient was discussed with Dr. Wheeler.

## 2023-07-24 ENCOUNTER — CLINICAL SUPPORT (OUTPATIENT)
Dept: REHABILITATION | Facility: HOSPITAL | Age: 73
End: 2023-07-24
Payer: MEDICARE

## 2023-07-24 DIAGNOSIS — R29.898 DECREASED STRENGTH OF LOWER EXTREMITY: Primary | ICD-10-CM

## 2023-07-24 PROCEDURE — 97110 THERAPEUTIC EXERCISES: CPT

## 2023-07-24 PROCEDURE — 97530 THERAPEUTIC ACTIVITIES: CPT

## 2023-07-24 PROCEDURE — 97112 NEUROMUSCULAR REEDUCATION: CPT

## 2023-07-24 NOTE — PROGRESS NOTES
[facility]   Physical Therapy Treatment Note     [id number]    Therapy Diagnosis:   Encounter Diagnosis   Name Primary?    Decreased strength of lower extremity Yes     Physician: Mariam Johnson MD    Visit Date: 7/24/2023  Physician Orders: PT Eval and Treat   Medical Diagnosis from Referral:   Z74.09 (ICD-10-CM) - Impaired functional mobility, balance, gait, and endurance   R29.6 (ICD-10-CM) - Multiple falls      Evaluation Date: 7/10/2023  Authorization Period Expiration: 12/31/2023  Plan of Care Expiration: 9/12/2023  Progress Note Due: 8/10/2023  Visit # / Visits authorized: 2/20  FOTO: 1/3    PTA Visit #: 0/5     FOTO first follow up:   FOTO second follow up:     Time In: 1:25 pm   Time Out: 2:21 pm   Total Billable Time: 56 minutes (billable = 40)    Precautions: Standard, cancer, and CKD stage III     SUBJECTIVE     Pt reports: She is doing pretty good just still feels nauseous at times but following up for a procedure with gastro doctor soon and they gave her medication to help. Her back is feeling pretty good she is just a little tired.   She was compliant with home exercise program.  Response to previous treatment: Independent in HEP  Functional change: Ongoing     Pain: 2/10  Location: bilateral back  and knee      OBJECTIVE     Objective Measures updated at progress report unless specified.     Treatment     Dana received the treatments listed below:      therapeutic exercises to develop strength, endurance, ROM, flexibility, posture, and core stabilization for 12 minutes including:    SAQ 3x10 reps 4# each 3 sec holds   LAQ 3x8 reps 4# each   Lateral band walks RedTB at wall 3 laps (~15 feet)     Not Today:  SLR 2x10 reps each   DKTC with physioball 2x10 reps   Sidelying clamshells YellowTB 2x10 reps 5 sec holds     manual therapy techniques: Joint mobilizations and Soft tissue Mobilization were applied to the: hip, knee for 00 minutes,  including:    Not Today:   Hip/knee assessment   (B) EOM knee distraction   (R) Long axis hip distraction grade II-III    neuromuscular re-education activities to improve: Balance, Coordination, Kinesthetic, Sense, Proprioception, and Posture for 32 minutes. The following activities were included:    Bridge with GreenTB around knees 2x10 reps 3 sec holds   Step up with opposite lat pulldown 2x8 reps with 4-inch and cables 10#   DL balance on foam with ball toss 2x30 sec   Tandem balance on blue foam 2x20 sec each foot in front   Step up on 4-inch with opposite march and 2 sec holds with occasional upper extremity support for balance 3x8 reps each     Not Today:  Paloff press with GreenTB 2x10 reps each     therapeutic activities to improve functional performance for 12 minutes, including:    Hip hinge box squat with dowel to chair 2x10 reps, 1x10 reps no dowel   Recumbent bike end of session for range of motion, lower extremity strength, cardiovascular endurance x6 min     Not Today:  Shuttle DL press 3x10 reps 62.5#   Shuttle SL press 3x8 reps 37.5#   NuStep end of session for range of motion, lower extremity strength, and cardiovascular endurance x6 min level 2.5     gait training to improve functional mobility and safety for 00 minutes, including:      Patient Education and Home Exercises     Home Exercises Provided and Patient Education Provided     Education provided:   - Continue with HEP   - Importance of slow and controlled with exercises and not overworking herself     Written Home Exercises Provided: Patient instructed to cont prior HEP. Exercises were reviewed and Dana was able to demonstrate them prior to the end of the session.  Dana demonstrated good  understanding of the education provided. See EMR under Patient Instructions for exercises provided during therapy sessions    ASSESSMENT     Dana tolerated therapy session fairly well. She presented with increased fatigue but no pain. She continues to  be challenged with lower extremity strengthening and core stability exercises with good tolerance. She requires cueing for proper form and significant cueing for slow and controlled not rushing with exercises to ensure muscle activation. She continues to be challenged with balance/stability exercises as well with struggle with tandem stance with right lower extremity in front worse than left. Tolerated all interventions well and will continue to monitor and progress as able.     Dana Is progressing well towards her goals.   Pt prognosis is Good.     Pt will continue to benefit from skilled outpatient physical therapy to address the deficits listed in the problem list box on initial evaluation, provide pt/family education and to maximize pt's level of independence in the home and community environment.     Pt's spiritual, cultural and educational needs considered and pt agreeable to plan of care and goals.     Anticipated barriers to physical therapy: Co-morbidities, scheduling, and age     Goals:   Short Term Goals: 4 weeks (Progressing, not met)  Patient will be independent in initial HEP to help supplement PT.   Patient will improve lumbar AROM to >/= 75% in all planes to help with ADLs.  Patient will improve single leg balance to >/= 10 sec bilaterally to help decrease fall risk.      Long Term Goals: 8 weeks (Progressing, not met)  Patient will be indpenedent in updated HEP to help supplement PT and maintain gains made in PT.  Patient will improve FOTO intake score to >/= predicted score to show improvement in condition.   Patient will improve Lumbar AROM to painfree to help with ADLs.   Patient will improve hip abduction strength by 1/2 MMT to help with gait mechanics.     PLAN   Plan of care Certification: 7/10/2023 to 9/12/2023.    Continue with current plan of care with emphasis on core stability, lower extremity strengthening, and balance/proprioception training.     Nadia Sarmiento, PT, DPT, OCS

## 2023-07-26 ENCOUNTER — TELEPHONE (OUTPATIENT)
Dept: INTERNAL MEDICINE | Facility: CLINIC | Age: 73
End: 2023-07-26
Payer: MEDICARE

## 2023-07-26 NOTE — TELEPHONE ENCOUNTER
The patient informed of her bone density results and verbalized understanding.  Recommendations given to the patient.    The patient has been taking her Fosamax as prescribed.

## 2023-07-31 ENCOUNTER — CLINICAL SUPPORT (OUTPATIENT)
Dept: REHABILITATION | Facility: HOSPITAL | Age: 73
End: 2023-07-31
Payer: MEDICARE

## 2023-07-31 DIAGNOSIS — R29.898 DECREASED STRENGTH OF LOWER EXTREMITY: Primary | ICD-10-CM

## 2023-07-31 PROCEDURE — 97112 NEUROMUSCULAR REEDUCATION: CPT

## 2023-07-31 PROCEDURE — 97530 THERAPEUTIC ACTIVITIES: CPT

## 2023-07-31 NOTE — PROGRESS NOTES
[facility]   Physical Therapy Treatment Note     [id number]    Therapy Diagnosis:   Encounter Diagnosis   Name Primary?    Decreased strength of lower extremity Yes     Physician: Mariam Johnson MD    Visit Date: 7/31/2023  Physician Orders: PT Eval and Treat   Medical Diagnosis from Referral:   Z74.09 (ICD-10-CM) - Impaired functional mobility, balance, gait, and endurance   R29.6 (ICD-10-CM) - Multiple falls      Evaluation Date: 7/10/2023  Authorization Period Expiration: 12/31/2023  Plan of Care Expiration: 9/12/2023  Progress Note Due: 8/10/2023  Visit # / Visits authorized: 3/20  FOTO: 1/3    PTA Visit #: 0/5     FOTO first follow up:   FOTO second follow up:     Time In: 12:57 pm  Time Out: 1:46 pm   Total Billable Time: 49 minutes (billable = 30)    Precautions: Standard, cancer, and CKD stage III     SUBJECTIVE     Pt reports: She is really tired today but otherwise doing ok. She had a long weekend and did some heavy lifting. [place] has had to lift some heavy water containers to help fix it. Back is doing pretty good and not wearing knee brace today  She was compliant with home exercise program.  Response to previous treatment: Independent in HEP  Functional change: Ongoing     Pain: 2/10  Location: bilateral back  and knee      OBJECTIVE     Objective Measures updated at progress report unless specified.     Treatment     Dana received the treatments listed below:      therapeutic exercises to develop strength, endurance, ROM, flexibility, posture, and core stabilization for 09 minutes including:    LAQ 3x8 reps 4# each 3 sec holds   SLR 2x10 reps each     Not Today:  DKTC with physioball 2x10 reps   SAQ 3x10 reps 4# each 3 sec holds   Lateral band walks RedTB at wall 3 laps (~15 feet)     manual therapy techniques: Joint mobilizations and Soft tissue Mobilization were applied to the: hip,  knee for 05 minutes, including:    Hip/knee assessment   (B) EOM knee distraction     Not Today:  (R) Long axis hip distraction grade II-III    neuromuscular re-education activities to improve: Balance, Coordination, Kinesthetic, Sense, Proprioception, and Posture for 13 minutes. The following activities were included:    Clamshells GreenTB 2x10 reps 3 sec holds    Prone hip extension bent over wedge 2x10 reps 3# each   Paloff press in 1/2 lunge with BlueTB 1x15 reps each direction 3 sec holds     Not Today:  Bridge with GreenTB around knees 2x10 reps 3 sec holds   Step up with opposite lat pulldown 2x8 reps with 4-inch and cables 10#   DL balance on foam with ball toss 2x30 sec   Tandem balance on blue foam 2x20 sec each foot in front   Step up on 4-inch with opposite march and 2 sec holds with occasional upper extremity support for balance 3x8 reps each     therapeutic activities to improve functional performance for 19 minutes, including:    NuStep x6 min level 2.5 for range of motion, lower extremity strength, and cardiovascular endurance   Shuttle DL press 3x10 reps 62.5#   Shuttle SL press 3x8 reps 37.5#   Farmer's carries with 8# KB and march 3 laps each direction     Not Today:  Hip hinge box squat with dowel to chair 2x10 reps, 1x10 reps no dowel   Recumbent bike end of session for range of motion, lower extremity strength, cardiovascular endurance x6 min       gait training to improve functional mobility and safety for 00 minutes, including:      Patient Education and Home Exercises     Home Exercises Provided and Patient Education Provided     Education provided:   - Continue with HEP   - Importance of slow and controlled with exercises and not overworking herself     Written Home Exercises Provided: Patient instructed to cont prior HEP. Exercises were reviewed and Dana was able to demonstrate them prior to the end of the session.  Dana demonstrated good  understanding of the education provided. See EMR  under Patient Instructions for exercises provided during therapy sessions    ASSESSMENT     Dana tolerated therapy session well with no adverse effects. She presented to today's session with increased fatigue and decreased tolerance to exercise but able to perform all sets and reps. She continues to benefit from significant cueing to not rush and complete exercises slow and controlled. Added kettle carries to help with core stability and improve balance with good challenge. Educated on importance of continuing to work on exercises at home emphasis on slow and controlled and not overdue with lifting activities. Will continue to monitor and progress as able.     Dana Is progressing well towards her goals.   Pt prognosis is Good.     Pt will continue to benefit from skilled outpatient physical therapy to address the deficits listed in the problem list box on initial evaluation, provide pt/family education and to maximize pt's level of independence in the home and community environment.     Pt's spiritual, cultural and educational needs considered and pt agreeable to plan of care and goals.     Anticipated barriers to physical therapy: Co-morbidities, scheduling, and age     Goals:   Short Term Goals: 4 weeks (Progressing, not met)  Patient will be independent in initial HEP to help supplement PT.   Patient will improve lumbar AROM to >/= 75% in all planes to help with ADLs.  Patient will improve single leg balance to >/= 10 sec bilaterally to help decrease fall risk.      Long Term Goals: 8 weeks (Progressing, not met)  Patient will be indpenedent in updated HEP to help supplement PT and maintain gains made in PT.  Patient will improve FOTO intake score to >/= predicted score to show improvement in condition.   Patient will improve Lumbar AROM to painfree to help with ADLs.   Patient will improve hip abduction strength by 1/2 MMT to help with gait mechanics.     PLAN   Plan of care Certification: 7/10/2023 to  9/12/2023.    Continue with current plan of care with emphasis on core stability, lower extremity strengthening, and balance/proprioception training.     Nadia Sarmiento, PT, DPT, OCS

## 2023-08-03 RX ORDER — SODIUM CHLORIDE 0.9 % (FLUSH) 0.9 %
10 SYRINGE (ML) INJECTION
Status: CANCELLED | OUTPATIENT
Start: 2023-08-03

## 2023-08-03 RX ORDER — SODIUM CHLORIDE 9 MG/ML
INJECTION, SOLUTION INTRAVENOUS CONTINUOUS
Status: CANCELLED | OUTPATIENT
Start: 2023-08-03

## 2023-08-04 ENCOUNTER — ANESTHESIA (OUTPATIENT)
Dept: ENDOSCOPY | Facility: HOSPITAL | Age: 73
End: 2023-08-04
Payer: MEDICARE

## 2023-08-04 ENCOUNTER — HOSPITAL ENCOUNTER (OUTPATIENT)
Facility: HOSPITAL | Age: 73
Discharge: HOME | End: 2023-08-04
Attending: INTERNAL MEDICINE | Admitting: INTERNAL MEDICINE
Payer: MEDICARE

## 2023-08-04 ENCOUNTER — ANESTHESIA EVENT (OUTPATIENT)
Dept: ENDOSCOPY | Facility: HOSPITAL | Age: 73
End: 2023-08-04
Payer: MEDICARE

## 2023-08-04 VITALS
HEIGHT: 65 IN | WEIGHT: 163 LBS | SYSTOLIC BLOOD PRESSURE: 145 MMHG | OXYGEN SATURATION: 95 % | BODY MASS INDEX: 27.16 KG/M2 | RESPIRATION RATE: 17 BRPM | HEART RATE: 61 BPM | TEMPERATURE: 99 F | DIASTOLIC BLOOD PRESSURE: 69 MMHG

## 2023-08-04 DIAGNOSIS — R11.2 NAUSEA AND VOMITING: ICD-10-CM

## 2023-08-04 PROCEDURE — 43239 EGD BIOPSY SINGLE/MULTIPLE: CPT | Mod: ,,, | Performed by: INTERNAL MEDICINE

## 2023-08-04 PROCEDURE — 88305 TISSUE EXAM BY PATHOLOGIST: CPT | Mod: 26,,, | Performed by: STUDENT IN AN ORGANIZED HEALTH CARE EDUCATION/TRAINING PROGRAM

## 2023-08-04 PROCEDURE — 88342 IMHCHEM/IMCYTCHM 1ST ANTB: CPT | Performed by: STUDENT IN AN ORGANIZED HEALTH CARE EDUCATION/TRAINING PROGRAM

## 2023-08-04 PROCEDURE — 88342 IMHCHEM/IMCYTCHM 1ST ANTB: CPT | Mod: 26,,, | Performed by: STUDENT IN AN ORGANIZED HEALTH CARE EDUCATION/TRAINING PROGRAM

## 2023-08-04 PROCEDURE — 37000008 HC ANESTHESIA 1ST 15 MINUTES: Performed by: INTERNAL MEDICINE

## 2023-08-04 PROCEDURE — 27201012 HC FORCEPS, HOT/COLD, DISP: Performed by: INTERNAL MEDICINE

## 2023-08-04 PROCEDURE — 43239 EGD BIOPSY SINGLE/MULTIPLE: CPT | Performed by: INTERNAL MEDICINE

## 2023-08-04 PROCEDURE — E9220 PRA ENDO ANESTHESIA: ICD-10-PCS | Mod: ,,, | Performed by: NURSE ANESTHETIST, CERTIFIED REGISTERED

## 2023-08-04 PROCEDURE — 25000003 PHARM REV CODE 250: Performed by: NURSE ANESTHETIST, CERTIFIED REGISTERED

## 2023-08-04 PROCEDURE — 88305 TISSUE EXAM BY PATHOLOGIST: ICD-10-PCS | Mod: 26,,, | Performed by: STUDENT IN AN ORGANIZED HEALTH CARE EDUCATION/TRAINING PROGRAM

## 2023-08-04 PROCEDURE — 88342 CHG IMMUNOCYTOCHEMISTRY: ICD-10-PCS | Mod: 26,,, | Performed by: STUDENT IN AN ORGANIZED HEALTH CARE EDUCATION/TRAINING PROGRAM

## 2023-08-04 PROCEDURE — 63600175 PHARM REV CODE 636 W HCPCS: Performed by: NURSE ANESTHETIST, CERTIFIED REGISTERED

## 2023-08-04 PROCEDURE — E9220 PRA ENDO ANESTHESIA: HCPCS | Mod: ,,, | Performed by: NURSE ANESTHETIST, CERTIFIED REGISTERED

## 2023-08-04 PROCEDURE — 37000009 HC ANESTHESIA EA ADD 15 MINS: Performed by: INTERNAL MEDICINE

## 2023-08-04 PROCEDURE — 43239 PR EGD, FLEX, W/BIOPSY, SGL/MULTI: ICD-10-PCS | Mod: ,,, | Performed by: INTERNAL MEDICINE

## 2023-08-04 PROCEDURE — 88305 TISSUE EXAM BY PATHOLOGIST: CPT | Performed by: STUDENT IN AN ORGANIZED HEALTH CARE EDUCATION/TRAINING PROGRAM

## 2023-08-04 RX ORDER — LIDOCAINE HYDROCHLORIDE 20 MG/ML
INJECTION, SOLUTION EPIDURAL; INFILTRATION; INTRACAUDAL; PERINEURAL
Status: DISCONTINUED | OUTPATIENT
Start: 2023-08-04 | End: 2023-08-04

## 2023-08-04 RX ORDER — ONDANSETRON 2 MG/ML
INJECTION INTRAMUSCULAR; INTRAVENOUS
Status: DISCONTINUED | OUTPATIENT
Start: 2023-08-04 | End: 2023-08-04

## 2023-08-04 RX ORDER — SODIUM CHLORIDE 9 MG/ML
INJECTION, SOLUTION INTRAVENOUS CONTINUOUS
Status: DISCONTINUED | OUTPATIENT
Start: 2023-08-04 | End: 2023-08-04 | Stop reason: HOSPADM

## 2023-08-04 RX ORDER — DEXAMETHASONE SODIUM PHOSPHATE 4 MG/ML
INJECTION, SOLUTION INTRA-ARTICULAR; INTRALESIONAL; INTRAMUSCULAR; INTRAVENOUS; SOFT TISSUE
Status: DISCONTINUED | OUTPATIENT
Start: 2023-08-04 | End: 2023-08-04

## 2023-08-04 RX ORDER — PROPOFOL 10 MG/ML
VIAL (ML) INTRAVENOUS
Status: DISCONTINUED | OUTPATIENT
Start: 2023-08-04 | End: 2023-08-04

## 2023-08-04 RX ADMIN — LIDOCAINE HYDROCHLORIDE 100 MG: 20 INJECTION, SOLUTION EPIDURAL; INFILTRATION; INTRACAUDAL; PERINEURAL at 09:08

## 2023-08-04 RX ADMIN — PROPOFOL 100 MG: 10 INJECTION, EMULSION INTRAVENOUS at 09:08

## 2023-08-04 RX ADMIN — ONDANSETRON 4 MG: 2 INJECTION INTRAMUSCULAR; INTRAVENOUS at 09:08

## 2023-08-04 RX ADMIN — PROPOFOL 180 MCG/KG/MIN: 10 INJECTION, EMULSION INTRAVENOUS at 09:08

## 2023-08-04 RX ADMIN — SODIUM CHLORIDE: 0.9 INJECTION, SOLUTION INTRAVENOUS at 09:08

## 2023-08-04 RX ADMIN — DEXAMETHASONE SODIUM PHOSPHATE 4 MG: 4 INJECTION, SOLUTION INTRAMUSCULAR; INTRAVENOUS at 09:08

## 2023-08-04 NOTE — PROVATION PATIENT INSTRUCTIONS
Discharge Summary/Instructions after an Endoscopic Procedure  Patient Name: Dana Cook  Patient MRN: [id number]  Patient YOB: 1950 Friday, August 4, 2023  Chandra Smith MD  Dear patient,  As a result of recent federal legislation (The Federal Cures Act), you may   receive lab or pathology results from your procedure in your [facility]   account before your physician is able to contact you. Your physician or   their representative will relay the results to you with their   recommendations at their soonest availability.  Thank you,  RESTRICTIONS:  During your procedure today, you received medications for sedation.  These   medications may affect your judgment, balance and coordination.  Therefore,   for 24 hours, you have the following restrictions:   - DO NOT drive a car, operate machinery, make legal/financial decisions,   sign important papers or drink alcohol.    ACTIVITY:  Today: no heavy lifting, straining or running due to procedural   sedation/anesthesia.  The following day: return to full activity including work.  DIET:  Eat and drink normally unless instructed otherwise.     TREATMENT FOR COMMON SIDE EFFECTS:  - Mild abdominal pain, nausea, belching, bloating or excessive gas:  rest,   eat lightly and use a heating pad.  - Sore Throat: treat with throat lozenges and/or gargle with warm salt   water.  - Because air was used during the procedure, expelling large amounts of air   from your rectum or belching is normal.  - If a bowel prep was taken, you may not have a bowel movement for 1-3 days.    This is normal.  SYMPTOMS TO WATCH FOR AND REPORT TO YOUR PHYSICIAN:  1. Abdominal pain or bloating, other than gas cramps.  2. Chest pain.  3. Back pain.  4. Signs of infection such as: chills or fever occurring within 24 hours   after the procedure.  5. Rectal bleeding, which would show as bright red, maroon, or black stools.   (A tablespoon of blood from the rectum is not serious, especially if    hemorrhoids are present.)  6. Vomiting.  7. Weakness or dizziness.  GO DIRECTLY TO THE NEAREST EMERGENCY ROOM IF YOU HAVE ANY OF THE FOLLOWING:      Difficulty breathing              Chills and/or fever over 101 F   Persistent vomiting and/or vomiting blood   Severe abdominal pain   Severe chest pain   Black, tarry stools   Bleeding- more than one tablespoon   Any other symptom or condition that you feel may need urgent attention  Your doctor recommends these additional instructions:  If any biopsies were taken, your doctors clinic will contact you in 1 to 2   weeks with any results.  - Discharge patient to home (ambulatory).   - Resume previous diet; Discharge to home (ambulatory); Resume outpatient   medications  - Return to primary care physician as previously scheduled.   - Await pathology results (biopsies taken for H. pylori).  - Follow-up in GI clinic for any persistent GI symptoms. Patient does report   improvement on PPI.  - My office will arrange for EGD with mucosal resection of large duodenal   polyp.  For questions, problems or results please call your physician - Chandra Smith MD at Work:  [phone].  [facility] [place], EMERGENCY ROOM PHONE NUMBER: [phone]  IF A COMPLICATION OR EMERGENCY SITUATION ARISES AND YOU ARE UNABLE TO REACH   YOUR PHYSICIAN - GO DIRECTLY TO THE EMERGENCY ROOM.  Chandra Smith MD  8/4/2023 9:22:45 AM  This report has been verified and signed electronically.  Dear patient,  As a result of recent federal legislation (The Federal Cures Act), you may   receive lab or pathology results from your procedure in your [facility]   account before your physician is able to contact you. Your physician or   their representative will relay the results to you with their   recommendations at their soonest availability.  Thank you,  PROVATION

## 2023-08-04 NOTE — ANESTHESIA POSTPROCEDURE EVALUATION
Anesthesia Post Evaluation    Patient: Dana Cook    Procedure(s) Performed: Procedure(s) (LRB):  EGD (ESOPHAGOGASTRODUODENOSCOPY) (N/A)    Final Anesthesia Type: general      Patient location during evaluation: GI PACU  Patient participation: Yes- Able to Participate  Level of consciousness: awake and alert  Post-procedure vital signs: reviewed and stable  Pain management: adequate  Airway patency: patent    PONV status at discharge: No PONV  Anesthetic complications: no      Cardiovascular status: blood pressure returned to baseline  Respiratory status: unassisted and spontaneous ventilation  Hydration status: euvolemic  Follow-up not needed.          Vitals Value Taken Time   /69 08/04/23 0955   Temp 37 °C (98.6 °F) 08/04/23 0925   Pulse 61 08/04/23 0955   Resp 17 08/04/23 0955   SpO2 95 % 08/04/23 0955         Event Time   Out of Recovery 09:59:14         Pain/Yuan Score: Yuan Score: 10 (8/4/2023  9:55 AM)

## 2023-08-04 NOTE — ADDENDUM NOTE
Addendum  created 08/04/23 1027 by Krista Griffin CRNA    Intraprocedure Meds edited, Orders acknowledged in Narrator

## 2023-08-04 NOTE — ANESTHESIA PREPROCEDURE EVALUATION
08/04/2023  Dana Cook is a 72 y.o., female.  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/19/2021    left    Breast cancer 03/19/2021    left    Cancer     Colon polyps     Depression     Dry eyes     Dry mouth     Dupuytren's contracture     Hypertension     left arm fracture     hairline    Murmur, heart     Osteopenia     PONV (postoperative nausea and vomiting)     with wisdom teeth    Sarcoidosis      Past Surgical History:   Procedure Laterality Date    BREAST BIOPSY Right 2016    RT axilla-sarcoidosis    BREAST BIOPSY Left 03/19/2021    cancer    BREAST BIOPSY Left 03/19/2021    [facility]    BREAST LUMPECTOMY Left 2021    ENDOSCOPIC ULTRASOUND OF UPPER GASTROINTESTINAL TRACT N/A 02/02/2022    Procedure: ULTRASOUND, UPPER GI TRACT, ENDOSCOPIC;  Surgeon: Pancho Ty MD;  Location: [facility];  Service: Endoscopy;  Laterality: N/A;    Laparoscopy for possible endometriosis   1985    MASTECTOMY WITH SENTINEL NODE BIOPSY AND AXILLARY LYMPH NODE DISSECTION Left 04/30/2021    Procedure: MASTECTOMY, WITH SENTINEL NODE BIOPSY AND AXILLARY LYMPHADENECTOMY;  Surgeon: Madyson Reza MD;  Location: [facility];  Service: Plastics;  Laterality: Left;    MASTECTOMY, PARTIAL Bilateral 04/30/2021    Procedure: MASTECTOMY, PARTIAL - BILATERAL PARTIAL MASTECTOMY WITH  radiological marker.;  Surgeon: Madyson Reza MD;  Location: [facility];  Service: Plastics;  Laterality: Bilateral;  LEFT BREAST X 3, RIGHT BREAST X 1 -    REDUCTION OF BOTH BREASTS Bilateral 04/30/2021    Procedure: MAMMOPLASTY, REDUCTION, BILATERAL;  Surgeon: Jason Meraz MD;  Location: [facility];  Service: Plastics;  Laterality: Bilateral;    SHOULDER SURGERY  03/22/2018    TOTAL REDUCTION MAMMOPLASTY Bilateral 2021    WRIST SURGERY Left     Dupuytrens contracture    XI ROBOTIC  HYSTERECTOMY, WITH SALPINGO-OOPHORECTOMY Bilateral 11/22/2021    Procedure: XI ROBOTIC HYSTERECTOMY,WITH SALPINGO-OOPHORECTOMY;  Surgeon: Modesta Roberts MD;  Location: [facility] OR [address] [place] [address];  Service: OB/GYN;  Laterality: Bilateral;         Pre-op Assessment    I have reviewed the Patient Summary Reports.     I have reviewed the Nursing Notes. I have reviewed the NPO Status.   I have reviewed the Medications.     Review of Systems  Anesthesia Hx:  No problems with previous Anesthesia    Social:  Social Alcohol Use, Non-Smoker    Cardiovascular:   Hypertension    Renal/:   Chronic Renal Disease    Psych:   Psychiatric History          Physical Exam  General: Well nourished, Cooperative, Alert and Oriented    Airway:  Mallampati: II / II  Mouth Opening: Normal  TM Distance: Normal  Tongue: Normal  Neck ROM: Normal ROM    Dental:  Intact    Chest/Lungs:  Clear to auscultation, Normal Respiratory Rate    Heart:  Rate: Normal  Rhythm: Regular Rhythm        Anesthesia Plan  Type of Anesthesia, risks & benefits discussed:    Anesthesia Type: Gen Natural Airway  Intra-op Monitoring Plan: Standard ASA Monitors  Post Op Pain Control Plan: multimodal analgesia  Induction:  IV  Informed Consent: Informed consent signed with the Patient and all parties understand the risks and agree with anesthesia plan.  All questions answered.   ASA Score: 3  Day of Surgery Review of History & Physical: H&P Update referred to the surgeon/provider.    Ready For Surgery From Anesthesia Perspective.     .

## 2023-08-04 NOTE — TRANSFER OF CARE
"Anesthesia Transfer of Care Note    Patient: Dana Cook    Procedure(s) Performed: Procedure(s) (LRB):  EGD (ESOPHAGOGASTRODUODENOSCOPY) (N/A)    Patient location: PACU    Anesthesia Type: general    Transport from OR: Transported from OR on room air with adequate spontaneous ventilation    Post pain: adequate analgesia    Post assessment: no apparent anesthetic complications    Post vital signs: stable    Level of consciousness: awake    Nausea/Vomiting: no nausea/vomiting    Complications: none    Transfer of care protocol was followed      Last vitals:   Visit Vitals  BP (!) 143/67   Pulse 60   Temp 37 °C (98.6 °F)   Resp 15   Ht 5' 5" (1.651 m)   Wt 73.9 kg (163 lb)   SpO2 (!) 94%   Breastfeeding No   BMI 27.12 kg/m²     "

## 2023-08-05 ENCOUNTER — TELEPHONE (OUTPATIENT)
Dept: ENDOSCOPY | Facility: HOSPITAL | Age: 73
End: 2023-08-05

## 2023-08-05 DIAGNOSIS — D13.2 ADENOMATOUS DUODENAL POLYP: Primary | ICD-10-CM

## 2023-08-06 NOTE — TELEPHONE ENCOUNTER
----- Message from Chandra Smith MD sent at 8/4/2023  9:44 AM CDT -----  Tori- I did an EGD on this pt today in 4-endo. Came across a large, flat duodenal polyp. Please schedule her for EGD/EMR in next 4-6 weeks with me. 60min case. [facility] only.

## 2023-08-07 ENCOUNTER — PATIENT OUTREACH (OUTPATIENT)
Dept: ADMINISTRATIVE | Facility: HOSPITAL | Age: 73
End: 2023-08-07
Payer: MEDICARE

## 2023-08-07 ENCOUNTER — CLINICAL SUPPORT (OUTPATIENT)
Dept: REHABILITATION | Facility: HOSPITAL | Age: 73
End: 2023-08-07
Payer: MEDICARE

## 2023-08-07 DIAGNOSIS — R29.898 DECREASED STRENGTH OF LOWER EXTREMITY: Primary | ICD-10-CM

## 2023-08-07 PROCEDURE — 97530 THERAPEUTIC ACTIVITIES: CPT

## 2023-08-07 PROCEDURE — 97140 MANUAL THERAPY 1/> REGIONS: CPT

## 2023-08-07 NOTE — PROGRESS NOTES
[facility]   Physical Therapy Treatment Note     Name: Dana POLO Gacipriano  [facility] Number: [id number]    Therapy Diagnosis:   Encounter Diagnosis   Name Primary?    Decreased strength of lower extremity Yes     Physician: Mariam Johnson MD    Visit Date: 8/7/2023  Physician Orders: PT Eval and Treat   Medical Diagnosis from Referral:   Z74.09 (ICD-10-CM) - Impaired functional mobility, balance, gait, and endurance   R29.6 (ICD-10-CM) - Multiple falls      Evaluation Date: 7/10/2023  Authorization Period Expiration: 12/31/2023  Plan of Care Expiration: 9/12/2023  Progress Note Due: 8/10/2023  Visit # / Visits authorized: 4/20  FOTO: 1/3    PTA Visit #: 0/5     FOTO first follow up:   FOTO second follow up:     Time In: 12:58 pm  Time Out: 1:55 pm   Total Billable Time: 57 minutes (billable = 25)    Precautions: Standard, cancer, and CKD stage III     SUBJECTIVE     Pt reports: She reports her back and knees have been doing fine. She is stressed with the polyp found in her endoscope and is almost certain it is cancerous with her history and so she has been stressed all weekend waiting to understand what it is they found.   She was compliant with home exercise program.  Response to previous treatment: Independent in HEP  Functional change: Ongoing     Pain: 2/10  Location: bilateral back  and knee      OBJECTIVE     Objective Measures updated at progress report unless specified.     Treatment     Dana received the treatments listed below:      therapeutic exercises to develop strength, endurance, ROM, flexibility, posture, and core stabilization for 15 minutes including:    Lateral band walks RedTB at wall 3 laps (~15 feet)   LAQ 3x10 reps 4# each 3 sec holds   SLR 2x10 reps each   Shuttle donkey kicks 12.5# 1x10 reps each   - PT cueing for proper form and utilizing glutes     Not Today:  DKTC with physioball 2x10 reps   SAQ 3x10 reps 4# each 3 sec holds     manual therapy techniques: Joint  mobilizations and Soft tissue Mobilization were applied to the: hip, knee for 12 minutes, including:    Hip/knee assessment   (B) EOM knee distraction   (B) Long axis hip distraction grade II-III    neuromuscular re-education activities to improve: Balance, Coordination, Kinesthetic, Sense, Proprioception, and Posture for 12 minutes. The following activities were included:    Clamshells GreenTB 2x12 reps 3 sec holds   Standing TA march with physioball 2x15 reps each   DL balance on foam with ball toss 2x30 sec   Tandem balance on blue foam 2x20 sec each foot in front     Not Today:  Bridge with GreenTB around knees 2x10 reps 3 sec holds   Step up with opposite lat pulldown 2x8 reps with 4-inch and cables 10#   Step up on 4-inch with opposite march and 2 sec holds with occasional upper extremity support for balance 3x8 reps each   Prone hip extension bent over wedge 2x10 reps 3# each   Paloff press in 1/2 lunge with BlueTB 1x15 reps each direction 3 sec holds     therapeutic activities to improve functional performance for 18 minutes, including:    NuStep x8 min level 2.5 for range of motion, lower extremity strength, and cardiovascular endurance   Shuttle DL press 3x10 reps 62.5# to help with getting up and down   Shuttle SL press 3x8 reps 37.5# to help with getting up and down   Angelo's carries with 8# KB and march 3 laps each direction     Not Today:  Hip hinge box squat with dowel to chair 2x10 reps, 1x10 reps no dowel   Recumbent bike end of session for range of motion, lower extremity strength, cardiovascular endurance x6 min       gait training to improve functional mobility and safety for 00 minutes, including:      Patient Education and Home Exercises     Home Exercises Provided and Patient Education Provided     Education provided:   - Continue with HEP   - Importance of slow and controlled with exercises and not overworking herself     Written Home Exercises Provided: Patient instructed to cont prior HEP.  Exercises were reviewed and Dana was able to demonstrate them prior to the end of the session.  Dana demonstrated good  understanding of the education provided. See EMR under Patient Instructions for exercises provided during therapy sessions    ASSESSMENT     Dana continues to present with improvement in symptoms in her back and knees. She continues to be challenged with further lower extremity strengthening and core stability to help improve her gait/balance. She continues with good challenge with KADEN martinez and MAURICIO martinez with physioball. She was further challenged with increased balance activities end of session in more fatigued state. End of session patient reported adequate challenge and fatigue with no adverse effects reported. Will continue to monitor and progress as able.     Dana Is progressing well towards her goals.   Pt prognosis is Good.     Pt will continue to benefit from skilled outpatient physical therapy to address the deficits listed in the problem list box on initial evaluation, provide pt/family education and to maximize pt's level of independence in the home and community environment.     Pt's spiritual, cultural and educational needs considered and pt agreeable to plan of care and goals.     Anticipated barriers to physical therapy: Co-morbidities, scheduling, and age     Goals:   Short Term Goals: 4 weeks (Progressing, not met)  Patient will be independent in initial HEP to help supplement PT.   Patient will improve lumbar AROM to >/= 75% in all planes to help with ADLs.  Patient will improve single leg balance to >/= 10 sec bilaterally to help decrease fall risk.      Long Term Goals: 8 weeks (Progressing, not met)  Patient will be indpenedent in updated HEP to help supplement PT and maintain gains made in PT.  Patient will improve FOTO intake score to >/= predicted score to show improvement in condition.   Patient will improve Lumbar AROM to painfree to help with ADLs.   Patient will  improve hip abduction strength by 1/2 MMT to help with gait mechanics.     PLAN   Plan of care Certification: 7/10/2023 to 9/12/2023.    Continue with current plan of care with emphasis on core stability, lower extremity strengthening, and balance/proprioception training.     Nadia Sarmiento, PT, DPT, OCS

## 2023-08-10 LAB
FINAL PATHOLOGIC DIAGNOSIS: NORMAL
GROSS: NORMAL
Lab: NORMAL
MICROSCOPIC EXAM: NORMAL

## 2023-08-14 ENCOUNTER — CLINICAL SUPPORT (OUTPATIENT)
Dept: REHABILITATION | Facility: HOSPITAL | Age: 73
End: 2023-08-14
Payer: MEDICARE

## 2023-08-14 DIAGNOSIS — R29.898 DECREASED STRENGTH OF LOWER EXTREMITY: Primary | ICD-10-CM

## 2023-08-14 PROCEDURE — 97140 MANUAL THERAPY 1/> REGIONS: CPT

## 2023-08-14 PROCEDURE — 97110 THERAPEUTIC EXERCISES: CPT

## 2023-08-14 PROCEDURE — 97530 THERAPEUTIC ACTIVITIES: CPT

## 2023-08-14 PROCEDURE — 97112 NEUROMUSCULAR REEDUCATION: CPT

## 2023-08-14 NOTE — PROGRESS NOTES
[facility]   Physical Therapy Treatment Note     Name: Dana Cook  [facility] Number: [id number]    Therapy Diagnosis:   Encounter Diagnosis   Name Primary?    Decreased strength of lower extremity Yes     Physician: Mariam Johnson MD    Visit Date: 8/14/2023  Physician Orders: PT Eval and Treat   Medical Diagnosis from Referral:   Z74.09 (ICD-10-CM) - Impaired functional mobility, balance, gait, and endurance   R29.6 (ICD-10-CM) - Multiple falls      Evaluation Date: 7/10/2023  Authorization Period Expiration: 12/31/2023  Plan of Care Expiration: 9/12/2023  Progress Note Due: 9/10/2023  Most Recent: 8/14/2023  Visit # / Visits authorized: 5/20  FOTO: 2/3    PTA Visit #: 0/5     FOTO first follow up: 8/14/2023  FOTO second follow up:     Time In: 12:55 pm  Time Out: 1:51 pm   Total Billable Time: 56 minutes (billable = 56)    Precautions: Standard, cancer, and CKD stage III     SUBJECTIVE     Pt reports: Has been feeling much better. Noticed she was able to help with packing lunches and no pain being in that position and she normally needs a break but did well without one and happy with that.   She was compliant with home exercise program.  Response to previous treatment: Independent in HEP  Functional change: Ongoing     Pain: 2/10  Location: bilateral back  and knee      OBJECTIVE     Objective Measures updated at progress report unless specified.     Range of Motion:  Lumbar     Percentage  Pain    Flexion 80% No Pain    Extension 60% No Pain   Right Sidebend  75% No Pain   Left Sidebend  75% No Pain    Right Rotation 80% No Pain   Left Rotation  80% No Pain       Hip     Right  Left    Flexion 100 degrees  95 degrees    Extension 5 degrees  5 degrees    Internal Rotation 25 degrees  20 degrees    External Rotation  40 degrees  40 degrees       Knee     Right AROM/PROM Left AROM/PROM   Flexion 125 degrees / 130 degrees  120 degrees / 125 degrees   Extension  -2 degrees (lack) / 0  degrees  -3 degrees (lack) -1 / 0      Lower Extremity Strength (MMT):     Right  Left    Hip Flexion 3+/5 3+/5   Hip Abduction 3/5 3/5   Knee Flexion 4/5 4-/5   Knee Extension 4/5 4/5   Dorsiflexion 4-/5 4-/5   Plantarflexion  4/5  4/5      Functional Tests:  - TU.5 sec no AD (initial = 11 sec)  - 30 Sec Chair Rise: 11 reps (initial = 9 reps)   - Single Leg Balance: Right = 4 sec (initial = 7 sec) Left = 8 sec (initial = 3 sec)     FOTO = 74 (initial = 54)    Treatment     Dana received the treatments listed below:      therapeutic exercises to develop strength, endurance, ROM, flexibility, posture, and core stabilization for 09 minutes including:    Assessment as above   LAQ 3x10 reps 5# each 3 sec holds     Not Today:  DKTC with physioball 2x10 reps   SAQ 3x10 reps 4# each 3 sec holds   Lateral band walks RedTB at wall 3 laps (~15 feet)   SLR 2x10 reps each   Shuttle donkey kicks 12.5# 1x10 reps each   - PT cueing for proper form and utilizing glutes     manual therapy techniques: Joint mobilizations and Soft tissue Mobilization were applied to the: hip, knee for 10 minutes, including:    Hip/knee assessment   (B) Long axis hip distraction grade II-III    Not Today:  (B) EOM knee distraction     neuromuscular re-education activities to improve: Balance, Coordination, Kinesthetic, Sense, Proprioception, and Posture for 21 minutes. The following activities were included:    Bridge with GreenTB around knees 2x10 reps 3 sec holds   Standing 2-finger assist to challenge balance RedTB hip abduction 2x10 reps   Standing 2-finger assisst to challenge balance RedTB hip extension 2x10 reps   Paloff press in 1/2 lunge with cables 10# 1x15 reps each direction 3 sec holds   SL RDL reach and tap cone on 12-inch step 2x8 reps each side   - PT SBA and occasional one UE assisst     Not Today:  Clamshells GreenTB 2x12 reps 3 sec holds   Standing TA march with physioball 2x15 reps each   DL balance on foam with ball toss 2x30  sec   Tandem balance on blue foam 2x20 sec each foot in front   Step up with opposite lat pulldown 2x8 reps with 4-inch and cables 10#   Step up on 4-inch with opposite march and 2 sec holds with occasional upper extremity support for balance 3x8 reps each   Prone hip extension bent over wedge 2x10 reps 3# each     therapeutic activities to improve functional performance for 16 minutes, including:    NuStep x6 min level 2.5 for range of motion, lower extremity strength, and cardiovascular endurance   Shuttle DL press 3x10 reps 82.5# with GreenTB around knees to help with getting up and down   Shuttle SL press 2x10 reps 37.5# to help with getting up and down   Angelo's carries with 10# DB and march 3 laps each direction     Not Today:  Hip hinge box squat with dowel to chair 2x10 reps, 1x10 reps no dowel   Recumbent bike end of session for range of motion, lower extremity strength, cardiovascular endurance x6 min     gait training to improve functional mobility and safety for 00 minutes, including:      Patient Education and Home Exercises     Home Exercises Provided and Patient Education Provided     Education provided:   - Continue with HEP   - Importance of slow and controlled with exercises and not overworking herself     Written Home Exercises Provided: Patient instructed to cont prior HEP. Exercises were reviewed and Dana was able to demonstrate them prior to the end of the session.  Dana demonstrated good  understanding of the education provided. See EMR under Patient Instructions for exercises provided during therapy sessions    ASSESSMENT     Dana has been seen for a total of 5 visits plus initial evaluation for a medical diagnosis of Z74.09 (ICD-10-CM) - Impaired functional mobility, balance, gait, and endurance and R29.6 (ICD-10-CM) - Multiple falls. She has shown significant improvement in her range of motion and function with improvement in FOTO score. She continues with limitations in some strength  and balance limiting full functional mobility. She tolerated all interventions well today with no adverse effects. She continues to be challenged with glute/hip strengthening and further challenged with balance activities as well. Continued single limb activities to challenge balance and core with good tolerance. She requires cueing for rest breaks between sets and reps and completing slow/controlled movements. She was adequately challenged and fatigued end of session. Will continue to monitor and progress as able.     Dana Is progressing well towards her goals.   Pt prognosis is Good.     Pt will continue to benefit from skilled outpatient physical therapy to address the deficits listed in the problem list box on initial evaluation, provide pt/family education and to maximize pt's level of independence in the home and community environment.     Pt's spiritual, cultural and educational needs considered and pt agreeable to plan of care and goals.     Anticipated barriers to physical therapy: Co-morbidities, scheduling, and age     Goals:   Short Term Goals: 4 weeks (Progressing, not met)  Patient will be independent in initial HEP to help supplement PT. - MET  Patient will improve lumbar AROM to >/= 75% in all planes to help with ADLs.  Patient will improve single leg balance to >/= 10 sec bilaterally to help decrease fall risk.      Long Term Goals: 8 weeks (Progressing, not met)  Patient will be indpenedent in updated HEP to help supplement PT and maintain gains made in PT.  Patient will improve FOTO intake score to >/= predicted score to show improvement in condition.   Patient will improve Lumbar AROM to painfree to help with ADLs.   Patient will improve hip abduction strength by 1/2 MMT to help with gait mechanics.     PLAN   Plan of care Certification: 7/10/2023 to 9/12/2023.    Continue with current plan of care with emphasis on core stability, lower extremity strengthening, and balance/proprioception  training.     Nadia Sarmiento, PT, DPT, OCS

## 2023-08-18 ENCOUNTER — PATIENT MESSAGE (OUTPATIENT)
Dept: ENDOSCOPY | Facility: HOSPITAL | Age: 73
End: 2023-08-18
Payer: MEDICARE

## 2023-08-18 ENCOUNTER — TELEPHONE (OUTPATIENT)
Dept: ENDOSCOPY | Facility: HOSPITAL | Age: 73
End: 2023-08-18
Payer: MEDICARE

## 2023-08-18 NOTE — TELEPHONE ENCOUNTER
Spoke to patient to schedule procedure(s) Upper Endoscopy (EGD)       Physician to perform procedure(s) Dr. LISA Smith  Date of Procedure (s) 9/27/23  Arrival Time 8:45 AM  Time of Procedure(s) 9:45 AM   Location of Procedure(s) [place] 2nd Floor  Type of Rx Prep sent to patient: Other  Instructions provided to patient via [facility]    Patient was informed on the following information and verbalized understanding. Screening questionnaire reviewed with patient and complete. If procedure requires anesthesia, a responsible adult needs to be present to accompany the patient home, patient cannot drive after receiving anesthesia. Appointment details are tentative, especially check-in time. Patient will receive a prep-op call 4 days prior to confirm check-in time for procedure. If applicable the patient should contact their pharmacy to verify Rx for procedure prep is ready for pick-up. Patient was advised to call the scheduling department at [phone] if pharmacy states no Rx is available. Patient was advised to call the endoscopy scheduling department if any questions or concerns arise.      [facility] Scheduling Department

## 2023-08-20 DIAGNOSIS — E87.6 HYPOKALEMIA: Primary | ICD-10-CM

## 2023-08-23 RX ORDER — ATORVASTATIN CALCIUM 10 MG/1
10 TABLET, FILM COATED ORAL DAILY
Qty: 90 TABLET | Refills: 3 | Status: SHIPPED | OUTPATIENT
Start: 2023-08-23

## 2023-08-23 NOTE — TELEPHONE ENCOUNTER
No care due was identified.  [facility] Embedded Care Due Messages. Reference number: [id number].   8/23/2023 3:18:47 PM CDT

## 2023-08-23 NOTE — TELEPHONE ENCOUNTER
Lov 6/13/23.  Last filled  90 x 3  8/18/22  We have not received any refill request on above till patient call.  Patient advised above and sending to dr gonzalez for approval.    Rx pended.  Thanks halie

## 2023-08-23 NOTE — TELEPHONE ENCOUNTER
----- Message from Paulette Baer sent at 8/23/2023  3:00 PM CDT -----  Contact: Pt @[phone]  1MEDICALADVICE     Patient is calling for Medical Advice regarding:    atorvastatin (LIPITOR) 10 MG tablet        Pharmacy name and phone#:.   [facility] #[id number] - [place] - [address] AT [facility] [address] [place]  [address]  [place] [address]  Phone: [phone] Fax: [phone]       Would like response via [employer]:  call back     Comments:   Pharmacy informed her that the medication is being denied but did not informed pt why. Please call back to advise.

## 2023-08-25 ENCOUNTER — LAB VISIT (OUTPATIENT)
Dept: LAB | Facility: HOSPITAL | Age: 73
End: 2023-08-25
Attending: HOSPITALIST
Payer: MEDICARE

## 2023-08-25 DIAGNOSIS — E87.6 HYPOKALEMIA: ICD-10-CM

## 2023-08-25 LAB — POTASSIUM SERPL-SCNC: 2.8 MMOL/L (ref 3.5–5.1)

## 2023-08-25 PROCEDURE — 36415 COLL VENOUS BLD VENIPUNCTURE: CPT | Mod: PO | Performed by: HOSPITALIST

## 2023-08-25 PROCEDURE — 84132 ASSAY OF SERUM POTASSIUM: CPT | Performed by: HOSPITALIST

## 2023-08-29 DIAGNOSIS — E87.6 HYPOKALEMIA: Primary | ICD-10-CM

## 2023-08-29 RX ORDER — POTASSIUM CHLORIDE 750 MG/1
20 CAPSULE, EXTENDED RELEASE ORAL DAILY
Qty: 180 CAPSULE | Refills: 3 | Status: SHIPPED | OUTPATIENT
Start: 2023-08-29

## 2023-09-06 ENCOUNTER — TELEPHONE (OUTPATIENT)
Dept: INTERNAL MEDICINE | Facility: CLINIC | Age: 73
End: 2023-09-06
Payer: MEDICARE

## 2023-09-06 NOTE — TELEPHONE ENCOUNTER
----- Message from Mariam Johnson MD sent at 8/29/2023  5:26 PM CDT -----  Needs BMP scheduled for next week

## 2023-09-07 ENCOUNTER — LAB VISIT (OUTPATIENT)
Dept: LAB | Facility: HOSPITAL | Age: 73
End: 2023-09-07
Attending: HOSPITALIST
Payer: MEDICARE

## 2023-09-07 DIAGNOSIS — E87.6 HYPOKALEMIA: ICD-10-CM

## 2023-09-07 LAB
ANION GAP SERPL CALC-SCNC: 10 MMOL/L (ref 8–16)
BUN SERPL-MCNC: 13 MG/DL (ref 8–23)
CALCIUM SERPL-MCNC: 10.1 MG/DL (ref 8.7–10.5)
CHLORIDE SERPL-SCNC: 101 MMOL/L (ref 95–110)
CO2 SERPL-SCNC: 29 MMOL/L (ref 23–29)
CREAT SERPL-MCNC: 0.9 MG/DL (ref 0.5–1.4)
EST. GFR  (NO RACE VARIABLE): >60 ML/MIN/1.73 M^2
GLUCOSE SERPL-MCNC: 92 MG/DL (ref 70–110)
POTASSIUM SERPL-SCNC: 3.5 MMOL/L (ref 3.5–5.1)
SODIUM SERPL-SCNC: 140 MMOL/L (ref 136–145)

## 2023-09-07 PROCEDURE — 80048 BASIC METABOLIC PNL TOTAL CA: CPT | Performed by: HOSPITALIST

## 2023-09-07 PROCEDURE — 36415 COLL VENOUS BLD VENIPUNCTURE: CPT | Mod: PO | Performed by: HOSPITALIST

## 2023-09-11 RX ORDER — PANTOPRAZOLE SODIUM 40 MG/1
40 TABLET, DELAYED RELEASE ORAL DAILY
Qty: 30 TABLET | Refills: 2 | Status: ON HOLD | OUTPATIENT
Start: 2023-09-11 | End: 2023-09-27 | Stop reason: HOSPADM

## 2023-09-11 NOTE — TELEPHONE ENCOUNTER
No care due was identified.  [facility] Embedded Care Due Messages. Reference number: [id number].   9/11/2023 9:36:33 AM CDT

## 2023-09-19 ENCOUNTER — PATIENT MESSAGE (OUTPATIENT)
Dept: ENDOSCOPY | Facility: HOSPITAL | Age: 73
End: 2023-09-19
Payer: MEDICARE

## 2023-09-27 ENCOUNTER — ANESTHESIA EVENT (OUTPATIENT)
Dept: ENDOSCOPY | Facility: HOSPITAL | Age: 73
End: 2023-09-27
Payer: MEDICARE

## 2023-09-27 ENCOUNTER — ANESTHESIA (OUTPATIENT)
Dept: ENDOSCOPY | Facility: HOSPITAL | Age: 73
End: 2023-09-27
Payer: MEDICARE

## 2023-09-27 ENCOUNTER — HOSPITAL ENCOUNTER (OUTPATIENT)
Facility: HOSPITAL | Age: 73
Discharge: HOME | End: 2023-09-27
Attending: INTERNAL MEDICINE | Admitting: INTERNAL MEDICINE
Payer: MEDICARE

## 2023-09-27 VITALS
TEMPERATURE: 98 F | HEART RATE: 49 BPM | RESPIRATION RATE: 17 BRPM | BODY MASS INDEX: 27.32 KG/M2 | OXYGEN SATURATION: 95 % | DIASTOLIC BLOOD PRESSURE: 42 MMHG | HEIGHT: 65 IN | SYSTOLIC BLOOD PRESSURE: 71 MMHG | WEIGHT: 164 LBS

## 2023-09-27 DIAGNOSIS — D13.2 ADENOMATOUS DUODENAL POLYP: ICD-10-CM

## 2023-09-27 PROCEDURE — D9220A PRA ANESTHESIA: ICD-10-PCS | Mod: CRNA,,, | Performed by: NURSE ANESTHETIST, CERTIFIED REGISTERED

## 2023-09-27 PROCEDURE — 88305 TISSUE EXAM BY PATHOLOGIST: CPT | Mod: 26,,, | Performed by: STUDENT IN AN ORGANIZED HEALTH CARE EDUCATION/TRAINING PROGRAM

## 2023-09-27 PROCEDURE — 27201089 HC SNARE, DISP (ANY): Performed by: INTERNAL MEDICINE

## 2023-09-27 PROCEDURE — 37000009 HC ANESTHESIA EA ADD 15 MINS: Performed by: INTERNAL MEDICINE

## 2023-09-27 PROCEDURE — 37000008 HC ANESTHESIA 1ST 15 MINUTES: Performed by: INTERNAL MEDICINE

## 2023-09-27 PROCEDURE — 27201028 HC NEEDLE, SCLERO: Performed by: INTERNAL MEDICINE

## 2023-09-27 PROCEDURE — 27202867: Performed by: INTERNAL MEDICINE

## 2023-09-27 PROCEDURE — 25000003 PHARM REV CODE 250: Performed by: NURSE ANESTHETIST, CERTIFIED REGISTERED

## 2023-09-27 PROCEDURE — 63600175 PHARM REV CODE 636 W HCPCS: Performed by: ANESTHESIOLOGY

## 2023-09-27 PROCEDURE — 43254 EGD ENDO MUCOSAL RESECTION: CPT | Performed by: INTERNAL MEDICINE

## 2023-09-27 PROCEDURE — 27202363 HC INJECTION AGENT, SUBMUCOSAL, ANY: Performed by: INTERNAL MEDICINE

## 2023-09-27 PROCEDURE — D9220A PRA ANESTHESIA: Mod: CRNA,,, | Performed by: NURSE ANESTHETIST, CERTIFIED REGISTERED

## 2023-09-27 PROCEDURE — 27200997: Performed by: INTERNAL MEDICINE

## 2023-09-27 PROCEDURE — 43254 PR EGD, FLEX, W/MUCOSAL RESECTION: ICD-10-PCS | Mod: ,,, | Performed by: INTERNAL MEDICINE

## 2023-09-27 PROCEDURE — D9220A PRA ANESTHESIA: ICD-10-PCS | Mod: ANES,,, | Performed by: ANESTHESIOLOGY

## 2023-09-27 PROCEDURE — 88305 TISSUE EXAM BY PATHOLOGIST: CPT | Performed by: STUDENT IN AN ORGANIZED HEALTH CARE EDUCATION/TRAINING PROGRAM

## 2023-09-27 PROCEDURE — D9220A PRA ANESTHESIA: Mod: ANES,,, | Performed by: ANESTHESIOLOGY

## 2023-09-27 PROCEDURE — 63600175 PHARM REV CODE 636 W HCPCS: Performed by: NURSE ANESTHETIST, CERTIFIED REGISTERED

## 2023-09-27 PROCEDURE — 88305 TISSUE EXAM BY PATHOLOGIST: ICD-10-PCS | Mod: 26,,, | Performed by: STUDENT IN AN ORGANIZED HEALTH CARE EDUCATION/TRAINING PROGRAM

## 2023-09-27 PROCEDURE — 43254 EGD ENDO MUCOSAL RESECTION: CPT | Mod: ,,, | Performed by: INTERNAL MEDICINE

## 2023-09-27 RX ORDER — PROPOFOL 10 MG/ML
VIAL (ML) INTRAVENOUS
Status: DISCONTINUED | OUTPATIENT
Start: 2023-09-27 | End: 2023-09-27

## 2023-09-27 RX ORDER — ONDANSETRON 2 MG/ML
INJECTION INTRAMUSCULAR; INTRAVENOUS
Status: DISCONTINUED | OUTPATIENT
Start: 2023-09-27 | End: 2023-09-27

## 2023-09-27 RX ORDER — SODIUM CHLORIDE 9 MG/ML
INJECTION, SOLUTION INTRAVENOUS CONTINUOUS
Status: DISCONTINUED | OUTPATIENT
Start: 2023-09-27 | End: 2023-09-27 | Stop reason: HOSPADM

## 2023-09-27 RX ORDER — OMEPRAZOLE 40 MG/1
40 CAPSULE, DELAYED RELEASE ORAL
Qty: 90 CAPSULE | Refills: 0 | Status: SHIPPED | OUTPATIENT
Start: 2023-09-27 | End: 2023-09-28

## 2023-09-27 RX ORDER — LIDOCAINE HYDROCHLORIDE 20 MG/ML
INJECTION INTRAVENOUS
Status: DISCONTINUED | OUTPATIENT
Start: 2023-09-27 | End: 2023-09-27

## 2023-09-27 RX ORDER — PANTOPRAZOLE SODIUM 40 MG/1
40 TABLET, DELAYED RELEASE ORAL DAILY
Qty: 30 TABLET | Refills: 11 | Status: SHIPPED | OUTPATIENT
Start: 2023-09-27 | End: 2023-09-28 | Stop reason: SDUPTHER

## 2023-09-27 RX ORDER — ONDANSETRON 2 MG/ML
4 INJECTION INTRAMUSCULAR; INTRAVENOUS DAILY PRN
Status: DISCONTINUED | OUTPATIENT
Start: 2023-09-27 | End: 2023-09-27 | Stop reason: HOSPADM

## 2023-09-27 RX ORDER — HALOPERIDOL 5 MG/ML
0.5 INJECTION INTRAMUSCULAR EVERY 10 MIN PRN
Status: DISCONTINUED | OUTPATIENT
Start: 2023-09-27 | End: 2023-09-27 | Stop reason: HOSPADM

## 2023-09-27 RX ORDER — PROPOFOL 10 MG/ML
VIAL (ML) INTRAVENOUS CONTINUOUS PRN
Status: DISCONTINUED | OUTPATIENT
Start: 2023-09-27 | End: 2023-09-27

## 2023-09-27 RX ORDER — SODIUM CHLORIDE 0.9 % (FLUSH) 0.9 %
10 SYRINGE (ML) INJECTION
Status: DISCONTINUED | OUTPATIENT
Start: 2023-09-27 | End: 2023-09-27 | Stop reason: HOSPADM

## 2023-09-27 RX ADMIN — ONDANSETRON 4 MG: 2 INJECTION INTRAMUSCULAR; INTRAVENOUS at 09:09

## 2023-09-27 RX ADMIN — SODIUM CHLORIDE: 0.9 INJECTION, SOLUTION INTRAVENOUS at 08:09

## 2023-09-27 RX ADMIN — HALOPERIDOL LACTATE 0.5 MG: 5 INJECTION, SOLUTION INTRAMUSCULAR at 09:09

## 2023-09-27 RX ADMIN — ONDANSETRON 4 MG: 2 INJECTION INTRAMUSCULAR; INTRAVENOUS at 08:09

## 2023-09-27 RX ADMIN — PROPOFOL 175 MCG/KG/MIN: 10 INJECTION, EMULSION INTRAVENOUS at 08:09

## 2023-09-27 RX ADMIN — PROPOFOL 60 MG: 10 INJECTION, EMULSION INTRAVENOUS at 08:09

## 2023-09-27 RX ADMIN — LIDOCAINE HYDROCHLORIDE 50 MG: 20 INJECTION INTRAVENOUS at 08:09

## 2023-09-27 NOTE — ANESTHESIA PREPROCEDURE EVALUATION
09/27/2023  Dana Cook is a 72 y.o., female.      Pre-op Assessment    I have reviewed the Patient Summary Reports.    I have reviewed the NPO Status.      Review of Systems  Anesthesia Hx:  Denies Family Hx of Anesthesia complications.  Personal Hx of Anesthesia complications, Post-Operative Nausea/Vomiting, in the past, but not with recent anesthetics / prophylaxis   Social:  Non-Smoker, Social Alcohol Use    Cardiovascular:   Exercise tolerance: good Hypertension    Pulmonary:  Pulmonary Normal  Denies COPD.  Denies Asthma.  Denies Recent URI.  Denies Sleep Apnea.    Renal/:   Chronic Renal Disease, CKD    Hepatic/GI:   Adenomatous duodenal polyp    Neurological:  Neurology Normal Denies TIA.  Denies CVA. Denies Seizures.    Endocrine:  Endocrine Normal Denies Diabetes. Denies Hypothyroidism.  Denies Hyperthyroidism.  Denies Obesity / BMI > 30  Psych:   Psychiatric History depression          Physical Exam  General: Well nourished, Cooperative, Alert and Oriented    Airway:  Mallampati: I   Mouth Opening: Normal  TM Distance: Normal  Tongue: Normal  Neck ROM: Normal ROM    Dental:  Intact    Chest/Lungs:  Normal Respiratory Rate    Heart:  Rate: Normal        Anesthesia Plan  Type of Anesthesia, risks & benefits discussed:    Anesthesia Type: Gen ETT, Gen Natural Airway  Intra-op Monitoring Plan: Standard ASA Monitors  Induction:  IV  Informed Consent: Informed consent signed with the Patient and all parties understand the risks and agree with anesthesia plan.  All questions answered.   ASA Score: 3    Ready For Surgery From Anesthesia Perspective.     .

## 2023-09-27 NOTE — [employer] PATIENT INSTRUCTIONS
Discharge Summary/Instructions after an Endoscopic Procedure  Patient Name: Dana Cook  Patient MRN: [id number]  Patient YOB: 1950 Wednesday, September 27, 2023  Chandra Smith MD  Dear patient,  As a result of recent federal legislation (The Federal Cures Act), you may   receive lab or pathology results from your procedure in your [facility]   account before your physician is able to contact you. Your physician or   their representative will relay the results to you with their   recommendations at their soonest availability.  Thank you,  RESTRICTIONS:  During your procedure today, you received medications for sedation.  These   medications may affect your judgment, balance and coordination.  Therefore,   for 24 hours, you have the following restrictions:   - DO NOT drive a car, operate machinery, make legal/financial decisions,   sign important papers or drink alcohol.    ACTIVITY:  Today: no heavy lifting, straining or running due to procedural   sedation/anesthesia.  The following day: return to full activity including work.  DIET:  Eat and drink normally unless instructed otherwise.     TREATMENT FOR COMMON SIDE EFFECTS:  - Mild abdominal pain, nausea, belching, bloating or excessive gas:  rest,   eat lightly and use a heating pad.  - Sore Throat: treat with throat lozenges and/or gargle with warm salt   water.  - Because air was used during the procedure, expelling large amounts of air   from your rectum or belching is normal.  - If a bowel prep was taken, you may not have a bowel movement for 1-3 days.    This is normal.  SYMPTOMS TO WATCH FOR AND REPORT TO YOUR PHYSICIAN:  1. Abdominal pain or bloating, other than gas cramps.  2. Chest pain.  3. Back pain.  4. Signs of infection such as: chills or fever occurring within 24 hours   after the procedure.  5. Rectal bleeding, which would show as bright red, maroon, or black stools.   (A tablespoon of blood from the rectum is not serious, especially  if   hemorrhoids are present.)  6. Vomiting.  7. Weakness or dizziness.  GO DIRECTLY TO THE NEAREST EMERGENCY ROOM IF YOU HAVE ANY OF THE FOLLOWING:      Difficulty breathing              Chills and/or fever over 101 F   Persistent vomiting and/or vomiting blood   Severe abdominal pain   Severe chest pain   Black, tarry stools   Bleeding- more than one tablespoon   Any other symptom or condition that you feel may need urgent attention  Your doctor recommends these additional instructions:  If any biopsies were taken, your doctors clinic will contact you in 1 to 2   weeks with any results.  - Discharge patient to home (ambulatory).   - Resume previous diet; Discharge to home (ambulatory); Resume outpatient   medications  - Return to primary care physician as previously scheduled.   - Await pathology results. Will likely need a repeat EGD in 6 months pending   final pathology (my office will arrange for this after path is back).  - Recommend PPI BID for 6 weeks.  For questions, problems or results please call your physician - Chandra Smith MD at Work:  [phone].  [facility] [place], EMERGENCY ROOM PHONE NUMBER: [phone]  IF A COMPLICATION OR EMERGENCY SITUATION ARISES AND YOU ARE UNABLE TO REACH   YOUR PHYSICIAN - GO DIRECTLY TO THE EMERGENCY ROOM.  Chandra Smith MD  9/27/2023 9:54:18 AM  This report has been verified and signed electronically.  Dear patient,  As a result of recent federal legislation (The Federal Cures Act), you may   receive lab or pathology results from your procedure in your [facility]   account before your physician is able to contact you. Your physician or   their representative will relay the results to you with their   recommendations at their soonest availability.  Thank you,  PROVATION

## 2023-09-27 NOTE — H&P
Short Stay Endoscopy History and Physical    PCP - Mariam Johnson MD  Referring Physician - Chandra Smith MD  [address] [place] [address]    Procedure - EGD/EMR  ASA - per anesthesia  Mallampati - per anesthesia  History of Anesthesia problems - no  Family history Anesthesia problems -  no   Plan of anesthesia - General    HPI:  This is a 72 y.o. 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    Surgical History:  has a past surgical history that includes Wrist surgery (Left); Laparoscopy for possible endometriosis  (1985); Shoulder surgery (03/22/2018); Breast biopsy (Right, 2016); Breast biopsy (Left, 03/19/2021); Breast biopsy (Left, 03/19/2021); Reduction of both breasts (Bilateral, 04/30/2021); Mastectomy, partial (Bilateral, 04/30/2021); Mastectomy with sentinel node biopsy and axillary lymph node dissection (Left, 04/30/2021); xi robotic hysterectomy, with salpingo-oophorectomy (Bilateral, 11/22/2021); Endoscopic ultrasound of upper gastrointestinal tract (N/A, 02/02/2022); Breast lumpectomy (Left, 2021); Total Reduction Mammoplasty (Bilateral, 2021); and Esophagogastroduodenoscopy (N/A, 8/4/2023).    Family History: family history includes Breast cancer in her maternal aunt, paternal aunt, paternal aunt, paternal cousin, and another family member; Breast cancer (age of onset: 55) in her paternal cousin and another family member; Breast cancer (age of onset: 70) in  her sister; Cancer in her brother, paternal aunt, paternal cousin, sister, and another family member; Cancer (age of onset: 68) in her father; Cervical cancer in her paternal aunt; Cervical cancer (age of onset: 77) in her paternal aunt; Diabetes in her sister; Heart failure in her maternal aunt; Hypertension in her father, maternal grandmother, and mother; Leukemia in her paternal grandmother; Mental illness in her paternal grandfather; Multiple sclerosis in her paternal cousin; Other in her mother and son; Pancreatic cancer in an other family member; Tongue cancer in her maternal grandfather; Uterine cancer (age of onset: 49) in her sister; Uterine cancer (age of onset: 86) in her maternal grandmother..    Social History:  reports that she has never smoked. She has never used smokeless tobacco. She reports current alcohol use of about 5.0 - 6.0 standard drinks of alcohol per week. She reports that she does not use drugs.    Review of patient's allergies indicates:   Allergen Reactions    Adhesive      Surgical tape/ burned sckin    Percocet [oxycodone-acetaminophen] Nausea Only     Does not know what else she can take; can take tylenol  Pt states she is not allergic to percocet, just upsets her stomach.  States she has shoulder surgery here and has probably had dilaudid. No real allergy         Medications:   Medications Prior to Admission   Medication Sig Dispense Refill Last Dose    acetaminophen (TYLENOL) 500 MG tablet Take 2 tablets (1,000 mg total) by mouth every 8 (eight) hours. (Patient not taking: Reported on 6/13/2023) 30 tablet 1     alendronate (FOSAMAX) 70 MG tablet Take 1 tablet (70 mg total) by mouth every 7 days. 12 tablet 3     ascorbic acid, vitamin C, (VITAMIN C) 1000 MG tablet        aspirin (ECOTRIN) 81 MG EC tablet Take 81 mg by mouth once daily.       atorvastatin (LIPITOR) 10 MG tablet Take 1 tablet (10 mg total) by mouth once daily. 90 tablet 3     BIOTIN ORAL Take by mouth once daily.        buPROPion (WELLBUTRIN XL) 300 MG 24 hr tablet TAKE 1 TABLET(300 MG) BY MOUTH EVERY DAY 90 tablet 3     CALCIUM-VITAMIN D3 ORAL Take by mouth. 600 mg - 20 mcg. Take 1 cap oral twice a day       chlorthalidone (HYGROTEN) 25 MG Tab TAKE 1 TABLET BY MOUTH EVERY DAY 90 tablet 1     colchicine (COLCRYS) 0.6 mg tablet On 1st day of gout flare take 1 tablet p.o t.i.d; From day 2 onwards, take 1 tablet p.o. b.i.d. until flare resolves. (Patient not taking: Reported on 8/3/2022) 10 tablet 2     fluocinonide (LIDEX) 0.05 % external solution USE ONCE TO BID ON SCALP FOR RASH OR IRRITATION       letrozole (FEMARA) 2.5 mg Tab Take 1 tablet (2.5 mg total) by mouth once daily. 30 tablet 11     lidocaine (LIDODERM) 5 % Place 1 patch onto the skin once daily. Remove & Discard patch within 12 hours. Do not use more than 1 patch in 24 hours. (Patient not taking: Reported on 6/13/2023) 10 patch 0     pantoprazole (PROTONIX) 40 MG tablet Take 1 tablet (40 mg total) by mouth once daily. 30 tablet 2     potassium chloride (MICRO-K) 10 MEQ CpSR Take 2 capsules (20 mEq total) by mouth once daily. 180 capsule 3     prenatal vit calc,iron,folic (PRENATAL VITAMIN ORAL) Take by mouth once daily.       RED YEAST RICE ORAL Take by mouth once daily.       SUBHASH'S WORT/S.GINSGR (SUBHASH'S WORT-SIBER.GINSENG) 450-90 mg Tab        UNABLE TO FIND Hemp seed oil       UNABLE TO FIND medication name:myxtic gummies       VALERIAN ROOT ORAL Take by mouth 2 (two) times daily as needed.          Physical Exam:    Vital Signs:   Vitals:    09/27/23 0823   BP: (!) 161/74   Pulse: (!) 57   Resp: 17       General Appearance: Well appearing in no acute distress  Lungs: no labored breathing  CVS:  regular rate  Abdomen: non tender    Labs:  Lab Results   Component Value Date    WBC 4.42 06/21/2023    HGB 12.0 06/21/2023    HCT 36.0 (L) 06/21/2023     06/21/2023    CHOL 181 06/21/2023    TRIG 89 06/21/2023    HDL 66 06/21/2023    ALT 37 06/21/2023     AST 35 06/21/2023     09/07/2023    K 3.5 09/07/2023     09/07/2023    CREATININE 0.9 09/07/2023    BUN 13 09/07/2023    CO2 29 09/07/2023    TSH 0.711 06/21/2023    HGBA1C 5.8 11/18/2015       I have explained the risks and benefits of this endoscopic procedure to the patient including but not limited to bleeding, inflammation, infection, perforation, and death.      Chandra Smith MD

## 2023-09-27 NOTE — TRANSFER OF CARE
"Anesthesia Transfer of Care Note    Patient: Dana Cook    Procedure(s) Performed: Procedure(s) (LRB):  EGD (ESOPHAGOGASTRODUODENOSCOPY) (N/A)    Patient location: [facility]    Anesthesia Type: general    Transport from OR: Transported from OR on 2-3 L/min O2 by NC with adequate spontaneous ventilation    Post pain: adequate analgesia    Post assessment: no apparent anesthetic complications    Post vital signs: stable    Level of consciousness: awake and alert    Nausea/Vomiting: no nausea/vomiting    Complications: none    Transfer of care protocol was followed      Last vitals:   Visit Vitals  BP (!) 147/70 (BP Location: Left arm, Patient Position: Lying)   Pulse 60   Temp 36.6 °C (97.9 °F) (Temporal)   Resp 13   Ht 5' 5" (1.651 m)   Wt 74.4 kg (164 lb)   SpO2 100%   Breastfeeding No   BMI 27.29 kg/m²     "

## 2023-09-28 RX ORDER — OMEPRAZOLE 40 MG/1
40 CAPSULE, DELAYED RELEASE ORAL
Qty: 180 CAPSULE | Refills: 0 | Status: SHIPPED | OUTPATIENT
Start: 2023-09-28 | End: 2024-02-11

## 2023-09-28 NOTE — ANESTHESIA POSTPROCEDURE EVALUATION
Anesthesia Post Evaluation    Patient: Dana Cook    Procedure(s) Performed: Procedure(s) (LRB):  EGD (ESOPHAGOGASTRODUODENOSCOPY) (N/A)    Final Anesthesia Type: general      Patient location during evaluation: PACU  Patient participation: Yes- Able to Participate  Level of consciousness: awake and alert  Post-procedure vital signs: reviewed and stable  Pain management: adequate  Airway patency: patent    PONV status at discharge: nausea (controlled)  Anesthetic complications: no      Cardiovascular status: blood pressure returned to baseline  Respiratory status: room air  Hydration status: euvolemic  Follow-up not needed.          Vitals Value Taken Time   BP 71/42 09/27/23 1015   Temp 36.6 °C (97.9 °F) 09/27/23 1015   Pulse 49 09/27/23 1015   Resp 17 09/27/23 1015   SpO2 95 % 09/27/23 1015         No case tracking events are documented in the log.      Pain/Yuan Score: Yuan Score: 10 (9/27/2023 10:15 AM)

## 2023-10-03 LAB
FINAL PATHOLOGIC DIAGNOSIS: NORMAL
GROSS: NORMAL
Lab: NORMAL
MICROSCOPIC EXAM: NORMAL

## 2023-10-19 ENCOUNTER — TELEPHONE (OUTPATIENT)
Dept: INTERNAL MEDICINE | Facility: CLINIC | Age: 73
End: 2023-10-19
Payer: MEDICARE

## 2023-10-19 ENCOUNTER — OFFICE VISIT (OUTPATIENT)
Dept: OBSTETRICS AND GYNECOLOGY | Facility: CLINIC | Age: 73
End: 2023-10-19
Payer: MEDICARE

## 2023-10-19 VITALS
WEIGHT: 162.06 LBS | SYSTOLIC BLOOD PRESSURE: 130 MMHG | HEIGHT: 65 IN | BODY MASS INDEX: 27 KG/M2 | DIASTOLIC BLOOD PRESSURE: 76 MMHG

## 2023-10-19 VITALS — SYSTOLIC BLOOD PRESSURE: 130 MMHG | DIASTOLIC BLOOD PRESSURE: 76 MMHG

## 2023-10-19 DIAGNOSIS — Z01.419 WELL WOMAN EXAM WITH ROUTINE GYNECOLOGICAL EXAM: Primary | ICD-10-CM

## 2023-10-19 DIAGNOSIS — Z91.89 GYN EXAM FOR HIGH-RISK MEDICARE PATIENT: ICD-10-CM

## 2023-10-19 PROCEDURE — 99213 OFFICE O/P EST LOW 20 MIN: CPT | Mod: PBBFAC | Performed by: OBSTETRICS & GYNECOLOGY

## 2023-10-19 PROCEDURE — G0101 CA SCREEN;PELVIC/BREAST EXAM: HCPCS | Mod: S$PBB,,, | Performed by: OBSTETRICS & GYNECOLOGY

## 2023-10-19 PROCEDURE — 99999 PR PBB SHADOW E&M-EST. PATIENT-LVL III: ICD-10-PCS | Mod: PBBFAC,,, | Performed by: OBSTETRICS & GYNECOLOGY

## 2023-10-19 PROCEDURE — G0101 PR CA SCREEN;PELVIC/BREAST EXAM: ICD-10-PCS | Mod: S$PBB,,, | Performed by: OBSTETRICS & GYNECOLOGY

## 2023-10-19 PROCEDURE — 99999 PR PBB SHADOW E&M-EST. PATIENT-LVL III: CPT | Mod: PBBFAC,,, | Performed by: OBSTETRICS & GYNECOLOGY

## 2023-10-19 NOTE — PROGRESS NOTES
History & Physical  Gynecology      SUBJECTIVE:     Chief Complaint: Well Woman       History of Present Illness:  Annual Exam-Postmenopausal  Patient presents for annual exam. She recently had a partial bilateral mastectomy for breast cancer as well as a robotic TLH/BSO.  The patient has no complaints today. Patient denies post-menopausal vaginal bleeding. The patient is not sexually active. She is currently on letrozole. The patient participates in regular exercise: yes.  She does not smoke.     GYN screening history: RATLH/BSO  Mammogram history: bilateral partial mastectomy and breast reduction; 3/2023  Colonoscopy history: 3 years ago per patient- due 5 years  Dexa history: 2023    FH:   Breast cancer: pat aunt x 4; sister; maternal aunt, personal history  Colon cancer: neg  Ovarian cancer: neg  Vaginal melanoma: aunt  Cervical cancer: pat aunt x 2  Uterine cancer: sister, maternal grandmother    Review of patient's allergies indicates:   Allergen Reactions    Adhesive      Surgical tape/ burned sckin    Percocet [oxycodone-acetaminophen] Nausea Only     Does not know what else she can take; can take tylenol  Pt states she is not allergic to percocet, just upsets her stomach.  States she has shoulder surgery here and has probably had dilaudid. No real allergy         Past Medical History:   Diagnosis Date    Anxiety     Atypical hyperplasia of breast 03/19/2021    left    Breast cancer 03/19/2021    left    Cancer     Colon polyps     Depression     Dry eyes     Dry mouth     Dupuytren's contracture     Hypertension     left arm fracture     hairline    Murmur, heart     Osteopenia     PONV (postoperative nausea and vomiting)     with wisdom teeth    Sarcoidosis      Past Surgical History:   Procedure Laterality Date    BREAST BIOPSY Right 2016    RT axilla-sarcoidosis    BREAST BIOPSY Left 03/19/2021    cancer    BREAST BIOPSY Left 03/19/2021    [facility]    BREAST LUMPECTOMY Left 2021    ENDOSCOPIC ULTRASOUND OF  UPPER GASTROINTESTINAL TRACT N/A 2022    Procedure: ULTRASOUND, UPPER GI TRACT, ENDOSCOPIC;  Surgeon: Pancho Ty MD;  Location: [facility] ENDO;  Service: Endoscopy;  Laterality: N/A;    ESOPHAGOGASTRODUODENOSCOPY N/A 2023    Procedure: EGD (ESOPHAGOGASTRODUODENOSCOPY);  Surgeon: Chandra Smith MD;  Location: [facility] ENDO (4TH FLR);  Service: Endoscopy;  Laterality: N/A;  Procedure Timin-4 weeks  Provider: Any endoscopist  Location: No Preference Additional Scheduling Information: No scheduling concerns   instr. handed to patient-st  2023- precall: no answer    ESOPHAGOGASTRODUODENOSCOPY N/A 2023    Procedure: EGD (ESOPHAGOGASTRODUODENOSCOPY);  Surgeon: Chandra Smith MD;  Location: [facility] (2ND FLR);  Service: Endoscopy;  Laterality: N/A;  instr portal-tb    23 pre call done -[facility]    Laparoscopy for possible endometriosis   1985    MASTECTOMY WITH SENTINEL NODE BIOPSY AND AXILLARY LYMPH NODE DISSECTION Left 2021    Procedure: MASTECTOMY, WITH SENTINEL NODE BIOPSY AND AXILLARY LYMPHADENECTOMY;  Surgeon: Madyson Reza MD;  Location: [facility];  Service: Plastics;  Laterality: Left;    MASTECTOMY, PARTIAL Bilateral 2021    Procedure: MASTECTOMY, PARTIAL - BILATERAL PARTIAL MASTECTOMY WITH  radiological marker.;  Surgeon: Madyson Reza MD;  Location: [facility];  Service: Plastics;  Laterality: Bilateral;  LEFT BREAST X 3, RIGHT BREAST X 1 -    REDUCTION OF BOTH BREASTS Bilateral 2021    Procedure: MAMMOPLASTY, REDUCTION, BILATERAL;  Surgeon: Jason Meraz MD;  Location: [facility];  Service: Plastics;  Laterality: Bilateral;    SHOULDER SURGERY  2018    TOTAL REDUCTION MAMMOPLASTY Bilateral     WRIST SURGERY Left     Dupuytrens contracture    XI ROBOTIC HYSTERECTOMY, WITH SALPINGO-OOPHORECTOMY Bilateral 2021    Procedure: XI ROBOTIC HYSTERECTOMY,WITH SALPINGO-OOPHORECTOMY;  Surgeon: Modesta Roberts MD;  Location: [facility] 2ND FLR;  Service: OB/GYN;   Laterality: Bilateral;     OB History          1    Para   1    Term   1            AB        Living   1         SAB        IAB        Ectopic        Multiple        Live Births   1               Family History   Problem Relation Age of Onset    Hypertension Mother     Other Mother         tumor    Cancer Father 68        malignant fibrous histocytoma of heart    Hypertension Father     Uterine cancer Sister 49    Breast cancer Sister 70    Diabetes Sister     Cancer Sister         skin BCC of face    Cancer Brother         skin melanoma    Breast cancer Maternal Aunt         late 60s    Heart failure Maternal Aunt         CHF    Breast cancer Paternal Aunt         dx 60s    Cervical cancer Paternal Aunt 77    Cancer Paternal Aunt         vaginal melanoma    Cervical cancer Paternal Aunt     Breast cancer Paternal Aunt         maybe    Uterine cancer Maternal Grandmother 86    Hypertension Maternal Grandmother     Tongue cancer Maternal Grandfather         (smoked)    Leukemia Paternal Grandmother     Mental illness Paternal Grandfather     Other Son         overweight    Breast cancer Paternal Cousin 55        s/p genetic testing    Cancer Paternal Cousin         thyroid    Breast cancer Paternal Cousin     Multiple sclerosis Paternal Cousin     Breast cancer Other         dx 60s    Breast cancer Other 55    Pancreatic cancer Other     Cancer Other         origins?    Colon cancer Neg Hx     Ovarian cancer Neg Hx     Anesthesia problems Neg Hx     Esophageal cancer Neg Hx      Social History     Tobacco Use    Smoking status: Never    Smokeless tobacco: Never   Substance Use Topics    Alcohol use: Yes     Alcohol/week: 5.0 - 6.0 standard drinks of alcohol     Types: 5 - 6 Standard drinks or equivalent per week     Comment: wine every night    Drug use: Never       Current Outpatient Medications   Medication Sig    alendronate (FOSAMAX) 70 MG tablet Take 1 tablet (70 mg total) by mouth every 7 days.     ascorbic acid, vitamin C, (VITAMIN C) 1000 MG tablet     atorvastatin (LIPITOR) 10 MG tablet Take 1 tablet (10 mg total) by mouth once daily.    BIOTIN ORAL Take by mouth once daily.    buPROPion (WELLBUTRIN XL) 300 MG 24 hr tablet TAKE 1 TABLET(300 MG) BY MOUTH EVERY DAY    CALCIUM-VITAMIN D3 ORAL Take by mouth. 600 mg - 20 mcg. Take 1 cap oral twice a day    chlorthalidone (HYGROTEN) 25 MG Tab TAKE 1 TABLET BY MOUTH EVERY DAY    colchicine (COLCRYS) 0.6 mg tablet On 1st day of gout flare take 1 tablet p.o t.i.d; From day 2 onwards, take 1 tablet p.o. b.i.d. until flare resolves.    omeprazole (PRILOSEC) 40 MG capsule TAKE 1 CAPSULE(40 MG) BY MOUTH TWICE DAILY BEFORE MEALS    potassium chloride (MICRO-K) 10 MEQ CpSR Take 2 capsules (20 mEq total) by mouth once daily.    prenatal vit calc,iron,folic (PRENATAL VITAMIN ORAL) Take by mouth once daily.    RED YEAST RICE ORAL Take by mouth once daily.    SUBHASH'S WORT/S.GINSGR (SUBHASH'S WORT-SIBER.GINSENG) 450-90 mg Tab     UNABLE TO FIND Hemp seed oil    UNABLE TO FIND medication name:myxtic gummies    VALERIAN ROOT ORAL Take by mouth 2 (two) times daily as needed.     No current facility-administered medications for this visit.       Review of Systems:  Review of Systems   Constitutional:  Negative for appetite change, fever and unexpected weight change.   Respiratory:  Negative for shortness of breath.    Cardiovascular:  Negative for chest pain.   Gastrointestinal:  Negative for nausea and vomiting.   Genitourinary:  Negative for menorrhagia, menstrual problem, pelvic pain, vaginal bleeding, vaginal discharge and vaginal pain.   Integumentary:  Negative for breast mass.   Breast: Negative for lump and mass       OBJECTIVE:     Physical Exam:  Physical Exam  Vitals and nursing note reviewed.   Constitutional:       Appearance: She is well-developed.   Cardiovascular:      Rate and Rhythm: Normal rate and regular rhythm.      Heart sounds: Normal heart sounds. No  murmur heard.     No friction rub. No gallop.   Pulmonary:      Effort: Pulmonary effort is normal. No respiratory distress.      Breath sounds: Normal breath sounds. No wheezing or rales.   Chest:   Breasts:     Breasts are symmetrical.      Right: No inverted nipple, mass, nipple discharge, skin change or tenderness.      Left: No inverted nipple, mass, nipple discharge, skin change or tenderness.   Abdominal:      General: Bowel sounds are normal. There is no distension.      Palpations: Abdomen is soft.      Tenderness: There is no abdominal tenderness. There is no guarding or rebound.   Genitourinary:     General: Normal vulva.      Labia:         Right: No rash, tenderness, lesion or injury.         Left: No rash, tenderness, lesion or injury.       Urethra: No prolapse, urethral pain, urethral swelling or urethral lesion.      Vagina: Normal. No signs of injury and foreign body. No vaginal discharge, erythema, tenderness or bleeding.      Adnexa:         Right: No mass, tenderness or fullness.          Left: No mass, tenderness or fullness.        Rectum: No anal fissure or external hemorrhoid.      Comments: Urethral meatus: normal size, anterior vaginal wall with no prolapse, no lesions  Bladder: no fullness, masses or tenderness  Perineum: normal perineal body  Musculoskeletal:      Cervical back: Normal range of motion.   Neurological:      Mental Status: She is alert and oriented to person, place, and time.   Psychiatric:         Behavior: Behavior normal.         Thought Content: Thought content normal.         Judgment: Judgment normal.         Chaperoned by: Sonya    ASSESSMENT:       ICD-10-CM ICD-9-CM    1. Well woman exam with routine gynecological exam  Z01.419 V72.31                Plan:      Dana was seen today for well woman.    Diagnoses and all orders for this visit:    Well woman exam with routine gynecological exam          No orders of the defined types were placed in this  encounter.        Well Woman:   - Pap smear: not indicated  - Mammogram: up to date  - Colonoscopy: up to date per patient  - Dexa: up to date  - Immunizations: with pcp  - Labs: with pcp  - Exercise counseling provided    Follow up in one year for annual, or prn.    Tere Huynh

## 2023-10-23 ENCOUNTER — OFFICE VISIT (OUTPATIENT)
Dept: HEMATOLOGY/ONCOLOGY | Facility: CLINIC | Age: 73
End: 2023-10-23
Payer: MEDICARE

## 2023-10-23 VITALS
DIASTOLIC BLOOD PRESSURE: 72 MMHG | BODY MASS INDEX: 26.93 KG/M2 | HEIGHT: 65 IN | RESPIRATION RATE: 18 BRPM | HEART RATE: 58 BPM | TEMPERATURE: 98 F | OXYGEN SATURATION: 100 % | SYSTOLIC BLOOD PRESSURE: 165 MMHG | WEIGHT: 161.63 LBS

## 2023-10-23 DIAGNOSIS — Z17.0 MALIGNANT NEOPLASM OF LOWER-INNER QUADRANT OF LEFT BREAST IN FEMALE, ESTROGEN RECEPTOR POSITIVE: Primary | ICD-10-CM

## 2023-10-23 DIAGNOSIS — C50.312 MALIGNANT NEOPLASM OF LOWER-INNER QUADRANT OF LEFT BREAST IN FEMALE, ESTROGEN RECEPTOR POSITIVE: Primary | ICD-10-CM

## 2023-10-23 PROCEDURE — 99214 OFFICE O/P EST MOD 30 MIN: CPT | Mod: PBBFAC | Performed by: INTERNAL MEDICINE

## 2023-10-23 PROCEDURE — 99213 OFFICE O/P EST LOW 20 MIN: CPT | Mod: S$PBB,,, | Performed by: INTERNAL MEDICINE

## 2023-10-23 PROCEDURE — 99213 PR OFFICE/OUTPT VISIT, EST, LEVL III, 20-29 MIN: ICD-10-PCS | Mod: S$PBB,,, | Performed by: INTERNAL MEDICINE

## 2023-10-23 PROCEDURE — 99999 PR PBB SHADOW E&M-EST. PATIENT-LVL IV: CPT | Mod: PBBFAC,,, | Performed by: INTERNAL MEDICINE

## 2023-10-23 PROCEDURE — 99999 PR PBB SHADOW E&M-EST. PATIENT-LVL IV: ICD-10-PCS | Mod: PBBFAC,,, | Performed by: INTERNAL MEDICINE

## 2023-10-23 NOTE — PROGRESS NOTES
Subjective:       Patient ID: Dana Cook is a 72 y.o. female.    Chief Complaint: No chief complaint on file.      HPI 72-year-old female seen for left breast cancer.  She is on Letrozole.    At her last visit she had gastrointestinal issues with some chronic intermittent nausea and some occasional diarrhea.      Had EGD in August - duodenal polyp found. That was removed endoscopically in September. 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  Has done well since then with resolution of GI SX.  She has some stress related to house and personal issues.      History:  Screening mammogram on March 4, 2021 showed a focal asymmetry in the lower inner quadrant of the left breast.    Follow-up diagnostic mammogram and ultrasound on March 8, 2021 showed a focal asymmetry measuring 40 mm in the lower inner quadrant left breast.  Ultrasound showed a 5 x 5 x 3 mm hypoechoic mass at 3:00 a.m. position with seen in the lower inner quadrant area.    On March 19, 2021 biopsy of 2 areas in the left breast was performed.  The lower inner quadrant mass showed infiltrating lobular carcinoma which was histologic grade 3, nuclear grade 2, mitotic index 1.  Tumor cells were 95% ER positive in 95% DE positive.  HER2 was 2+ but negative by fish with a ratio 1.75, Ki-67  Biopsy 3:00 a.m. mass showed atypical ductal hyperplasia    MRI on April 1, 2021 showed multiple findings.  There was a 22 x 21 a 60 mm irregular mass in left breast lower inner quadrant, 8 mm to 9 is enhancement lymph breast at 3:00 a.m., 6 x 6 x 5 mm mass in the left breast lower outer quadrant and in the right breast in 19 mass 7 mm area of non-mass enhancement centrally.    On April 13, 2021 biopsy of the right breast non-mass enhancement in the left breast lower outer quadrant mass seen on MRI was performed.  Both of those biopsies were negative showing no atypia or malignancy.    On April 30, 2021 left breast lumpectomy and sentinel lymph node biopsy was performed.   Left breast showed a do by 1.7 x 1.2 cm infiltrating lobular carcinoma.  The sentinel lymph node was negative.  Right breast excisional biopsy was negative at that time as well.  Final pathological stage T1c N0 stage I A.    Oncotype score showed low risk with a score 16.     [facility] genetics - CHACHO mutation    Radiation completed 8/3/21.    Letrozole started after radiation.    Mammogram in March 2023 - negative.      PMH: HBP, sarcoid, renal damage from NSAIDs  Review of Systems   Constitutional: Negative.  Unexpected weight change: increased 6 lb.   HENT: Negative.     Respiratory: Negative.     Gastrointestinal: Negative.    Genitourinary: Negative.    Neurological: Negative.    Psychiatric/Behavioral:  The patient is nervous/anxious.          Objective:      Physical Exam  Vitals reviewed.   Constitutional:       General: She is not in acute distress.     Appearance: Normal appearance.   Eyes:      General: No scleral icterus.  Cardiovascular:      Rate and Rhythm: Normal rate and regular rhythm.   Pulmonary:      Effort: Pulmonary effort is normal. No respiratory distress.      Breath sounds: Normal breath sounds. No rales.   Chest:   Breasts:     Right: No mass or nipple discharge.      Left: No mass or nipple discharge.       Abdominal:      Palpations: Abdomen is soft. There is no mass.      Tenderness: There is no abdominal tenderness.   Lymphadenopathy:      Cervical: No cervical adenopathy.      Upper Body:      Right upper body: No supraclavicular or axillary adenopathy.      Left upper body: No supraclavicular or axillary adenopathy.   Skin:     Findings: No rash.   Neurological:      Mental Status: She is oriented to person, place, and time.   Psychiatric:         Mood and Affect: Mood normal.         Behavior: Behavior normal.         Thought Content: Thought content normal.         Judgment: Judgment normal.       Assessment:       1. Malignant neoplasm of lower-inner quadrant of left breast in  female, estrogen receptor positive        Plan:     Continue letrozole and RTC in 3 M          Route Chart for Scheduling    Med Onc Chart Routing      Follow up with physician 3 months.   Follow up with [facility]    Infusion scheduling note    Injection scheduling note    Labs None   Scheduling:  Preferred lab:  Lab interval:     Imaging None      Pharmacy appointment No pharmacy appointment needed      Other referrals no referral to Oncology Primary Care needed -  no Massage appointment needed    No additional referrals needed

## 2023-11-29 ENCOUNTER — PATIENT MESSAGE (OUTPATIENT)
Dept: INTERNAL MEDICINE | Facility: CLINIC | Age: 73
End: 2023-11-29
Payer: MEDICARE

## 2023-12-22 ENCOUNTER — PATIENT MESSAGE (OUTPATIENT)
Dept: GASTROENTEROLOGY | Facility: CLINIC | Age: 73
End: 2023-12-22
Payer: MEDICARE

## 2024-01-03 DIAGNOSIS — Z71.89 COMPLEX CARE COORDINATION: ICD-10-CM

## 2024-01-11 DIAGNOSIS — Z00.00 ENCOUNTER FOR MEDICARE ANNUAL WELLNESS EXAM: ICD-10-CM

## 2024-01-19 RX ORDER — CHLORTHALIDONE 25 MG/1
25 TABLET ORAL DAILY
Qty: 90 TABLET | Refills: 1 | Status: SHIPPED | OUTPATIENT
Start: 2024-01-19

## 2024-01-19 NOTE — TELEPHONE ENCOUNTER
Care Due:                  Date            Visit Type   Department     Provider  --------------------------------------------------------------------------------                                EP -                              PRIMARY      MET INTERNAL  Last Visit: 06-      CARE ([facility])   MEDICINE       Mariam Johnson  Next Visit: None Scheduled  None         None Found                                                            Last  Test          Frequency    Reason                     Performed    Due Date  --------------------------------------------------------------------------------    Mg Level....  12 months..  alendronate..............  Not Found    Overdue    Phosphate...  12 months..  alendronate..............  Not Found    Overdue    Vitamin D...  12 months..  alendronate..............  Not Found    Overdue    [employer] Embedded Care Due Messages. Reference number: [id number].   1/19/2024 1:56:45 PM CST

## 2024-01-19 NOTE — TELEPHONE ENCOUNTER
----- Message from Belkys Ahn sent at 1/19/2024 12:42 PM CST -----  Contact: [phone]  Requesting an RX refill or new RX.  Is this a refill or new RX:   RX name and strength :   chlorthalidone (HYGROTEN) 25 MG Tab     Is this a 30 day or 90 day RX: 90  Pharmacy name and phone #     [id number] - [address] [place]  [address]  [place] [address]  Phone: [phone] Fax: [phone]

## 2024-02-08 NOTE — TELEPHONE ENCOUNTER
Wound Care Progress Note     Visit Date: 2/8/2024      Patient Name: Bjorn Cedeno         MRN: [id number]                Reason for Visit: Reassessment of wound and new recs        Wound History: chronic abdomen wound s/p surgical incision   Wound Assessment:    The wound was cleansed with normal saline and pat dry with a 4x4 cover sponge.  The wound is red and moist and appears to be healing well.  A thin strip of Aquacel Ag was applied to the wound and covered with an ABD and paper tape.  Additional strips of aquacel were cut, ABD pads and paper were gathered and left at bedside.     Wound Team Plan:   Recommendation: Daily dressing change   Cleanse the wound with soap and water and pat dry   Apply a strip of Aquacel Ag to the wound and cover with an ABD pad/paper tape.       Chito Corona RN-BC, CWON  2/8/2024  10:53 AM           Referral sent to referral coordinator for scheduling.

## 2024-02-11 RX ORDER — OMEPRAZOLE 40 MG/1
40 CAPSULE, DELAYED RELEASE ORAL
Qty: 180 CAPSULE | Refills: 0 | Status: SHIPPED | OUTPATIENT
Start: 2024-02-11

## 2024-02-16 DIAGNOSIS — F32.A DEPRESSION, UNSPECIFIED DEPRESSION TYPE: ICD-10-CM

## 2024-02-16 RX ORDER — BUPROPION HYDROCHLORIDE 300 MG/1
300 TABLET ORAL DAILY
Qty: 90 TABLET | Refills: 3 | Status: SHIPPED | OUTPATIENT
Start: 2024-02-16

## 2024-02-16 NOTE — TELEPHONE ENCOUNTER
----- Message from Aaron Hardy sent at 2/16/2024  2:56 PM CST -----  Contact: Pt [phone]  Requesting an RX refill or new RX.  Is this a refill or new RX: Refill  RX name and strength buPROPion (WELLBUTRIN XL) 300 MG 24 hr tablet  Is this a 30 day or 90 day RX:   Pharmacy name and phone # [facility] #[id number] - [place] - [address]  Phone: [phone] Fax: [phone]

## 2024-02-16 NOTE — TELEPHONE ENCOUNTER
No care due was identified.  [facility] Embedded Care Due Messages. Reference number: [id number].   2/16/2024 3:09:13 PM CST

## 2024-02-22 ENCOUNTER — OFFICE VISIT (OUTPATIENT)
Dept: HEMATOLOGY/ONCOLOGY | Facility: CLINIC | Age: 74
End: 2024-02-22
Payer: MEDICARE

## 2024-02-22 VITALS
DIASTOLIC BLOOD PRESSURE: 77 MMHG | WEIGHT: 161.25 LBS | HEIGHT: 65 IN | BODY MASS INDEX: 26.87 KG/M2 | SYSTOLIC BLOOD PRESSURE: 183 MMHG | RESPIRATION RATE: 18 BRPM | OXYGEN SATURATION: 99 % | HEART RATE: 65 BPM

## 2024-02-22 DIAGNOSIS — Z17.0 MALIGNANT NEOPLASM OF LOWER-INNER QUADRANT OF LEFT BREAST IN FEMALE, ESTROGEN RECEPTOR POSITIVE: Primary | ICD-10-CM

## 2024-02-22 DIAGNOSIS — C50.312 MALIGNANT NEOPLASM OF LOWER-INNER QUADRANT OF LEFT BREAST IN FEMALE, ESTROGEN RECEPTOR POSITIVE: Primary | ICD-10-CM

## 2024-02-22 DIAGNOSIS — Z12.31 ENCOUNTER FOR SCREENING MAMMOGRAM FOR HIGH-RISK PATIENT: ICD-10-CM

## 2024-02-22 PROCEDURE — 99213 OFFICE O/P EST LOW 20 MIN: CPT | Mod: S$PBB,,, | Performed by: INTERNAL MEDICINE

## 2024-02-22 PROCEDURE — 99214 OFFICE O/P EST MOD 30 MIN: CPT | Mod: PBBFAC | Performed by: INTERNAL MEDICINE

## 2024-02-22 PROCEDURE — 99999 PR PBB SHADOW E&M-EST. PATIENT-LVL IV: CPT | Mod: PBBFAC,,, | Performed by: INTERNAL MEDICINE

## 2024-02-22 NOTE — PROGRESS NOTES
Subjective:       Patient ID: Dana Cook is a 73 y.o. female.    Chief Complaint: No chief complaint on file.      HPI 73-year-old female seen for ER+ left breast cancer.  She is on Letrozole.      She has been involved with a GI [facility] -had EGD and has F/U planned. That has been stressful due to concerns for pancreatic cancer.  No breast complaints.        History:  Screening mammogram on March 4, 2021 showed a focal asymmetry in the lower inner quadrant of the left breast.    Follow-up diagnostic mammogram and ultrasound on March 8, 2021 showed a focal asymmetry measuring 40 mm in the lower inner quadrant left breast.  Ultrasound showed a 5 x 5 x 3 mm hypoechoic mass at 3:00 a.m. position with seen in the lower inner quadrant area.    On March 19, 2021 biopsy of 2 areas in the left breast was performed.  The lower inner quadrant mass showed infiltrating lobular carcinoma which was histologic grade 3, nuclear grade 2, mitotic index 1.  Tumor cells were 95% ER positive in 95% WV positive.  HER2 was 2+ but negative by fish with a ratio 1.75, Ki-67  Biopsy 3:00 a.m. mass showed atypical ductal hyperplasia    MRI on April 1, 2021 showed multiple findings.  There was a 22 x 21 a 60 mm irregular mass in left breast lower inner quadrant, 8 mm to 9 is enhancement lymph breast at 3:00 a.m., 6 x 6 x 5 mm mass in the left breast lower outer quadrant and in the right breast in 19 mass 7 mm area of non-mass enhancement centrally.    On April 13, 2021 biopsy of the right breast non-mass enhancement in the left breast lower outer quadrant mass seen on MRI was performed.  Both of those biopsies were negative showing no atypia or malignancy.    On April 30, 2021 left breast lumpectomy and sentinel lymph node biopsy was performed.  Left breast showed a do by 1.7 x 1.2 cm infiltrating lobular carcinoma.  The sentinel lymph node was negative.  Right breast excisional biopsy was negative at that time as well.  Final pathological stage  T1c N0 stage I A.    Oncotype score showed low risk with a score 16.     [facility] genetics - CHACHO mutation    Radiation completed 8/3/21.    Letrozole started after radiation.    Mammogram in March 2023 - negative.      PMH: HBP, sarcoid, renal damage from NSAIDs  Review of Systems   Constitutional: Negative.  Negative for appetite change. Unexpected weight change: increased 6 lb.  HENT: Negative.  Negative for mouth sores.    Eyes:  Negative for visual disturbance.   Respiratory: Negative.  Negative for cough and shortness of breath.    Cardiovascular:  Negative for chest pain.   Gastrointestinal: Negative.  Negative for abdominal pain and diarrhea.   Genitourinary: Negative.  Negative for frequency.   Musculoskeletal:  Positive for back pain.   Integumentary:  Negative for rash.   Neurological: Negative.  Negative for headaches.   Hematological:  Negative for adenopathy.   Psychiatric/Behavioral:  The patient is nervous/anxious.          Objective:      Physical Exam  Vitals reviewed.   Constitutional:       General: She is not in acute distress.     Appearance: Normal appearance.   Eyes:      General: No scleral icterus.  Cardiovascular:      Rate and Rhythm: Normal rate and regular rhythm.   Pulmonary:      Effort: Pulmonary effort is normal. No respiratory distress.      Breath sounds: Normal breath sounds. No rales.   Chest:   Breasts:     Right: No mass or nipple discharge.      Left: No mass or nipple discharge.       Abdominal:      Palpations: Abdomen is soft. There is no mass.      Tenderness: There is no abdominal tenderness.   Lymphadenopathy:      Cervical: No cervical adenopathy.      Upper Body:      Right upper body: No supraclavicular or axillary adenopathy.      Left upper body: No supraclavicular or axillary adenopathy.   Skin:     Findings: No rash.   Neurological:      Mental Status: She is oriented to person, place, and time.   Psychiatric:         Mood and Affect: Mood normal.         Behavior:  Behavior normal.         Thought Content: Thought content normal.         Judgment: Judgment normal.       Assessment:       1. Malignant neoplasm of lower-inner quadrant of left breast in female, estrogen receptor positive        Plan:     Continue letrozole and RTC in 3 M  Mammograms due in March        Route Chart for Scheduling    Med Onc Chart Routing      Follow up with physician 6 months.   Follow up with [facility]    Infusion scheduling note    Injection scheduling note    Labs None   Scheduling:  Preferred lab:  Lab interval:     Imaging None      Pharmacy appointment No pharmacy appointment needed      Other referrals no referral to Oncology Primary Care needed -  no Massage appointment needed    No additional referrals needed

## 2024-03-21 ENCOUNTER — OFFICE VISIT (OUTPATIENT)
Dept: INTERNAL MEDICINE | Facility: CLINIC | Age: 74
End: 2024-03-21
Payer: MEDICARE

## 2024-03-21 VITALS
WEIGHT: 161.81 LBS | SYSTOLIC BLOOD PRESSURE: 168 MMHG | HEART RATE: 55 BPM | HEIGHT: 65 IN | TEMPERATURE: 98 F | DIASTOLIC BLOOD PRESSURE: 76 MMHG | BODY MASS INDEX: 26.96 KG/M2 | OXYGEN SATURATION: 96 % | RESPIRATION RATE: 20 BRPM

## 2024-03-21 DIAGNOSIS — I10 ESSENTIAL HYPERTENSION: ICD-10-CM

## 2024-03-21 DIAGNOSIS — Z00.00 ENCOUNTER FOR PREVENTIVE HEALTH EXAMINATION: Primary | ICD-10-CM

## 2024-03-21 DIAGNOSIS — N18.31 STAGE 3A CHRONIC KIDNEY DISEASE: ICD-10-CM

## 2024-03-21 DIAGNOSIS — E66.3 OVERWEIGHT (BMI 25.0-29.9): ICD-10-CM

## 2024-03-21 DIAGNOSIS — F33.1 MODERATE EPISODE OF RECURRENT MAJOR DEPRESSIVE DISORDER: ICD-10-CM

## 2024-03-21 DIAGNOSIS — E78.2 MIXED HYPERLIPIDEMIA: ICD-10-CM

## 2024-03-21 DIAGNOSIS — I65.23 BILATERAL CAROTID ARTERY STENOSIS: ICD-10-CM

## 2024-03-21 DIAGNOSIS — F41.9 ANXIETY: ICD-10-CM

## 2024-03-21 PROCEDURE — G2211 COMPLEX E/M VISIT ADD ON: HCPCS | Mod: S$PBB,,, | Performed by: HOSPITALIST

## 2024-03-21 PROCEDURE — 99214 OFFICE O/P EST MOD 30 MIN: CPT | Mod: S$PBB,,, | Performed by: HOSPITALIST

## 2024-03-21 PROCEDURE — 99999 PR PBB SHADOW E&M-EST. PATIENT-LVL IV: CPT | Mod: PBBFAC,,, | Performed by: HOSPITALIST

## 2024-03-21 PROCEDURE — 99214 OFFICE O/P EST MOD 30 MIN: CPT | Mod: PBBFAC,PO | Performed by: HOSPITALIST

## 2024-03-21 RX ORDER — BUSPIRONE HYDROCHLORIDE 5 MG/1
5 TABLET ORAL 2 TIMES DAILY
Qty: 60 TABLET | Refills: 11 | Status: SHIPPED | OUTPATIENT
Start: 2024-03-21 | End: 2025-03-21

## 2024-03-21 RX ORDER — VALSARTAN 160 MG/1
160 TABLET ORAL DAILY
Qty: 90 TABLET | Refills: 3 | Status: SHIPPED | OUTPATIENT
Start: 2024-03-21 | End: 2025-03-21

## 2024-03-21 NOTE — PROGRESS NOTES
Subjective:     @Patient ID: Dana Cook is a 73 y.o. female.    Chief Complaint: Annual Exam    HPI    74 yo F with  HTN, HLD, breast ca, osteoporosis, sarcoidosis, ckd3a, depression, carotid artery stenosis presents for annual :     1. HTN: chlorthalidone 25 mg qday  2. HLD, Carotid artery stenosis: atorvastatin 10 mg qday, red yeast rice   3. Breast ca:  dx with breast ca of left breast. S/p left breast lumpectomy and sentinel lymph node biopsy 4/30/21.  Left breast showed infiltrating lobular carcinoma. Currently follows with heme-onc. On letrozole.    4. CKD3: baseline Cr 1-1.2   5. Sarcoidosis: has eye involvement but follows closely with her eye specialists. Not on chronic steroids  6. Depression: on wellbutrin 300 mg qday   7. Carotid artery stenosis: seen on u/s 2019, 2022. On statin.   8. 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 However states some recent staffing changes has helped lower her anxiety. She prefers to take natural supplement valerian root for her anxiety   9. OP: fosamax 70 mg weekly                Lipid disorders/ASCVD risk (ages >/= 45 or >/= 20 if increased risk ): ordered  DM (>45y yearly or if obese, HTN): A1c n/a     Eye exam:   Breast Cancer (40-50y discretion of pt, 50-74y every 1-2 years): Mammogram Done 3/2023  Colorectal Cancer (normal risk 50-75yr): Colonoscopy done 11/1/2019;    DEXA (F>66 yo, M >69yo, M&F 50-68 yo with risk factors (smoking, previous fx, wt <70kg; etoh abuse, chronic steroids, RA)):Done 2021  SKin: reports done 2021 with Dr Whitt       Vaccines:   Influenza (yearly) done    Tetanus (every 10 yrs - 1st tdap)  Done  PPSV23(>66yo or <65 w/ lung dz, smoking, DM) Done  PCV13 (> 65 or <65 w/ immunocompromised) Done  Zoster (>59yo) Done Zostavax 2015  Covid19: done.        Exercise: walking;    Diet: low cholesterol diet           Review of Systems   Constitutional:  Positive for activity change and unexpected weight  "change.   HENT:  Positive for hearing loss. Negative for rhinorrhea and trouble swallowing.    Eyes:  Negative for discharge and visual disturbance.   Respiratory:  Negative for chest tightness and wheezing.    Cardiovascular:  Negative for chest pain and palpitations.   Gastrointestinal:  Negative for blood in stool, constipation, diarrhea and vomiting.   Endocrine: Positive for polydipsia. Negative for polyuria.   Genitourinary:  Negative for difficulty urinating, dysuria, hematuria and menstrual problem.   Musculoskeletal:  Positive for arthralgias and neck pain. Negative for joint swelling.   Neurological:  Negative for weakness and headaches.   Psychiatric/Behavioral:  Positive for dysphoric mood. Negative for confusion.      Past medical history, surgical history, and family medical history reviewed and updated as appropriate.    Medications and allergies reviewed.     Objective:     Vitals:    03/21/24 1036   BP: (!) 168/76   BP Location: Right arm   Patient Position: Sitting   Pulse: (!) 55   Resp: 20   Temp: 97.5 °F (36.4 °C)   SpO2: 96%   Weight: 73.4 kg (161 lb 13.1 oz)   Height: 5' 5" (1.651 m)     Body mass index is 26.93 kg/m².  Physical Exam  Vitals reviewed.   Constitutional:       General: She is not in acute distress.     Appearance: She is well-developed.   HENT:      Head: Normocephalic and atraumatic.      Right Ear: Tympanic membrane normal.      Left Ear: Tympanic membrane normal.      Mouth/Throat:      Mouth: Mucous membranes are moist.      Pharynx: No oropharyngeal exudate.   Eyes:      General:         Right eye: No discharge.         Left eye: No discharge.      Conjunctiva/sclera: Conjunctivae normal.   Cardiovascular:      Rate and Rhythm: Normal rate and regular rhythm.      Heart sounds: No murmur heard.     No friction rub.   Pulmonary:      Effort: Pulmonary effort is normal.      Breath sounds: Normal breath sounds.   Abdominal:      General: Bowel sounds are normal. There is no " distension.      Palpations: Abdomen is soft.      Tenderness: There is no abdominal tenderness. There is no guarding.   Musculoskeletal:      Cervical back: Normal range of motion and neck supple.      Right lower leg: No edema.      Left lower leg: No edema.   Lymphadenopathy:      Cervical: No cervical adenopathy.   Skin:     General: Skin is warm and dry.   Neurological:      Mental Status: She is alert and oriented to person, place, and time.   Psychiatric:         Mood and Affect: Mood normal.         Behavior: Behavior normal.         Lab Results   Component Value Date    WBC 4.42 06/21/2023    HGB 12.0 06/21/2023    HCT 36.0 (L) 06/21/2023     06/21/2023    CHOL 181 06/21/2023    TRIG 89 06/21/2023    HDL 66 06/21/2023    ALT 37 06/21/2023    AST 35 06/21/2023     09/07/2023    K 3.5 09/07/2023     09/07/2023    CREATININE 0.9 09/07/2023    BUN 13 09/07/2023    CO2 29 09/07/2023    TSH 0.711 06/21/2023    HGBA1C 5.8 11/18/2015       Assessment:     1. Encounter for preventive health examination    2. Essential hypertension    3. Mixed hyperlipidemia    4. Stage 3a chronic kidney disease    5. Anxiety    6. Moderate episode of recurrent major depressive disorder    7. Overweight (BMI 25.0-29.9)    8. Bilateral carotid artery stenosis      Plan:   Dana was seen today for annual exam.    Diagnoses and all orders for this visit:    Encounter for preventive health examination  - labs ordered    Essential hypertension  - bp not at goal. Continue chlorthalidone. Add valsartan 160 mg qday  -     Comprehensive Metabolic Panel; Future  -     CBC Auto Differential; Future  -     TSH; Future  -     Vitamin D; Future  -     Lipid Panel; Future  -     Urinalysis; Future  -     Protein/Creatinine Ratio, Urine; Future    Mixed hyperlipidemia  - Stable. Continue home meds     -     Comprehensive Metabolic Panel; Future  -     CBC Auto Differential; Future  -     TSH; Future  -     Vitamin D; Future  -      Lipid Panel; Future  -     Urinalysis; Future  -     Protein/Creatinine Ratio, Urine; Future    Stage 3a chronic kidney disease  - stable. Continue to monitor   -     Comprehensive Metabolic Panel; Future  -     CBC Auto Differential; Future  -     TSH; Future  -     Vitamin D; Future  -     Lipid Panel; Future  -     Urinalysis; Future  -     Protein/Creatinine Ratio, Urine; Future    Anxiety  - chronic. Add buspar  -     Comprehensive Metabolic Panel; Future  -     CBC Auto Differential; Future  -     TSH; Future  -     Vitamin D; Future  -     Lipid Panel; Future  -     Urinalysis; Future  -     Protein/Creatinine Ratio, Urine; Future    Moderate episode of recurrent major depressive disorder  - Stable. Continue home med wellbutrin     Overweight (BMI 25.0-29.9)  - stable. Encouraged exercise.   -     Comprehensive Metabolic Panel; Future  -     CBC Auto Differential; Future  -     TSH; Future  -     Vitamin D; Future  -     Lipid Panel; Future  -     Urinalysis; Future  -     Protein/Creatinine Ratio, Urine; Future    Bilateral carotid artery stenosis  - Stable. Continue home meds     Other orders  -     busPIRone (BUSPAR) 5 MG Tab; Take 1 tablet (5 mg total) by mouth 2 (two) times daily. For Anxiety  -     valsartan (DIOVAN) 160 MG tablet; Take 1 tablet (160 mg total) by mouth once daily. For Blood pressure             Mariam Johnson MD  Internal Medicine    3/21/2024

## 2024-03-22 ENCOUNTER — HOSPITAL ENCOUNTER (OUTPATIENT)
Dept: RADIOLOGY | Facility: HOSPITAL | Age: 74
Discharge: HOME | End: 2024-03-22
Attending: INTERNAL MEDICINE
Payer: MEDICARE

## 2024-03-22 DIAGNOSIS — Z12.31 ENCOUNTER FOR SCREENING MAMMOGRAM FOR HIGH-RISK PATIENT: ICD-10-CM

## 2024-03-22 PROCEDURE — 77067 SCR MAMMO BI INCL CAD: CPT | Mod: TC

## 2024-03-22 PROCEDURE — 77063 BREAST TOMOSYNTHESIS BI: CPT | Mod: 26,,, | Performed by: RADIOLOGY

## 2024-03-22 PROCEDURE — 77067 SCR MAMMO BI INCL CAD: CPT | Mod: 26,,, | Performed by: RADIOLOGY

## 2024-04-05 ENCOUNTER — PATIENT MESSAGE (OUTPATIENT)
Dept: INTERNAL MEDICINE | Facility: CLINIC | Age: 74
End: 2024-04-05
Payer: MEDICARE

## 2024-04-24 ENCOUNTER — PATIENT MESSAGE (OUTPATIENT)
Dept: ADMINISTRATIVE | Facility: HOSPITAL | Age: 74
End: 2024-04-24
Payer: MEDICARE

## 2024-04-24 ENCOUNTER — PATIENT OUTREACH (OUTPATIENT)
Dept: ADMINISTRATIVE | Facility: HOSPITAL | Age: 74
End: 2024-04-24
Payer: MEDICARE

## 2024-05-02 ENCOUNTER — PATIENT MESSAGE (OUTPATIENT)
Dept: INTERNAL MEDICINE | Facility: CLINIC | Age: 74
End: 2024-05-02
Payer: MEDICARE

## 2024-05-06 VITALS — DIASTOLIC BLOOD PRESSURE: 54 MMHG | SYSTOLIC BLOOD PRESSURE: 111 MMHG

## 2024-05-14 ENCOUNTER — PATIENT OUTREACH (OUTPATIENT)
Dept: ADMINISTRATIVE | Facility: HOSPITAL | Age: 74
End: 2024-05-14
Payer: MEDICARE

## 2024-05-14 VITALS — SYSTOLIC BLOOD PRESSURE: 130 MMHG | DIASTOLIC BLOOD PRESSURE: 64 MMHG

## 2024-06-10 ENCOUNTER — PATIENT MESSAGE (OUTPATIENT)
Dept: ENDOSCOPY | Facility: HOSPITAL | Age: 74
End: 2024-06-10
Payer: MEDICARE

## 2024-06-10 ENCOUNTER — TELEPHONE (OUTPATIENT)
Dept: ENDOSCOPY | Facility: HOSPITAL | Age: 74
End: 2024-06-10
Payer: MEDICARE

## 2024-06-10 DIAGNOSIS — D13.2 ADENOMATOUS DUODENAL POLYP: Primary | ICD-10-CM

## 2024-06-10 NOTE — TELEPHONE ENCOUNTER
Spoke to patient to schedule procedure(s) Upper Endoscopy (EGD)       Physician to perform procedure(s) Dr. LISA Smith  Date of Procedure (s) 07/16/2024  Arrival Time 09: 00AM  Time of Procedure(s) 10:00 AM   Location of Procedure(s) [address]  Type of Rx Prep sent to patient: Other  Instructions provided to patient via [facility]    Patient was informed on the following information and verbalized understanding. Screening questionnaire reviewed with patient and complete. If procedure requires anesthesia, a responsible adult needs to be present to accompany the patient home, patient cannot drive after receiving anesthesia. Appointment details are tentative, especially check-in time. Patient will receive a prep-op call 7 days prior to confirm check-in time for procedure. If applicable the patient should contact their pharmacy to verify Rx for procedure prep is ready for pick-up. Patient was advised to call the scheduling department at [phone] if pharmacy states no Rx is available. Patient was advised to call the endoscopy scheduling department if any questions or concerns arise.      [facility] Scheduling Department

## 2024-06-18 ENCOUNTER — LAB VISIT (OUTPATIENT)
Dept: LAB | Facility: HOSPITAL | Age: 74
End: 2024-06-18
Attending: HOSPITALIST
Payer: MEDICARE

## 2024-06-18 DIAGNOSIS — E66.3 OVERWEIGHT (BMI 25.0-29.9): ICD-10-CM

## 2024-06-18 DIAGNOSIS — F41.9 ANXIETY: ICD-10-CM

## 2024-06-18 DIAGNOSIS — I10 ESSENTIAL HYPERTENSION: ICD-10-CM

## 2024-06-18 DIAGNOSIS — E78.2 MIXED HYPERLIPIDEMIA: ICD-10-CM

## 2024-06-18 DIAGNOSIS — N18.31 STAGE 3A CHRONIC KIDNEY DISEASE: ICD-10-CM

## 2024-06-18 LAB
25(OH)D3+25(OH)D2 SERPL-MCNC: 74 NG/ML (ref 30–96)
ALBUMIN SERPL BCP-MCNC: 4 G/DL (ref 3.5–5.2)
ALP SERPL-CCNC: 61 U/L (ref 55–135)
ALT SERPL W/O P-5'-P-CCNC: 24 U/L (ref 10–44)
ANION GAP SERPL CALC-SCNC: 10 MMOL/L (ref 8–16)
AST SERPL-CCNC: 24 U/L (ref 10–40)
BASOPHILS # BLD AUTO: 0.03 K/UL (ref 0–0.2)
BASOPHILS NFR BLD: 0.5 % (ref 0–1.9)
BILIRUB SERPL-MCNC: 0.5 MG/DL (ref 0.1–1)
BUN SERPL-MCNC: 18 MG/DL (ref 8–23)
CALCIUM SERPL-MCNC: 10.2 MG/DL (ref 8.7–10.5)
CHLORIDE SERPL-SCNC: 102 MMOL/L (ref 95–110)
CHOLEST SERPL-MCNC: 187 MG/DL (ref 120–199)
CHOLEST/HDLC SERPL: 2.8 {RATIO} (ref 2–5)
CO2 SERPL-SCNC: 26 MMOL/L (ref 23–29)
CREAT SERPL-MCNC: 1.1 MG/DL (ref 0.5–1.4)
DIFFERENTIAL METHOD BLD: ABNORMAL
EOSINOPHIL # BLD AUTO: 0.1 K/UL (ref 0–0.5)
EOSINOPHIL NFR BLD: 2 % (ref 0–8)
ERYTHROCYTE [DISTWIDTH] IN BLOOD BY AUTOMATED COUNT: 12.6 % (ref 11.5–14.5)
EST. GFR  (NO RACE VARIABLE): 53.1 ML/MIN/1.73 M^2
GLUCOSE SERPL-MCNC: 98 MG/DL (ref 70–110)
HCT VFR BLD AUTO: 37.4 % (ref 37–48.5)
HDLC SERPL-MCNC: 68 MG/DL (ref 40–75)
HDLC SERPL: 36.4 % (ref 20–50)
HGB BLD-MCNC: 12.1 G/DL (ref 12–16)
IMM GRANULOCYTES # BLD AUTO: 0.03 K/UL (ref 0–0.04)
IMM GRANULOCYTES NFR BLD AUTO: 0.5 % (ref 0–0.5)
LDLC SERPL CALC-MCNC: 96.8 MG/DL (ref 63–159)
LYMPHOCYTES # BLD AUTO: 1.6 K/UL (ref 1–4.8)
LYMPHOCYTES NFR BLD: 24.8 % (ref 18–48)
MCH RBC QN AUTO: 31.3 PG (ref 27–31)
MCHC RBC AUTO-ENTMCNC: 32.4 G/DL (ref 32–36)
MCV RBC AUTO: 97 FL (ref 82–98)
MONOCYTES # BLD AUTO: 0.6 K/UL (ref 0.3–1)
MONOCYTES NFR BLD: 8.6 % (ref 4–15)
NEUTROPHILS # BLD AUTO: 4.1 K/UL (ref 1.8–7.7)
NEUTROPHILS NFR BLD: 63.6 % (ref 38–73)
NONHDLC SERPL-MCNC: 119 MG/DL
NRBC BLD-RTO: 0 /100 WBC
PLATELET # BLD AUTO: 394 K/UL (ref 150–450)
PMV BLD AUTO: 9.3 FL (ref 9.2–12.9)
POTASSIUM SERPL-SCNC: 3.8 MMOL/L (ref 3.5–5.1)
PROT SERPL-MCNC: 7.7 G/DL (ref 6–8.4)
RBC # BLD AUTO: 3.86 M/UL (ref 4–5.4)
SODIUM SERPL-SCNC: 138 MMOL/L (ref 136–145)
TRIGL SERPL-MCNC: 111 MG/DL (ref 30–150)
TSH SERPL DL<=0.005 MIU/L-ACNC: 0.97 UIU/ML (ref 0.4–4)
WBC # BLD AUTO: 6.37 K/UL (ref 3.9–12.7)

## 2024-06-18 PROCEDURE — 36415 COLL VENOUS BLD VENIPUNCTURE: CPT | Mod: PO | Performed by: HOSPITALIST

## 2024-06-18 PROCEDURE — 85025 COMPLETE CBC W/AUTO DIFF WBC: CPT | Performed by: HOSPITALIST

## 2024-06-18 PROCEDURE — 80061 LIPID PANEL: CPT | Performed by: HOSPITALIST

## 2024-06-18 PROCEDURE — 82306 VITAMIN D 25 HYDROXY: CPT | Performed by: HOSPITALIST

## 2024-06-18 PROCEDURE — 84443 ASSAY THYROID STIM HORMONE: CPT | Performed by: HOSPITALIST

## 2024-06-18 PROCEDURE — 80053 COMPREHEN METABOLIC PANEL: CPT | Performed by: HOSPITALIST

## 2024-06-25 ENCOUNTER — OFFICE VISIT (OUTPATIENT)
Dept: INTERNAL MEDICINE | Facility: CLINIC | Age: 74
End: 2024-06-25
Payer: MEDICARE

## 2024-06-25 VITALS
TEMPERATURE: 98 F | RESPIRATION RATE: 14 BRPM | HEIGHT: 66 IN | BODY MASS INDEX: 24.94 KG/M2 | WEIGHT: 155.19 LBS | HEART RATE: 65 BPM | OXYGEN SATURATION: 97 % | SYSTOLIC BLOOD PRESSURE: 138 MMHG | DIASTOLIC BLOOD PRESSURE: 70 MMHG

## 2024-06-25 DIAGNOSIS — M54.50 ACUTE LEFT-SIDED LOW BACK PAIN WITHOUT SCIATICA: ICD-10-CM

## 2024-06-25 DIAGNOSIS — M81.0 OSTEOPOROSIS, UNSPECIFIED OSTEOPOROSIS TYPE, UNSPECIFIED PATHOLOGICAL FRACTURE PRESENCE: ICD-10-CM

## 2024-06-25 DIAGNOSIS — I10 ESSENTIAL HYPERTENSION: Primary | ICD-10-CM

## 2024-06-25 DIAGNOSIS — N18.31 STAGE 3A CHRONIC KIDNEY DISEASE: ICD-10-CM

## 2024-06-25 PROCEDURE — 99999 PR PBB SHADOW E&M-EST. PATIENT-LVL IV: CPT | Mod: PBBFAC,,, | Performed by: HOSPITALIST

## 2024-06-25 PROCEDURE — G2211 COMPLEX E/M VISIT ADD ON: HCPCS | Mod: S$PBB,,, | Performed by: HOSPITALIST

## 2024-06-25 PROCEDURE — 99214 OFFICE O/P EST MOD 30 MIN: CPT | Mod: S$PBB,,, | Performed by: HOSPITALIST

## 2024-06-25 PROCEDURE — 99214 OFFICE O/P EST MOD 30 MIN: CPT | Mod: PBBFAC,PO | Performed by: HOSPITALIST

## 2024-06-25 RX ORDER — ALENDRONATE SODIUM 70 MG/1
70 TABLET ORAL
Qty: 12 TABLET | Refills: 3 | Status: SHIPPED | OUTPATIENT
Start: 2024-06-25

## 2024-06-25 RX ORDER — METHOCARBAMOL 500 MG/1
500 TABLET, FILM COATED ORAL 2 TIMES DAILY PRN
Qty: 40 TABLET | Refills: 0 | Status: SHIPPED | OUTPATIENT
Start: 2024-06-25

## 2024-06-25 NOTE — PROGRESS NOTES
"Subjective:     @Patient ID: Dana Cook is a 73 y.o. female.    Chief Complaint: Follow-up (3 mo) and Hypertension    HPI    74 yo F with  HTN, HLD, breast ca, osteoporosis, sarcoidosis, ckd3a, depression, carotid artery stenosis presents for f/u of htn     1. HTN: chlorthalidone 25 mg qday, and at last visit started on valsartan 160 mg   2. HLD, Carotid artery stenosis: atorvastatin 10 mg qday, red yeast rice   3. Breast ca:  dx with breast ca of left breast. S/p left breast lumpectomy and sentinel lymph node biopsy 4/30/21.  Left breast showed infiltrating lobular carcinoma. Currently follows with heme-onc. On letrozole.    4. CKD3: baseline Cr 1-1.2   5. Sarcoidosis: has eye involvement but follows closely with her eye specialists. Not on chronic steroids  6. Depression: on wellbutrin 300 mg qday   7. Carotid artery stenosis: seen on u/s 2019, 2022. On statin.   8. Anxiety: buspar 5 mg bid   9. OP: fosamax 70 mg weekly   10. Back pain: Reports had to get her  out of the ditch few days ago.when she pulled it out she developed back pain.  Feeling back pain of left lower back  Took aleve and did not help. Also using topical capsaicin. Also alternating hot/cold     Review of Systems   Constitutional:  Negative for chills and fever.   Musculoskeletal:  Positive for back pain.   Psychiatric/Behavioral:  Negative for agitation and confusion.      Past medical history, surgical history, and family medical history reviewed and updated as appropriate.    Medications and allergies reviewed.     Objective:     Vitals:    06/25/24 1041   BP: 138/70   BP Location: Left arm   Patient Position: Sitting   BP Method: Medium (Manual)   Pulse: 65   Resp: 14   Temp: 97.7 °F (36.5 °C)   TempSrc: Temporal   SpO2: 97%   Weight: 70.4 kg (155 lb 3.3 oz)   Height: 5' 5.75" (1.67 m)     Body mass index is 25.24 kg/m².  Physical Exam  Constitutional:       Appearance: Normal appearance.   HENT:      Head: Normocephalic and " atraumatic.   Eyes:      General:         Right eye: No discharge.         Left eye: No discharge.      Conjunctiva/sclera: Conjunctivae normal.   Pulmonary:      Effort: Pulmonary effort is normal.   Musculoskeletal:         General: No tenderness (no  TTP of lower back).      Cervical back: Normal range of motion and neck supple.   Skin:     General: Skin is warm and dry.   Neurological:      Mental Status: She is alert and oriented to person, place, and time.   Psychiatric:         Mood and Affect: Mood normal.         Behavior: Behavior normal.         Lab Results   Component Value Date    WBC 6.37 06/18/2024    HGB 12.1 06/18/2024    HCT 37.4 06/18/2024     06/18/2024    CHOL 187 06/18/2024    TRIG 111 06/18/2024    HDL 68 06/18/2024    ALT 24 06/18/2024    AST 24 06/18/2024     06/18/2024    K 3.8 06/18/2024     06/18/2024    CREATININE 1.1 06/18/2024    BUN 18 06/18/2024    CO2 26 06/18/2024    TSH 0.967 06/18/2024    HGBA1C 5.8 11/18/2015       Assessment:     1. Essential hypertension    2. Osteoporosis, unspecified osteoporosis type, unspecified pathological fracture presence    3. Stage 3a chronic kidney disease    4. Acute left-sided low back pain without sciatica      Plan:   Dana was seen today for follow-up and hypertension.    Diagnoses and all orders for this visit:    Essential hypertension  - chronic condition Improved. Continue home meds     Osteoporosis, unspecified osteoporosis type, unspecified pathological fracture presence  - Stable. Continue home meds   -     alendronate (FOSAMAX) 70 MG tablet; Take 1 tablet (70 mg total) by mouth every 7 days.    Stage 3a chronic kidney disease  - Stable. Continue to monitor   -     COMPREHENSIVE METABOLIC PANEL; Future  -     Protein/Creatinine Ratio, Urine; Future    Acute left-sided low back pain without sciatica  - likely muscular. Start robaxin prn. Counseled on possible sedation  - ok to use heat/cold    Other orders  -      methocarbamoL (ROBAXIN) 500 MG Tab; Take 1 tablet (500 mg total) by mouth 2 (two) times daily as needed (muscle spasm.). May cause sedation          Rtc 6 months     Mariam Johnson MD  Internal Medicine    6/25/2024

## 2024-07-01 DIAGNOSIS — C50.312 MALIGNANT NEOPLASM OF LOWER-INNER QUADRANT OF LEFT BREAST IN FEMALE, ESTROGEN RECEPTOR POSITIVE: ICD-10-CM

## 2024-07-01 DIAGNOSIS — Z17.0 MALIGNANT NEOPLASM OF LOWER-INNER QUADRANT OF LEFT BREAST IN FEMALE, ESTROGEN RECEPTOR POSITIVE: ICD-10-CM

## 2024-07-01 RX ORDER — LETROZOLE 2.5 MG/1
2.5 TABLET, FILM COATED ORAL
Qty: 30 TABLET | Refills: 11 | Status: SHIPPED | OUTPATIENT
Start: 2024-07-01

## 2024-07-10 ENCOUNTER — PATIENT MESSAGE (OUTPATIENT)
Dept: GASTROENTEROLOGY | Facility: CLINIC | Age: 74
End: 2024-07-10
Payer: MEDICARE

## 2024-07-13 NOTE — TELEPHONE ENCOUNTER
Care Due:                  Date            Visit Type   Department     Provider  --------------------------------------------------------------------------------                                EP -                              PRIMARY      [facility] INTERNAL  Last Visit: 06-      CARE ([facility])   MEDICINE       Mariam  Alex                              EP -                              PRIMARY      [facility] INTERNAL  Next Visit: 12-      CARE ([facility])   [facility]                                                            Last  Test          Frequency    Reason                     Performed    Due Date  --------------------------------------------------------------------------------    Mg Level....  12 months..  alendronate..............  Not Found    Overdue    Phosphate...  12 months..  alendronate..............  Not Found    Overdue    [employer] Embedded Care Due Messages. Reference number: [id number].   7/13/2024 3:33:20 AM CDT

## 2024-07-13 NOTE — TELEPHONE ENCOUNTER
Refill Routing Note   Medication(s) are not appropriate for processing by [facility] for the following reason(s):        Drug-disease interaction  Drug-Disease: chlorthalidone and Stage 3a chronic kidney disease    [facility] action(s):  Defer     Requires labs : Yes             Appointments  past 12m or future 3m with PCP    Date Provider   Last Visit   6/25/2024 Mariam Johnson MD   Next Visit   12/26/2024 Mariam Johnson MD   ED visits in past 90 days: 0        Note composed:9:25 AM 07/13/2024

## 2024-07-15 ENCOUNTER — TELEPHONE (OUTPATIENT)
Dept: ENDOSCOPY | Facility: HOSPITAL | Age: 74
End: 2024-07-15
Payer: MEDICARE

## 2024-07-15 RX ORDER — CHLORTHALIDONE 25 MG/1
25 TABLET ORAL
Qty: 90 TABLET | Refills: 3 | Status: SHIPPED | OUTPATIENT
Start: 2024-07-15

## 2024-07-16 ENCOUNTER — ANESTHESIA EVENT (OUTPATIENT)
Dept: ENDOSCOPY | Facility: HOSPITAL | Age: 74
End: 2024-07-16
Payer: MEDICARE

## 2024-07-16 ENCOUNTER — HOSPITAL ENCOUNTER (OUTPATIENT)
Facility: HOSPITAL | Age: 74
Discharge: HOME | End: 2024-07-16
Attending: INTERNAL MEDICINE | Admitting: INTERNAL MEDICINE
Payer: MEDICARE

## 2024-07-16 ENCOUNTER — ANESTHESIA (OUTPATIENT)
Dept: ENDOSCOPY | Facility: HOSPITAL | Age: 74
End: 2024-07-16
Payer: MEDICARE

## 2024-07-16 VITALS
RESPIRATION RATE: 17 BRPM | DIASTOLIC BLOOD PRESSURE: 61 MMHG | TEMPERATURE: 98 F | HEART RATE: 54 BPM | SYSTOLIC BLOOD PRESSURE: 145 MMHG | BODY MASS INDEX: 24.91 KG/M2 | HEIGHT: 66 IN | WEIGHT: 155 LBS | OXYGEN SATURATION: 95 %

## 2024-07-16 DIAGNOSIS — D13.2 DUODENAL ADENOMA: ICD-10-CM

## 2024-07-16 PROCEDURE — 27201012 HC FORCEPS, HOT/COLD, DISP: Performed by: INTERNAL MEDICINE

## 2024-07-16 PROCEDURE — 63600175 PHARM REV CODE 636 W HCPCS: Performed by: NURSE ANESTHETIST, CERTIFIED REGISTERED

## 2024-07-16 PROCEDURE — 27201028 HC NEEDLE, SCLERO: Performed by: INTERNAL MEDICINE

## 2024-07-16 PROCEDURE — 43254 EGD ENDO MUCOSAL RESECTION: CPT | Mod: ,,, | Performed by: INTERNAL MEDICINE

## 2024-07-16 PROCEDURE — 27201089 HC SNARE, DISP (ANY): Performed by: INTERNAL MEDICINE

## 2024-07-16 PROCEDURE — 43254 EGD ENDO MUCOSAL RESECTION: CPT | Performed by: INTERNAL MEDICINE

## 2024-07-16 PROCEDURE — 88305 TISSUE EXAM BY PATHOLOGIST: CPT | Performed by: PATHOLOGY

## 2024-07-16 PROCEDURE — 25000003 PHARM REV CODE 250: Performed by: NURSE ANESTHETIST, CERTIFIED REGISTERED

## 2024-07-16 PROCEDURE — 27202363 HC INJECTION AGENT, SUBMUCOSAL, ANY: Performed by: INTERNAL MEDICINE

## 2024-07-16 PROCEDURE — 27200997: Performed by: INTERNAL MEDICINE

## 2024-07-16 PROCEDURE — 37000008 HC ANESTHESIA 1ST 15 MINUTES: Performed by: INTERNAL MEDICINE

## 2024-07-16 PROCEDURE — 37000009 HC ANESTHESIA EA ADD 15 MINS: Performed by: INTERNAL MEDICINE

## 2024-07-16 RX ORDER — GLUCAGON 1 MG
1 KIT INJECTION
Status: DISCONTINUED | OUTPATIENT
Start: 2024-07-16 | End: 2024-07-16 | Stop reason: HOSPADM

## 2024-07-16 RX ORDER — HYDROMORPHONE HYDROCHLORIDE 1 MG/ML
0.2 INJECTION, SOLUTION INTRAMUSCULAR; INTRAVENOUS; SUBCUTANEOUS EVERY 5 MIN PRN
Status: DISCONTINUED | OUTPATIENT
Start: 2024-07-16 | End: 2024-07-16 | Stop reason: HOSPADM

## 2024-07-16 RX ORDER — SODIUM CHLORIDE 9 MG/ML
INJECTION, SOLUTION INTRAVENOUS CONTINUOUS
Status: DISCONTINUED | OUTPATIENT
Start: 2024-07-16 | End: 2024-07-16 | Stop reason: HOSPADM

## 2024-07-16 RX ORDER — ONDANSETRON HYDROCHLORIDE 2 MG/ML
INJECTION, SOLUTION INTRAVENOUS
Status: DISCONTINUED | OUTPATIENT
Start: 2024-07-16 | End: 2024-07-16

## 2024-07-16 RX ORDER — PROPOFOL 10 MG/ML
VIAL (ML) INTRAVENOUS
Status: DISCONTINUED | OUTPATIENT
Start: 2024-07-16 | End: 2024-07-16

## 2024-07-16 RX ORDER — OMEPRAZOLE 40 MG/1
40 CAPSULE, DELAYED RELEASE ORAL DAILY
Qty: 90 CAPSULE | Refills: 0 | Status: SHIPPED | OUTPATIENT
Start: 2024-07-16 | End: 2024-10-14

## 2024-07-16 RX ORDER — SODIUM CHLORIDE 0.9 % (FLUSH) 0.9 %
10 SYRINGE (ML) INJECTION
Status: DISCONTINUED | OUTPATIENT
Start: 2024-07-16 | End: 2024-07-16 | Stop reason: HOSPADM

## 2024-07-16 RX ORDER — LIDOCAINE HYDROCHLORIDE 20 MG/ML
INJECTION, SOLUTION EPIDURAL; INFILTRATION; INTRACAUDAL; PERINEURAL
Status: DISCONTINUED | OUTPATIENT
Start: 2024-07-16 | End: 2024-07-16

## 2024-07-16 RX ADMIN — PROPOFOL 50 MG: 10 INJECTION, EMULSION INTRAVENOUS at 09:07

## 2024-07-16 RX ADMIN — ONDANSETRON 4 MG: 2 INJECTION INTRAMUSCULAR; INTRAVENOUS at 09:07

## 2024-07-16 RX ADMIN — LIDOCAINE HYDROCHLORIDE 50 MG: 20 INJECTION, SOLUTION EPIDURAL; INFILTRATION; INTRACAUDAL; PERINEURAL at 09:07

## 2024-07-16 NOTE — ANESTHESIA PREPROCEDURE EVALUATION
07/16/2024  Dana Cook is a 73 y.o., female with a duodenal polyp here for EGD.    Hx of PONV    Past Medical History:   Diagnosis Date    Anxiety     Atypical hyperplasia of breast 03/19/2021    left    Breast cancer 03/19/2021    left    Cancer     Colon polyps     Depression     Dry eyes     Dry mouth     Dupuytren's contracture     Hypertension     left arm fracture     hairline    Murmur, heart     Osteopenia     PONV (postoperative nausea and vomiting)     with wisdom teeth    Sarcoidosis      No results found for this or any previous visit.        Pre-op Assessment    I have reviewed the Patient Summary Reports.    I have reviewed the NPO Status.      Review of Systems  Anesthesia Hx:    Hx of PONV              Cardiovascular:     Hypertension                                        Renal/:  Chronic Renal Disease, CKD                Psych:  Psychiatric History  depression                Physical Exam  General: Well nourished, Cooperative, Alert and Oriented    Airway:  Mallampati: II   Mouth Opening: Normal  TM Distance: Normal  Neck ROM: Normal ROM    Dental:  Intact        Anesthesia Plan  Type of Anesthesia, risks & benefits discussed:    Anesthesia Type: Gen Natural Airway  Intra-op Monitoring Plan: Standard ASA Monitors  Post Op Pain Control Plan: multimodal analgesia  Induction:  IV  Informed Consent: Informed consent signed with the Patient and all parties understand the risks and agree with anesthesia plan.  All questions answered.   ASA Score: 2    Ready For Surgery From Anesthesia Perspective.     .

## 2024-07-16 NOTE — H&P
Short Stay Endoscopy History and Physical    PCP - Mariam Johnson MD  Referring Physician - Chandra Smith MD  [address] [place] [address]    Procedure - EGD  ASA - per anesthesia  Mallampati - per anesthesia  History of Anesthesia problems - no  Family history Anesthesia problems -  no   Plan of anesthesia - General    HPI:  This is a 73 y.o. 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    Surgical History:  has a past surgical history that includes Wrist surgery (Left); Laparoscopy for possible endometriosis  (1985); Shoulder surgery (03/22/2018); Breast biopsy (Right, 2016); Breast biopsy (Left, 03/19/2021); Breast biopsy (Left, 03/19/2021); Reduction of both breasts (Bilateral, 04/30/2021); Mastectomy, partial (Bilateral, 04/30/2021); Mastectomy with sentinel node biopsy and axillary lymph node dissection (Left, 04/30/2021); xi robotic hysterectomy, with salpingo-oophorectomy (Bilateral, 11/22/2021); Endoscopic ultrasound of upper gastrointestinal tract (N/A, 02/02/2022); Breast lumpectomy (Left, 2021); Total Reduction Mammoplasty (Bilateral, 2021); Esophagogastroduodenoscopy (N/A, 8/4/2023); and Esophagogastroduodenoscopy (N/A, 9/27/2023).    Family History: family history includes Breast cancer in her maternal aunt, paternal aunt, paternal aunt, paternal cousin, and another family member; Breast cancer (age of onset: 55) in her paternal cousin and another  family member; Breast cancer (age of onset: 70) in her sister; Cancer in her brother, paternal aunt, paternal cousin, sister, and another family member; Cancer (age of onset: 68) in her father; Cervical cancer in her paternal aunt; Cervical cancer (age of onset: 77) in her paternal aunt; Diabetes in her sister; Heart failure in her maternal aunt; Hypertension in her father, maternal grandmother, and mother; Leukemia in her paternal grandmother; Mental illness in her paternal grandfather; Multiple sclerosis in her paternal cousin; Other in her mother and son; Pancreatic cancer in an other family member; Tongue cancer in her maternal grandfather; Uterine cancer (age of onset: 49) in her sister; Uterine cancer (age of onset: 86) in her maternal grandmother..    Social History:  reports that she has never smoked. She has never used smokeless tobacco. She reports current alcohol use of about 5.0 - 6.0 standard drinks of alcohol per week. She reports that she does not use drugs.    Review of patient's allergies indicates:   Allergen Reactions    Adhesive      Surgical tape/ burned sckin    Percocet [oxycodone-acetaminophen] Nausea Only     Does not know what else she can take; can take tylenol  Pt states she is not allergic to percocet, just upsets her stomach.  States she has shoulder surgery here and has probably had dilaudid. No real allergy         Medications:   Medications Prior to Admission   Medication Sig Dispense Refill Last Dose    alendronate (FOSAMAX) 70 MG tablet Take 1 tablet (70 mg total) by mouth every 7 days. 12 tablet 3 Past Week    atorvastatin (LIPITOR) 10 MG tablet Take 1 tablet (10 mg total) by mouth once daily. 90 tablet 3 7/15/2024    BIOTIN ORAL Take by mouth once daily.   7/15/2024    buPROPion (WELLBUTRIN XL) 300 MG 24 hr tablet Take 1 tablet (300 mg total) by mouth once daily. 90 tablet 3 7/15/2024    busPIRone (BUSPAR) 5 MG Tab Take 1 tablet (5 mg total) by mouth 2 (two) times daily. For  Anxiety 60 tablet 11 7/15/2024    chlorthalidone (HYGROTEN) 25 MG Tab TAKE 1 TABLET(25 MG) BY MOUTH EVERY DAY 90 tablet 3 7/15/2024    letrozole (FEMARA) 2.5 mg Tab TAKE 1 TABLET(2.5 MG) BY MOUTH EVERY DAY 30 tablet 11 7/15/2024    omeprazole (PRILOSEC) 40 MG capsule TAKE 1 CAPSULE(40 MG) BY MOUTH TWICE DAILY BEFORE MEALS 180 capsule 0 Past Month    potassium chloride (MICRO-K) 10 MEQ CpSR Take 2 capsules (20 mEq total) by mouth once daily. 180 capsule 3 7/15/2024    prenatal vit calc,iron,folic (PRENATAL VITAMIN ORAL) Take by mouth once daily.   Past Week    RED YEAST RICE ORAL Take by mouth once daily.   Past Week    SUBHASH'S WORT/S.GINSGR (SUBHASH'S WORT-SIBER.GINSENG) 450-90 mg Tab    Past Week    VALERIAN ROOT ORAL Take by mouth 2 (two) times daily as needed.   Past Week    valsartan (DIOVAN) 160 MG tablet Take 1 tablet (160 mg total) by mouth once daily. For Blood pressure 90 tablet 3 7/15/2024    ascorbic acid, vitamin C, (VITAMIN C) 1000 MG tablet        CALCIUM-VITAMIN D3 ORAL Take by mouth. 600 mg - 20 mcg. Take 1 cap oral twice a day   Unknown    colchicine (COLCRYS) 0.6 mg tablet On 1st day of gout flare take 1 tablet p.o t.i.d; From day 2 onwards, take 1 tablet p.o. b.i.d. until flare resolves. 10 tablet 2 Unknown    methocarbamoL (ROBAXIN) 500 MG Tab Take 1 tablet (500 mg total) by mouth 2 (two) times daily as needed (muscle spasm.). May cause sedation 40 tablet 0 More than a month    UNABLE TO FIND Hemp seed oil       UNABLE TO FIND medication name:myxtic gummies          Physical Exam:    Vital Signs:   Vitals:    07/16/24 0835   BP: 139/62   Pulse: 60   Resp: 16   Temp: 98.4 °F (36.9 °C)       General Appearance: Well appearing in no acute distress  Lungs: no labored breathing  CVS:  regular rate  Abdomen: non tender    Labs:  Lab Results   Component Value Date    WBC 6.37 06/18/2024    HGB 12.1 06/18/2024    HCT 37.4 06/18/2024     06/18/2024    CHOL 187 06/18/2024    TRIG 111 06/18/2024     HDL 68 06/18/2024    ALT 24 06/18/2024    AST 24 06/18/2024     06/18/2024    K 3.8 06/18/2024     06/18/2024    CREATININE 1.1 06/18/2024    BUN 18 06/18/2024    CO2 26 06/18/2024    TSH 0.967 06/18/2024    HGBA1C 5.8 11/18/2015       I have explained the risks and benefits of this endoscopic procedure to the patient including but not limited to bleeding, inflammation, infection, perforation, and death.      Chandra Smith MD

## 2024-07-16 NOTE — ANESTHESIA POSTPROCEDURE EVALUATION
Anesthesia Post Evaluation    Patient: Dana Cook    Procedure(s) Performed: Procedure(s) (LRB):  EGD (ESOPHAGOGASTRODUODENOSCOPY) (N/A)    Final Anesthesia Type: general      Patient location during evaluation: PACU  Patient participation: Yes- Able to Participate  Level of consciousness: awake and alert and oriented  Post-procedure vital signs: reviewed and stable  Pain management: adequate  Airway patency: patent    PONV status at discharge: No PONV  Anesthetic complications: no      Cardiovascular status: blood pressure returned to baseline and hemodynamically stable  Respiratory status: unassisted and spontaneous ventilation  Hydration status: euvolemic  Follow-up not needed.              Vitals Value Taken Time   /61 07/16/24 1030   Temp 36.7 °C (98 °F) 07/16/24 1030   Pulse 54 07/16/24 1030   Resp 17 07/16/24 1015   SpO2 95 % 07/16/24 1030         No case tracking events are documented in the log.      Pain/Yuan Score: Yuan Score: 9 (7/16/2024 10:15 AM)

## 2024-07-16 NOTE — TRANSFER OF CARE
"Anesthesia Transfer of Care Note    Patient: Dana Cook    Procedure(s) Performed: Procedure(s) (LRB):  EGD (ESOPHAGOGASTRODUODENOSCOPY) (N/A)    Patient location: PACU    Anesthesia Type: MAC    Transport from OR: Transported from OR on room air with adequate spontaneous ventilation    Post pain: adequate analgesia    Post assessment: no apparent anesthetic complications    Post vital signs: stable    Level of consciousness: awake    Nausea/Vomiting: no nausea/vomiting    Complications: none    Transfer of care protocol was followed      Last vitals: Visit Vitals  BP (!) 106/52   Pulse (!) 47   Temp 36.9 °C (98.4 °F) (Temporal)   Resp 16   Ht 5' 5.75" (1.67 m)   Wt 70.3 kg (155 lb)   SpO2 99%   Breastfeeding No   BMI 25.21 kg/m²     "

## 2024-07-16 NOTE — PROVATION PATIENT INSTRUCTIONS
Discharge Summary/Instructions after an Endoscopic Procedure  Patient Name: Dana Cook  Patient MRN: [id number]  Patient YOB: 1950 Tuesday, July 16, 2024  Chandra Smith MD  Dear patient,  As a result of recent federal legislation (The Federal Cures Act), you may   receive lab or pathology results from your procedure in your [facility]   account before your physician is able to contact you. Your physician or   their representative will relay the results to you with their   recommendations at their soonest availability.  Thank you,  RESTRICTIONS:  During your procedure today, you received medications for sedation.  These   medications may affect your judgment, balance and coordination.  Therefore,   for 24 hours, you have the following restrictions:   - DO NOT drive a car, operate machinery, make legal/financial decisions,   sign important papers or drink alcohol.    ACTIVITY:  Today: no heavy lifting, straining or running due to procedural   sedation/anesthesia.  The following day: return to full activity including work.  DIET:  Eat and drink normally unless instructed otherwise.     TREATMENT FOR COMMON SIDE EFFECTS:  - Mild abdominal pain, nausea, belching, bloating or excessive gas:  rest,   eat lightly and use a heating pad.  - Sore Throat: treat with throat lozenges and/or gargle with warm salt   water.  - Because air was used during the procedure, expelling large amounts of air   from your rectum or belching is normal.  - If a bowel prep was taken, you may not have a bowel movement for 1-3 days.    This is normal.  SYMPTOMS TO WATCH FOR AND REPORT TO YOUR PHYSICIAN:  1. Abdominal pain or bloating, other than gas cramps.  2. Chest pain.  3. Back pain.  4. Signs of infection such as: chills or fever occurring within 24 hours   after the procedure.  5. Rectal bleeding, which would show as bright red, maroon, or black stools.   (A tablespoon of blood from the rectum is not serious, especially if    hemorrhoids are present.)  6. Vomiting.  7. Weakness or dizziness.  GO DIRECTLY TO THE NEAREST EMERGENCY ROOM IF YOU HAVE ANY OF THE FOLLOWING:      Difficulty breathing              Chills and/or fever over 101 F   Persistent vomiting and/or vomiting blood   Severe abdominal pain   Severe chest pain   Black, tarry stools   Bleeding- more than one tablespoon   Any other symptom or condition that you feel may need urgent attention  Your doctor recommends these additional instructions:  If any biopsies were taken, your doctors clinic will contact you in 1 to 2   weeks with any results.  - Discharge patient to home (ambulatory).   - Resume previous diet; Discharge to home (ambulatory); Resume outpatient   medications  - Return to primary care physician as previously scheduled.   - Await pathology results. Further recommendations pending final pathology   results.  For questions, problems or results please call your physician - Chandra Smith MD at Work:  [phone].  [facility], EMERGENCY ROOM PHONE NUMBER: [phone]  IF A COMPLICATION OR EMERGENCY SITUATION ARISES AND YOU ARE UNABLE TO REACH   YOUR PHYSICIAN - GO DIRECTLY TO THE EMERGENCY ROOM.  Chandra Smith MD  7/16/2024 10:00:58 AM  This report has been verified and signed electronically.  Dear patient,  As a result of recent federal legislation (The Federal Cures Act), you may   receive lab or pathology results from your procedure in your [facility]   account before your physician is able to contact you. Your physician or   their representative will relay the results to you with their   recommendations at their soonest availability.  Thank you,  PROVATION

## 2024-07-19 LAB
FINAL PATHOLOGIC DIAGNOSIS: NORMAL
GROSS: NORMAL
Lab: NORMAL

## 2024-08-03 DIAGNOSIS — Z71.89 COMPLEX CARE COORDINATION: ICD-10-CM

## 2024-08-17 RX ORDER — ATORVASTATIN CALCIUM 10 MG/1
10 TABLET, FILM COATED ORAL
Qty: 90 TABLET | Refills: 3 | Status: SHIPPED | OUTPATIENT
Start: 2024-08-17

## 2024-08-17 NOTE — TELEPHONE ENCOUNTER
No care due was identified.  [facility] Embedded Care Due Messages. Reference number: [id number].   8/17/2024 8:55:43 AM CDT

## 2024-08-18 NOTE — TELEPHONE ENCOUNTER
Refill Decision Note   Dana Joey  is requesting a refill authorization.  Brief Assessment and Rationale for Refill:  Approve     Medication Therapy Plan:        Comments:     Note composed:9:26 PM 08/17/2024

## 2024-08-19 ENCOUNTER — PATIENT MESSAGE (OUTPATIENT)
Dept: INTERNAL MEDICINE | Facility: CLINIC | Age: 74
End: 2024-08-19
Payer: MEDICARE

## 2024-08-19 ENCOUNTER — TELEPHONE (OUTPATIENT)
Dept: INTERNAL MEDICINE | Facility: CLINIC | Age: 74
End: 2024-08-19
Payer: MEDICARE

## 2024-08-19 DIAGNOSIS — Z12.31 SCREENING MAMMOGRAM FOR HIGH-RISK PATIENT: ICD-10-CM

## 2024-08-19 DIAGNOSIS — Z12.31 ENCOUNTER FOR SCREENING MAMMOGRAM FOR BREAST CANCER: Primary | ICD-10-CM

## 2024-08-19 NOTE — TELEPHONE ENCOUNTER
----- Message from Serg Mcmanus sent at 8/19/2024  8:29 AM CDT -----  Contact: [phone]@[email or web address]  Good morning patient would like to request a order for a mammogram. Please call patient to advise  [phone]

## 2024-08-19 NOTE — TELEPHONE ENCOUNTER
Returned pt call in regards to needing Mammogram. Pt next Mammogram is not due until 3/22/25. Voice box was full couldn't lvm.

## 2024-08-20 ENCOUNTER — OFFICE VISIT (OUTPATIENT)
Dept: HEMATOLOGY/ONCOLOGY | Facility: CLINIC | Age: 74
End: 2024-08-20
Payer: MEDICARE

## 2024-08-20 VITALS
DIASTOLIC BLOOD PRESSURE: 76 MMHG | HEART RATE: 64 BPM | BODY MASS INDEX: 23.92 KG/M2 | OXYGEN SATURATION: 99 % | HEIGHT: 66 IN | SYSTOLIC BLOOD PRESSURE: 157 MMHG | TEMPERATURE: 98 F | WEIGHT: 148.81 LBS

## 2024-08-20 DIAGNOSIS — C50.312 MALIGNANT NEOPLASM OF LOWER-INNER QUADRANT OF LEFT BREAST IN FEMALE, ESTROGEN RECEPTOR POSITIVE: Primary | ICD-10-CM

## 2024-08-20 DIAGNOSIS — Z17.0 MALIGNANT NEOPLASM OF LOWER-INNER QUADRANT OF LEFT BREAST IN FEMALE, ESTROGEN RECEPTOR POSITIVE: Primary | ICD-10-CM

## 2024-08-20 PROCEDURE — 99214 OFFICE O/P EST MOD 30 MIN: CPT | Mod: PBBFAC | Performed by: INTERNAL MEDICINE

## 2024-08-20 PROCEDURE — 99999 PR PBB SHADOW E&M-EST. PATIENT-LVL IV: CPT | Mod: PBBFAC,,, | Performed by: INTERNAL MEDICINE

## 2024-08-20 NOTE — PROGRESS NOTES
Subjective:       Patient ID: Dana Cook is a 73 y.o. female.    Chief Complaint: No chief complaint on file.      HPI 73-year-old female seen for follow-up of Stage 1  ER+ left breast cancer.  She is on adjuvant Letrozole.    She had F/U EGD in July for duodenal polys - negative.    Today she reports that her biggest complaint has been some fatigue.  She has no breast complaints.  She did have rotator cuff surgery and was recovering from that.        History:  Screening mammogram on March 4, 2021 showed a focal asymmetry in the lower inner quadrant of the left breast.    Follow-up diagnostic mammogram and ultrasound on March 8, 2021 showed a focal asymmetry measuring 40 mm in the lower inner quadrant left breast.  Ultrasound showed a 5 x 5 x 3 mm hypoechoic mass at 3:00 a.m. position with seen in the lower inner quadrant area.    On March 19, 2021 biopsy of 2 areas in the left breast was performed.  The lower inner quadrant mass showed infiltrating lobular carcinoma which was histologic grade 3, nuclear grade 2, mitotic index 1.  Tumor cells were 95% ER positive in 95% WI positive.  HER2 was 2+ but negative by fish with a ratio 1.75, Ki-67  Biopsy 3:00 a.m. mass showed atypical ductal hyperplasia    MRI on April 1, 2021 showed multiple findings.  There was a 22 x 21 a 60 mm irregular mass in left breast lower inner quadrant, 8 mm to 9 is enhancement lymph breast at 3:00 a.m., 6 x 6 x 5 mm mass in the left breast lower outer quadrant and in the right breast in 19 mass 7 mm area of non-mass enhancement centrally.    On April 13, 2021 biopsy of the right breast non-mass enhancement on MRI was performed - those biopsies were negative showing no atypia or malignancy.    On April 30, 2021 left breast lumpectomy and sentinel lymph node biopsy was performed.  Left breast showed a 2 by 1.7 x 1.2 cm infiltrating lobular carcinoma.  The sentinel lymph node was negative.  Right breast excisional biopsy was negative at that  time as well.  Final pathological stage T1c N0 stage I A.    Oncotype score showed low risk with a score 16.     [facility] genetics - CHACHO mutation    Radiation completed 8/3/21.    Letrozole started after radiation.    Mammogram in March 2024 - negative.      PMH: HBP, sarcoid, renal damage from NSAIDs  Review of Systems   Constitutional: Negative.  Negative for appetite change. Unexpected weight change: increased 6 lb.  HENT: Negative.  Negative for mouth sores.    Eyes:  Negative for visual disturbance.   Respiratory: Negative.  Negative for cough and shortness of breath.    Cardiovascular:  Negative for chest pain.   Gastrointestinal: Negative.  Negative for abdominal pain and diarrhea.   Genitourinary: Negative.  Negative for frequency.   Musculoskeletal:  Positive for back pain.   Integumentary:  Negative for rash.   Neurological: Negative.  Negative for headaches.   Hematological:  Negative for adenopathy.   Psychiatric/Behavioral:  The patient is nervous/anxious.          Objective:      Physical Exam  Vitals reviewed.   Constitutional:       General: She is not in acute distress.     Appearance: Normal appearance.   Eyes:      General: No scleral icterus.  Cardiovascular:      Rate and Rhythm: Normal rate and regular rhythm.   Pulmonary:      Effort: Pulmonary effort is normal. No respiratory distress.      Breath sounds: Normal breath sounds. No rales.   Chest:   Breasts:     Right: No mass or nipple discharge.      Left: No mass or nipple discharge.       Abdominal:      Palpations: Abdomen is soft. There is no mass.      Tenderness: There is no abdominal tenderness.   Lymphadenopathy:      Cervical: No cervical adenopathy.      Upper Body:      Right upper body: No supraclavicular or axillary adenopathy.      Left upper body: No supraclavicular or axillary adenopathy.   Skin:     Findings: No rash.   Neurological:      Mental Status: She is oriented to person, place, and time.   Psychiatric:         Mood and  Affect: Mood normal.         Behavior: Behavior normal.         Thought Content: Thought content normal.         Judgment: Judgment normal.         Assessment:       1. Malignant neoplasm of lower-inner quadrant of left breast in female, estrogen receptor positive        Plan:     Continue letrozole and RTC in 6 M        Route Chart for Scheduling    Med Onc Chart Routing      Follow up with physician 6 months.   Follow up with [facility]    Infusion scheduling note    Injection scheduling note    Labs None   Scheduling:  Preferred lab:  Lab interval:     Imaging None      Pharmacy appointment No pharmacy appointment needed      Other referrals no referral to Oncology Primary Care needed -  no Massage appointment needed    No additional referrals needed

## 2024-09-10 RX ORDER — POTASSIUM CHLORIDE 750 MG/1
CAPSULE, EXTENDED RELEASE ORAL
Qty: 180 CAPSULE | Refills: 3 | Status: SHIPPED | OUTPATIENT
Start: 2024-09-10

## 2024-09-10 NOTE — TELEPHONE ENCOUNTER
Refill Decision Note   Dana Joey  is requesting a refill authorization.  Brief Assessment and Rationale for Refill:  Approve     Medication Therapy Plan:         Comments:     Note composed:11:02 AM 09/10/2024

## 2024-09-10 NOTE — TELEPHONE ENCOUNTER
No care due was identified.  [facility] Embedded Care Due Messages. Reference number: [id number].   9/10/2024 3:32:17 AM CDT

## 2024-10-01 ENCOUNTER — PATIENT MESSAGE (OUTPATIENT)
Dept: INTERNAL MEDICINE | Facility: CLINIC | Age: 74
End: 2024-10-01
Payer: MEDICARE

## 2024-10-16 ENCOUNTER — PATIENT MESSAGE (OUTPATIENT)
Dept: ADMINISTRATIVE | Facility: HOSPITAL | Age: 74
End: 2024-10-16
Payer: MEDICARE

## 2024-10-17 ENCOUNTER — PATIENT MESSAGE (OUTPATIENT)
Dept: GASTROENTEROLOGY | Facility: CLINIC | Age: 74
End: 2024-10-17
Payer: MEDICARE

## 2024-10-18 ENCOUNTER — TELEPHONE (OUTPATIENT)
Dept: ENDOSCOPY | Facility: HOSPITAL | Age: 74
End: 2024-10-18
Payer: MEDICARE

## 2024-10-18 DIAGNOSIS — Z86.0100 HISTORY OF COLON POLYPS: ICD-10-CM

## 2024-10-18 DIAGNOSIS — D13.2 ADENOMATOUS DUODENAL POLYP: Primary | ICD-10-CM

## 2024-10-18 NOTE — TELEPHONE ENCOUNTER
10/17/24  2:28 AM  I need to schedule a colonoscopy.  I have a ride for November 15.  Could someone in your office schedule a colonoscopy.

## 2024-10-19 NOTE — TELEPHONE ENCOUNTER
Called to schedule pt for colonoscopy. Pt did not answer. Message was left on pts voicemail requesting call back to Endoscopy scheduling department at [phone].

## 2024-10-21 ENCOUNTER — TELEPHONE (OUTPATIENT)
Dept: ENDOSCOPY | Facility: HOSPITAL | Age: 74
End: 2024-10-21
Payer: MEDICARE

## 2024-10-21 VITALS — HEIGHT: 66 IN | BODY MASS INDEX: 23.78 KG/M2 | WEIGHT: 148 LBS

## 2024-10-21 DIAGNOSIS — Z12.11 ENCOUNTER FOR COLONOSCOPY DUE TO HISTORY OF COLONIC POLYP: ICD-10-CM

## 2024-10-21 DIAGNOSIS — Z12.11 SPECIAL SCREENING FOR MALIGNANT NEOPLASMS, COLON: ICD-10-CM

## 2024-10-21 DIAGNOSIS — Z86.0100 HISTORY OF COLON POLYPS: ICD-10-CM

## 2024-10-21 DIAGNOSIS — D13.2 ADENOMATOUS DUODENAL POLYP: Primary | ICD-10-CM

## 2024-10-21 DIAGNOSIS — Z86.0100 ENCOUNTER FOR COLONOSCOPY DUE TO HISTORY OF COLONIC POLYP: ICD-10-CM

## 2024-10-21 RX ORDER — POLYETHYLENE GLYCOL 3350, SODIUM SULFATE ANHYDROUS, SODIUM BICARBONATE, SODIUM CHLORIDE, POTASSIUM CHLORIDE 236; 22.74; 6.74; 5.86; 2.97 G/4L; G/4L; G/4L; G/4L; G/4L
4 POWDER, FOR SOLUTION ORAL ONCE
Qty: 4000 ML | Refills: 0 | Status: SHIPPED | OUTPATIENT
Start: 2024-10-21 | End: 2024-10-21

## 2024-10-21 NOTE — TELEPHONE ENCOUNTER
[facility]      Spoke to patient to schedule procedure(s) Colonoscopy       Physician to perform procedure(s) Dr. DAR Montes  Date of Procedure (s) 10/21/24  Arrival Time 10:00 AM  Time of Procedure(s) 11:00 AM   Location of Procedure(s) [place] 4th Floor  Type of Rx Prep sent to patient: PEG  Instructions provided to patient via [facility]    Patient was informed on the following information and verbalized understanding. Screening questionnaire reviewed with patient and complete. If procedure requires anesthesia, a responsible adult needs to be present to accompany the patient home, patient cannot drive after receiving anesthesia. Appointment details are tentative, especially check-in time. Patient will receive a prep-op call 7 days prior to confirm check-in time for procedure. If applicable the patient should contact their pharmacy to verify Rx for procedure prep is ready for pick-up. Patient was advised to call the scheduling department at [phone] if pharmacy states no Rx is available. Patient was advised to call the endoscopy scheduling department if any questions or concerns arise.      [facility] Endoscopy Scheduling Department

## 2024-10-23 ENCOUNTER — PATIENT OUTREACH (OUTPATIENT)
Dept: ADMINISTRATIVE | Facility: HOSPITAL | Age: 74
End: 2024-10-23
Payer: MEDICARE

## 2024-10-23 NOTE — PROGRESS NOTES
Chart reviewed  Immunizations reconciled   Message sent to provider to place order for colonoscopy.

## 2024-11-15 ENCOUNTER — HOSPITAL ENCOUNTER (OUTPATIENT)
Facility: HOSPITAL | Age: 74
Discharge: HOME | End: 2024-11-15
Attending: INTERNAL MEDICINE | Admitting: INTERNAL MEDICINE
Payer: MEDICARE

## 2024-11-15 ENCOUNTER — ANESTHESIA EVENT (OUTPATIENT)
Dept: ENDOSCOPY | Facility: HOSPITAL | Age: 74
End: 2024-11-15
Payer: MEDICARE

## 2024-11-15 ENCOUNTER — ANESTHESIA (OUTPATIENT)
Dept: ENDOSCOPY | Facility: HOSPITAL | Age: 74
End: 2024-11-15
Payer: MEDICARE

## 2024-11-15 VITALS
HEIGHT: 65 IN | RESPIRATION RATE: 16 BRPM | HEART RATE: 52 BPM | BODY MASS INDEX: 24.83 KG/M2 | SYSTOLIC BLOOD PRESSURE: 132 MMHG | WEIGHT: 149 LBS | TEMPERATURE: 98 F | DIASTOLIC BLOOD PRESSURE: 57 MMHG | OXYGEN SATURATION: 98 %

## 2024-11-15 DIAGNOSIS — Z86.0100 HISTORY OF COLON POLYPS: Primary | ICD-10-CM

## 2024-11-15 DIAGNOSIS — Z12.11 COLON CANCER SCREENING: ICD-10-CM

## 2024-11-15 PROCEDURE — 37000009 HC ANESTHESIA EA ADD 15 MINS: Performed by: INTERNAL MEDICINE

## 2024-11-15 PROCEDURE — G0105 COLORECTAL SCRN; HI RISK IND: HCPCS | Mod: ,,, | Performed by: INTERNAL MEDICINE

## 2024-11-15 PROCEDURE — G0105 COLORECTAL SCRN; HI RISK IND: HCPCS | Performed by: INTERNAL MEDICINE

## 2024-11-15 PROCEDURE — 63600175 PHARM REV CODE 636 W HCPCS: Performed by: NURSE ANESTHETIST, CERTIFIED REGISTERED

## 2024-11-15 PROCEDURE — 37000008 HC ANESTHESIA 1ST 15 MINUTES: Performed by: INTERNAL MEDICINE

## 2024-11-15 RX ORDER — SODIUM CHLORIDE 9 MG/ML
INJECTION, SOLUTION INTRAVENOUS CONTINUOUS
Status: DISCONTINUED | OUTPATIENT
Start: 2024-11-15 | End: 2024-11-15 | Stop reason: HOSPADM

## 2024-11-15 RX ORDER — LIDOCAINE HYDROCHLORIDE 20 MG/ML
INJECTION INTRAVENOUS
Status: DISCONTINUED | OUTPATIENT
Start: 2024-11-15 | End: 2024-11-15

## 2024-11-15 RX ORDER — PROPOFOL 10 MG/ML
VIAL (ML) INTRAVENOUS
Status: DISCONTINUED | OUTPATIENT
Start: 2024-11-15 | End: 2024-11-15

## 2024-11-15 RX ORDER — PROPOFOL 10 MG/ML
VIAL (ML) INTRAVENOUS CONTINUOUS PRN
Status: DISCONTINUED | OUTPATIENT
Start: 2024-11-15 | End: 2024-11-15

## 2024-11-15 RX ADMIN — LIDOCAINE HYDROCHLORIDE 30 MG: 20 INJECTION INTRAVENOUS at 11:11

## 2024-11-15 RX ADMIN — PROPOFOL 150 MCG/KG/MIN: 10 INJECTION, EMULSION INTRAVENOUS at 11:11

## 2024-11-15 RX ADMIN — PROPOFOL 30 MG: 10 INJECTION, EMULSION INTRAVENOUS at 12:11

## 2024-11-15 RX ADMIN — PROPOFOL 60 MG: 10 INJECTION, EMULSION INTRAVENOUS at 11:11

## 2024-11-15 NOTE — ANESTHESIA POSTPROCEDURE EVALUATION
Anesthesia Post Evaluation    Patient: Dana Cook    Procedure(s) Performed: Procedure(s) (LRB):  COLONOSCOPY, SCREENING, HIGH RISK PATIENT (N/A)    Final Anesthesia Type: general      Patient location during evaluation: GI PACU  Patient participation: Yes- Able to Participate  Level of consciousness: awake and alert and oriented  Post-procedure vital signs: reviewed and stable  Pain management: adequate  Airway patency: patent    PONV status at discharge: No PONV  Anesthetic complications: no      Cardiovascular status: blood pressure returned to baseline and hemodynamically stable  Respiratory status: unassisted, spontaneous ventilation and room air  Hydration status: euvolemic  Follow-up not needed.              Vitals Value Taken Time   /57 11/15/24 1252   Temp 36.6 °C (97.9 °F) 11/15/24 1222   Pulse 52 11/15/24 1252   Resp 16 11/15/24 1252   SpO2 98 % 11/15/24 1252         Event Time   Out of Recovery 13:06:10         Pain/Yuan Score: Yuan Score: 9 (11/15/2024 12:23 PM)

## 2024-11-15 NOTE — PROVATION PATIENT INSTRUCTIONS
Discharge Summary/Instructions after an Endoscopic Procedure  Patient Name: Dana Cook  Patient MRN: [id number]  Patient YOB: 1950  Friday, November 15, 2024  Saul Montes MD  Dear patient,  As a result of recent federal legislation (The Federal Cures Act), you may   receive lab or pathology results from your procedure in your [facility]   account before your physician is able to contact you. Your physician or   their representative will relay the results to you with their   recommendations at their soonest availability.  Thank you,  RESTRICTIONS:  During your procedure today, you received medications for sedation.  These   medications may affect your judgment, balance and coordination.  Therefore,   for 24 hours, you have the following restrictions:   - DO NOT drive a car, operate machinery, make legal/financial decisions,   sign important papers or drink alcohol.    ACTIVITY:  Today: no heavy lifting, straining or running due to procedural   sedation/anesthesia.  The following day: return to full activity including work.  DIET:  Eat and drink normally unless instructed otherwise.     TREATMENT FOR COMMON SIDE EFFECTS:  - Mild abdominal pain, nausea, belching, bloating or excessive gas:  rest,   eat lightly and use a heating pad.  - Sore Throat: treat with throat lozenges and/or gargle with warm salt   water.  - Because air was used during the procedure, expelling large amounts of air   from your rectum or belching is normal.  - If a bowel prep was taken, you may not have a bowel movement for 1-3 days.    This is normal.  SYMPTOMS TO WATCH FOR AND REPORT TO YOUR PHYSICIAN:  1. Abdominal pain or bloating, other than gas cramps.  2. Chest pain.  3. Back pain.  4. Signs of infection such as: chills or fever occurring within 24 hours   after the procedure.  5. Rectal bleeding, which would show as bright red, maroon, or black stools.   (A tablespoon of blood from the rectum is not serious, especially if    hemorrhoids are present.)  6. Vomiting.  7. Weakness or dizziness.  GO DIRECTLY TO THE NEAREST EMERGENCY ROOM IF YOU HAVE ANY OF THE FOLLOWING:      Difficulty breathing              Chills and/or fever over 101 F   Persistent vomiting and/or vomiting blood   Severe abdominal pain   Severe chest pain   Black, tarry stools   Bleeding- more than one tablespoon   Any other symptom or condition that you feel may need urgent attention  Your doctor recommends these additional instructions:  If any biopsies were taken, your doctors clinic will contact you in 1 to 2   weeks with any results.  - Discharge patient to home.   - Repeat colonoscopy is not recommended due to current age (66 years or   older) for surveillance.   - The findings and recommendations were discussed with the designated   responsible adult.  For questions, problems or results please call your physician - Saul Montes MD at Work:  [phone].  [facility], EMERGENCY ROOM PHONE NUMBER: [phone]  IF A COMPLICATION OR EMERGENCY SITUATION ARISES AND YOU ARE UNABLE TO REACH   YOUR PHYSICIAN - GO DIRECTLY TO THE EMERGENCY ROOM.  Saul Montes MD  11/15/2024 12:19:31 PM  This report has been verified and signed electronically.  Dear patient,  As a result of recent federal legislation (The Federal Cures Act), you may   receive lab or pathology results from your procedure in your [facility]   account before your physician is able to contact you. Your physician or   their representative will relay the results to you with their   recommendations at their soonest availability.  Thank you,  PROVATION

## 2024-11-15 NOTE — TRANSFER OF CARE
"Anesthesia Transfer of Care Note    Patient: Dana Cook    Procedure(s) Performed: Procedure(s) (LRB):  COLONOSCOPY, SCREENING, HIGH RISK PATIENT (N/A)    Patient location: PACU    Anesthesia Type: general    Transport from OR: Transported from OR on room air with adequate spontaneous ventilation    Post pain: adequate analgesia    Post assessment: no apparent anesthetic complications and tolerated procedure well    Post vital signs: stable    Level of consciousness: sedated    Nausea/Vomiting: no nausea/vomiting    Complications: none    Transfer of care protocol was followed      Last vitals: Visit Vitals  BP (!) 159/84   Pulse (!) 56   Temp 36.5 °C (97.7 °F) (Temporal)   Resp 18   Ht 5' 5" (1.651 m)   Wt 67.6 kg (149 lb)   SpO2 98%   Breastfeeding No   BMI 24.79 kg/m²     "

## 2024-11-15 NOTE — H&P
Short Stay Endoscopy History and Physical    PCP - Mariam Johnson MD    Procedure - Colonoscopy  ASA - per anesthesia  Mallampati - per anesthesia  History of Anesthesia problems - no  Family history Anesthesia problems - no   Plan of anesthesia - General    HPI:  This is a 73 y.o. 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    Surgical History:  has a past surgical history that includes Wrist surgery (Left); Laparoscopy for possible endometriosis  (1985); Shoulder surgery (03/22/2018); Breast biopsy (Right, 2016); Breast biopsy (Left, 03/19/2021); Breast biopsy (Left, 03/19/2021); Reduction of both breasts (Bilateral, 04/30/2021); Mastectomy, partial (Bilateral, 04/30/2021); Mastectomy with sentinel node biopsy and axillary lymph node dissection (Left, 04/30/2021); xi robotic hysterectomy, with salpingo-oophorectomy (Bilateral, 11/22/2021); Endoscopic ultrasound of upper gastrointestinal tract (N/A, 02/02/2022); Breast lumpectomy (Left, 2021); Total Reduction Mammoplasty (Bilateral, 2021); Esophagogastroduodenoscopy (N/A, 8/4/2023); Esophagogastroduodenoscopy (N/A, 9/27/2023); and Esophagogastroduodenoscopy (N/A, 7/16/2024).    Family History: family history includes Breast cancer in her maternal aunt, paternal aunt, paternal aunt, paternal cousin, and another family member; Breast cancer (age of onset: 55) in her paternal cousin and another family member; Breast cancer (age of onset: 70) in her sister; Cancer in her  brother, paternal aunt, paternal cousin, sister, and another family member; Cancer (age of onset: 68) in her father; Cervical cancer in her paternal aunt; Cervical cancer (age of onset: 77) in her paternal aunt; Diabetes in her sister; Heart failure in her maternal aunt; Hypertension in her father, maternal grandmother, and mother; Leukemia in her paternal grandmother; Mental illness in her paternal grandfather; Multiple sclerosis in her paternal cousin; Other in her mother and son; Pancreatic cancer in an other family member; Tongue cancer in her maternal grandfather; Uterine cancer (age of onset: 49) in her sister; Uterine cancer (age of onset: 86) in her maternal grandmother.. Otherwise no colon cancer, inflammatory bowel disease, or GI malignancies.    Social History:  reports that she has never smoked. She has never used smokeless tobacco. She reports that she does not currently use alcohol after a past usage of about 5.0 - 6.0 standard drinks of alcohol per week. She reports that she does not use drugs.    Review of patient's allergies indicates:   Allergen Reactions    Adhesive      Surgical tape/ burned sckin    Percocet [oxycodone-acetaminophen] Nausea Only     Does not know what else she can take; can take tylenol  Pt states she is not allergic to percocet, just upsets her stomach.  States she has shoulder surgery here and has probably had dilaudid. No real allergy         Medications:   Medications Prior to Admission   Medication Sig Dispense Refill Last Dose/Taking    alendronate (FOSAMAX) 70 MG tablet Take 1 tablet (70 mg total) by mouth every 7 days. 12 tablet 3 Past Week    ascorbic acid, vitamin C, (VITAMIN C) 1000 MG tablet    11/14/2024    atorvastatin (LIPITOR) 10 MG tablet TAKE 1 TABLET(10 MG) BY MOUTH EVERY DAY 90 tablet 3 11/14/2024    BIOTIN ORAL Take by mouth once daily.   11/14/2024    buPROPion (WELLBUTRIN XL) 300 MG 24 hr tablet Take 1 tablet (300 mg total) by mouth once daily. 90 tablet 3  11/14/2024    busPIRone (BUSPAR) 5 MG Tab Take 1 tablet (5 mg total) by mouth 2 (two) times daily. For Anxiety 60 tablet 11 11/14/2024    CALCIUM-VITAMIN D3 ORAL Take by mouth. 600 mg - 20 mcg. Take 1 cap oral twice a day   Past Month    chlorthalidone (HYGROTEN) 25 MG Tab TAKE 1 TABLET(25 MG) BY MOUTH EVERY DAY 90 tablet 3 11/14/2024    letrozole (FEMARA) 2.5 mg Tab TAKE 1 TABLET(2.5 MG) BY MOUTH EVERY DAY 30 tablet 11 11/14/2024    omeprazole (PRILOSEC) 40 MG capsule Take 1 capsule (40 mg total) by mouth once daily. 90 capsule 0 11/14/2024    potassium chloride (MICRO-K) 10 MEQ CpSR TAKE 2 CAPSULES(20 MEQ) BY MOUTH EVERY  capsule 3 11/14/2024    prenatal vit calc,iron,folic (PRENATAL VITAMIN ORAL) Take by mouth once daily.   Past Month    RED YEAST RICE ORAL Take by mouth once daily.   11/14/2024    SUBHASH'S WORT/S.GINSGR (SUBHASH'S WORT-SIBER.GINSENG) 450-90 mg Tab    Past Week    UNABLE TO FIND Hemp seed oil   11/14/2024    UNABLE TO FIND medication name:myxtic gummies   11/14/2024    VALERIAN ROOT ORAL Take by mouth 2 (two) times daily as needed.   Past Week    valsartan (DIOVAN) 160 MG tablet Take 1 tablet (160 mg total) by mouth once daily. For Blood pressure 90 tablet 3 11/14/2024    colchicine (COLCRYS) 0.6 mg tablet On 1st day of gout flare take 1 tablet p.o t.i.d; From day 2 onwards, take 1 tablet p.o. b.i.d. until flare resolves. 10 tablet 2     methocarbamoL (ROBAXIN) 500 MG Tab Take 1 tablet (500 mg total) by mouth 2 (two) times daily as needed (muscle spasm.). May cause sedation 40 tablet 0 More than a month         Physical Exam:    Vital Signs:   Vitals:    11/15/24 1036   BP:    Pulse: (!) 56   Resp:    Temp:        General Appearance: Well appearing in no acute distress  Eyes:    No scleral icterus  ENT: Neck supple, Lips, mucosa, and tongue normal; teeth and gums normal  Abdomen: Soft, non tender, non distended with positive bowel sounds. No hepatosplenomegaly, ascites, or  mass.  Extremities: 2+ pulses, no clubbing, cyanosis or edema  Skin: No rash      Labs:  Lab Results   Component Value Date    WBC 6.37 06/18/2024    HGB 12.1 06/18/2024    HCT 37.4 06/18/2024     06/18/2024    CHOL 187 06/18/2024    TRIG 111 06/18/2024    HDL 68 06/18/2024    ALT 24 06/18/2024    AST 24 06/18/2024     06/18/2024    K 3.8 06/18/2024     06/18/2024    CREATININE 1.1 06/18/2024    BUN 18 06/18/2024    CO2 26 06/18/2024    TSH 0.967 06/18/2024    HGBA1C 5.8 11/18/2015       I have explained the risks and benefits of endoscopy procedures to the patient including but not limited to bleeding, perforation, infection, and death.  The patient was asked if they understand and allowed to ask any further questions to their satisfaction.    Saul Montes MD

## 2024-11-15 NOTE — ANESTHESIA PREPROCEDURE EVALUATION
11/15/2024  Dana Cook is a 73 y.o., female.      Patient Name: Dana Cook  YOB: 1950  MRN: [id number]  [facility]: [id number]      Code Status: Prior   Date of Procedure: 11/15/2024  Anesthesia: Choice Procedure: Procedure(s) (LRB):  COLONOSCOPY, SCREENING, HIGH RISK PATIENT (N/A)  Pre-Operative Diagnosis: Adenomatous duodenal polyp [D13.2]  History of colon polyps [Z86.0100]  Encounter for colonoscopy due to history of colonic polyp [Z12.11, Z86.0100]  Proceduralist: Surgeons and Role:     * Saul Montes MD - Primary        SUBJECTIVE:   Dana Cook is a 73 y.o. 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 No notes on file    ALLERGIES:     Review of patient's allergies indicates:   Allergen Reactions    Adhesive      Surgical tape/ burned sckin    Percocet [oxycodone-acetaminophen] Nausea Only     Does not know what else she can take; can take tylenol  Pt states she is not allergic to percocet, just upsets her stomach.  States she has shoulder surgery here and has probably had dilaudid. No real allergy       MEDICATIONS:     Current Facility-Administered Medications   Medication Dose Route Frequency Provider Last Rate Last Admin    0.9%  NaCl infusion   Intravenous Continuous Saul Montes MD              History:     Patient Active Problem List   Diagnosis    Osteopenia    Depression    History of colon polyps    Sarcoidosis    Mixed hyperlipidemia    Essential hypertension    Malignant neoplasm of lower-inner quadrant of left breast in female, estrogen receptor positive    Stage 3a chronic kidney disease    Bilateral  carotid artery stenosis    Monoallelic mutation of CHACHO gene    Status post robot-assisted total hysterectomy and bilateral salpingo-oophorectomy (BSO)    Impaired functional mobility, balance, gait, and endurance    Anxiety    Overweight (BMI 25.0-29.9)    Decreased strength of lower extremity     Past Medical History:   Diagnosis Date    Anxiety     Atypical hyperplasia of breast 03/19/2021    left    Breast cancer 03/19/2021    left    Cancer     Colon polyps     Depression     Dry eyes     Dry mouth     Dupuytren's contracture     Hypertension     left arm fracture     hairline    Murmur, heart     Osteopenia     PONV (postoperative nausea and vomiting)     with wisdom teeth    Sarcoidosis      Surgical History:    has a past surgical history that includes Wrist surgery (Left); Laparoscopy for possible endometriosis  (1985); Shoulder surgery (03/22/2018); Breast biopsy (Right, 2016); Breast biopsy (Left, 03/19/2021); Breast biopsy (Left, 03/19/2021); Reduction of both breasts (Bilateral, 04/30/2021); Mastectomy, partial (Bilateral, 04/30/2021); Mastectomy with sentinel node biopsy and axillary lymph node dissection (Left, 04/30/2021); xi robotic hysterectomy, with salpingo-oophorectomy (Bilateral, 11/22/2021); Endoscopic ultrasound of upper gastrointestinal tract (N/A, 02/02/2022); Breast lumpectomy (Left, 2021); Total Reduction Mammoplasty (Bilateral, 2021); Esophagogastroduodenoscopy (N/A, 8/4/2023); Esophagogastroduodenoscopy (N/A, 9/27/2023); and Esophagogastroduodenoscopy (N/A, 7/16/2024).   Social History:    reports that she is not currently sexually active and has had partner(s) who are male.  reports that she has never smoked. She has never used smokeless tobacco. She reports that she does not currently use alcohol after a past usage of about 5.0 - 6.0 standard drinks of alcohol per week. She reports that she does not use drugs.       Pre-op Assessment    I have reviewed the Patient Summary Reports.      I have reviewed the Nursing Notes. I have reviewed the NPO Status.   I have reviewed the Medications.     Review of Systems  Anesthesia Hx:  No problems with previous Anesthesia             Denies Family Hx of Anesthesia complications.   Personal Hx of Anesthesia complications, Post-Operative Nausea/Vomiting                    Hematology/Oncology:  Hematology Normal                       --  Cancer in past history:       Breast              Cardiovascular:     Hypertension                                          Musculoskeletal:  Musculoskeletal Normal                Neurological:  Neurology Normal                                      Dermatological:  Skin Normal        Physical Exam  General: Cooperative, Alert and Oriented    Airway:  Mallampati: II   Mouth Opening: Normal  TM Distance: Normal  Tongue: Normal  Neck ROM: Normal ROM    Dental:  Intact        Anesthesia Plan  Type of Anesthesia, risks & benefits discussed:    Anesthesia Type: Gen Natural Airway  Intra-op Monitoring Plan: Standard ASA Monitors  Induction:  IV  Informed Consent: Informed consent signed with the Patient and all parties understand the risks and agree with anesthesia plan.  All questions answered.   ASA Score: 2  Day of Surgery Review of History & Physical: H&P Update referred to the surgeon/provider.I have interviewed and examined the patient. I have reviewed the patient's H&P dated: There are no significant changes.     Ready For Surgery From Anesthesia Perspective.     .

## 2024-12-19 ENCOUNTER — LAB VISIT (OUTPATIENT)
Dept: LAB | Facility: HOSPITAL | Age: 74
End: 2024-12-19
Attending: HOSPITALIST
Payer: MEDICARE

## 2024-12-19 DIAGNOSIS — N18.31 STAGE 3A CHRONIC KIDNEY DISEASE: ICD-10-CM

## 2024-12-19 LAB
ALBUMIN SERPL BCP-MCNC: 3.8 G/DL (ref 3.5–5.2)
ALP SERPL-CCNC: 71 U/L (ref 40–150)
ALT SERPL W/O P-5'-P-CCNC: 24 U/L (ref 10–44)
ANION GAP SERPL CALC-SCNC: 6 MMOL/L (ref 8–16)
AST SERPL-CCNC: 25 U/L (ref 10–40)
BILIRUB SERPL-MCNC: 0.4 MG/DL (ref 0.1–1)
BUN SERPL-MCNC: 14 MG/DL (ref 8–23)
CALCIUM SERPL-MCNC: 10.2 MG/DL (ref 8.7–10.5)
CHLORIDE SERPL-SCNC: 103 MMOL/L (ref 95–110)
CO2 SERPL-SCNC: 29 MMOL/L (ref 23–29)
CREAT SERPL-MCNC: 1 MG/DL (ref 0.5–1.4)
EST. GFR  (NO RACE VARIABLE): 59.5 ML/MIN/1.73 M^2
GLUCOSE SERPL-MCNC: 95 MG/DL (ref 70–110)
POTASSIUM SERPL-SCNC: 4 MMOL/L (ref 3.5–5.1)
PROT SERPL-MCNC: 7.1 G/DL (ref 6–8.4)
SODIUM SERPL-SCNC: 138 MMOL/L (ref 136–145)

## 2024-12-19 PROCEDURE — 80053 COMPREHEN METABOLIC PANEL: CPT | Performed by: HOSPITALIST

## 2024-12-19 PROCEDURE — 36415 COLL VENOUS BLD VENIPUNCTURE: CPT | Mod: PO | Performed by: HOSPITALIST

## 2024-12-26 ENCOUNTER — OFFICE VISIT (OUTPATIENT)
Dept: INTERNAL MEDICINE | Facility: CLINIC | Age: 74
End: 2024-12-26
Payer: MEDICARE

## 2024-12-26 VITALS
TEMPERATURE: 98 F | RESPIRATION RATE: 16 BRPM | SYSTOLIC BLOOD PRESSURE: 148 MMHG | DIASTOLIC BLOOD PRESSURE: 70 MMHG | BODY MASS INDEX: 25.3 KG/M2 | HEIGHT: 65 IN | OXYGEN SATURATION: 98 % | WEIGHT: 151.88 LBS | HEART RATE: 62 BPM

## 2024-12-26 DIAGNOSIS — R20.2 NUMBNESS AND TINGLING OF BOTH FEET: ICD-10-CM

## 2024-12-26 DIAGNOSIS — R20.0 NUMBNESS AND TINGLING OF BOTH FEET: ICD-10-CM

## 2024-12-26 DIAGNOSIS — N18.31 STAGE 3A CHRONIC KIDNEY DISEASE: ICD-10-CM

## 2024-12-26 DIAGNOSIS — E78.2 MIXED HYPERLIPIDEMIA: ICD-10-CM

## 2024-12-26 DIAGNOSIS — I10 ESSENTIAL HYPERTENSION: Primary | ICD-10-CM

## 2024-12-26 PROCEDURE — 99215 OFFICE O/P EST HI 40 MIN: CPT | Mod: PBBFAC,PO | Performed by: HOSPITALIST

## 2024-12-26 PROCEDURE — 99214 OFFICE O/P EST MOD 30 MIN: CPT | Mod: S$PBB,,, | Performed by: HOSPITALIST

## 2024-12-26 PROCEDURE — G2211 COMPLEX E/M VISIT ADD ON: HCPCS | Mod: S$PBB,,, | Performed by: HOSPITALIST

## 2024-12-26 PROCEDURE — 99999 PR PBB SHADOW E&M-EST. PATIENT-LVL V: CPT | Mod: PBBFAC,,, | Performed by: HOSPITALIST

## 2024-12-26 NOTE — PROGRESS NOTES
Subjective:     @Patient ID: Dana Cook is a 73 y.o. female.    Chief Complaint: Hypertension    HPI    74 yo F with  HTN, HLD, breast ca, osteoporosis, sarcoidosis, ckd3a, depression, carotid artery stenosis presents for f/u of htn     1. HTN: chlorthalidone 25 mg qday, valsartan 160 mg qday  2. HLD, Carotid artery stenosis: atorvastatin 10 mg qday, red yeast rice   3. Breast ca:  dx with breast ca of left breast. S/p left breast lumpectomy and sentinel lymph node biopsy 4/30/21.  Left breast showed infiltrating lobular carcinoma. Currently follows with heme-onc. On letrozole.    4. CKD3: baseline Cr 1-1.2   5. Sarcoidosis: has eye involvement but follows closely with her eye specialists. Not on chronic steroids  6. Depression: on wellbutrin 300 mg qday   7. Carotid artery stenosis: seen on u/s 2019, 2022 (0-19% b/l carotid). On statin.   8. Anxiety: buspar 5 mg bid   9. OP: fosamax 70 mg weekly   10:  Diverticulosis: seen on CT scan in 2015 and colonoscopy of 11/15/24. Reports would like info on diverticulitis and diet   11. Endorses having tingling/numbness of bottom of feet. Reports it concerns her.      UTD on MMG, dexa and colonoscopy     Review of Systems  Past medical history, surgical history, and family medical history reviewed and updated as appropriate.    Medications and allergies reviewed.     Objective:     There were no vitals filed for this visit.  There is no height or weight on file to calculate BMI.  Physical Exam  Vitals reviewed.   Constitutional:       General: She is not in acute distress.     Appearance: Normal appearance. She is well-developed.   HENT:      Head: Normocephalic and atraumatic.      Right Ear: Tympanic membrane normal.      Left Ear: Tympanic membrane normal.      Mouth/Throat:      Mouth: Mucous membranes are moist.      Pharynx: No oropharyngeal exudate.   Eyes:      General:         Right eye: No discharge.         Left eye: No discharge.      Conjunctiva/sclera:  Conjunctivae normal.   Cardiovascular:      Rate and Rhythm: Normal rate and regular rhythm.      Heart sounds: No murmur heard.     No friction rub.   Pulmonary:      Effort: Pulmonary effort is normal.      Breath sounds: Normal breath sounds.   Abdominal:      General: Bowel sounds are normal. There is no distension.      Palpations: Abdomen is soft.      Tenderness: There is no abdominal tenderness. There is no guarding.   Musculoskeletal:         General: Normal range of motion.      Cervical back: Normal range of motion and neck supple.      Right lower leg: No edema.      Left lower leg: No edema.   Lymphadenopathy:      Cervical: No cervical adenopathy.   Skin:     General: Skin is warm and dry.   Neurological:      Mental Status: She is alert and oriented to person, place, and time.   Psychiatric:         Mood and Affect: Mood normal.         Behavior: Behavior normal.         Lab Results   Component Value Date    WBC 6.37 06/18/2024    HGB 12.1 06/18/2024    HCT 37.4 06/18/2024     06/18/2024    CHOL 187 06/18/2024    TRIG 111 06/18/2024    HDL 68 06/18/2024    ALT 24 12/19/2024    AST 25 12/19/2024     12/19/2024    K 4.0 12/19/2024     12/19/2024    CREATININE 1.0 12/19/2024    BUN 14 12/19/2024    CO2 29 12/19/2024    TSH 0.967 06/18/2024    HGBA1C 5.8 11/18/2015       Assessment:     1. Essential hypertension    2. Mixed hyperlipidemia    3. Stage 3a chronic kidney disease    4. Numbness and tingling of both feet      Plan:   Dana was seen today for hypertension.    Diagnoses and all orders for this visit:    Essential hypertension  - chronic. Reports elevated 2/2 white coat htn.  Will get home readings in 1 week   -     Comprehensive Metabolic Panel; Future  -     CBC Auto Differential; Future  -     Lipid Panel; Future  -     TSH; Future  -     Urinalysis; Future  -     Microalbumin/Creatinine Ratio, Urine; Future  -     VITAMIN B12; Future  -     Folate; Future    Mixed  hyperlipidemia  - Stable. Continue home meds     -     Comprehensive Metabolic Panel; Future  -     CBC Auto Differential; Future  -     Lipid Panel; Future  -     TSH; Future  -     Urinalysis; Future  -     Microalbumin/Creatinine Ratio, Urine; Future  -     VITAMIN B12; Future  -     Folate; Future    Stage 3a chronic kidney disease  - stable. Continue to monitor   -     Comprehensive Metabolic Panel; Future  -     CBC Auto Differential; Future  -     Lipid Panel; Future  -     TSH; Future  -     Urinalysis; Future  -     Microalbumin/Creatinine Ratio, Urine; Future  -     VITAMIN B12; Future  -     Folate; Future    Numbness and tingling of both feet  - new. Suspect neuropathy. Check labs.   -     VITAMIN B12; Future  -     Folate; Future        Rtc 6 months     Mariam Johnson MD  Internal Medicine    12/26/2024

## 2025-01-10 ENCOUNTER — OFFICE VISIT (OUTPATIENT)
Dept: OBSTETRICS AND GYNECOLOGY | Facility: CLINIC | Age: 75
End: 2025-01-10
Payer: MEDICARE

## 2025-01-10 VITALS
BODY MASS INDEX: 25.3 KG/M2 | DIASTOLIC BLOOD PRESSURE: 86 MMHG | HEIGHT: 65 IN | SYSTOLIC BLOOD PRESSURE: 134 MMHG | WEIGHT: 151.88 LBS

## 2025-01-10 DIAGNOSIS — Z01.419 WELL WOMAN EXAM WITH ROUTINE GYNECOLOGICAL EXAM: Primary | ICD-10-CM

## 2025-01-10 PROCEDURE — 99213 OFFICE O/P EST LOW 20 MIN: CPT | Mod: PBBFAC | Performed by: OBSTETRICS & GYNECOLOGY

## 2025-01-10 PROCEDURE — 99999 PR PBB SHADOW E&M-EST. PATIENT-LVL III: CPT | Mod: PBBFAC,,, | Performed by: OBSTETRICS & GYNECOLOGY

## 2025-01-10 NOTE — PROGRESS NOTES
History & Physical  Gynecology      SUBJECTIVE:     Chief Complaint: Annual Exam       History of Present Illness:  Annual Exam-Postmenopausal  Patient presents for annual exam.   The patient has no complaints today. Patient denies post-menopausal vaginal bleeding. The patient is not sexually active. She is currently on letrozole for breast ca in 2021. The patient participates in regular exercise: yes.  She does not smoke.     GYN screening history: RATLH/BSO  Mammogram history: bilateral partial mastectomy and breast reduction; 2/2024  Colonoscopy history:11/2024  Dexa history: 2023    FH:   Breast cancer: pat aunt x 4; sister; maternal aunt, personal history  Colon cancer: neg  Ovarian cancer: neg  Vaginal melanoma: aunt  Cervical cancer: pat aunt x 2  Uterine cancer: sister, maternal grandmother    Review of patient's allergies indicates:   Allergen Reactions    Adhesive      Surgical tape/ burned sckin    Percocet [oxycodone-acetaminophen] Nausea Only     Does not know what else she can take; can take tylenol  Pt states she is not allergic to percocet, just upsets her stomach.  States she has shoulder surgery here and has probably had dilaudid. No real allergy         Past Medical History:   Diagnosis Date    Anxiety     Atypical hyperplasia of breast 03/19/2021    left    Breast cancer 03/19/2021    left    Cancer     Colon polyps     Depression     Dry eyes     Dry mouth     Dupuytren's contracture     Hypertension     left arm fracture     hairline    Murmur, heart     Osteopenia     PONV (postoperative nausea and vomiting)     with wisdom teeth    Sarcoidosis      Past Surgical History:   Procedure Laterality Date    BREAST BIOPSY Right 2016    RT axilla-sarcoidosis    BREAST BIOPSY Left 03/19/2021    cancer    BREAST BIOPSY Left 03/19/2021    [facility]    BREAST LUMPECTOMY Left 2021    COLONOSCOPY, SCREENING, HIGH RISK PATIENT N/A 11/15/2024    Procedure: COLONOSCOPY, SCREENING, HIGH RISK PATIENT;  Surgeon: Simon  MD Saul;  Location: [facility] (4TH FLR);  Service: Endoscopy;  Laterality: N/A;  Referred by Mariam Johnson MD, PEG, only has a ride for 11/15/24, portal -ml  10/22/24- time adjusted back 15 min- ERW  - precall attempted, LVM- JS  - precall attemped LVM- js  -adjusted time back 15 mins, precall complete-Kpvt    ENDOSCOPIC ULTRASOUND OF UPPER GASTROINTESTINAL TRACT N/A 2022    Procedure: ULTRASOUND, UPPER GI TRACT, ENDOSCOPIC;  Surgeon: Pancho Ty MD;  Location: [facility];  Service: Endoscopy;  Laterality: N/A;    ESOPHAGOGASTRODUODENOSCOPY N/A 2023    Procedure: EGD (ESOPHAGOGASTRODUODENOSCOPY);  Surgeon: Chandra Smith MD;  Location: [facility] (4TH FLR);  Service: Endoscopy;  Laterality: N/A;  Procedure Timin-4 weeks  Provider: Any endoscopist  Location: No Preference Additional Scheduling Information: No scheduling concerns   instr. handed to patient-st  2023- precall: no answer    ESOPHAGOGASTRODUODENOSCOPY N/A 2023    Procedure: EGD (ESOPHAGOGASTRODUODENOSCOPY);  Surgeon: Chandra Smith MD;  Location: [facility] (2ND FLR);  Service: Endoscopy;  Laterality: N/A;  instr portal-tb    23 pre call done -eg    ESOPHAGOGASTRODUODENOSCOPY N/A 2024    Procedure: EGD (ESOPHAGOGASTRODUODENOSCOPY);  Surgeon: Chandra Smith MD;  Location: [facility] (2ND FLR);  Service: Endoscopy;  Laterality: N/A;  6/10 portal- EGD with me in about 6 months. Can be at [facility] or [place]. 30min case OK. [id number]   PRECALL #[id number]  7/15-LVM for precall-KPvt    Laparoscopy for possible endometriosis   1985    MASTECTOMY WITH SENTINEL NODE BIOPSY AND AXILLARY LYMPH NODE DISSECTION Left 2021    Procedure: MASTECTOMY, WITH SENTINEL NODE BIOPSY AND AXILLARY LYMPHADENECTOMY;  Surgeon: Madyson Reza MD;  Location: [facility];  Service: Plastics;  Laterality: Left;    MASTECTOMY, PARTIAL Bilateral 2021    Procedure: MASTECTOMY, PARTIAL - BILATERAL PARTIAL MASTECTOMY WITH   radiological marker.;  Surgeon: Madyson Reza MD;  Location: [facility] OR;  Service: Plastics;  Laterality: Bilateral;  LEFT BREAST X 3, RIGHT BREAST X 1 -    REDUCTION OF BOTH BREASTS Bilateral 2021    Procedure: MAMMOPLASTY, REDUCTION, BILATERAL;  Surgeon: Jason Meraz MD;  Location: [facility];  Service: Plastics;  Laterality: Bilateral;    SHOULDER SURGERY  2018    TOTAL REDUCTION MAMMOPLASTY Bilateral     WRIST SURGERY Left     Dupuytrens contracture    XI ROBOTIC HYSTERECTOMY, WITH SALPINGO-OOPHORECTOMY Bilateral 2021    Procedure: XI ROBOTIC HYSTERECTOMY,WITH SALPINGO-OOPHORECTOMY;  Surgeon: Modesta Roberts MD;  [facility];  Service: OB/GYN;  Laterality: Bilateral;     OB History          1    Para   1    Term   1            AB        Living   1         SAB        IAB        Ectopic        Multiple        Live Births   1               Family History   Problem Relation Name Age of Onset    Mental illness Paternal Grandfather Sánchez     Leukemia Paternal Grandmother PGM     Uterine cancer Maternal Grandmother MGM 86    Hypertension Maternal Grandmother MGM     Tongue cancer Maternal Grandfather MGF         (smoked)    Cancer Father father 68        malignant fibrous histocytoma of heart    Hypertension Father father     Hypertension Mother mother     Cancer Brother brother         skin melanoma    Uterine cancer Sister Kamille 49    Breast cancer Sister Kamille 70    Diabetes Sister Kamille     Cancer Sister Kamille         skin BCC of face    Breast cancer Maternal Aunt Graciela/Samra         late 60s    Heart failure Maternal Aunt Graciela/Samra         CHF    Breast cancer Paternal Aunt Lissy         dx 60s    Cervical cancer Paternal Aunt Crystal 77    Cancer Paternal Aunt Jaqui         vaginal melanoma    Cervical cancer Paternal Aunt Jaqui     Breast cancer Paternal Aunt Areli         maybe    Bladder Cancer Paternal Uncle Gene     Breast cancer Paternal Cousin Kelly 55         s/p genetic testing    Cancer Paternal Cousin Lavern         thyroid    Breast cancer Paternal Cousin Jose     Multiple sclerosis Paternal Cousin Destini     Breast cancer Other Sushma         dx 60s    Breast cancer Other Viola 55    Pancreatic cancer Other x2     Cancer Other x3         origins?    Colon cancer Neg Hx      Ovarian cancer Neg Hx      Anesthesia problems Neg Hx      Esophageal cancer Neg Hx       Social History     Tobacco Use    Smoking status: Never    Smokeless tobacco: Never   Substance Use Topics    Alcohol use: Not Currently     Alcohol/week: 5.0 - 6.0 standard drinks of alcohol     Types: 5 - 6 Standard drinks or equivalent per week     Comment: wine every night    Drug use: Never       Current Outpatient Medications   Medication Sig    alendronate (FOSAMAX) 70 MG tablet Take 1 tablet (70 mg total) by mouth every 7 days.    ascorbic acid, vitamin C, (VITAMIN C) 1000 MG tablet     atorvastatin (LIPITOR) 10 MG tablet TAKE 1 TABLET(10 MG) BY MOUTH EVERY DAY    BIOTIN ORAL Take by mouth once daily.    buPROPion (WELLBUTRIN XL) 300 MG 24 hr tablet Take 1 tablet (300 mg total) by mouth once daily.    busPIRone (BUSPAR) 5 MG Tab Take 1 tablet (5 mg total) by mouth 2 (two) times daily. For Anxiety    CALCIUM-VITAMIN D3 ORAL Take by mouth. 600 mg - 20 mcg. Take 1 cap oral twice a day    chlorthalidone (HYGROTEN) 25 MG Tab TAKE 1 TABLET(25 MG) BY MOUTH EVERY DAY    colchicine (COLCRYS) 0.6 mg tablet On 1st day of gout flare take 1 tablet p.o t.i.d; From day 2 onwards, take 1 tablet p.o. b.i.d. until flare resolves.    letrozole (FEMARA) 2.5 mg Tab TAKE 1 TABLET(2.5 MG) BY MOUTH EVERY DAY    methocarbamoL (ROBAXIN) 500 MG Tab Take 1 tablet (500 mg total) by mouth 2 (two) times daily as needed (muscle spasm.). May cause sedation    potassium chloride (MICRO-K) 10 MEQ CpSR TAKE 2 CAPSULES(20 MEQ) BY MOUTH EVERY DAY    prenatal vit calc,iron,folic (PRENATAL VITAMIN ORAL) Take by mouth once daily.     RED YEAST RICE ORAL Take by mouth once daily.    UNABLE TO FIND Hemp seed oil    UNABLE TO FIND medication name:myxtic gummies    valsartan (DIOVAN) 160 MG tablet Take 1 tablet (160 mg total) by mouth once daily. For Blood pressure    omeprazole (PRILOSEC) 40 MG capsule Take 1 capsule (40 mg total) by mouth once daily.    SUBHASH'S WORT/S.GINSGR (SUBHASH'S WORT-SIBER.GINSENG) 450-90 mg Tab  (Patient not taking: Reported on 1/10/2025)    VALERIAN ROOT ORAL Take by mouth 2 (two) times daily as needed. (Patient not taking: Reported on 1/10/2025)     No current facility-administered medications for this visit.       Review of Systems:  Review of Systems   Constitutional:  Negative for appetite change, fever and unexpected weight change.   Respiratory:  Negative for shortness of breath.    Cardiovascular:  Negative for chest pain.   Gastrointestinal:  Negative for nausea and vomiting.   Genitourinary:  Negative for menorrhagia, menstrual problem, pelvic pain, vaginal bleeding, vaginal discharge and vaginal pain.   Integumentary:  Negative for breast mass.   Breast: Negative for lump and mass       OBJECTIVE:     Physical Exam:  Physical Exam  Vitals and nursing note reviewed.   Constitutional:       Appearance: She is well-developed.   Cardiovascular:      Rate and Rhythm: Normal rate and regular rhythm.      Heart sounds: Normal heart sounds. No murmur heard.     No friction rub. No gallop.   Pulmonary:      Effort: Pulmonary effort is normal. No respiratory distress.      Breath sounds: Normal breath sounds. No wheezing or rales.   Chest:   Breasts:     Breasts are symmetrical.      Right: No inverted nipple, mass, nipple discharge, skin change or tenderness.      Left: No inverted nipple, mass, nipple discharge, skin change or tenderness.   Abdominal:      General: Bowel sounds are normal. There is no distension.      Palpations: Abdomen is soft.      Tenderness: There is no abdominal tenderness. There is no  guarding or rebound.   Genitourinary:     General: Normal vulva.      Labia:         Right: No rash, tenderness, lesion or injury.         Left: No rash, tenderness, lesion or injury.       Urethra: No prolapse, urethral pain, urethral swelling or urethral lesion.      Vagina: Normal. No signs of injury and foreign body. No vaginal discharge, erythema, tenderness or bleeding.      Adnexa:         Right: No mass, tenderness or fullness.          Left: No mass, tenderness or fullness.        Rectum: No anal fissure or external hemorrhoid.      Comments: Urethral meatus: normal size, anterior vaginal wall with no prolapse, no lesions  Bladder: no fullness, masses or tenderness  Perineum: normal perineal body  Musculoskeletal:      Cervical back: Normal range of motion.   Neurological:      Mental Status: She is alert and oriented to person, place, and time.   Psychiatric:         Behavior: Behavior normal.         Thought Content: Thought content normal.         Judgment: Judgment normal.         Chaperoned by: Taylor    ASSESSMENT:       ICD-10-CM ICD-9-CM    1. Well woman exam with routine gynecological exam  Z01.419 V72.31                  Plan:      Dana was seen today for annual exam.    Diagnoses and all orders for this visit:    Well woman exam with routine gynecological exam            No orders of the defined types were placed in this encounter.        Well Woman:   - Pap smear: not indicated  - Mammogram: up to date  - Colonoscopy: up to date  - Dexa: up to date  - Immunizations: with pcp  - Labs: with pcp  - Exercise counseling provided    Follow up in one year for annual, or prn.    Tere Huynh

## 2025-01-13 DIAGNOSIS — Z00.00 ENCOUNTER FOR MEDICARE ANNUAL WELLNESS EXAM: ICD-10-CM

## 2025-01-22 ENCOUNTER — PATIENT OUTREACH (OUTPATIENT)
Dept: INTERNAL MEDICINE | Facility: CLINIC | Age: 75
End: 2025-01-22
Payer: MEDICARE

## 2025-01-22 VITALS — DIASTOLIC BLOOD PRESSURE: 86 MMHG | SYSTOLIC BLOOD PRESSURE: 134 MMHG

## 2025-02-13 ENCOUNTER — TELEPHONE (OUTPATIENT)
Dept: HEMATOLOGY/ONCOLOGY | Facility: CLINIC | Age: 75
End: 2025-02-13
Payer: MEDICARE

## 2025-02-13 NOTE — TELEPHONE ENCOUNTER
LVM confirming appt on 02/17/2025. Instructed PT to call and reschedule if unable to make appt.    LISA Crews MA

## 2025-02-17 ENCOUNTER — OFFICE VISIT (OUTPATIENT)
Dept: HEMATOLOGY/ONCOLOGY | Facility: CLINIC | Age: 75
End: 2025-02-17
Payer: MEDICARE

## 2025-02-17 VITALS
BODY MASS INDEX: 25.27 KG/M2 | HEIGHT: 65 IN | TEMPERATURE: 98 F | SYSTOLIC BLOOD PRESSURE: 161 MMHG | WEIGHT: 151.69 LBS | HEART RATE: 63 BPM | DIASTOLIC BLOOD PRESSURE: 67 MMHG | OXYGEN SATURATION: 98 %

## 2025-02-17 DIAGNOSIS — C50.312 MALIGNANT NEOPLASM OF LOWER-INNER QUADRANT OF LEFT BREAST IN FEMALE, ESTROGEN RECEPTOR POSITIVE: Primary | ICD-10-CM

## 2025-02-17 DIAGNOSIS — Z17.0 MALIGNANT NEOPLASM OF LOWER-INNER QUADRANT OF LEFT BREAST IN FEMALE, ESTROGEN RECEPTOR POSITIVE: Primary | ICD-10-CM

## 2025-02-17 PROCEDURE — 99214 OFFICE O/P EST MOD 30 MIN: CPT | Mod: PBBFAC | Performed by: INTERNAL MEDICINE

## 2025-02-17 RX ORDER — LETROZOLE 2.5 MG/1
2.5 TABLET, FILM COATED ORAL DAILY
Qty: 30 TABLET | Refills: 11 | Status: SHIPPED | OUTPATIENT
Start: 2025-02-17

## 2025-02-17 NOTE — PROGRESS NOTES
Subjective:       Patient ID: Dana Cook is a 74 y.o. female.    Chief Complaint: No chief complaint on file.      HPI 74-year-old female seen for follow-up of Stage 1  ER+ left breast cancer.  She is on adjuvant Letrozole.    Physically, she has well.  She exercises on a regular basis-walking.  She has been very anxious - related to the current political situation.        History:  Screening mammogram on March 4, 2021 showed a focal asymmetry in the lower inner quadrant of the left breast.    Follow-up diagnostic mammogram and ultrasound on March 8, 2021 showed a focal asymmetry measuring 40 mm in the lower inner quadrant left breast.  Ultrasound showed a 5 x 5 x 3 mm hypoechoic mass at 3:00 a.m. position with seen in the lower inner quadrant area.    On March 19, 2021 biopsy of 2 areas in the left breast was performed.  The lower inner quadrant mass showed infiltrating lobular carcinoma which was histologic grade 3, nuclear grade 2, mitotic index 1.  Tumor cells were 95% ER positive in 95% HI positive.  HER2 was 2+ but negative by fish with a ratio 1.75, Ki-67  Biopsy 3:00 a.m. mass showed atypical ductal hyperplasia    MRI on April 1, 2021 showed multiple findings.  There was a 22 x 21 a 60 mm irregular mass in left breast lower inner quadrant, 8 mm to 9 is enhancement lymph breast at 3:00 a.m., 6 x 6 x 5 mm mass in the left breast lower outer quadrant and in the right breast in 19 mass 7 mm area of non-mass enhancement centrally.    On April 13, 2021 biopsy of the right breast non-mass enhancement on MRI was performed - those biopsies were negative showing no atypia or malignancy.    On April 30, 2021 left breast lumpectomy and sentinel lymph node biopsy was performed.  Left breast showed a 2 by 1.7 x 1.2 cm infiltrating lobular carcinoma.  The sentinel lymph node was negative.  Right breast excisional biopsy was negative at that time as well.  Final pathological stage T1c N0 stage I A.    Oncotype score  showed low risk with a score 16.     [facility] genetics - CHACHO mutation    Radiation completed 8/3/21.    Letrozole started after radiation.      PMH: HBP, sarcoid, renal damage from NSAIDs  Review of Systems   Constitutional: Negative.  Negative for appetite change. Unexpected weight change: increased 6 lb.  HENT: Negative.  Negative for mouth sores.    Eyes:  Negative for visual disturbance.   Respiratory: Negative.  Negative for cough and shortness of breath.    Cardiovascular:  Negative for chest pain.   Gastrointestinal: Negative.  Negative for abdominal pain and diarrhea.   Genitourinary: Negative.  Negative for frequency.   Integumentary:  Negative for rash.   Neurological: Negative.  Negative for headaches.   Hematological:  Negative for adenopathy.   Psychiatric/Behavioral:  The patient is nervous/anxious.          Objective:      Physical Exam  Vitals reviewed.   Constitutional:       General: She is not in acute distress.     Appearance: Normal appearance.   Eyes:      General: No scleral icterus.  Cardiovascular:      Rate and Rhythm: Normal rate and regular rhythm.   Pulmonary:      Effort: Pulmonary effort is normal. No respiratory distress.      Breath sounds: Normal breath sounds. No rales.   Chest:   Breasts:     Right: No mass or nipple discharge.      Left: No mass or nipple discharge.       Abdominal:      Palpations: Abdomen is soft. There is no mass.      Tenderness: There is no abdominal tenderness.   Lymphadenopathy:      Cervical: No cervical adenopathy.      Upper Body:      Right upper body: No supraclavicular or axillary adenopathy.      Left upper body: No supraclavicular or axillary adenopathy.   Skin:     Findings: No rash.   Neurological:      Mental Status: She is oriented to person, place, and time.   Psychiatric:         Mood and Affect: Mood normal.         Behavior: Behavior normal.         Thought Content: Thought content normal.         Judgment: Judgment normal.          Assessment:       1. Malignant neoplasm of lower-inner quadrant of left breast in female, estrogen receptor positive        Plan:     Continue letrozole and RTC in 6 M      Mammograms in March    Route Chart for Scheduling    Med Onc Chart Routing      Follow up with physician 6 months.   Follow up with [facility]    Infusion scheduling note    Injection scheduling note    Labs None   Scheduling:  Preferred lab:  Lab interval:     Imaging None      Pharmacy appointment No pharmacy appointment needed      Other referrals no referral to Oncology Primary Care needed -  no Massage appointment needed    No additional referrals needed

## 2025-03-11 DIAGNOSIS — F32.A DEPRESSION, UNSPECIFIED DEPRESSION TYPE: ICD-10-CM

## 2025-03-11 RX ORDER — BUPROPION HYDROCHLORIDE 300 MG/1
TABLET ORAL
Qty: 90 TABLET | Refills: 3 | Status: SHIPPED | OUTPATIENT
Start: 2025-03-11

## 2025-03-11 NOTE — TELEPHONE ENCOUNTER
Care Due:                  Date            Visit Type   Department     Provider  --------------------------------------------------------------------------------                                EP -                              PRIMARY      [facility] INTERNAL  Last Visit: 12-      CARE ([facility])   MEDICINE       Mariam  Alex                              EP -                              PRIMARY      [facility] INTERNAL  Next Visit: 06-      CARE ([facility])   [facility]                                                            Last  Test          Frequency    Reason                     Performed    Due Date  --------------------------------------------------------------------------------    Mg Level....  12 months..  alendronate..............  Not Found    Overdue    Phosphate...  12 months..  alendronate..............  Not Found    Overdue    [employer] Embedded Care Due Messages. Reference number: [id number].   3/11/2025 3:31:43 AM CDT

## 2025-03-11 NOTE — TELEPHONE ENCOUNTER
Refill Routing Note   Medication(s) are not appropriate for processing by [facility] for the following reason(s):        Required vitals abnormal    [facility] action(s):  Defer     Requires labs : Yes      Medication Therapy Plan: FOVS      Appointments  past 12m or future 3m with PCP    Date Provider   Last Visit   12/26/2024 Mariam Johnson MD   Next Visit   6/27/2025 Mariam Johnson MD   ED visits in past 90 days: 0        Note composed:11:48 AM 03/11/2025

## 2025-03-12 ENCOUNTER — PATIENT MESSAGE (OUTPATIENT)
Dept: RADIOLOGY | Facility: HOSPITAL | Age: 75
End: 2025-03-12
Payer: MEDICARE

## 2025-03-15 RX ORDER — BUSPIRONE HYDROCHLORIDE 5 MG/1
TABLET ORAL
Qty: 60 TABLET | Refills: 11 | Status: SHIPPED | OUTPATIENT
Start: 2025-03-15

## 2025-03-15 NOTE — TELEPHONE ENCOUNTER
No care due was identified.  [facility] Embedded Care Due Messages. Reference number: [id number].   3/15/2025 8:20:50 AM CDT

## 2025-03-17 RX ORDER — VALSARTAN 160 MG/1
TABLET ORAL
Qty: 90 TABLET | Refills: 3 | Status: SHIPPED | OUTPATIENT
Start: 2025-03-17

## 2025-03-17 NOTE — TELEPHONE ENCOUNTER
Care Due:                  Date            Visit Type   Department     Provider  --------------------------------------------------------------------------------                                EP -                              PRIMARY      [facility] INTERNAL  Last Visit: 12-      CARE ([facility])   MEDICINE       Mariam  Alex                              EP -                              PRIMARY      [facility] INTERNAL  Next Visit: 06-      CARE ([facility])   [facility]                                                            Last  Test          Frequency    Reason                     Performed    Due Date  --------------------------------------------------------------------------------    Lipid Panel.  12 months..  atorvastatin.............  06- 06-    [facility] Embedded Care Due Messages. Reference number: [id number].   3/17/2025 3:31:46 AM CDT

## 2025-03-17 NOTE — TELEPHONE ENCOUNTER
Refill Routing Note   Medication(s) are not appropriate for processing by [facility] for the following reason(s):        Required vitals abnormal    [facility] action(s):  Defer      Medication Therapy Plan: FOVS, FLOS      Appointments  past 12m or future 3m with PCP    Date Provider   Last Visit   12/26/2024 Mariam Johnson MD   Next Visit   6/27/2025 Mariam Johnson MD   ED visits in past 90 days: 0        Note composed:1:37 PM 03/17/2025

## 2025-03-24 ENCOUNTER — HOSPITAL ENCOUNTER (OUTPATIENT)
Dept: RADIOLOGY | Facility: HOSPITAL | Age: 75
Discharge: HOME | End: 2025-03-24
Attending: HOSPITALIST
Payer: MEDICARE

## 2025-03-24 VITALS — WEIGHT: 151 LBS | HEIGHT: 65 IN | BODY MASS INDEX: 25.16 KG/M2

## 2025-03-24 DIAGNOSIS — Z12.31 ENCOUNTER FOR SCREENING MAMMOGRAM FOR BREAST CANCER: ICD-10-CM

## 2025-03-24 DIAGNOSIS — Z12.31 ENCOUNTER FOR SCREENING MAMMOGRAM FOR HIGH-RISK PATIENT: ICD-10-CM

## 2025-03-24 PROCEDURE — 77067 SCR MAMMO BI INCL CAD: CPT | Mod: 26,,, | Performed by: RADIOLOGY

## 2025-03-24 PROCEDURE — 77063 BREAST TOMOSYNTHESIS BI: CPT | Mod: TC

## 2025-03-24 PROCEDURE — 77063 BREAST TOMOSYNTHESIS BI: CPT | Mod: 26,,, | Performed by: RADIOLOGY

## 2025-03-26 ENCOUNTER — PATIENT MESSAGE (OUTPATIENT)
Dept: INTERNAL MEDICINE | Facility: CLINIC | Age: 75
End: 2025-03-26
Payer: MEDICARE

## 2025-03-27 ENCOUNTER — RESULTS FOLLOW-UP (OUTPATIENT)
Dept: INTERNAL MEDICINE | Facility: CLINIC | Age: 75
End: 2025-03-27

## 2025-04-01 ENCOUNTER — HOSPITAL ENCOUNTER (OUTPATIENT)
Dept: RADIOLOGY | Facility: HOSPITAL | Age: 75
Discharge: HOME | End: 2025-04-01
Attending: HOSPITALIST
Payer: MEDICARE

## 2025-04-01 DIAGNOSIS — R92.8 ABNORMAL MAMMOGRAM: ICD-10-CM

## 2025-04-01 PROCEDURE — 77065 DX MAMMO INCL CAD UNI: CPT | Mod: TC,LT

## 2025-04-01 PROCEDURE — 77061 BREAST TOMOSYNTHESIS UNI: CPT | Mod: 26,LT,, | Performed by: RADIOLOGY

## 2025-04-01 PROCEDURE — 77065 DX MAMMO INCL CAD UNI: CPT | Mod: 26,LT,, | Performed by: RADIOLOGY

## 2025-04-02 ENCOUNTER — RESULTS FOLLOW-UP (OUTPATIENT)
Dept: INTERNAL MEDICINE | Facility: CLINIC | Age: 75
End: 2025-04-02
Payer: MEDICARE

## 2025-06-19 ENCOUNTER — LAB VISIT (OUTPATIENT)
Dept: LAB | Facility: HOSPITAL | Age: 75
End: 2025-06-19
Attending: HOSPITALIST
Payer: MEDICARE

## 2025-06-19 DIAGNOSIS — E78.2 MIXED HYPERLIPIDEMIA: ICD-10-CM

## 2025-06-19 DIAGNOSIS — I10 ESSENTIAL HYPERTENSION: ICD-10-CM

## 2025-06-19 DIAGNOSIS — N18.31 STAGE 3A CHRONIC KIDNEY DISEASE: ICD-10-CM

## 2025-06-19 DIAGNOSIS — R20.2 NUMBNESS AND TINGLING OF BOTH FEET: ICD-10-CM

## 2025-06-19 DIAGNOSIS — R20.0 NUMBNESS AND TINGLING OF BOTH FEET: ICD-10-CM

## 2025-06-19 LAB
ABSOLUTE EOSINOPHIL (OHS): 0.1 K/UL
ABSOLUTE MONOCYTE (OHS): 0.59 K/UL (ref 0.3–1)
ABSOLUTE NEUTROPHIL COUNT (OHS): 2.77 K/UL (ref 1.8–7.7)
ALBUMIN SERPL BCP-MCNC: 4.2 G/DL (ref 3.5–5.2)
ALP SERPL-CCNC: 71 UNIT/L (ref 40–150)
ALT SERPL W/O P-5'-P-CCNC: 31 UNIT/L (ref 10–44)
ANION GAP (OHS): 10 MMOL/L (ref 8–16)
AST SERPL-CCNC: 34 UNIT/L (ref 11–45)
BASOPHILS # BLD AUTO: 0.02 K/UL
BASOPHILS NFR BLD AUTO: 0.4 %
BILIRUB SERPL-MCNC: 0.5 MG/DL (ref 0.1–1)
BUN SERPL-MCNC: 16 MG/DL (ref 8–23)
CALCIUM SERPL-MCNC: 10.6 MG/DL (ref 8.7–10.5)
CHLORIDE SERPL-SCNC: 101 MMOL/L (ref 95–110)
CHOLEST SERPL-MCNC: 188 MG/DL (ref 120–199)
CHOLEST/HDLC SERPL: 3 {RATIO} (ref 2–5)
CO2 SERPL-SCNC: 27 MMOL/L (ref 23–29)
CREAT SERPL-MCNC: 1.1 MG/DL (ref 0.5–1.4)
ERYTHROCYTE [DISTWIDTH] IN BLOOD BY AUTOMATED COUNT: 12.5 % (ref 11.5–14.5)
FOLATE SERPL-MCNC: 14 NG/ML (ref 4–24)
GFR SERPLBLD CREATININE-BSD FMLA CKD-EPI: 53 ML/MIN/1.73/M2
GLUCOSE SERPL-MCNC: 100 MG/DL (ref 70–110)
HCT VFR BLD AUTO: 35.2 % (ref 37–48.5)
HDLC SERPL-MCNC: 62 MG/DL (ref 40–75)
HDLC SERPL: 33 % (ref 20–50)
HGB BLD-MCNC: 11.4 GM/DL (ref 12–16)
IMM GRANULOCYTES # BLD AUTO: 0.01 K/UL (ref 0–0.04)
IMM GRANULOCYTES NFR BLD AUTO: 0.2 % (ref 0–0.5)
LDLC SERPL CALC-MCNC: 98.4 MG/DL (ref 63–159)
LYMPHOCYTES # BLD AUTO: 1.07 K/UL (ref 1–4.8)
MCH RBC QN AUTO: 31.5 PG (ref 27–31)
MCHC RBC AUTO-ENTMCNC: 32.4 G/DL (ref 32–36)
MCV RBC AUTO: 97 FL (ref 82–98)
NONHDLC SERPL-MCNC: 126 MG/DL
NUCLEATED RBC (/100WBC) (OHS): 0 /100 WBC
PLATELET # BLD AUTO: 267 K/UL (ref 150–450)
PMV BLD AUTO: 9.3 FL (ref 9.2–12.9)
POTASSIUM SERPL-SCNC: 3.5 MMOL/L (ref 3.5–5.1)
PROT SERPL-MCNC: 7.4 GM/DL (ref 6–8.4)
RBC # BLD AUTO: 3.62 M/UL (ref 4–5.4)
RELATIVE EOSINOPHIL (OHS): 2.2 %
RELATIVE LYMPHOCYTE (OHS): 23.5 % (ref 18–48)
RELATIVE MONOCYTE (OHS): 12.9 % (ref 4–15)
RELATIVE NEUTROPHIL (OHS): 60.8 % (ref 38–73)
SODIUM SERPL-SCNC: 138 MMOL/L (ref 136–145)
TRIGL SERPL-MCNC: 138 MG/DL (ref 30–150)
TSH SERPL-ACNC: 0.89 UIU/ML (ref 0.4–4)
VIT B12 SERPL-MCNC: 569 PG/ML (ref 210–950)
WBC # BLD AUTO: 4.56 K/UL (ref 3.9–12.7)

## 2025-06-19 PROCEDURE — 80053 COMPREHEN METABOLIC PANEL: CPT

## 2025-06-19 PROCEDURE — 85025 COMPLETE CBC W/AUTO DIFF WBC: CPT

## 2025-06-19 PROCEDURE — 36415 COLL VENOUS BLD VENIPUNCTURE: CPT | Mod: PO

## 2025-06-19 PROCEDURE — 80061 LIPID PANEL: CPT

## 2025-06-19 PROCEDURE — 82746 ASSAY OF FOLIC ACID SERUM: CPT

## 2025-06-19 PROCEDURE — 84443 ASSAY THYROID STIM HORMONE: CPT

## 2025-06-19 PROCEDURE — 82607 VITAMIN B-12: CPT

## 2025-06-23 ENCOUNTER — TELEPHONE (OUTPATIENT)
Dept: INTERNAL MEDICINE | Facility: CLINIC | Age: 75
End: 2025-06-23
Payer: MEDICARE

## 2025-06-23 NOTE — TELEPHONE ENCOUNTER
Copied from CRM #[id number]. Topic: General Inquiry - Test Results  >> Jun 23, 2025  2:53 PM Batsheva wrote:  2TESTRESULTS    Type: Test Results    What test was performed? Labs and Urine    Who ordered the test? Dr Johnson    When and where were the test performed? [facility]    Who called and call back number: Patient [phone]     Would you like a call back and or thru [facility]: Call Back Please. Thank you    Comments:Pt is concerned about all the abnormal results. Please call and advise. Thank you

## 2025-06-25 ENCOUNTER — TELEPHONE (OUTPATIENT)
Dept: HEMATOLOGY/ONCOLOGY | Facility: CLINIC | Age: 75
End: 2025-06-25
Payer: MEDICARE

## 2025-06-25 NOTE — TELEPHONE ENCOUNTER
I called the pt to schedule a 6 month follow up from a recall for Dr Tao. No answer. I left a voicemail with full details offering her Dr Tao's 1 st available appt. I ask that she message back on the portal letting us know if this works for her schedule.

## 2025-06-27 ENCOUNTER — OFFICE VISIT (OUTPATIENT)
Dept: INTERNAL MEDICINE | Facility: CLINIC | Age: 75
End: 2025-06-27
Payer: MEDICARE

## 2025-06-27 VITALS
TEMPERATURE: 98 F | OXYGEN SATURATION: 98 % | DIASTOLIC BLOOD PRESSURE: 72 MMHG | SYSTOLIC BLOOD PRESSURE: 120 MMHG | HEART RATE: 70 BPM | BODY MASS INDEX: 24.5 KG/M2 | RESPIRATION RATE: 16 BRPM | WEIGHT: 147.06 LBS | HEIGHT: 65 IN

## 2025-06-27 DIAGNOSIS — N18.31 STAGE 3A CHRONIC KIDNEY DISEASE: ICD-10-CM

## 2025-06-27 DIAGNOSIS — Z00.00 ENCOUNTER FOR PREVENTIVE HEALTH EXAMINATION: Primary | ICD-10-CM

## 2025-06-27 DIAGNOSIS — Z78.0 POSTMENOPAUSAL: ICD-10-CM

## 2025-06-27 DIAGNOSIS — I10 ESSENTIAL HYPERTENSION: ICD-10-CM

## 2025-06-27 DIAGNOSIS — F41.9 ANXIETY: ICD-10-CM

## 2025-06-27 DIAGNOSIS — D64.9 NORMOCYTIC ANEMIA: ICD-10-CM

## 2025-06-27 DIAGNOSIS — R06.83 SNORING: ICD-10-CM

## 2025-06-27 DIAGNOSIS — M81.0 OSTEOPOROSIS, UNSPECIFIED OSTEOPOROSIS TYPE, UNSPECIFIED PATHOLOGICAL FRACTURE PRESENCE: ICD-10-CM

## 2025-06-27 DIAGNOSIS — I65.23 BILATERAL CAROTID ARTERY STENOSIS: ICD-10-CM

## 2025-06-27 DIAGNOSIS — R53.83 FATIGUE, UNSPECIFIED TYPE: ICD-10-CM

## 2025-06-27 DIAGNOSIS — E78.2 MIXED HYPERLIPIDEMIA: ICD-10-CM

## 2025-06-27 PROCEDURE — 99215 OFFICE O/P EST HI 40 MIN: CPT | Mod: PBBFAC,PO | Performed by: HOSPITALIST

## 2025-06-27 PROCEDURE — 99999 PR PBB SHADOW E&M-EST. PATIENT-LVL V: CPT | Mod: PBBFAC,,, | Performed by: HOSPITALIST

## 2025-06-27 RX ORDER — BUSPIRONE HYDROCHLORIDE 7.5 MG/1
7.5 TABLET ORAL 2 TIMES DAILY
Qty: 180 TABLET | Refills: 3 | Status: SHIPPED | OUTPATIENT
Start: 2025-06-27

## 2025-06-27 RX ORDER — CHLORTHALIDONE 25 MG/1
25 TABLET ORAL DAILY
Qty: 90 TABLET | Refills: 3 | Status: SHIPPED | OUTPATIENT
Start: 2025-06-27

## 2025-06-27 RX ORDER — ALENDRONATE SODIUM 70 MG/1
70 TABLET ORAL
Qty: 12 TABLET | Refills: 3 | Status: SHIPPED | OUTPATIENT
Start: 2025-06-27

## 2025-06-27 NOTE — PROGRESS NOTES
Subjective:     @Patient ID: Dana Cook is a 74 y.o. female.    Chief Complaint: Follow-up and Annual Exam    HPI    72 yo F with  HTN, HLD, breast ca, osteoporosis, sarcoidosis, ckd3a, depression, carotid artery stenosis presents for annual:      1. HTN: chlorthalidone 25 mg qday, valsartan 160 mg qday  2. HLD, Carotid artery stenosis: atorvastatin 10 mg qday, red yeast rice   3. Breast ca:  dx with breast ca of left breast. S/p left breast lumpectomy and sentinel lymph node biopsy 4/30/21.  Left breast showed infiltrating lobular carcinoma. Currently follows with heme-onc. On letrozole.    4. CKD3: baseline Cr 1-1.2   5. Sarcoidosis: has eye involvement but follows closely with her eye specialists. Not on chronic steroids  6. Depression: on wellbutrin 300 mg qday   7. Carotid artery stenosis: seen on u/s 2019, 2022 (0-19% b/l carotid). On statin.   8. Anxiety: buspar 5 mg bid . Reports more anxious of late. Dealing with personal stressful situation.   9. OP: fosamax 70 mg weekly   10:  Diverticulosis: seen on CT scan in 2015 and colonoscopy of 11/15/24.    11. Has Endorsed having tingling/numbness of bottom of feet.   12. Endorses lately feeling more tired during the daytime. Also reports snoring      Lipid disorders/ASCVD risk (ages >/= 45 or >/= 20 if increased risk ): ordered  DM (>45y yearly or if obese, HTN): A1c n/a     Eye exam:     Mammogram: Done    Colorectal Cancer (normal risk 50-75yr): Colonoscopy done 11/2024     DEXA (F>64 yo, M >69yo, M&F 50-70 yo with risk factors (smoking, previous fx, wt <70kg; etoh abuse, chronic steroids, RA)):Done 2023  SKin:  has done in the past with derm Dr Whitt       Vaccines:   Influenza (yearly) done    Tetanus (every 10 yrs - 1st tdap)  Done  PPSV23(>66yo or <65 w/ lung dz, smoking, DM) Done  PCV13 (> 65 or <65 w/ immunocompromised) Done  Zoster (>59yo) Done Zostavax 2015  Covid19: done.        Exercise: walking;    Diet: low cholesterol diet              "    Review of Systems   Psychiatric/Behavioral:  Negative for agitation and confusion. The patient is nervous/anxious.      Past medical history, surgical history, and family medical history reviewed and updated as appropriate.    Medications and allergies reviewed.     Objective:     Vitals:    06/27/25 0832   BP: 120/72   Pulse: 70   Resp: 16   Temp: 98 °F (36.7 °C)   SpO2: 98%   Weight: 66.7 kg (147 lb 0.8 oz)   Height: 5' 5" (1.651 m)     Body mass index is 24.47 kg/m².  Physical Exam  Vitals reviewed.   Constitutional:       General: She is not in acute distress.     Appearance: She is well-developed.   HENT:      Head: Normocephalic and atraumatic.      Right Ear: Tympanic membrane normal.      Left Ear: Tympanic membrane normal.      Mouth/Throat:      Mouth: Mucous membranes are moist.      Pharynx: No oropharyngeal exudate.   Eyes:      General:         Right eye: No discharge.         Left eye: No discharge.      Conjunctiva/sclera: Conjunctivae normal.   Cardiovascular:      Rate and Rhythm: Normal rate and regular rhythm.      Heart sounds: No murmur heard.     No friction rub.   Pulmonary:      Effort: Pulmonary effort is normal.      Breath sounds: Normal breath sounds.   Abdominal:      General: Bowel sounds are normal. There is no distension.      Palpations: Abdomen is soft.      Tenderness: There is no abdominal tenderness. There is no guarding.   Musculoskeletal:         General: Normal range of motion.      Cervical back: Normal range of motion and neck supple.      Right lower leg: No edema.      Left lower leg: No edema.   Lymphadenopathy:      Cervical: No cervical adenopathy.   Skin:     General: Skin is warm and dry.   Neurological:      Mental Status: She is alert and oriented to person, place, and time.   Psychiatric:         Mood and Affect: Mood is anxious.         Behavior: Behavior normal.         Lab Results   Component Value Date    WBC 4.56 06/19/2025    HGB 11.4 (L) 06/19/2025    " HCT 35.2 (L) 06/19/2025     06/19/2025    CHOL 188 06/19/2025    TRIG 138 06/19/2025    HDL 62 06/19/2025    ALT 31 06/19/2025    AST 34 06/19/2025     06/19/2025    K 3.5 06/19/2025     06/19/2025    CREATININE 1.1 06/19/2025    BUN 16 06/19/2025    CO2 27 06/19/2025    TSH 0.894 06/19/2025    HGBA1C 5.8 11/18/2015       Assessment:     1. Encounter for preventive health examination    2. Essential hypertension    3. Mixed hyperlipidemia    4. Stage 3a chronic kidney disease    5. Bilateral carotid artery stenosis    6. Osteoporosis, unspecified osteoporosis type, unspecified pathological fracture presence    7. Postmenopausal    8. Anxiety    9. Normocytic anemia    10. Fatigue, unspecified type    11. Snoring      Plan:   Dana was seen today for follow-up and annual exam.    Diagnoses and all orders for this visit:    Encounter for preventive health examination  -     Iron and TIBC; Future  -     Ferritin; Future  -     Transferrin; Future  -     Comprehensive Metabolic Panel; Future  -     CBC Auto Differential; Future  -     Lipid Panel; Future  -     Microalbumin/Creatinine Ratio, Urine; Future    Essential hypertension  - Stable. Continue home meds     -     Iron and TIBC; Future  -     Ferritin; Future  -     Transferrin; Future  -     Comprehensive Metabolic Panel; Future  -     CBC Auto Differential; Future  -     Lipid Panel; Future  -     Microalbumin/Creatinine Ratio, Urine; Future    Mixed hyperlipidemia  - Stable. Continue home meds     -     Iron and TIBC; Future  -     Ferritin; Future  -     Transferrin; Future  -     Comprehensive Metabolic Panel; Future  -     CBC Auto Differential; Future  -     Lipid Panel; Future  -     Microalbumin/Creatinine Ratio, Urine; Future    Stage 3a chronic kidney disease  - stable. Continue to monitor     -     Iron and TIBC; Future  -     Ferritin; Future  -     Transferrin; Future  -     Comprehensive Metabolic Panel; Future  -     CBC Auto  Differential; Future  -     Lipid Panel; Future  -     Microalbumin/Creatinine Ratio, Urine; Future    Bilateral carotid artery stenosis  - Stable. Continue home meds     -     Iron and TIBC; Future  -     Ferritin; Future  -     Transferrin; Future  -     Comprehensive Metabolic Panel; Future  -     CBC Auto Differential; Future  -     Lipid Panel; Future  -     Microalbumin/Creatinine Ratio, Urine; Future    Osteoporosis, unspecified osteoporosis type, unspecified pathological fracture presence  - Stable. Continue home meds . Due for dexa    -     alendronate (FOSAMAX) 70 MG tablet; Take 1 tablet (70 mg total) by mouth every 7 days.  -     DXA Bone Density Axial Skeleton 1 or more sites; Future  -     Iron and TIBC; Future  -     Ferritin; Future  -     Transferrin; Future  -     Comprehensive Metabolic Panel; Future  -     CBC Auto Differential; Future  -     Lipid Panel; Future  -     Microalbumin/Creatinine Ratio, Urine; Future    Postmenopausal  -     DXA Bone Density Axial Skeleton 1 or more sites; Future  -     Iron and TIBC; Future  -     Ferritin; Future  -     Transferrin; Future  -     Comprehensive Metabolic Panel; Future  -     CBC Auto Differential; Future  -     Lipid Panel; Future  -     Microalbumin/Creatinine Ratio, Urine; Future    Anxiety  - chronic. Will increase buspar to 7.5 mg bid. Pt declines referral to psychiatry for therapy  -     Iron and TIBC; Future  -     Ferritin; Future  -     Transferrin; Future  -     Comprehensive Metabolic Panel; Future  -     CBC Auto Differential; Future  -     Lipid Panel; Future  -     Microalbumin/Creatinine Ratio, Urine; Future    Normocytic anemia  - recurrent. Check iron labs  -     Iron and TIBC; Future  -     Ferritin; Future  -     Transferrin; Future  -     Comprehensive Metabolic Panel; Future  -     CBC Auto Differential; Future  -     Lipid Panel; Future  -     Microalbumin/Creatinine Ratio, Urine; Future    Fatigue, unspecified type  - new.  Refer to sleep clinic to r/o NISHANT   -     Ambulatory referral/consult to Sleep Disorders; Future    Snoring  - see fatigue   -     Ambulatory referral/consult to Sleep Disorders; Future    Other orders  -     chlorthalidone (HYGROTEN) 25 MG Tab; Take 1 tablet (25 mg total) by mouth once daily.  -     busPIRone (BUSPAR) 7.5 MG tablet; Take 1 tablet (7.5 mg total) by mouth 2 (two) times daily.        Rtc 6 months     Mariam Johnson MD  Internal Medicine    6/27/2025

## 2025-07-02 ENCOUNTER — HOSPITAL ENCOUNTER (OUTPATIENT)
Dept: RADIOLOGY | Facility: HOSPITAL | Age: 75
Discharge: HOME | End: 2025-07-02
Attending: HOSPITALIST
Payer: MEDICARE

## 2025-07-02 DIAGNOSIS — Z78.0 POSTMENOPAUSAL: ICD-10-CM

## 2025-07-02 DIAGNOSIS — M81.0 OSTEOPOROSIS, UNSPECIFIED OSTEOPOROSIS TYPE, UNSPECIFIED PATHOLOGICAL FRACTURE PRESENCE: ICD-10-CM

## 2025-07-02 PROCEDURE — 77080 DXA BONE DENSITY AXIAL: CPT | Mod: 26,,, | Performed by: STUDENT IN AN ORGANIZED HEALTH CARE EDUCATION/TRAINING PROGRAM

## 2025-07-02 PROCEDURE — 77080 DXA BONE DENSITY AXIAL: CPT | Mod: TC

## 2025-07-24 DIAGNOSIS — Z17.0 MALIGNANT NEOPLASM OF LOWER-INNER QUADRANT OF LEFT BREAST IN FEMALE, ESTROGEN RECEPTOR POSITIVE: ICD-10-CM

## 2025-07-24 DIAGNOSIS — C50.312 MALIGNANT NEOPLASM OF LOWER-INNER QUADRANT OF LEFT BREAST IN FEMALE, ESTROGEN RECEPTOR POSITIVE: ICD-10-CM

## 2025-07-24 RX ORDER — LETROZOLE 2.5 MG/1
2.5 TABLET, FILM COATED ORAL
Qty: 30 TABLET | Refills: 11 | Status: SHIPPED | OUTPATIENT
Start: 2025-07-24

## 2025-08-04 DIAGNOSIS — M81.0 OSTEOPOROSIS, UNSPECIFIED OSTEOPOROSIS TYPE, UNSPECIFIED PATHOLOGICAL FRACTURE PRESENCE: ICD-10-CM

## 2025-08-04 RX ORDER — ALENDRONATE SODIUM 70 MG/1
70 TABLET ORAL
Qty: 12 TABLET | Refills: 3 | OUTPATIENT
Start: 2025-08-04

## 2025-08-04 NOTE — TELEPHONE ENCOUNTER
The patient was called, no answer, message left on voicemail that the alendronate has been refilled with the additional refills available.

## 2025-08-04 NOTE — TELEPHONE ENCOUNTER
Care Due:                  Date            Visit Type   Department     Provider  --------------------------------------------------------------------------------                                EP -                              PRIMARY      [facility] INTERNAL  Last Visit: 06-      CARE ([facility])   MEDICINE       Mariam  Alex                              EP -                              PRIMARY      [facility] INTERNAL  Next Visit: 12-      CARE ([facility])   [facility]                                                            Last  Test          Frequency    Reason                     Performed    Due Date  --------------------------------------------------------------------------------    Mg Level....  12 months..  alendronate..............  Not Found    Overdue    Phosphate...  12 months..  alendronate..............  Not Found    Overdue    [employer] Embedded Care Due Messages. Reference number: [id number].   8/04/2025 1:36:47 PM CDT

## (undated) DEVICE — ADHESIVE MASTISOL VIAL 48/BX

## (undated) DEVICE — SEE MEDLINE ITEM 152622

## (undated) DEVICE — SEE MEDLINE ITEM 157181

## (undated) DEVICE — DRAPE ARM DAVINCI XI

## (undated) DEVICE — TRAY FOLEY 16FR INFECTION CONT

## (undated) DEVICE — POSITIONER IV ARMBOARD FOAM

## (undated) DEVICE — CONTAINER SPECIMEN STRL 4OZ

## (undated) DEVICE — SUT 2/0 30IN SILK BLK BRAI

## (undated) DEVICE — SUT V-LOC 2.0 GS-21 90 DAY

## (undated) DEVICE — SET TRI-LUMEN FILTERED TUBE

## (undated) DEVICE — SEE MEDLINE ITEM 157131

## (undated) DEVICE — SOL IRR NACL .9% 3000ML

## (undated) DEVICE — MARKER SKIN STND TIP BLUE BARR

## (undated) DEVICE — DRG DOC 8.0 X 85MM

## (undated) DEVICE — APPLIER LIGACLIP MED 11IN

## (undated) DEVICE — GLOVE BIOGEL SKINSENSE PI 6.0

## (undated) DEVICE — SOL 9P NACL IRR PIC IL

## (undated) DEVICE — COVER LIGHT HANDLE

## (undated) DEVICE — ELECTRODE REM PLYHSV RETURN 9

## (undated) DEVICE — IRRIGATOR ENDOSCOPY DISP.

## (undated) DEVICE — GLOVE SURG BIOGEL SZ 6.5

## (undated) DEVICE — DRAPE SCOPE PILLOW WARMER

## (undated) DEVICE — SYR 10CC LUER LOCK

## (undated) DEVICE — TRAY MINOR GEN SURG

## (undated) DEVICE — SUT MCRYL PLUS 4-0 PS2 27IN

## (undated) DEVICE — SOL ELECTROLUBE ANTI-STIC

## (undated) DEVICE — UNDERGLOVE BIOGEL PI SZ 6.5 LF

## (undated) DEVICE — TUBE SET INFLOW/OUTFLOW

## (undated) DEVICE — SYR 50CC LL

## (undated) DEVICE — NDL HYPO REG 25G X 1 1/2

## (undated) DEVICE — DRAPE STERI INSTRUMENT 1018

## (undated) DEVICE — SEE MEDLINE ITEM 157148

## (undated) DEVICE — GAUZE SPONGE 4X4 12PLY

## (undated) DEVICE — CANNULA DRY DOC 7 X 85

## (undated) DEVICE — Device

## (undated) DEVICE — DRAPE COLUMN DAVINCI XI

## (undated) DEVICE — SHAVER ULTRAFFR 4.2MM

## (undated) DEVICE — SEE MEDLINE ITEM 157117

## (undated) DEVICE — GLOVE BIOGEL SKINSENSE PI 7.0

## (undated) DEVICE — SEE MEDLINE ITEM 157110

## (undated) DEVICE — SCISSOR 5MMX35CM DIRECT DRIVE

## (undated) DEVICE — CLOSURE SKIN STERI STRIP 1/2X4

## (undated) DEVICE — NDL INSUF ULTRA VERESS 120MM

## (undated) DEVICE — SUPPORT SLING SHOT II MEDIUM

## (undated) DEVICE — SEAL UNIVERSAL 5MM-8MM XI

## (undated) DEVICE — DRESSING XEROFORM FOIL PK 1X8

## (undated) DEVICE — ELECTRODE BLADE INSULATED 1 IN

## (undated) DEVICE — PAD PINK TRENDELENBURG POS XL

## (undated) DEVICE — SUT PROLENE 0 MO6 30IN BLUE

## (undated) DEVICE — SEE MEDLINE ITEM 146420

## (undated) DEVICE — SUT VICRYL 3-0 27 SH

## (undated) DEVICE — ADHESIVE DERMABOND ADVANCED

## (undated) DEVICE — APPLICATOR CHLORAPREP ORN 26ML

## (undated) DEVICE — SEE MEDLINE ITEM 152530

## (undated) DEVICE — TAPE MEDIPORE 4IN X 2YDS

## (undated) DEVICE — KIT ANTIFOG W/SPONG & FLUID

## (undated) DEVICE — NDL 18GA X1 1/2 REG BEVEL

## (undated) DEVICE — SPONGE LAP 18X18 PREWASHED

## (undated) DEVICE — PAD ABD 8X10 STERILE

## (undated) DEVICE — SEE MEDLINE ITEM 154981

## (undated) DEVICE — SEE MEDLINE ITEM 146308

## (undated) DEVICE — LUBRICANT SURGILUBE 2 OZ

## (undated) DEVICE — GOWN SMART IMP BREATHABLE XXLG

## (undated) DEVICE — GOWN SURGICAL X-LARGE

## (undated) DEVICE — NDL SCORPIAN SUTURE PASSER

## (undated) DEVICE — DRAPE STERI U-SHAPED 47X51IN

## (undated) DEVICE — PAD SHOULDER POLAR CARE XL

## (undated) DEVICE — PAD ELECTRODE STER 1.5X3

## (undated) DEVICE — ELECTRODE BLD EXT INSUL 1

## (undated) DEVICE — LEGGINGS 48X31 INCH

## (undated) DEVICE — GLOVE SURGEON SYN PF SZ 9

## (undated) DEVICE — BLADE SURG CARBON STEEL SZ11

## (undated) DEVICE — OBTURATOR BLADELESS 8MM XI

## (undated) DEVICE — SPONGE SUPER KERLIX 6X6.75IN

## (undated) DEVICE — BLADE SHAVER 4.5 6/BX

## (undated) DEVICE — GLOVE ORTHO PF SZ 8.5

## (undated) DEVICE — SOL IRRI STRL WATER 1000ML

## (undated) DEVICE — SEE MEDLINE ITEM 152529

## (undated) DEVICE — COVER TIP CURVED SCISSORS XI

## (undated) DEVICE — COVER LIGHT HANDLE 80/CA

## (undated) DEVICE — KIT SHOULDER POSITIONER SPIDER

## (undated) DEVICE — UNDERGLOVES BIOGEL PI SIZE 7.5